# Patient Record
Sex: MALE | Race: WHITE | NOT HISPANIC OR LATINO | Employment: OTHER | URBAN - METROPOLITAN AREA
[De-identification: names, ages, dates, MRNs, and addresses within clinical notes are randomized per-mention and may not be internally consistent; named-entity substitution may affect disease eponyms.]

---

## 2020-01-21 ENCOUNTER — HOSPITAL ENCOUNTER (OUTPATIENT)
Dept: OTHER | Facility: HOSPITAL | Age: 65
Discharge: HOME | End: 2020-01-21
Attending: FAMILY MEDICINE

## 2020-01-21 LAB
CREAT BLD-MCNC: 0.7 MG/DL (ref 0.6–1.4)
GFR SERPLBLD BASED ON 1.73 SQ M-ARVRAT: >60 ML/MIN/{1.73_M2}

## 2020-02-03 ENCOUNTER — HOSPITAL ENCOUNTER (OUTPATIENT)
Dept: OTHER | Facility: HOSPITAL | Age: 65
Discharge: HOME | End: 2020-02-03
Attending: INTERNAL MEDICINE

## 2020-02-03 LAB
BASE EXCESS BLD CALC-SCNC: 0.2 MMOL/L
COHGB MFR BLD: 1.2 % (ref 0–1.5)
CONV ALLEN'S TEST: ABNORMAL
CONV FHHB: 14 % (ref 0–5)
CONV FIO2: 21 % (ref 21–100)
CONV SITE: ABNORMAL
HBA1C MFR BLD: 13.9 % (ref 13.8–16.4)
HCO3 BLDA-SCNC: 26 MMOL/L (ref 22–26)
LABORATORY COMMENT REPORT: ABNORMAL
LITERS PER MINUTE: 0 L/MIN
Lab: ABNORMAL
METHGB MFR BLD: 0.2 % (ref 0–1.5)
OXYHGB MFR BLD: 84.6 % (ref 94–98)
PCO2 BLD: 46.5 MM[HG] (ref 35–45)
PH UR: 7.36 [PH] (ref 7.35–7.45)
PO2 BLD: 237 MM[HG] (ref 0–500)
PO2 BLD: 49.8 MM[HG] (ref 80–100)
SAO2 % BLDCOA: 85.8 % (ref 95–99)
SPECIMEN SOURCE: ABNORMAL
SPO2: 87 MM[HG] (ref 21–100)

## 2020-05-11 ENCOUNTER — HOSPITAL ENCOUNTER (OUTPATIENT)
Dept: CARDIOLOGY | Facility: HOSPITAL | Age: 65
Discharge: HOME | End: 2020-05-11

## 2020-06-24 ENCOUNTER — HOSPITAL ENCOUNTER (OUTPATIENT)
Dept: GENERAL RADIOLOGY | Facility: HOSPITAL | Age: 65
Discharge: HOME | End: 2020-06-24
Attending: INTERNAL MEDICINE

## 2021-06-03 ENCOUNTER — HOSPITAL ENCOUNTER (INPATIENT)
Dept: TELEMETRY | Facility: HOSPITAL | Age: 66
LOS: 1 days | Discharge: HOME | End: 2021-06-05
Attending: GENERAL PRACTICE | Admitting: FAMILY MEDICINE

## 2021-06-03 LAB
ABO GROUP BLD: NORMAL
ALBUMIN SERPL-MCNC: 4.4 G/DL (ref 3.5–5)
ALBUMIN/GLOB SERPL: 1.4 {RATIO} (ref 1.4–2.6)
ALP SERPL-CCNC: 70 U/L (ref 56–155)
ALT SERPL-CCNC: 17 U/L (ref 10–40)
ANION GAP SERPL CALC-SCNC: 11 MMOL/L (ref 8–19)
AST SERPL-CCNC: 24 U/L (ref 15–50)
BASOPHILS # BLD AUTO: 0.06 10*3/UL (ref 0–0.2)
BASOPHILS NFR BLD AUTO: 0.6 % (ref 0–3)
BILIRUB SERPL-MCNC: 0.29 MG/DL (ref 0.2–1.3)
BLD GP AB SCN SERPL QL: NORMAL
BUN SERPL-MCNC: 21 MG/DL (ref 5–25)
BUN/CREAT SERPL: 30 {RATIO} (ref 6–20)
CALCIUM SERPL-MCNC: 9.4 MG/DL (ref 8.7–10.4)
CHLORIDE SERPL-SCNC: 97 MMOL/L (ref 99–111)
CONV ABD CONTROL: NORMAL
CONV ABS IMM GRAN: 0.04 10*3/UL (ref 0–0.2)
CONV CO2: 34 MMOL/L (ref 22–32)
CONV IMMATURE GRAN: 0.4 % (ref 0–1.8)
CONV TOTAL PROTEIN: 7.6 G/DL (ref 6.3–8.2)
CREAT UR-MCNC: 0.7 MG/DL (ref 0.7–1.2)
DEPRECATED RDW RBC AUTO: 41.7 FL (ref 35.1–43.9)
EOSINOPHIL # BLD AUTO: 0.18 10*3/UL (ref 0–0.7)
EOSINOPHIL # BLD AUTO: 1.7 % (ref 0–7)
ERYTHROCYTE [DISTWIDTH] IN BLOOD BY AUTOMATED COUNT: 12.1 % (ref 11.6–14.4)
GFR SERPLBLD BASED ON 1.73 SQ M-ARVRAT: >60 ML/MIN/{1.73_M2}
GLOBULIN UR ELPH-MCNC: 3.2 G/DL (ref 2–3.5)
GLUCOSE SERPL-MCNC: 123 MG/DL (ref 70–99)
HCT VFR BLD AUTO: 41.2 % (ref 42–52)
HGB BLD-MCNC: 12.9 G/DL (ref 14–18)
INR PPP: 0.99 (ref 2–3)
LYMPHOCYTES # BLD AUTO: 1.05 10*3/UL (ref 1–5)
LYMPHOCYTES NFR BLD AUTO: 9.8 % (ref 20–45)
MCH RBC QN AUTO: 29.2 PG (ref 27–31)
MCHC RBC AUTO-ENTMCNC: 31.3 G/DL (ref 33–37)
MCV RBC AUTO: 93.2 FL (ref 80–96)
MONOCYTES # BLD AUTO: 0.49 10*3/UL (ref 0.2–1.2)
MONOCYTES NFR BLD AUTO: 4.6 % (ref 3–10)
NEUTROPHILS # BLD AUTO: 8.87 10*3/UL (ref 2–8)
NEUTROPHILS NFR BLD AUTO: 82.9 % (ref 30–85)
NRBC CBCN: 0 % (ref 0–0.7)
OSMOLALITY SERPL CALC.SUM OF ELEC: 290 MOSM/KG (ref 273–304)
PLATELET # BLD AUTO: 304 10*3/UL (ref 130–400)
PMV BLD AUTO: 9.8 FL (ref 9.4–12.4)
POTASSIUM SERPL-SCNC: 4.1 MMOL/L (ref 3.5–5.3)
PROTHROMBIN TIME: 10.8 S (ref 9.4–12)
RBC # BLD AUTO: 4.42 10*6/UL (ref 4.7–6.1)
RH BLD: NORMAL
SODIUM SERPL-SCNC: 138 MMOL/L (ref 135–147)
WBC # BLD AUTO: 10.69 10*3/UL (ref 4.8–10.8)

## 2021-06-03 PROCEDURE — 9990

## 2021-06-03 PROCEDURE — C9113 INJ PANTOPRAZOLE SODIUM, VIA: HCPCS

## 2021-06-04 PROBLEM — I10 ESSENTIAL HYPERTENSION: Status: ACTIVE | Noted: 2021-06-04

## 2021-06-04 PROBLEM — I25.10 CAD (CORONARY ARTERY DISEASE): Status: ACTIVE | Noted: 2021-06-04

## 2021-06-04 PROBLEM — K62.5 RECTAL BLEEDING: Status: ACTIVE | Noted: 2021-06-04

## 2021-06-04 PROBLEM — D62 ACUTE BLOOD LOSS ANEMIA: Status: ACTIVE | Noted: 2021-06-04

## 2021-06-04 PROBLEM — J44.9 COPD (CHRONIC OBSTRUCTIVE PULMONARY DISEASE): Status: ACTIVE | Noted: 2021-06-04

## 2021-06-04 PROBLEM — Z86.73 HISTORY OF ISCHEMIC STROKE: Status: ACTIVE | Noted: 2021-06-04

## 2021-06-04 LAB
ANION GAP SERPL CALC-SCNC: 14 MMOL/L (ref 8–19)
BUN SERPL-MCNC: 18 MG/DL (ref 5–25)
BUN/CREAT SERPL: 30 {RATIO} (ref 6–20)
CALCIUM SERPL-MCNC: 8 MG/DL (ref 8.7–10.4)
CHLORIDE SERPL-SCNC: 104 MMOL/L (ref 99–111)
CONV CO2: 27 MMOL/L (ref 22–32)
CREAT UR-MCNC: 0.61 MG/DL (ref 0.7–1.2)
GFR SERPLBLD BASED ON 1.73 SQ M-ARVRAT: >60 ML/MIN/{1.73_M2}
GLUCOSE SERPL-MCNC: 94 MG/DL (ref 70–99)
HCT VFR BLD AUTO: 33.4 % (ref 42–52)
HCT VFR BLD AUTO: 35.6 % (ref 42–52)
HCT VFR BLD AUTO: 35.7 % (ref 42–52)
HGB BLD-MCNC: 10.3 G/DL (ref 14–18)
HGB BLD-MCNC: 10.9 G/DL (ref 14–18)
HGB BLD-MCNC: 11.1 G/DL (ref 14–18)
OSMOLALITY SERPL CALC.SUM OF ELEC: 294 MOSM/KG (ref 273–304)
POTASSIUM SERPL-SCNC: 4 MMOL/L (ref 3.5–5.3)
SODIUM SERPL-SCNC: 141 MMOL/L (ref 135–147)

## 2021-06-04 PROCEDURE — 9990

## 2021-06-04 PROCEDURE — 0DBP8ZX EXCISION OF RECTUM, VIA NATURAL OR ARTIFICIAL OPENING ENDOSCOPIC, DIAGNOSTIC: ICD-10-PCS | Performed by: SURGERY

## 2021-06-04 PROCEDURE — 88305 TISSUE EXAM BY PATHOLOGIST: CPT | Performed by: SURGERY

## 2021-06-04 PROCEDURE — 0DBL8ZX EXCISION OF TRANSVERSE COLON, VIA NATURAL OR ARTIFICIAL OPENING ENDOSCOPIC, DIAGNOSTIC: ICD-10-PCS | Performed by: SURGERY

## 2021-06-04 PROCEDURE — 0DBM8ZX EXCISION OF DESCENDING COLON, VIA NATURAL OR ARTIFICIAL OPENING ENDOSCOPIC, DIAGNOSTIC: ICD-10-PCS | Performed by: SURGERY

## 2021-06-04 RX ORDER — ARFORMOTEROL TARTRATE 15 UG/2ML
15 SOLUTION RESPIRATORY (INHALATION)
Status: DISCONTINUED | OUTPATIENT
Start: 2021-06-05 | End: 2021-06-05 | Stop reason: HOSPADM

## 2021-06-04 RX ORDER — LORATADINE 10 MG/1
10 TABLET ORAL DAILY
COMMUNITY

## 2021-06-04 RX ORDER — GUAIFENESIN 600 MG/1
600 TABLET, EXTENDED RELEASE ORAL 2 TIMES DAILY PRN
COMMUNITY

## 2021-06-04 RX ORDER — METOPROLOL SUCCINATE 50 MG/1
50 TABLET, EXTENDED RELEASE ORAL 2 TIMES DAILY
Status: ON HOLD | COMMUNITY
End: 2022-08-08

## 2021-06-04 RX ORDER — ALPRAZOLAM 0.25 MG/1
0.12 TABLET ORAL 2 TIMES DAILY
COMMUNITY
End: 2021-11-03

## 2021-06-04 RX ORDER — IPRATROPIUM BROMIDE AND ALBUTEROL SULFATE 2.5; .5 MG/3ML; MG/3ML
3 SOLUTION RESPIRATORY (INHALATION) EVERY 4 HOURS PRN
Status: DISCONTINUED | OUTPATIENT
Start: 2021-06-05 | End: 2021-06-05 | Stop reason: HOSPADM

## 2021-06-04 RX ORDER — IPRATROPIUM BROMIDE AND ALBUTEROL SULFATE 2.5; .5 MG/3ML; MG/3ML
3 SOLUTION RESPIRATORY (INHALATION)
COMMUNITY
End: 2021-11-03

## 2021-06-04 RX ORDER — ATORVASTATIN CALCIUM 10 MG/1
10 TABLET, FILM COATED ORAL NIGHTLY
COMMUNITY

## 2021-06-04 RX ORDER — OMEPRAZOLE 20 MG/1
20 CAPSULE, DELAYED RELEASE ORAL DAILY
COMMUNITY

## 2021-06-04 RX ORDER — ONDANSETRON 2 MG/ML
4 INJECTION INTRAMUSCULAR; INTRAVENOUS EVERY 4 HOURS PRN
Status: DISCONTINUED | OUTPATIENT
Start: 2021-06-05 | End: 2021-06-05 | Stop reason: HOSPADM

## 2021-06-04 RX ORDER — SODIUM CHLORIDE 9 MG/ML
30 INJECTION, SOLUTION INTRAVENOUS EVERY MORNING
Status: DISCONTINUED | OUTPATIENT
Start: 2021-06-05 | End: 2021-06-05 | Stop reason: HOSPADM

## 2021-06-04 RX ORDER — SODIUM CHLORIDE 0.9 % (FLUSH) 0.9 %
3 SYRINGE (ML) INJECTION EVERY 12 HOURS SCHEDULED
Status: DISCONTINUED | OUTPATIENT
Start: 2021-06-05 | End: 2021-06-05 | Stop reason: HOSPADM

## 2021-06-04 RX ORDER — SODIUM CHLORIDE 0.9 % (FLUSH) 0.9 %
3 SYRINGE (ML) INJECTION AS NEEDED
Status: DISCONTINUED | OUTPATIENT
Start: 2021-06-05 | End: 2021-06-05 | Stop reason: HOSPADM

## 2021-06-04 RX ORDER — MULTIPLE VITAMINS W/ MINERALS TAB 9MG-400MCG
1 TAB ORAL DAILY
COMMUNITY

## 2021-06-04 RX ORDER — ASPIRIN 81 MG/1
81 TABLET, CHEWABLE ORAL DAILY
COMMUNITY

## 2021-06-04 RX ORDER — BUDESONIDE AND FORMOTEROL FUMARATE DIHYDRATE 160; 4.5 UG/1; UG/1
2 AEROSOL RESPIRATORY (INHALATION)
COMMUNITY
End: 2022-05-26

## 2021-06-04 RX ORDER — LEVOFLOXACIN 750 MG/1
750 TABLET ORAL DAILY
COMMUNITY
End: 2021-06-05 | Stop reason: HOSPADM

## 2021-06-04 RX ORDER — ROFLUMILAST 500 UG/1
500 TABLET ORAL DAILY
COMMUNITY

## 2021-06-04 RX ORDER — METOPROLOL TARTRATE 50 MG/1
50 TABLET, FILM COATED ORAL EVERY 12 HOURS SCHEDULED
Status: DISCONTINUED | OUTPATIENT
Start: 2021-06-05 | End: 2021-06-05 | Stop reason: HOSPADM

## 2021-06-04 RX ORDER — BUDESONIDE 0.5 MG/2ML
0.5 INHALANT ORAL
Status: DISCONTINUED | OUTPATIENT
Start: 2021-06-05 | End: 2021-06-05 | Stop reason: HOSPADM

## 2021-06-04 RX ORDER — FAMOTIDINE 20 MG/1
20 TABLET, FILM COATED ORAL 2 TIMES DAILY
COMMUNITY
End: 2021-11-03

## 2021-06-05 VITALS
HEIGHT: 74 IN | OXYGEN SATURATION: 98 % | DIASTOLIC BLOOD PRESSURE: 58 MMHG | WEIGHT: 127.65 LBS | HEART RATE: 79 BPM | BODY MASS INDEX: 16.38 KG/M2 | RESPIRATION RATE: 18 BRPM | SYSTOLIC BLOOD PRESSURE: 100 MMHG | TEMPERATURE: 98.6 F

## 2021-06-05 LAB
ANION GAP SERPL CALCULATED.3IONS-SCNC: 7.1 MMOL/L (ref 5–15)
BASOPHILS # BLD AUTO: 0.08 10*3/MM3 (ref 0–0.2)
BASOPHILS NFR BLD AUTO: 0.9 % (ref 0–1.5)
BUN SERPL-MCNC: 21 MG/DL (ref 8–23)
BUN/CREAT SERPL: 36.8 (ref 7–25)
CALCIUM SPEC-SCNC: 8.4 MG/DL (ref 8.6–10.5)
CHLORIDE SERPL-SCNC: 109 MMOL/L (ref 98–107)
CO2 SERPL-SCNC: 28.9 MMOL/L (ref 22–29)
CREAT SERPL-MCNC: 0.57 MG/DL (ref 0.76–1.27)
DEPRECATED RDW RBC AUTO: 42.8 FL (ref 37–54)
EOSINOPHIL # BLD AUTO: 0.3 10*3/MM3 (ref 0–0.4)
EOSINOPHIL NFR BLD AUTO: 3.5 % (ref 0.3–6.2)
ERYTHROCYTE [DISTWIDTH] IN BLOOD BY AUTOMATED COUNT: 12.4 % (ref 12.3–15.4)
GFR SERPL CREATININE-BSD FRML MDRD: 144 ML/MIN/1.73
GLUCOSE SERPL-MCNC: 81 MG/DL (ref 65–99)
HCT VFR BLD AUTO: 30.6 % (ref 37.5–51)
HGB BLD-MCNC: 9.3 G/DL (ref 13–17.7)
IMM GRANULOCYTES # BLD AUTO: 0.02 10*3/MM3 (ref 0–0.05)
IMM GRANULOCYTES NFR BLD AUTO: 0.2 % (ref 0–0.5)
LYMPHOCYTES # BLD AUTO: 1.19 10*3/MM3 (ref 0.7–3.1)
LYMPHOCYTES NFR BLD AUTO: 13.9 % (ref 19.6–45.3)
MCH RBC QN AUTO: 29.2 PG (ref 26.6–33)
MCHC RBC AUTO-ENTMCNC: 30.4 G/DL (ref 31.5–35.7)
MCV RBC AUTO: 95.9 FL (ref 79–97)
MONOCYTES # BLD AUTO: 0.59 10*3/MM3 (ref 0.1–0.9)
MONOCYTES NFR BLD AUTO: 6.9 % (ref 5–12)
NEUTROPHILS NFR BLD AUTO: 6.38 10*3/MM3 (ref 1.7–7)
NEUTROPHILS NFR BLD AUTO: 74.6 % (ref 42.7–76)
NRBC BLD AUTO-RTO: 0 /100 WBC (ref 0–0.2)
PLATELET # BLD AUTO: 254 10*3/MM3 (ref 140–450)
PMV BLD AUTO: 10.5 FL (ref 6–12)
POTASSIUM SERPL-SCNC: 3.9 MMOL/L (ref 3.5–5.2)
RBC # BLD AUTO: 3.19 10*6/MM3 (ref 4.14–5.8)
SODIUM SERPL-SCNC: 145 MMOL/L (ref 136–145)
WBC # BLD AUTO: 8.56 10*3/MM3 (ref 3.4–10.8)

## 2021-06-05 PROCEDURE — 94799 UNLISTED PULMONARY SVC/PX: CPT

## 2021-06-05 PROCEDURE — 85025 COMPLETE CBC W/AUTO DIFF WBC: CPT | Performed by: FAMILY MEDICINE

## 2021-06-05 PROCEDURE — 99239 HOSP IP/OBS DSCHRG MGMT >30: CPT | Performed by: FAMILY MEDICINE

## 2021-06-05 PROCEDURE — 80048 BASIC METABOLIC PNL TOTAL CA: CPT | Performed by: FAMILY MEDICINE

## 2021-06-05 RX ADMIN — METOPROLOL TARTRATE 50 MG: 50 TABLET, FILM COATED ORAL at 09:10

## 2021-06-05 RX ADMIN — BUDESONIDE 0.5 MG: 0.5 INHALANT ORAL at 08:35

## 2021-06-05 RX ADMIN — SODIUM CHLORIDE, PRESERVATIVE FREE 3 ML: 5 INJECTION INTRAVENOUS at 09:11

## 2021-06-05 RX ADMIN — ARFORMOTEROL TARTRATE 15 MCG: 15 SOLUTION RESPIRATORY (INHALATION) at 08:35

## 2021-06-06 ENCOUNTER — READMISSION MANAGEMENT (OUTPATIENT)
Dept: CALL CENTER | Facility: HOSPITAL | Age: 66
End: 2021-06-06

## 2021-06-07 ENCOUNTER — LAB REQUISITION (OUTPATIENT)
Dept: LAB | Facility: HOSPITAL | Age: 66
End: 2021-06-07

## 2021-06-07 ENCOUNTER — READMISSION MANAGEMENT (OUTPATIENT)
Dept: CALL CENTER | Facility: HOSPITAL | Age: 66
End: 2021-06-07

## 2021-06-07 DIAGNOSIS — K51.411: ICD-10-CM

## 2021-06-07 NOTE — OUTREACH NOTE
Medical Week 1 Survey      Responses   [facility] patient discharged from?  [facility]   Does the patient have one of the following disease processes/diagnoses(primary or secondary)?  Other   Week 1 attempt successful?  Yes   Call start time  1408   Call end time  1418   Discharge diagnosis  rectal bleed,  rectal mass 6+cm w/ pending biopsies   Is patient permission given to speak with other caregiver?  Yes [Sons]   Meds reviewed with patient/caregiver?  Yes   Is the patient having any side effects they believe may be caused by any medication additions or changes?  No   Does the patient have all medications ordered at discharge?  N/A   Is the patient taking all medications as directed (includes completed medication regime)?  Yes   Medication comments  No new meds   Does the patient have a primary care provider?   Yes   Does the patient have an appointment with their PCP within 7 days of discharge?  No   [facility] regarding [facility]   What is preventing the patient from scheduling follow up appointments within 7 days of discharge?  Haven't had time   Nursing Interventions  Advised patient to make appointment, Educated patient on importance of making appointment   Has the patient kept scheduled appointments due by today?  N/A   Has home health visited the patient within 72 hours of discharge?  N/A   Psychosocial issues?  No   Did the patient receive a copy of their discharge instructions?  Yes   Nursing interventions  Reviewed instructions with patient   What is the patient's perception of their health status since discharge?  Improving   Is the patient/caregiver able to teach back signs and symptoms related to disease process for when to call PCP?  Yes   Is the patient/caregiver able to teach back signs and symptoms related to disease process for when to call 911?  Yes   Is the patient/caregiver able to teach back the hierarchy of who to call/visit for symptoms/problems? PCP, Specialist, Home health nurse,  Urgent Care, ED, 911  Yes   If the patient is a current smoker, are they able to teach back resources for cessation?  Not a smoker   Additional teach back comments  Pt instructed to watch for melena. He is eating and drinking well and advised to f/u with PCP.    Week 1 call completed?  Yes          Peri Zavala RN

## 2021-06-07 NOTE — OUTREACH NOTE
Prep Survey      Responses   [facility] facility patient discharged from?  [facility]   Is LACE score < 7 ?  No   Emergency Room discharge w/ pulse ox?  No   Eligibility  Readm Mgmt   Discharge diagnosis  rectal bleed,  rectal mass 6+cm w/ pending biopsies   Does the patient have one of the following disease processes/diagnoses(primary or secondary)?  Other   Does the patient have Home health ordered?  No   Is there a DME ordered?  No   Prep survey completed?  Yes          Gabrielle Woo RN

## 2021-06-08 LAB
CYTO UR: NORMAL
LAB AP CASE REPORT: NORMAL
LAB AP CLINICAL INFORMATION: NORMAL
PATH REPORT.FINAL DX SPEC: NORMAL
PATH REPORT.GROSS SPEC: NORMAL

## 2021-06-15 ENCOUNTER — READMISSION MANAGEMENT (OUTPATIENT)
Dept: CALL CENTER | Facility: HOSPITAL | Age: 66
End: 2021-06-15

## 2021-06-15 NOTE — OUTREACH NOTE
Medical Week 2 Survey      Responses   [facility] patient discharged from?  [facility]   Does the patient have one of the following disease processes/diagnoses(primary or secondary)?  Other   Week 2 attempt successful?  No   Unsuccessful attempts  Attempt 1          Leslie Goodrich RN

## 2021-06-22 ENCOUNTER — READMISSION MANAGEMENT (OUTPATIENT)
Dept: CALL CENTER | Facility: HOSPITAL | Age: 66
End: 2021-06-22

## 2021-06-22 NOTE — OUTREACH NOTE
Medical Week 2 Survey      Responses   [facility] patient discharged from?  [facility]   Does the patient have one of the following disease processes/diagnoses(primary or secondary)?  Other   Week 2 attempt successful?  Yes   Call start time  1101   Discharge diagnosis  rectal bleed,  rectal mass 6+cm w/ pending biopsies   Call end time  1106   Meds reviewed with patient/caregiver?  Yes   Is the patient having any side effects they believe may be caused by any medication additions or changes?  No   Does the patient have all medications ordered at discharge?  N/A   Is the patient taking all medications as directed (includes completed medication regime)?  Yes   Medication comments  NO new meds   Comments regarding appointments  [facility]   Does the patient have a primary care provider?   Yes   Has the patient kept scheduled appointments due by today?  N/A   Has home health visited the patient within 72 hours of discharge?  N/A   Psychosocial issues?  No   Did the patient receive a copy of their discharge instructions?  Yes   Nursing interventions  Reviewed instructions with patient   What is the patient's perception of their health status since discharge?  Improving   Is the patient/caregiver able to teach back signs and symptoms related to disease process for when to call PCP?  Yes   Is the patient/caregiver able to teach back signs and symptoms related to disease process for when to call 911?  Yes   Is the patient/caregiver able to teach back the hierarchy of who to call/visit for symptoms/problems? PCP, Specialist, Home health nurse, Urgent Care, ED, 911  Yes   If the patient is a current smoker, are they able to teach back resources for cessation?  Not a smoker   Additional teach back comments  Patient denies any further issues with rectal bleeding, tolerating po, no n/v and denies SOA.    Week 2 Call Completed?  Yes   Graduated  Yes   Graduated/Revoked comments  Patient is doing well, f/u scheduled  on 6/24/21. No immediate needs identified          Janette Head RN

## 2021-11-02 PROBLEM — I11.9 HYPERTENSIVE HEART DISEASE WITHOUT CONGESTIVE HEART FAILURE: Status: ACTIVE | Noted: 2021-11-02

## 2021-11-02 PROBLEM — R09.82 POSTNASAL DRIP: Status: ACTIVE | Noted: 2021-11-02

## 2021-11-02 PROBLEM — Z99.81 OXYGEN DEPENDENT: Status: ACTIVE | Noted: 2021-11-02

## 2021-11-02 PROBLEM — Z23 ENCOUNTER FOR IMMUNIZATION: Status: ACTIVE | Noted: 2021-11-02

## 2021-11-02 PROBLEM — R09.02 HYPOXEMIA: Status: ACTIVE | Noted: 2021-11-02

## 2021-11-02 PROBLEM — R06.02 SHORTNESS OF BREATH: Status: ACTIVE | Noted: 2021-11-02

## 2021-11-02 PROBLEM — Z87.891 EX-SMOKER: Status: ACTIVE | Noted: 2021-11-02

## 2021-11-02 PROBLEM — E78.5 HYPERLIPIDEMIA: Status: ACTIVE | Noted: 2021-11-02

## 2021-11-02 RX ORDER — ALBUTEROL SULFATE 2.5 MG/3ML
2.5 SOLUTION RESPIRATORY (INHALATION) EVERY 4 HOURS PRN
Status: ON HOLD | COMMUNITY
End: 2022-08-08

## 2021-11-03 ENCOUNTER — CONSULT (OUTPATIENT)
Dept: RADIATION ONCOLOGY | Facility: HOSPITAL | Age: 66
End: 2021-11-03

## 2021-11-03 ENCOUNTER — CONSULT (OUTPATIENT)
Dept: ONCOLOGY | Facility: HOSPITAL | Age: 66
End: 2021-11-03

## 2021-11-03 VITALS
WEIGHT: 124.56 LBS | DIASTOLIC BLOOD PRESSURE: 70 MMHG | OXYGEN SATURATION: 92 % | SYSTOLIC BLOOD PRESSURE: 130 MMHG | HEART RATE: 85 BPM | BODY MASS INDEX: 16.51 KG/M2 | RESPIRATION RATE: 18 BRPM | TEMPERATURE: 97.1 F | HEIGHT: 73 IN

## 2021-11-03 VITALS
OXYGEN SATURATION: 93 % | BODY MASS INDEX: 16.68 KG/M2 | TEMPERATURE: 99.6 F | DIASTOLIC BLOOD PRESSURE: 76 MMHG | SYSTOLIC BLOOD PRESSURE: 143 MMHG | HEART RATE: 78 BPM | RESPIRATION RATE: 16 BRPM | WEIGHT: 125.88 LBS | HEIGHT: 73 IN

## 2021-11-03 DIAGNOSIS — C20 RECTAL CANCER (HCC): Primary | ICD-10-CM

## 2021-11-03 LAB
ALBUMIN SERPL-MCNC: 4.1 G/DL (ref 3.5–5.2)
ALBUMIN/GLOB SERPL: 1.5 G/DL
ALP SERPL-CCNC: 54 U/L (ref 39–117)
ALT SERPL W P-5'-P-CCNC: 20 U/L (ref 1–41)
ANION GAP SERPL CALCULATED.3IONS-SCNC: 8.5 MMOL/L (ref 5–15)
AST SERPL-CCNC: 20 U/L (ref 1–40)
BILIRUB SERPL-MCNC: 0.2 MG/DL (ref 0–1.2)
BUN SERPL-MCNC: 23 MG/DL (ref 8–23)
BUN/CREAT SERPL: 34.8 (ref 7–25)
CALCIUM SPEC-SCNC: 9 MG/DL (ref 8.6–10.5)
CHLORIDE SERPL-SCNC: 99 MMOL/L (ref 98–107)
CO2 SERPL-SCNC: 32.5 MMOL/L (ref 22–29)
CREAT SERPL-MCNC: 0.66 MG/DL (ref 0.76–1.27)
GFR SERPL CREATININE-BSD FRML MDRD: 122 ML/MIN/1.73
GLOBULIN UR ELPH-MCNC: 2.8 GM/DL
GLUCOSE SERPL-MCNC: 99 MG/DL (ref 65–99)
POTASSIUM SERPL-SCNC: 4.8 MMOL/L (ref 3.5–5.2)
PROT SERPL-MCNC: 6.9 G/DL (ref 6–8.5)
SODIUM SERPL-SCNC: 140 MMOL/L (ref 136–145)

## 2021-11-03 PROCEDURE — 99204 OFFICE O/P NEW MOD 45 MIN: CPT | Performed by: INTERNAL MEDICINE

## 2021-11-03 PROCEDURE — 80053 COMPREHEN METABOLIC PANEL: CPT | Performed by: RADIOLOGY

## 2021-11-03 PROCEDURE — G0463 HOSPITAL OUTPT CLINIC VISIT: HCPCS | Performed by: INTERNAL MEDICINE

## 2021-11-03 PROCEDURE — 99205 OFFICE O/P NEW HI 60 MIN: CPT | Performed by: RADIOLOGY

## 2021-11-03 PROCEDURE — G0463 HOSPITAL OUTPT CLINIC VISIT: HCPCS

## 2021-11-03 RX ORDER — NEOMYCIN SULFATE 500 MG/1
TABLET ORAL
COMMUNITY
Start: 2021-09-01 | End: 2021-11-03

## 2021-11-03 RX ORDER — CAPECITABINE 500 MG/1
TABLET, FILM COATED ORAL
Qty: 180 TABLET | Refills: 0 | Status: SHIPPED | OUTPATIENT
Start: 2021-11-03 | End: 2022-01-26

## 2021-11-03 RX ORDER — MELATONIN
1000 DAILY
COMMUNITY
End: 2021-11-03

## 2021-11-03 RX ORDER — ONDANSETRON HYDROCHLORIDE 8 MG/1
8 TABLET, FILM COATED ORAL 3 TIMES DAILY PRN
Qty: 30 TABLET | Refills: 5 | Status: SHIPPED | OUTPATIENT
Start: 2021-11-03 | End: 2022-01-26

## 2021-11-03 NOTE — PROGRESS NOTES
New Patient Office Visit      Encounter Date: 11/03/2021   Patient Name: Pavel Dai  YOB: 1955   Medical Record Number: [id number]   Primary Diagnosis: Rectal cancer (HCC) [C20]       Chief Complaint:    Chief Complaint   Patient presents with   • Consult     Rectal Cancer       History of Present Illness: Pavel Dai is a 66 y.o. male who is here today to be seen in Radiation Oncology for   Evaluation of a recently diagnosed adenocarcinoma of the rectum.  Presenting symptoms included a GI bleed which prompted a colonoscopy. This revealed a mass consistent in appearance with a tubulovillous adenoma located 6 to 7 cm from the anal verge.  He had a transanal resection of the mass on 9/22 at the [facility].  Pathology revealed a moderately differentiated adenocarcinoma with superficial invasion of the muscularis propria.  Positive margins were noted peripherally.  There was LVI present on the specimen.  His case was discussed in a multidisciplinary setting.  Recommendation for adjuvant chemoradiation was made.. He wanted to receive radiation treatment closer to home and is here to establish care.  He recently had a PET/CT.  He was unable to tolerate an MRI of the pelvis.     Subjective      Prior Radiation History: no  Pacemaker or ICD: no    Review of Systems: Review of Systems   Constitutional: Negative for appetite change and fatigue.   HENT: Negative for trouble swallowing.    Eyes: Negative for visual disturbance.   Respiratory: Positive for cough (productive of clear/yellow sputum) and shortness of breath (on oxygen @ 2L).    Cardiovascular: Positive for leg swelling (intermittently). Negative for palpitations.   Gastrointestinal: Positive for abdominal pain (occasional sharp pain in lower abdomen) and blood in stool (at diagnosis, has since resolved). Negative for constipation, diarrhea, nausea and rectal pain.   Genitourinary: Negative  for difficulty urinating, dysuria and frequency.   Musculoskeletal: Positive for gait problem (uses w/c as needed r/t soa). Negative for back pain.   Neurological: Positive for weakness. Negative for dizziness and headaches.   Psychiatric/Behavioral: Positive for sleep disturbance.       Past Medical History:   Past Medical History:   Diagnosis Date   • CHF (congestive heart failure) (HCC)    • COPD (chronic obstructive pulmonary disease) (HCC)    • Coronary artery disease    • Frequent urination    • Hyperlipidemia    • Hypertension    • Rectal cancer (HCC)        Past Surgical History:   Past Surgical History:   Procedure Laterality Date   • COLONOSCOPY     • HERNIA REPAIR      approximately 4 years ago    • RECTAL SURGERY     • SHOULDER SURGERY      joint loose, calcium particles removed from shoulder joint       Family History:   Family History   Problem Relation Age of Onset   • No Known Problems Mother        Social History:   Social History     Socioeconomic History   • Marital status:    Tobacco Use   • Smoking status: Former Smoker     Start date: 11/3/1973     Quit date: 11/3/2014     Years since quittin.0   • Smokeless tobacco: Never Used   Vaping Use   • Vaping Use: Never used   Substance and Sexual Activity   • Alcohol use: Not Currently   • Drug use: Never   • Sexual activity: Defer       Medications:     Current Outpatient Medications:   •  albuterol (PROVENTIL) (2.5 MG/3ML) 0.083% nebulizer solution, Every 4 (Four) Hours., Disp: , Rfl:   •  aspirin 81 MG chewable tablet, Chew 81 mg Daily., Disp: , Rfl:   •  atorvastatin (LIPITOR) 10 MG tablet, Take 10 mg by mouth Every Night., Disp: , Rfl:   •  budesonide-formoterol (SYMBICORT) 160-4.5 MCG/ACT inhaler, Inhale 2 puffs 2 (Two) Times a Day., Disp: , Rfl:   •  guaiFENesin (MUCINEX) 600 MG 12 hr tablet, Take 600 mg by mouth 2 (Two) Times a Day., Disp: , Rfl:   •  ipratropium-albuterol (COMBIVENT RESPIMAT)  MCG/ACT inhaler, Inhale 1 puff 4  "(Four) Times a Day. Do not exceed inhalations per day, Disp: , Rfl:   •  loratadine (CLARITIN) 10 MG tablet, Take 10 mg by mouth Daily., Disp: , Rfl:   •  metoprolol succinate XL (TOPROL-XL) 50 MG 24 hr tablet, Take 50 mg by mouth 2 (two) times a day., Disp: , Rfl:   •  multivitamin with minerals tablet tablet, Take 1 tablet by mouth Daily., Disp: , Rfl:   •  omeprazole (priLOSEC) 20 MG capsule, Take 20 mg by mouth Daily., Disp: , Rfl:   •  roflumilast (DALIRESP) 500 MCG tablet tablet, Take 500 mcg by mouth Daily., Disp: , Rfl:     Allergies:   No Known Allergies    Measures:   Pain: (on a scale of 0-10)   Pain Score    21 1029   PainSc: 0-No pain       Advanced Care Plan: N Advanced Care Planning was discussed. The patient does not have a living will documented in the medical record and declined to perform one today.  KPS/Quality of Life: 80 - Restricted Physical Activity  ECOG: (2) Ambulatory & Capable of Self Care, Unable to Carry Out Work Activity, Up & About Greater Than 50% of Waking Hours  Depression Screenin    Objective     Physical Exam:   Vital Signs:   Vitals:    21 1029   BP: 143/76   Pulse: 78   Resp: 16   Temp: 99.6 °F (37.6 °C)   TempSrc: Temporal   SpO2: 93%   Weight: 57.1 kg (125 lb 14.1 oz)   Height: 185.4 cm (73\")   PainSc: 0-No pain     Body mass index is 16.61 kg/m².     Constitutional: The patient is a well-developed, well-nourished male  in no acute distress.  Alert and oriented ×3.  General: NAD, sitting comfortably  Eye: EOMI, anicteric sclerae  HENT: NC/AT, MMM  Respiratory: Symmetric expansion, nonlabored respiration  Cardiovascular: Regular rate and rhythm.  No murmurs, rubs, or gallops are appreciated.  Abdomen: nontender, nondistended.   Musculoskeletal: No obvious joint deformities, range of motion intact in all 4 extremities  Neuro: Alert oriented x3, cranial nerves III through XII are grossly intact, with no focal neurological deficits noted on exam.  Psych: Mood and " affect appropriate  Rectal: No masses palpable on rectal exam    PET/CT report available-focal areas of uptake in mid rectum with a mass correlate on comparison CT. mildly increased uptake in the distal rectosigmoid junction with an SUV of 7.1 without a CT correlate.  Nonhypermetabolic PA lymph nodes.  No pelvic or perirectal lymphadenopathy noted      Assessment / Plan      Assessment/Plan:   Diagnoses and all orders for this visit:    1. Rectal cancer (HCC) (Primary)  -     Comprehensive Metabolic Panel    Pavel Dai is a pleasant 66 y.o. male here to discuss adjuvant chemoradiation for his rectal adenocarcinoma.   He had a transanal resection of the mass on 9/22 at the [facility].  Pathology revealed a moderately differentiated adenocarcinoma with superficial invasion of the muscularis propria.  Positive margins were noted peripherally.  There was LVI present on the specimen.  His case was discussed in a multidisciplinary setting.  Recommendation for adjuvant chemoradiation was made. He will be receiving Xeloda through the [facility] in [place].   He was unable to tolerate an MRI.  However he had a PET/CT and does not have evidence of distant metastatic disease.   We have scheduled him for a CT simulation to facilitate treatment planning. Indications, risks, and benefits of treatment were discussed. We discussed the possibility of acute and late toxicities involving the skin, bowel, and bladder. We discussed the risk of radiation induced secondary malignancy. He signed a consent today.     Follow Up:  Return in about 1 week (around 11/10/2021) for CT SIM next visit.      Time:   I spent 65 minutes on this encounter today, 11/03/21. Activities that took place during this time include: preparing to see the patient, obtaining history, reviewing separately obtained history, performing a medically appropriate examination and evaluation, counseling and educating the patient, ordering  medications/tests/procedures, communicating with other healthcare providers, documenting clinical information in the health record, and coordinating care for this patient.     Sincerely,        Kandace Lucio MD  Radiation Oncology  [facility]    This document has been signed by Kandace Lucio MD on November 3, 2021 13:42 EDT             Patient Information:   NOTICE TO PATIENTS-HEALTH RESULTS RELEASE    We believe in information transparency, and we believe you deserve to see your information as soon as it is available.    Lab Results    We release ALL notes and results to you promptly.    Therefore, you may see some results even before we do. Please give us 2 business days to review and let you know our thoughts.  We look at every result. We will contact you with any results that concern us.  Some results may show abnormal, but are not clinically important. An example is “MCHC” and “MCV”.   Other results (like “RAINA”) are really challenging to interpret, and require reviewing other results and other information from your chart. Sometimes these are best discussed in person.    Imaging    CT scan, MRI, and PET reports can show new or re-occurring cancer  These reports can contain words that are hard to understand  These reports can also show unexpected results.  We ALWAYS plan to review these results with you and decide on a plan together. We prefer to do this in person, by video visit, or phone.  When possible, we will discuss the possible results with you BEFORE getting the test, and the next steps we would take with each result.  Some patients prefer to see their results online as soon as possible. Because of possible “bad news,” other patients may feel more comfortable waiting to discuss results when their provider is available at their next video visit or in-person visit or by phone. As the patient, you can choose when to view your result

## 2021-11-03 NOTE — ASSESSMENT & PLAN NOTE
Adenocarcinoma involving a dysplastic polyp with invasion into the muscularis propria-T2 on pathology report, positive lymph vascular invasion, positive margin.  No obvious amber involvement or metastatic disease on imaging including PET scan.  His performance status is ECOG PS 2.  Based on NCCN guidelines, I would recommend concurrent chemo RT for a T2 lesion with positive LVI and positive margins after transanal resection.  I discussed the case with Dr. Lucio, radiation oncology.  I will plan oral Xeloda 825 mg/m² twice daily Monday-Friday throughout the course of radiation.  We discussed possible adjuvant Xeloda to complete 6 months of perioperative therapy depending upon his tolerance and response to treatment with concurrent therapy.  He will need lab work-up for initiating therapy.  Prescription for Zofran also sent as needed for nausea.

## 2021-11-03 NOTE — PROGRESS NOTES
Chief Complaint  Rectal Cancer and Appointment    Jose Luis Abdalla MD Tran, Khue N, MD Subjective Allen Osorio Dai presents to [facility] for evaluation of rectal cancer.  Patient initially presented to the hospital for bright red blood per rectum.  He was evaluated at [facility].  Colonoscopy showed a lesion in the rectal area biopsy demonstrating only tubulovillous adenoma with high-grade dysplasia.  He was then referred to [facility] and underwent transanal excision of the lesion on 9/22/2021.  This demonstrated the high-grade dysplasia but also moderately differentiated adenocarcinoma with superficial invasion of the muscularis propria (T2), peripheral margin was positive.  Lymph vascular invasion identified.  He was presented at [facility] multidisciplinary tumor board.  They recommended adjuvant chemo RT.  He had a PET scan which did not demonstrate any evidence of metastatic disease.  MRI of the pelvis attempted but he was unable to complete the study.  He was initially evaluated through the [facility] but because of travel distance, he wanted to have his treatment more locally and thus presents today.  He was evaluated earlier today by Dr. Lucio with radiation oncology.  I discussed the case with her and we are in agreement for concurrent therapy with an anticipated start date later this month.  He states that the bowels are working well.  He denies any further blood per rectum or melena.  No other masses or adenopathy.  He states that he is not eating very well and has lost a few pounds since early in the summer.  He reports that he did speak with dietitian at the [facility].    Oncology/Hematology History Overview Note   9/22/21 (A)  Rectal mass; stitch on cephalad.  (B)  Right margin.         CLINICAL HISTORY:    Pre-op diagnosis: Anal mass.        DIAGNOSIS:    (A) Rectal mass,  transanal disk excision:     - Moderately differentiated invasive adenocarcinoma with focal superficial   invasion of  muscularis propria (see Synoptic Report)     - Tumor present at 3 o'clock mucosal/peripheral margin    (B) Rectum, right margin, excision:     - Fragment of colon, negative for carcinoma    11/3/21 Consult at [facility] Xeloda initiated with XRT     Rectal cancer (HCC)   11/3/2021 Initial Diagnosis    Rectal cancer (HCC)     11/3/2021 Cancer Staged    Staging form: Colon And Rectum, AJCC 8th Edition  - Clinical: Stage I (cT2, cN0, cM0) - Signed by Issac Walker MD on 11/3/2021     11/22/2021 -  Chemotherapy    OP rectal Capecitabine 825 mg/m2 M-F With Concurrent Radiation          Review of Systems   Constitutional: Negative for appetite change, diaphoresis, fatigue, fever, unexpected weight gain and unexpected weight loss.   HENT: Negative for hearing loss, mouth sores, sore throat, swollen glands, trouble swallowing and voice change.    Eyes: Negative for blurred vision.   Respiratory: Negative for cough, shortness of breath and wheezing.    Cardiovascular: Negative for chest pain and palpitations.   Gastrointestinal: Negative for abdominal pain, blood in stool, constipation, diarrhea, nausea and vomiting.   Endocrine: Negative for cold intolerance and heat intolerance.   Genitourinary: Negative for difficulty urinating, dysuria, frequency, hematuria and urinary incontinence.   Musculoskeletal: Negative for arthralgias, back pain and myalgias.   Skin: Negative for rash, skin lesions and bruise.   Neurological: Negative for dizziness, seizures, weakness, numbness and headache.   Hematological: Does not bruise/bleed easily.   Psychiatric/Behavioral: Negative for depressed mood. The patient is not nervous/anxious.      Current Outpatient Medications on File Prior to Visit   Medication Sig Dispense Refill   • albuterol (PROVENTIL) (2.5 MG/3ML) 0.083% nebulizer solution Every 4 (Four) Hours.     • aspirin  81 MG chewable tablet Chew 81 mg Daily.     • atorvastatin (LIPITOR) 10 MG tablet Take 10 mg by mouth Every Night.     • budesonide-formoterol (SYMBICORT) 160-4.5 MCG/ACT inhaler Inhale 2 puffs 2 (Two) Times a Day.     • guaiFENesin (MUCINEX) 600 MG 12 hr tablet Take 600 mg by mouth 2 (Two) Times a Day.     • ipratropium-albuterol (COMBIVENT RESPIMAT)  MCG/ACT inhaler Inhale 1 puff 4 (Four) Times a Day. Do not exceed inhalations per day     • loratadine (CLARITIN) 10 MG tablet Take 10 mg by mouth Daily.     • metoprolol succinate XL (TOPROL-XL) 50 MG 24 hr tablet Take 50 mg by mouth 2 (two) times a day.     • multivitamin with minerals tablet tablet Take 1 tablet by mouth Daily.     • omeprazole (priLOSEC) 20 MG capsule Take 20 mg by mouth Daily.     • roflumilast (DALIRESP) 500 MCG tablet tablet Take 500 mcg by mouth Daily.     • [DISCONTINUED] ALPRAZolam (XANAX) 0.25 MG tablet Take 0.125 mg by mouth 2 (two) times a day.     • [DISCONTINUED] cholecalciferol (Cholecalciferol) 25 MCG (1000 UT) tablet Take 1,000 Units by mouth Daily.     • [DISCONTINUED] famotidine (PEPCID) 20 MG tablet Take 20 mg by mouth 2 (Two) Times a Day.     • [DISCONTINUED] ipratropium-albuterol (DUO-NEB) 0.5-2.5 mg/3 ml nebulizer Take 3 mL by nebulization Every 4 (Four) Hours While Awake.     • [DISCONTINUED] neomycin (MYCIFRADIN) 500 MG tablet Take 2 tablets at 4 pm, then 2 tablets at bedtime the evening before surgery       No current facility-administered medications on file prior to visit.       No Known Allergies  Past Medical History:   Diagnosis Date   • CHF (congestive heart failure) (HCC)    • COPD (chronic obstructive pulmonary disease) (HCC)    • Coronary artery disease    • Frequent urination    • Hyperlipidemia    • Hypertension    • Rectal cancer (HCC)      Past Surgical History:   Procedure Laterality Date   • COLONOSCOPY     • HERNIA REPAIR      approximately 4 years ago    • RECTAL SURGERY     • SHOULDER SURGERY       "joint loose, calcium particles removed from shoulder joint     Social History     Socioeconomic History   • Marital status:    Tobacco Use   • Smoking status: Former Smoker     Start date: 11/3/1973     Quit date: 11/3/2014     Years since quittin.0   • Smokeless tobacco: Never Used   Vaping Use   • Vaping Use: Never used   Substance and Sexual Activity   • Alcohol use: Not Currently   • Drug use: Never   • Sexual activity: Defer     Family History   Problem Relation Age of Onset   • Heart attack Mother    • Heart attack Sister    • Heart attack Sister        Objective   Physical Exam  Vitals reviewed. Exam conducted with a chaperone present.   Constitutional:       General: He is not in acute distress.     Appearance: Normal appearance.   HENT:      Head: Normocephalic and atraumatic.      Nose:      Comments: Nasal cannula in place  Eyes:      Conjunctiva/sclera: Conjunctivae normal.      Pupils: Pupils are equal, round, and reactive to light.   Cardiovascular:      Rate and Rhythm: Normal rate and regular rhythm.      Heart sounds: Normal heart sounds. No murmur heard.  No gallop.    Pulmonary:      Effort: Pulmonary effort is normal.      Breath sounds: Normal breath sounds.   Abdominal:      General: Abdomen is flat. Bowel sounds are normal. There is no distension.      Palpations: Abdomen is soft.      Tenderness: There is no abdominal tenderness.   Musculoskeletal:      Cervical back: Neck supple.      Right lower leg: No edema.      Left lower leg: No edema.   Lymphadenopathy:      Cervical: No cervical adenopathy.   Neurological:      Mental Status: He is alert and oriented to person, place, and time.   Psychiatric:         Mood and Affect: Mood normal.         Behavior: Behavior normal.         Vitals:    21 1440   BP: 130/70   Pulse: 85   Resp: 18   Temp: 97.1 °F (36.2 °C)   SpO2: 92%  Comment: 2 l   Weight: 56.5 kg (124 lb 9 oz)   Height: 185.4 cm (73\")   PainSc: 0-No pain     ECOG " score: 2         PHQ-9 Total Score: 5                  Result Review :   The following data was reviewed by: Issac Walker MD on 11/03/2021:  Lab Results   Component Value Date    HGB 9.3 (L) 06/05/2021    HCT 30.6 (L) 06/05/2021    MCV 95.9 06/05/2021     06/05/2021    WBC 8.56 06/05/2021    NEUTROABS 6.38 06/05/2021    LYMPHSABS 1.19 06/05/2021    MONOSABS 0.59 06/05/2021    EOSABS 0.30 06/05/2021    BASOSABS 0.08 06/05/2021     Lab Results   Component Value Date    GLUCOSE 99 11/03/2021    BUN 23 11/03/2021    CREATININE 0.66 (L) 11/03/2021     11/03/2021    K 4.8 11/03/2021    CL 99 11/03/2021    CO2 32.5 (H) 11/03/2021    CALCIUM 9.0 11/03/2021    PROTEINTOT 6.9 11/03/2021    ALBUMIN 4.10 11/03/2021    BILITOT 0.2 11/03/2021    ALKPHOS 54 11/03/2021    AST 20 11/03/2021    ALT 20 11/03/2021     Data reviewed: [facility] from [facility] reviewed including oncology notes from Dr. Abdalla, PET scan, pathology report, operative report.      Assessment and Plan    Diagnoses and all orders for this visit:    1. Rectal cancer (HCC) (Primary)  Assessment & Plan:  Adenocarcinoma involving a dysplastic polyp with invasion into the muscularis propria-T2 on pathology report, positive lymph vascular invasion, positive margin.  No obvious amber involvement or metastatic disease on imaging including PET scan.  His performance status is ECOG PS 2.  Based on NCCN guidelines, I would recommend concurrent chemo RT for a T2 lesion with positive LVI and positive margins after transanal resection.  I discussed the case with Dr. Lucio, radiation oncology.  I will plan oral Xeloda 825 mg/m² twice daily Monday-Friday throughout the course of radiation.  We discussed possible adjuvant Xeloda to complete 6 months of perioperative therapy depending upon his tolerance and response to treatment with concurrent therapy.  He will need lab work-up for initiating therapy.  Prescription for  Zofran also sent as needed for nausea.     Orders:  -     CBC & Differential; Future  -     Comprehensive Metabolic Panel; Future  -     CEA; Future  -     capecitabine (XELODA) 500 MG chemo tablet; Take 3 tab(s) by mouth in the AM and 3 tab(s) by mouth in the PM on Monday-Friday with radiation.  Dispense: 180 tablet; Refill: 0  -     ondansetron (ZOFRAN) 8 MG tablet; Take 1 tablet by mouth 3 (Three) Times a Day As Needed for Nausea or Vomiting.  Dispense: 30 tablet; Refill: 5          Patient Follow Up: 2 weeks    Patient was given instructions and counseling regarding his condition or for health maintenance advice. Please see specific information pulled into the AVS if appropriate.     Issac Walker MD    11/3/2021

## 2021-11-04 ENCOUNTER — HOSPITAL ENCOUNTER (OUTPATIENT)
Dept: RADIATION ONCOLOGY | Facility: HOSPITAL | Age: 66
Setting detail: RADIATION/ONCOLOGY SERIES
End: 2021-11-04

## 2021-11-09 ENCOUNTER — HOSPITAL ENCOUNTER (OUTPATIENT)
Dept: RADIATION ONCOLOGY | Facility: HOSPITAL | Age: 66
Setting detail: RADIATION/ONCOLOGY SERIES
Discharge: HOME | End: 2021-11-09

## 2021-11-09 ENCOUNTER — TELEPHONE (OUTPATIENT)
Dept: RADIATION ONCOLOGY | Facility: HOSPITAL | Age: 66
End: 2021-11-09

## 2021-11-09 DIAGNOSIS — C20 MALIGNANT NEOPLASM OF RECTUM (HCC): ICD-10-CM

## 2021-11-09 PROCEDURE — 77334 RADIATION TREATMENT AID(S): CPT | Performed by: RADIOLOGY

## 2021-11-09 PROCEDURE — 77470 SPECIAL RADIATION TREATMENT: CPT | Performed by: RADIOLOGY

## 2021-11-09 PROCEDURE — 77263 THER RADIOLOGY TX PLNG CPLX: CPT | Performed by: RADIOLOGY

## 2021-11-09 PROCEDURE — 0 IOPAMIDOL PER 1 ML: Performed by: RADIOLOGY

## 2021-11-09 RX ADMIN — IOPAMIDOL 100 ML: 755 INJECTION, SOLUTION INTRAVENOUS at 10:10

## 2021-11-09 NOTE — TELEPHONE ENCOUNTER
Diagnosis: Rectal cancer    Reason for referral: Transportation    Comments: OSW received VM from pt requesting gas assistance. Appears pt had a consult with med onc and rad onc last Wednesday and is scheduled for a CT Sim in rad onc this morning. Called rad onc therapist and requested they provide pt with a $15 gas card today. OSW will plan to f/u with pt once tx begins to provide additional resources and assistance. OSW support remains available.    Services/Referrals Provided: Transportation - gas card

## 2021-11-19 ENCOUNTER — HOSPITAL ENCOUNTER (OUTPATIENT)
Dept: RADIATION ONCOLOGY | Facility: HOSPITAL | Age: 66
Discharge: HOME | End: 2021-11-19

## 2021-11-19 PROCEDURE — 77301 RADIOTHERAPY DOSE PLAN IMRT: CPT | Performed by: RADIOLOGY

## 2021-11-19 PROCEDURE — 77338 DESIGN MLC DEVICE FOR IMRT: CPT | Performed by: RADIOLOGY

## 2021-11-19 PROCEDURE — 77300 RADIATION THERAPY DOSE PLAN: CPT | Performed by: RADIOLOGY

## 2021-11-22 ENCOUNTER — LAB (OUTPATIENT)
Dept: LAB | Facility: HOSPITAL | Age: 66
End: 2021-11-22

## 2021-11-22 ENCOUNTER — TELEPHONE (OUTPATIENT)
Dept: ONCOLOGY | Facility: HOSPITAL | Age: 66
End: 2021-11-22

## 2021-11-22 DIAGNOSIS — C20 RECTAL CANCER (HCC): ICD-10-CM

## 2021-11-22 LAB
ALBUMIN SERPL-MCNC: 4.3 G/DL (ref 3.5–5.2)
ALBUMIN/GLOB SERPL: 1.5 G/DL
ALP SERPL-CCNC: 63 U/L (ref 39–117)
ALT SERPL W P-5'-P-CCNC: 21 U/L (ref 1–41)
ANION GAP SERPL CALCULATED.3IONS-SCNC: 5.6 MMOL/L (ref 5–15)
AST SERPL-CCNC: 25 U/L (ref 1–40)
BASOPHILS # BLD AUTO: 0.07 10*3/MM3 (ref 0–0.2)
BASOPHILS NFR BLD AUTO: 1.2 % (ref 0–1.5)
BILIRUB SERPL-MCNC: 0.2 MG/DL (ref 0–1.2)
BUN SERPL-MCNC: 20 MG/DL (ref 8–23)
BUN/CREAT SERPL: 31.3 (ref 7–25)
CALCIUM SPEC-SCNC: 9.3 MG/DL (ref 8.6–10.5)
CEA SERPL-MCNC: 2.82 NG/ML
CHLORIDE SERPL-SCNC: 96 MMOL/L (ref 98–107)
CO2 SERPL-SCNC: 35.4 MMOL/L (ref 22–29)
CREAT SERPL-MCNC: 0.64 MG/DL (ref 0.76–1.27)
DEPRECATED RDW RBC AUTO: 38.1 FL (ref 37–54)
EOSINOPHIL # BLD AUTO: 0.25 10*3/MM3 (ref 0–0.4)
EOSINOPHIL NFR BLD AUTO: 4.3 % (ref 0.3–6.2)
ERYTHROCYTE [DISTWIDTH] IN BLOOD BY AUTOMATED COUNT: 12.7 % (ref 12.3–15.4)
GFR SERPL CREATININE-BSD FRML MDRD: 126 ML/MIN/1.73
GLOBULIN UR ELPH-MCNC: 2.8 GM/DL
GLUCOSE SERPL-MCNC: 106 MG/DL (ref 65–99)
HCT VFR BLD AUTO: 33.2 % (ref 37.5–51)
HGB BLD-MCNC: 10.1 G/DL (ref 13–17.7)
IMM GRANULOCYTES # BLD AUTO: 0.01 10*3/MM3 (ref 0–0.05)
IMM GRANULOCYTES NFR BLD AUTO: 0.2 % (ref 0–0.5)
LYMPHOCYTES # BLD AUTO: 0.99 10*3/MM3 (ref 0.7–3.1)
LYMPHOCYTES NFR BLD AUTO: 17.1 % (ref 19.6–45.3)
MCH RBC QN AUTO: 25 PG (ref 26.6–33)
MCHC RBC AUTO-ENTMCNC: 30.4 G/DL (ref 31.5–35.7)
MCV RBC AUTO: 82.2 FL (ref 79–97)
MONOCYTES # BLD AUTO: 0.51 10*3/MM3 (ref 0.1–0.9)
MONOCYTES NFR BLD AUTO: 8.8 % (ref 5–12)
NEUTROPHILS NFR BLD AUTO: 3.95 10*3/MM3 (ref 1.7–7)
NEUTROPHILS NFR BLD AUTO: 68.4 % (ref 42.7–76)
NRBC BLD AUTO-RTO: 0 /100 WBC (ref 0–0.2)
PLATELET # BLD AUTO: 333 10*3/MM3 (ref 140–450)
PMV BLD AUTO: 11 FL (ref 6–12)
POTASSIUM SERPL-SCNC: 4.7 MMOL/L (ref 3.5–5.2)
PROT SERPL-MCNC: 7.1 G/DL (ref 6–8.5)
RBC # BLD AUTO: 4.04 10*6/MM3 (ref 4.14–5.8)
SODIUM SERPL-SCNC: 137 MMOL/L (ref 136–145)
WBC NRBC COR # BLD: 5.78 10*3/MM3 (ref 3.4–10.8)

## 2021-11-22 PROCEDURE — 85025 COMPLETE CBC W/AUTO DIFF WBC: CPT

## 2021-11-22 PROCEDURE — 82378 CARCINOEMBRYONIC ANTIGEN: CPT

## 2021-11-22 PROCEDURE — 80053 COMPREHEN METABOLIC PANEL: CPT

## 2021-11-22 PROCEDURE — 36415 COLL VENOUS BLD VENIPUNCTURE: CPT

## 2021-11-22 NOTE — TELEPHONE ENCOUNTER
Caller: Pavel Dai    Relationship to patient: Self    Best call back number: [phone] - PT HAS RADIATION TOMORROW AT 1030 SO TRY TO CALL TODAY OR BEFORE OR AFTER THAT APPT TOMORROW. PT DOES NOT HAVE VM SET UP ON HIS PHONE.     Chief complaint: PT WOULD LIKE TO R/S F/U APPT FOR 11/29 TO 11:30 INSTEAD OF 2:45. HE HAS RADIATION AT 10:30 AND HAS A LONG DRIVE HOME SO HE DOESN'T WANT TO HAVE TO WAIT SEVERAL HOURS FOR HIS APPT.     Type of visit: F/U    Requested date: SAME DAY BUT DIFFERENT TIME    Additional notes: PLS CALL PT TO ADVISE.

## 2021-11-23 ENCOUNTER — HOSPITAL ENCOUNTER (OUTPATIENT)
Dept: RADIATION ONCOLOGY | Facility: HOSPITAL | Age: 66
Discharge: HOME | End: 2021-11-23

## 2021-11-23 PROCEDURE — 77014 CHG CT GUIDANCE RADIATION THERAPY FLDS PLACEMENT: CPT | Performed by: RADIOLOGY

## 2021-11-23 PROCEDURE — 77386: CPT | Performed by: RADIOLOGY

## 2021-11-24 ENCOUNTER — HOSPITAL ENCOUNTER (OUTPATIENT)
Dept: RADIATION ONCOLOGY | Facility: HOSPITAL | Age: 66
Discharge: HOME | End: 2021-11-24

## 2021-11-24 ENCOUNTER — TELEPHONE (OUTPATIENT)
Dept: ONCOLOGY | Facility: HOSPITAL | Age: 66
End: 2021-11-24

## 2021-11-24 VITALS — BODY MASS INDEX: 16.67 KG/M2 | WEIGHT: 126.32 LBS

## 2021-11-24 PROCEDURE — 77014 CHG CT GUIDANCE RADIATION THERAPY FLDS PLACEMENT: CPT | Performed by: RADIOLOGY

## 2021-11-24 PROCEDURE — 77386: CPT | Performed by: RADIOLOGY

## 2021-11-24 NOTE — PROGRESS NOTES
Outpatient Nutrition Oncology Assessment    Patient Name: Pavel Dai  YOB: 1955  MRN: [id number]  Assessment Date: 11/24/2021    CLINICAL NUTRITION ASSESSMENT    Dx:  Rectal Ca      Type of Cancer Treatment  XRT to pelvis  Xeloda     CLINICAL NUTRITION ASSESSMENT      Reason for Assessment  Assessment     H&P:    Past Medical History:   Diagnosis Date   • CHF (congestive heart failure) (HCC)    • COPD (chronic obstructive pulmonary disease) (HCC)    • Coronary artery disease    • Frequent urination    • Hyperlipidemia    • Hypertension    • Rectal cancer (HCC)         Current Problems:   Patient Active Problem List   Diagnosis Code   • Rectal bleeding K62.5   • Acute blood loss anemia D62   • COPD (chronic obstructive pulmonary disease) (HCC) J44.9   • CAD (coronary artery disease) I25.10   • Essential hypertension I10   • History of ischemic stroke Z86.73   • Body mass index (BMI) 19.9 or less, adult Z68.1   • Encounter for immunization Z23   • Ex-smoker Z87.891   • Hyperlipidemia E78.5   • Hypoxemia R09.02   • Hypertensive heart disease without congestive heart failure I11.9   • Oxygen dependent Z99.81   • Postnasal drip R09.82   • Shortness of breath R06.02   • Rectal cancer (HCC) C20   • Malignant neoplasm of rectum (HCC) C20         Anthropometrics     Row Name 11/24/21 1058          Anthropometrics    Weight 57.3 kg (126 lb 5.2 oz)                 BMI kg/m2   Body mass index is 16.67 kg/m².    Weight Hx  Wt Readings from Last 30 Encounters:   11/24/21 1058 57.3 kg (126 lb 5.2 oz)   11/03/21 1440 56.5 kg (124 lb 9 oz)   11/03/21 1029 57.1 kg (125 lb 14.1 oz)   06/04/21 1142 57.9 kg (127 lb 10.3 oz)         Estimated/Assessed Needs     Row Name 11/24/21 1059          Calculation Measurements    Weight Used For Calculations 57.6 kg (126 lb 15.8 oz)            Estimated/Assessed Needs    Additional Documentation Protein Requirements (Group); Fluid Requirements (Group); KCAL/KG (Group)             KCAL/KG    KCAL/KG 35 Kcal/Kg (kcal)     35 Kcal/Kg (kcal) 2016            Protein Requirements    Weight Used For Protein Calculations 57.6 kg (126 lb 15.8 oz)     Est Protein Requirement Amount (gms/kg) 1.5 gm protein     Estimated Protein Requirements (gms/day) 86.4            Fluid Requirements    Fluid Requirements (mL/day) 1728  30 ml/kg ABW     RDA Method (mL) 1728                  Labs/Medications        Pertinent Labs Reviewed.   Results from last 7 days   Lab Units 11/22/21  1040   SODIUM mmol/L 137   POTASSIUM mmol/L 4.7   CHLORIDE mmol/L 96*   CO2 mmol/L 35.4*   BUN mg/dL 20   CREATININE mg/dL 0.64*   CALCIUM mg/dL 9.3   BILIRUBIN mg/dL 0.2   ALK PHOS U/L 63   ALT (SGPT) U/L 21   AST (SGOT) U/L 25   GLUCOSE mg/dL 106*     Results from last 7 days   Lab Units 11/22/21  1040   HEMOGLOBIN g/dL 10.1*   HEMATOCRIT % 33.2*     No results found for: COVID19  No results found for: HGBA1C      Pertinent Medications albuterol, aspirin, atorvastatin, budesonide-formoterol, capecitabine, guaiFENesin, ipratropium-albuterol, loratadine, metoprolol succinate XL, multivitamin with minerals, omeprazole, ondansetron, and roflumilast     Physical Findings        Malnutrition Severity Assessment     Row Name 11/24/21 1100          Malnutrition Severity Assessment    Malnutrition Type Chronic Disease - Related Malnutrition            Muscle Loss    Loss of Muscle Mass Findings Moderate     [place] Region Moderate - slight depression     Clavicle Bone Region Moderate - some protrusion in females, visible in males     Patellar Region Moderate - patella more prominent, less muscle definition around patella     Anterior Thigh Region Moderate - mild depression on inner thigh            Fat Loss    Subcutaneous Fat Loss Findings Moderate     Orbital Region  Moderate -  somewhat hollowness, slightly dark circles     Upper Arm Region Moderate - some fat tissue, not ample            Criteria Met (Must meet criteria for severity  "in at least 2 of these categories: M Wasting, Fat Loss, Fluid, Secondary Signs, Wt. Status, Intake)    Patient meets criteria for  Moderate (non-severe) Malnutrition                  Current Nutrition Orders & Evaluation of Intake       Oral Nutrition     Current PO Diet Soft foods (small/frequent meals), increased fluids   Supplement Ensure plus BID     Nutrition Diagnosis        Nutrition Dx Problem 1 Moderate malnutrition related to increased nutrient needs due to catabolic disease as evidenced by physiological causes increasing nutrient needs., hypermetabolic state., muscle wasting. and fat loss.       Nutrition Intervention       RD Action Nutrition counseling provided  Written materials given:  Diarrhea, High Calorie & High Protein Foods, Oral Care     Monitor/Evaluation       Monitor Per oncology nutrition protocol.     Comments:    Pt with 4% wt decline in the past 3 months.  Moderate muscle wasting & moderate subcutaneous fat loss noted while observing pt lying on treatment table.  He reports he is making a large effort to gain weight and has gained ~1-2 kg in the last week.  [facility] reports [facility] oral nutrition supplements (Ensure Plus) and he drinks 1 can BID.  Pt lives alone and reports it is hard to \"cook for one person\".  Reviewed high kcal/high protein convenient foods pt can consume.  He reports he is \"very picky\" and does not like a lot of the creamy-type suggestions that were given.  Discussed ideas in length.  Provided written materials.    Electronically signed by:  Kimberlee Gomez RD  11/24/21 11:01 EST  "

## 2021-11-24 NOTE — TELEPHONE ENCOUNTER
error   [General Appearance - Alert] : alert [PERRL With Normal Accommodation] : pupils were equal in size, round, and reactive to light [General Appearance - In No Acute Distress] : in no acute distress [Sclera] : the sclera and conjunctiva were normal [Extraocular Movements] : extraocular movements were intact [] : no respiratory distress [Auscultation Breath Sounds / Voice Sounds] : lungs were clear to auscultation bilaterally [Heart Sounds] : normal S1 and S2 [Murmurs] : no murmurs [Heart Rate And Rhythm] : heart rate was normal and rhythm regular [Heart Sounds Gallop] : no gallops [Heart Sounds Pericardial Friction Rub] : no pericardial rub [Full Pulse] : the pedal pulses are present [Edema] : there was no peripheral edema [Skin Color & Pigmentation] : normal skin color and pigmentation [Skin Turgor] : normal skin turgor [Cervical Lymph Nodes Enlarged Posterior Bilaterally] : posterior cervical [Cervical Lymph Nodes Enlarged Anterior Bilaterally] : anterior cervical [Supraclavicular Lymph Nodes Enlarged Bilaterally] : supraclavicular [No CVA Tenderness] : no ~M costovertebral angle tenderness [No Spinal Tenderness] : no spinal tenderness [Oriented To Time, Place, And Person] : oriented to person, place, and time [Impaired Insight] : insight and judgment were intact [Affect] : the affect was normal [FreeTextEntry1] : fast speech, appropriate, anxiety tolerable

## 2021-11-29 ENCOUNTER — HOSPITAL ENCOUNTER (OUTPATIENT)
Dept: RADIATION ONCOLOGY | Facility: HOSPITAL | Age: 66
Discharge: HOME | End: 2021-11-29

## 2021-11-29 ENCOUNTER — OFFICE VISIT (OUTPATIENT)
Dept: ONCOLOGY | Facility: HOSPITAL | Age: 66
End: 2021-11-29

## 2021-11-29 ENCOUNTER — DOCUMENTATION (OUTPATIENT)
Dept: RADIATION ONCOLOGY | Facility: HOSPITAL | Age: 66
End: 2021-11-29

## 2021-11-29 VITALS
BODY MASS INDEX: 16.58 KG/M2 | RESPIRATION RATE: 18 BRPM | DIASTOLIC BLOOD PRESSURE: 80 MMHG | OXYGEN SATURATION: 94 % | WEIGHT: 125.66 LBS | SYSTOLIC BLOOD PRESSURE: 143 MMHG | TEMPERATURE: 97.6 F | HEART RATE: 86 BPM

## 2021-11-29 DIAGNOSIS — C20 RECTAL CANCER (HCC): Primary | ICD-10-CM

## 2021-11-29 DIAGNOSIS — D62 ACUTE BLOOD LOSS ANEMIA: ICD-10-CM

## 2021-11-29 PROCEDURE — 77014 CHG CT GUIDANCE RADIATION THERAPY FLDS PLACEMENT: CPT | Performed by: RADIOLOGY

## 2021-11-29 PROCEDURE — 77386: CPT | Performed by: RADIOLOGY

## 2021-11-29 PROCEDURE — 99214 OFFICE O/P EST MOD 30 MIN: CPT | Performed by: INTERNAL MEDICINE

## 2021-11-29 PROCEDURE — G0463 HOSPITAL OUTPT CLINIC VISIT: HCPCS | Performed by: INTERNAL MEDICINE

## 2021-11-29 NOTE — PROGRESS NOTES
Chief Complaint  Malignant Neoplasm of Rectum (-fu)    Yoel Geller MD Tran, Khue N, MD Subjective          Pavel Dai presents to [facility] for ongoing treatment of his rectal cancer.  He is receiving concurrent chemo RT with radiation and single agent oral Xeloda.  He started last week.  He states that the treatments have gone well so far.  He denies nausea, vomiting, diarrhea, mouth sores, rash or hand-foot syndrome.  His appetite is normal.  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  He denies new masses or adenopathy.    Oncology/Hematology History Overview Note   9/22/21 (A)  Rectal mass; stitch on cephalad.  (B)  Right margin.         CLINICAL HISTORY:    Pre-op diagnosis: Anal mass.        DIAGNOSIS:    (A) Rectal mass, transanal disk excision:     - Moderately differentiated invasive adenocarcinoma with focal superficial   invasion of  muscularis propria (see Synoptic Report)     - Tumor present at 3 o'clock mucosal/peripheral margin    (B) Rectum, right margin, excision:     - Fragment of colon, negative for carcinoma    11/3/21 Consult at [facility] Xeloda initiated with XRT     Rectal cancer (HCC)   11/3/2021 Initial Diagnosis    Rectal cancer (HCC)     11/3/2021 Cancer Staged    Staging form: Colon And Rectum, AJCC 8th Edition  - Clinical: Stage I (cT2, cN0, cM0) - Signed by Issac Walker MD on 11/3/2021     11/22/2021 -  Chemotherapy    OP rectal Capecitabine 825 mg/m2 M-F With Concurrent Radiation      Malignant neoplasm of rectum (HCC) (Resolved)   11/9/2021 Initial Diagnosis    Malignant neoplasm of rectum (HCC)     11/9/2021 - 11/29/2021 Radiation    RADIATION THERAPY Treatment Details (11/9/2021 - 11/29/2021)  Site: Rectum  Technique: IMRT  Goal: No goal specified  Planned Treatment Start Date: No planned start date specified         Review of Systems   Constitutional: Positive for fatigue.   Respiratory: Positive for  shortness of breath (pt is O2 dependent).    All other systems reviewed and are negative.    Current Outpatient Medications on File Prior to Visit   Medication Sig Dispense Refill   • albuterol (PROVENTIL) (2.5 MG/3ML) 0.083% nebulizer solution Every 4 (Four) Hours.     • aspirin 81 MG chewable tablet Chew 81 mg Daily.     • atorvastatin (LIPITOR) 10 MG tablet Take 10 mg by mouth Every Night.     • budesonide-formoterol (SYMBICORT) 160-4.5 MCG/ACT inhaler Inhale 2 puffs 2 (Two) Times a Day.     • capecitabine (XELODA) 500 MG chemo tablet Take 3 tab(s) by mouth in the AM and 3 tab(s) by mouth in the PM on Monday-Friday with radiation. 180 tablet 0   • guaiFENesin (MUCINEX) 600 MG 12 hr tablet Take 600 mg by mouth 2 (Two) Times a Day.     • ipratropium-albuterol (COMBIVENT RESPIMAT)  MCG/ACT inhaler Inhale 1 puff 4 (Four) Times a Day. Do not exceed inhalations per day     • loratadine (CLARITIN) 10 MG tablet Take 10 mg by mouth Daily.     • metoprolol succinate XL (TOPROL-XL) 50 MG 24 hr tablet Take 50 mg by mouth 2 (two) times a day.     • multivitamin with minerals tablet tablet Take 1 tablet by mouth Daily.     • omeprazole (priLOSEC) 20 MG capsule Take 20 mg by mouth Daily.     • ondansetron (ZOFRAN) 8 MG tablet Take 1 tablet by mouth 3 (Three) Times a Day As Needed for Nausea or Vomiting. 30 tablet 5   • roflumilast (DALIRESP) 500 MCG tablet tablet Take 500 mcg by mouth Daily.       No current facility-administered medications on file prior to visit.       No Known Allergies  Past Medical History:   Diagnosis Date   • CHF (congestive heart failure) (HCC)    • COPD (chronic obstructive pulmonary disease) (HCC)    • Coronary artery disease    • Frequent urination    • Hyperlipidemia    • Hypertension    • Rectal cancer (HCC)      Past Surgical History:   Procedure Laterality Date   • COLONOSCOPY     • HERNIA REPAIR      approximately 4 years ago    • RECTAL SURGERY     • SHOULDER SURGERY      joint loose,  calcium particles removed from shoulder joint     Social History     Socioeconomic History   • Marital status:    Tobacco Use   • Smoking status: Former Smoker     Start date: 11/3/1973     Quit date: 11/3/2014     Years since quittin.0   • Smokeless tobacco: Never Used   Vaping Use   • Vaping Use: Never used   Substance and Sexual Activity   • Alcohol use: Not Currently   • Drug use: Never   • Sexual activity: Defer     Family History   Problem Relation Age of Onset   • Heart attack Mother    • Heart attack Sister    • Heart attack Sister        Objective   Physical Exam  Vitals reviewed. Exam conducted with a chaperone present.   Constitutional:       General: He is not in acute distress.     Appearance: Normal appearance.   HENT:      Head:      Comments: Nasal cannula in place    Cardiovascular:      Rate and Rhythm: Normal rate and regular rhythm.      Heart sounds: Normal heart sounds. No murmur heard.  No gallop.    Pulmonary:      Effort: Pulmonary effort is normal.      Breath sounds: Normal breath sounds.   Abdominal:      General: Abdomen is flat. Bowel sounds are normal. There is no distension.      Palpations: Abdomen is soft.      Tenderness: There is no abdominal tenderness.   Musculoskeletal:      Cervical back: Neck supple.      Right lower leg: No edema.      Left lower leg: No edema.   Lymphadenopathy:      Cervical: No cervical adenopathy.   Neurological:      Mental Status: He is alert and oriented to person, place, and time.   Psychiatric:         Mood and Affect: Mood normal.         Behavior: Behavior normal.         Vitals:    21 0918   BP: 143/80   Pulse: 86   Resp: 18   Temp: 97.6 °F (36.4 °C)   SpO2: 94%  Comment: Vladimir Can 2L   Weight: 57 kg (125 lb 10.6 oz)   PainSc: 0-No pain     ECOG score: 2         PHQ-9 Total Score: 0                  Result Review :   The following data was reviewed by: Issac Walker MD on 2021:  Lab Results   Component Value Date    HGB  10.1 (L) 11/22/2021    HCT 33.2 (L) 11/22/2021    MCV 82.2 11/22/2021     11/22/2021    WBC 5.78 11/22/2021    NEUTROABS 3.95 11/22/2021    LYMPHSABS 0.99 11/22/2021    MONOSABS 0.51 11/22/2021    EOSABS 0.25 11/22/2021    BASOSABS 0.07 11/22/2021     Lab Results   Component Value Date    GLUCOSE 106 (H) 11/22/2021    BUN 20 11/22/2021    CREATININE 0.64 (L) 11/22/2021     11/22/2021    K 4.7 11/22/2021    CL 96 (L) 11/22/2021    CO2 35.4 (H) 11/22/2021    CALCIUM 9.3 11/22/2021    PROTEINTOT 7.1 11/22/2021    ALBUMIN 4.30 11/22/2021    BILITOT 0.2 11/22/2021    ALKPHOS 63 11/22/2021    AST 25 11/22/2021    ALT 21 11/22/2021           Assessment and Plan    Diagnoses and all orders for this visit:    1. Rectal cancer (HCC) (Primary)  Assessment & Plan:  Patient is receiving concurrent chemo RT with radiation and single agent Xeloda.  Tolerating well thus far.  Lab work is notable for anemia which is multifactorial including prior blood loss and current treatment with radiation and chemotherapy.  His other blood counts are normal.  Adequate for treatment.  Continue capecitabine by mouth as prescribed.  Continue XRT as per radiation oncology.  I will see him back in 2 weeks for ongoing treatment with lab work prior to monitor for toxicities.    Orders:  -     CBC & Differential; Future  -     Comprehensive Metabolic Panel; Future    2. Acute blood loss anemia  Assessment & Plan:  Hemoglobin is decreased at 10.1 g/dL.  No intervention required at this point.  Repeat CBC at next visit            Patient Follow Up: 2 weeks  Patient was given instructions and counseling regarding his condition or for health maintenance advice. Please see specific information pulled into the AVS if appropriate.     Issac Walker MD    11/30/2021

## 2021-11-29 NOTE — PROGRESS NOTES
Diagnosis: Rectal Cancer    Reason for referral: Rounding    Comments: OSW and OSW student met with pt and pt's sister in [facility] during first week of tx/rounding. Pt presented in a pleasant mood. Introduced myself and discussed OSW role/psychosocial services available. Discussed opportunities for gas, transportation and financial assistance. Pt expressed interest in gas assistance and application for [employer] was completed and will be faxed when pt's source of income is confirmed. Application to the [employer] Cancer [employer]: [employer] was also completed and will be faxed pending Dr. Lucio's signature. OSW also spoke with pt about [employer] and financial assistance through the organization but pt reported that he is not interested as he does not want to take more than he needs. Pt not interested in transportation nor other financial assistance at this time as he provides his own transportation to and from appointments. Discussed opportunities for connection to support services (speaking with another pt for support, support groups, counseling etc.). Pt indicated that he is not interested in support services at this time as he receives great support from his sister who was with him at his tx today, his son who assists him with grocery shopping, his grandson who helps with mowing his lawn as well as other family and friends. Pt shared that his wife who passed away from lung cancer has been a pt of Dr. Partida. Pt expressed interest in getting assistance with cleaning his home every now and then. OSW student provided pt with information for the [facility] ([facility]) and pt indicated that he would be reaching out to them soon. Pt currently uses oxygen that he gets [facility]. Pt resides alone in [place] and has [facility] insurance coverage. OSW and OSW student support remains available. Pt and pt's sister expressed gratitude.      Services/Referrals Provided: Financial/gas  assistance: [employer], [place] [facility], [facility] ([facility])

## 2021-11-30 ENCOUNTER — HOSPITAL ENCOUNTER (OUTPATIENT)
Dept: RADIATION ONCOLOGY | Facility: HOSPITAL | Age: 66
Discharge: HOME | End: 2021-11-30

## 2021-11-30 VITALS
TEMPERATURE: 99 F | RESPIRATION RATE: 16 BRPM | DIASTOLIC BLOOD PRESSURE: 68 MMHG | HEART RATE: 85 BPM | OXYGEN SATURATION: 96 % | BODY MASS INDEX: 16.58 KG/M2 | SYSTOLIC BLOOD PRESSURE: 138 MMHG | WEIGHT: 125.66 LBS

## 2021-11-30 DIAGNOSIS — C20 RECTAL CANCER (HCC): Primary | ICD-10-CM

## 2021-11-30 PROCEDURE — 77386: CPT | Performed by: RADIOLOGY

## 2021-11-30 PROCEDURE — 77014 CHG CT GUIDANCE RADIATION THERAPY FLDS PLACEMENT: CPT | Performed by: RADIOLOGY

## 2021-11-30 PROCEDURE — 77427 RADIATION TX MANAGEMENT X5: CPT | Performed by: RADIOLOGY

## 2021-11-30 NOTE — PROGRESS NOTES
"                                     On Treatment Note      Encounter Date: 11/30/2021   Patient Name: Pavel Dai  YOB: 1955   Medical Record Number: [id number]       Rectal cancer (HCC)  Staging form: Colon And Rectum, AJCC 8th Edition  - Clinical: Stage I (cT2, cN0, cM0) - Signed by Issac Walker MD on 11/3/2021            Cancer Staging  Stage I (cT2, cN0, cM0)  Treatment Site: Pelvis  Prescribed dose: 50.4 Gy/28 fractions  Completed dose: 7.4 Gy/4 fractions  Date of First Treatment: 11/23/21    Tolerance: Tolerating well    Radiation related toxicities and management plan: none     He is receiving concurrent chemotherapy: xeloda (Dr. Walker)     Issues raised by patient or treatment team: none      Subjective      Review of Systems: Review of Systems   Constitutional: Negative for appetite change and fatigue.   HENT: Negative for sore throat and trouble swallowing.    Respiratory: Positive for cough (occasionally productive of clear to light green sputum) and shortness of breath (on oxygen cont. ).    Cardiovascular: Negative for leg swelling.   Gastrointestinal: Positive for abdominal pain (occasional, tolerable per pt.). Negative for constipation (taking stool softener nightly), diarrhea and nausea.   Genitourinary: Negative for difficulty urinating.   Musculoskeletal: Positive for arthralgias, back pain and gait problem.   Neurological: Positive for headaches (ongoing x \"30 yrs\" per pt, no recent change). Negative for dizziness and weakness.   Psychiatric/Behavioral: Positive for sleep disturbance (ongoing x 2-3 years, no recent change).       The following portions of the patient's history were reviewed and updated as appropriate: allergies, current medications, past family history, past medical history, past social history, past surgical history and problem list.    Measures:   Pain: (on a scale of 0-10)   Pain Score    11/30/21 1046   PainSc: 0-No pain       Advanced Care Plan: " N   KPS/Quality of Life: 80 - Restricted Physical Activity  ECOG: (2) Ambulatory and capable of self care, unable to carry out work activity, up and about > 50% or waking hours  Depression Screening:   Patient screened positive for depression based on a PHQ-9 score of 0 on 11/29/2021.         Objective     Physical Exam:   Vital Signs:   Vitals:    11/30/21 1046   BP: 138/68   Pulse: 85   Resp: 16   Temp: 99 °F (37.2 °C)   SpO2: 96%      Body mass index is 16.58 kg/m².   Wt Readings from Last 3 Encounters:   11/30/21 57 kg (125 lb 10.6 oz)   11/29/21 57 kg (125 lb 10.6 oz)   11/24/21 57.3 kg (126 lb 5.2 oz)       General: NAD, sitting comfortably  Eye: EOMI, anicteric sclerae  Respiratory: Symmetric expansion, nonlabored respiration  Neuro: Alert oriented x3, cranial nerves III through XII are grossly intact.  Psych: Mood and affect appropriate      Assessment / Plan      Plan:   I reviewed technical aspects of the radiation therapy treatment administration including dose delivery and daily treatment parameters to verify that these meet the specifications from my clinical treatment planning note. I reviewed the treatment setup notes. I reviewed and approved images obtained for “image guidance” with setup and treatment.     We will continue radiation therapy as prescribed.        Kandace Lucio MD  Radiation Oncology  [facility]    This document has been signed by Kandace Lucio MD on November 30, 2021 11:05 EST

## 2021-11-30 NOTE — ASSESSMENT & PLAN NOTE
Hemoglobin is decreased at 10.1 g/dL.  No intervention required at this point.  Repeat CBC at next visit

## 2021-11-30 NOTE — ASSESSMENT & PLAN NOTE
Patient is receiving concurrent chemo RT with radiation and single agent Xeloda.  Tolerating well thus far.  Lab work is notable for anemia which is multifactorial including prior blood loss and current treatment with radiation and chemotherapy.  His other blood counts are normal.  Adequate for treatment.  Continue capecitabine by mouth as prescribed.  Continue XRT as per radiation oncology.  I will see him back in 2 weeks for ongoing treatment with lab work prior to monitor for toxicities.

## 2021-11-30 NOTE — PROGRESS NOTES
11/30: OSW student faxed application for [employer]. Fax confirmed.     11/30: Application for [employer] was mailed.

## 2021-12-01 ENCOUNTER — HOSPITAL ENCOUNTER (OUTPATIENT)
Dept: RADIATION ONCOLOGY | Facility: HOSPITAL | Age: 66
Setting detail: RADIATION/ONCOLOGY SERIES
End: 2021-12-01

## 2021-12-01 ENCOUNTER — HOSPITAL ENCOUNTER (OUTPATIENT)
Dept: RADIATION ONCOLOGY | Facility: HOSPITAL | Age: 66
Discharge: HOME | End: 2021-12-01

## 2021-12-01 VITALS — WEIGHT: 126.76 LBS | BODY MASS INDEX: 16.72 KG/M2

## 2021-12-01 PROCEDURE — 77014 CHG CT GUIDANCE RADIATION THERAPY FLDS PLACEMENT: CPT | Performed by: RADIOLOGY

## 2021-12-01 PROCEDURE — 77386: CPT | Performed by: RADIOLOGY

## 2021-12-01 PROCEDURE — 77336 RADIATION PHYSICS CONSULT: CPT | Performed by: RADIOLOGY

## 2021-12-01 NOTE — PROGRESS NOTES
Outpatient Nutrition Oncology Follow Up    Patient Name: Pavel Dai  YOB: 1955  MRN: [id number]  Assessment Date: 12/1/2021    CLINICAL NUTRITION ASSESSMENT    Dx:  Rectal Ca      Type of Cancer Treatment  XRT to pelvis  Xeloda     CLINICAL NUTRITION ASSESSMENT      Reason for Assessment  Follow-up protocol     H&P:    Past Medical History:   Diagnosis Date   • CHF (congestive heart failure) (HCC)    • COPD (chronic obstructive pulmonary disease) (HCC)    • Coronary artery disease    • Frequent urination    • Hyperlipidemia    • Hypertension    • Rectal cancer (HCC)         Current Problems:   Patient Active Problem List   Diagnosis Code   • Rectal bleeding K62.5   • Acute blood loss anemia D62   • COPD (chronic obstructive pulmonary disease) (HCC) J44.9   • CAD (coronary artery disease) I25.10   • Essential hypertension I10   • History of ischemic stroke Z86.73   • Body mass index (BMI) 19.9 or less, adult Z68.1   • Encounter for immunization Z23   • Ex-smoker Z87.891   • Hyperlipidemia E78.5   • Hypoxemia R09.02   • Hypertensive heart disease without congestive heart failure I11.9   • Oxygen dependent Z99.81   • Postnasal drip R09.82   • Shortness of breath R06.02   • Rectal cancer (HCC) C20         Anthropometrics     Row Name 12/01/21 1031          Anthropometrics    Weight 57.5 kg (126 lb 12.2 oz)                 BMI kg/m2   Body mass index is 16.72 kg/m².    Weight Hx  Wt Readings from Last 30 Encounters:   12/01/21 1031 57.5 kg (126 lb 12.2 oz)   11/30/21 1046 57 kg (125 lb 10.6 oz)   11/29/21 0918 57 kg (125 lb 10.6 oz)   11/24/21 1058 57.3 kg (126 lb 5.2 oz)   11/03/21 1440 56.5 kg (124 lb 9 oz)   11/03/21 1029 57.1 kg (125 lb 14.1 oz)   06/04/21 1142 57.9 kg (127 lb 10.3 oz)        Labs/Medications        Pertinent Labs Reviewed.         Invalid input(s): LABALBU, PROT      No results found for: COVID19  No results found for: HGBA1C      Pertinent Medications albuterol, aspirin,  "atorvastatin, budesonide-formoterol, capecitabine, guaiFENesin, ipratropium-albuterol, loratadine, metoprolol succinate XL, multivitamin with minerals, omeprazole, ondansetron, and roflumilast     Current Nutrition Orders & Evaluation of Intake       Oral Nutrition     Current PO Diet Soft foods (small/frequent meals), increased fluids recommended   Supplement Ensure plus BID     Nutrition Diagnosis        Nutrition Dx Problem 1 Moderate malnutrition related to increased nutrient needs due to catabolic disease as evidenced by physiological causes increasing nutrient needs., hypermetabolic state., muscle wasting., fat loss. and patient report.       Nutrition Intervention       RD Action Nutrition follow up     Monitor/Evaluation       Monitor Per oncology nutrition protocol.     Comments:    .2 kg wt gain this week.  Pt denies nutrition-related concerns and reports to be eating better.  He is drinking the Ensure plus BID- he says the oral nutrition supplements \"must be helping me gain weight\".    Electronically signed by:  Kimberlee Gomez RD  12/01/21 10:32 EST  "

## 2021-12-02 ENCOUNTER — HOSPITAL ENCOUNTER (OUTPATIENT)
Dept: RADIATION ONCOLOGY | Facility: HOSPITAL | Age: 66
Discharge: HOME | End: 2021-12-02

## 2021-12-02 PROCEDURE — 77386: CPT | Performed by: RADIOLOGY

## 2021-12-02 PROCEDURE — 77014 CHG CT GUIDANCE RADIATION THERAPY FLDS PLACEMENT: CPT | Performed by: RADIOLOGY

## 2021-12-03 ENCOUNTER — HOSPITAL ENCOUNTER (OUTPATIENT)
Dept: RADIATION ONCOLOGY | Facility: HOSPITAL | Age: 66
Discharge: HOME | End: 2021-12-03

## 2021-12-03 PROCEDURE — 77014 CHG CT GUIDANCE RADIATION THERAPY FLDS PLACEMENT: CPT | Performed by: RADIOLOGY

## 2021-12-03 PROCEDURE — 77386: CPT | Performed by: RADIOLOGY

## 2021-12-06 ENCOUNTER — HOSPITAL ENCOUNTER (OUTPATIENT)
Dept: RADIATION ONCOLOGY | Facility: HOSPITAL | Age: 66
Discharge: HOME | End: 2021-12-06

## 2021-12-06 PROCEDURE — 77014 CHG CT GUIDANCE RADIATION THERAPY FLDS PLACEMENT: CPT | Performed by: RADIOLOGY

## 2021-12-06 PROCEDURE — 77386: CPT | Performed by: RADIOLOGY

## 2021-12-07 ENCOUNTER — HOSPITAL ENCOUNTER (OUTPATIENT)
Dept: RADIATION ONCOLOGY | Facility: HOSPITAL | Age: 66
Discharge: HOME | End: 2021-12-07

## 2021-12-07 VITALS
TEMPERATURE: 98.7 F | WEIGHT: 125.44 LBS | DIASTOLIC BLOOD PRESSURE: 73 MMHG | RESPIRATION RATE: 16 BRPM | HEART RATE: 86 BPM | OXYGEN SATURATION: 94 % | SYSTOLIC BLOOD PRESSURE: 140 MMHG | BODY MASS INDEX: 16.55 KG/M2

## 2021-12-07 DIAGNOSIS — C20 RECTAL CANCER (HCC): Primary | ICD-10-CM

## 2021-12-07 PROCEDURE — 77386: CPT | Performed by: RADIOLOGY

## 2021-12-07 PROCEDURE — 77014 CHG CT GUIDANCE RADIATION THERAPY FLDS PLACEMENT: CPT | Performed by: RADIOLOGY

## 2021-12-07 PROCEDURE — 77427 RADIATION TX MANAGEMENT X5: CPT | Performed by: RADIOLOGY

## 2021-12-07 NOTE — PROGRESS NOTES
"                                     On Treatment Note      Encounter Date: 12/07/2021   Patient Name: Pavel Dai  YOB: 1955   Medical Record Number: [id number]     Primary Diagnosis:@   Cancer Staging: Cancer Staging  Rectal cancer (HCC)  Staging form: Colon And Rectum, AJCC 8th Edition  - Clinical: Stage I (cT2, cN0, cM0) - Signed by Issac Walker MD on 11/3/2021            Cancer Staging  Stage I (cT2, cN0, cM0)      Treatment Site: Pelvis  Prescribed dose: 50.4 Gy/28 fractions  Completed dose: 16.2 Gy/9 fractions  Date of First Treatment: 11/23/21     Tolerance: Tolerating well     Radiation related toxicities and management plan: none      He is receiving concurrent chemotherapy: xeloda (Dr. Walker)      Issues raised by patient or treatment team: none - reinforced full bladder/empty rectum setup - discussed use of miralax      Subjective      Review of Systems: Review of Systems   Constitutional: Positive for fatigue. Negative for appetite change.   Respiratory: Positive for cough and shortness of breath (on oxygen @ 2L).    Cardiovascular: Negative for leg swelling.   Gastrointestinal: Positive for abdominal pain (r/t gas) and constipation.   Genitourinary: Positive for frequency and urgency. Negative for difficulty urinating and dysuria.   Musculoskeletal: Positive for gait problem. Negative for arthralgias and back pain.   Neurological: Positive for weakness (bilateral knees) and headaches (ongoing for \"years\", no recent change). Negative for dizziness.   Psychiatric/Behavioral: Positive for sleep disturbance (ongoing).       The following portions of the patient's history were reviewed and updated as appropriate: allergies, current medications, past family history, past medical history, past social history, past surgical history and problem list.    Measures:   Pain: (on a scale of 0-10)   Pain Score    12/07/21 1044   PainSc: 0-No pain       Advanced Care Plan: N Advance Care " Planning   ACP discussion was declined by the patient. Patient does not have an advance directive, declines further assistance.    KPS/Quality of Life: 80 - Restricted Physical Activity  ECOG: (2) Ambulatory and capable of self care, unable to carry out work activity, up and about > 50% or waking hours  Depression Screening:    PHQ-9 score of 0 on 11/29/2021.         Objective     Physical Exam:   Vital Signs:   Vitals:    12/07/21 1044   BP: 140/73   Pulse: 86   Resp: 16   Temp: 98.7 °F (37.1 °C)   SpO2: 94%      Body mass index is 16.55 kg/m².   Wt Readings from Last 3 Encounters:   12/07/21 56.9 kg (125 lb 7.1 oz)   12/01/21 57.5 kg (126 lb 12.2 oz)   11/30/21 57 kg (125 lb 10.6 oz)       General: NAD, sitting comfortably  Eye: EOMI, anicteric sclerae  Respiratory: Symmetric expansion, nonlabored respiration  Neuro: Alert oriented x3, cranial nerves III through XII are grossly intact.  Psych: Mood and affect appropriate      Assessment / Plan      Plan:   I reviewed technical aspects of the radiation therapy treatment administration including dose delivery and daily treatment parameters to verify that these meet the specifications from my clinical treatment planning note. I reviewed the treatment setup notes. I reviewed and approved images obtained for “image guidance” with setup and treatment.     We will continue radiation therapy as prescribed.        Kandace Lucio MD  Radiation Oncology  [facility]    This document has been signed by Kandace Lucio MD on December 7, 2021 12:32 EST

## 2021-12-08 ENCOUNTER — HOSPITAL ENCOUNTER (OUTPATIENT)
Dept: RADIATION ONCOLOGY | Facility: HOSPITAL | Age: 66
Discharge: HOME | End: 2021-12-08

## 2021-12-08 PROCEDURE — 77014 CHG CT GUIDANCE RADIATION THERAPY FLDS PLACEMENT: CPT | Performed by: RADIOLOGY

## 2021-12-08 PROCEDURE — 77336 RADIATION PHYSICS CONSULT: CPT | Performed by: RADIOLOGY

## 2021-12-08 PROCEDURE — 77386: CPT | Performed by: RADIOLOGY

## 2021-12-09 ENCOUNTER — HOSPITAL ENCOUNTER (OUTPATIENT)
Dept: RADIATION ONCOLOGY | Facility: HOSPITAL | Age: 66
Discharge: HOME | End: 2021-12-09

## 2021-12-09 VITALS — WEIGHT: 125.44 LBS | BODY MASS INDEX: 16.55 KG/M2

## 2021-12-09 PROCEDURE — 77386: CPT | Performed by: RADIOLOGY

## 2021-12-09 PROCEDURE — 77014 CHG CT GUIDANCE RADIATION THERAPY FLDS PLACEMENT: CPT | Performed by: RADIOLOGY

## 2021-12-09 NOTE — PROGRESS NOTES
Outpatient Nutrition Oncology Follow Up    Patient Name: Pavel Dai  YOB: 1955  MRN: [id number]  Assessment Date: 12/9/2021    CLINICAL NUTRITION ASSESSMENT    Dx:  Rectal Ca      Type of Cancer Treatment XRT to pelvis   Xeloda     CLINICAL NUTRITION ASSESSMENT      Reason for Assessment  Follow-up protocol     H&P:    Past Medical History:   Diagnosis Date   • CHF (congestive heart failure) (HCC)    • COPD (chronic obstructive pulmonary disease) (HCC)    • Coronary artery disease    • Frequent urination    • Hyperlipidemia    • Hypertension    • Rectal cancer (HCC)         Current Problems:   Patient Active Problem List   Diagnosis Code   • Rectal bleeding K62.5   • Acute blood loss anemia D62   • COPD (chronic obstructive pulmonary disease) (HCC) J44.9   • CAD (coronary artery disease) I25.10   • Essential hypertension I10   • History of ischemic stroke Z86.73   • Body mass index (BMI) 19.9 or less, adult Z68.1   • Encounter for immunization Z23   • Ex-smoker Z87.891   • Hyperlipidemia E78.5   • Hypoxemia R09.02   • Hypertensive heart disease without congestive heart failure I11.9   • Oxygen dependent Z99.81   • Postnasal drip R09.82   • Shortness of breath R06.02   • Rectal cancer (HCC) C20         Anthropometrics     Row Name 12/09/21 1046          Anthropometrics    Weight 56.9 kg (125 lb 7.1 oz)            Usual Body Weight (UBW)    Weight Loss unintentional     Weight Loss Time Frame 1% wt decline in the past week                 BMI kg/m2   Body mass index is 16.55 kg/m².    Weight Hx  Wt Readings from Last 30 Encounters:   12/09/21 1046 56.9 kg (125 lb 7.1 oz)   12/07/21 1044 56.9 kg (125 lb 7.1 oz)   12/01/21 1031 57.5 kg (126 lb 12.2 oz)   11/30/21 1046 57 kg (125 lb 10.6 oz)   11/29/21 0918 57 kg (125 lb 10.6 oz)   11/24/21 1058 57.3 kg (126 lb 5.2 oz)   11/03/21 1440 56.5 kg (124 lb 9 oz)   11/03/21 1029 57.1 kg (125 lb 14.1 oz)   06/04/21 1142 57.9 kg (127 lb 10.3 oz)     "    Labs/Medications        Pertinent Labs Reviewed.       Pertinent Medications albuterol, aspirin, atorvastatin, budesonide-formoterol, capecitabine, guaiFENesin, ipratropium-albuterol, loratadine, metoprolol succinate XL, multivitamin with minerals, omeprazole, ondansetron, and roflumilast     Current Nutrition Orders & Evaluation of Intake       Oral Nutrition     Current PO Diet Soft foods (small/frequent meals), increased fluids recommended   Supplement Ensure Plus BID (see below)     Nutrition Diagnosis        Nutrition Dx Problem 1 Moderate malnutrition related to increased nutrient needs due to catabolic disease as evidenced by physiological causes increasing nutrient needs., hypermetabolic state., muscle wasting. and fat loss.       Nutrition Intervention       RD Action Nutrition follow up     Monitor/Evaluation       Monitor Per oncology nutrition protocol.     Comments:    1% wt decline in the past week.  Pt reports he was previously drinking 1 can Ensure Plus BID ([facility] through [facility]), but has only been drinking 1 per day for the past week.  He notes that because he has to drink a lot of water prior to his tx, he was afraid the Ensure would make him feel \"too full\".  He spoke to radiation therapist & they encouraged pt to drink the Ensure supplement prior to coming & water- they would continue to monitor his fluid intake and provide advice as needed.  He reports improvement in gas due to takng Gas-ex.  He reports \"pain\" upon elimination of BMs, however report his stool is soft.  He is taking Miralax to help with elimination.      Electronically signed by:  Kimberlee Gomez RD  12/09/21 10:47 EST  "

## 2021-12-10 ENCOUNTER — HOSPITAL ENCOUNTER (OUTPATIENT)
Dept: RADIATION ONCOLOGY | Facility: HOSPITAL | Age: 66
Discharge: HOME | End: 2021-12-10

## 2021-12-10 PROCEDURE — 77386: CPT | Performed by: RADIOLOGY

## 2021-12-10 PROCEDURE — 77014 CHG CT GUIDANCE RADIATION THERAPY FLDS PLACEMENT: CPT | Performed by: RADIOLOGY

## 2021-12-13 ENCOUNTER — LAB (OUTPATIENT)
Dept: ONCOLOGY | Facility: HOSPITAL | Age: 66
End: 2021-12-13

## 2021-12-13 ENCOUNTER — HOSPITAL ENCOUNTER (OUTPATIENT)
Dept: RADIATION ONCOLOGY | Facility: HOSPITAL | Age: 66
Discharge: HOME | End: 2021-12-13

## 2021-12-13 ENCOUNTER — OFFICE VISIT (OUTPATIENT)
Dept: ONCOLOGY | Facility: HOSPITAL | Age: 66
End: 2021-12-13

## 2021-12-13 VITALS
SYSTOLIC BLOOD PRESSURE: 135 MMHG | OXYGEN SATURATION: 94 % | DIASTOLIC BLOOD PRESSURE: 80 MMHG | BODY MASS INDEX: 16 KG/M2 | TEMPERATURE: 96.4 F | WEIGHT: 121.25 LBS | RESPIRATION RATE: 18 BRPM | HEART RATE: 96 BPM

## 2021-12-13 DIAGNOSIS — C20 RECTAL CANCER (HCC): ICD-10-CM

## 2021-12-13 DIAGNOSIS — C20 RECTAL CANCER (HCC): Primary | ICD-10-CM

## 2021-12-13 PROBLEM — T45.1X5A ANEMIA DUE TO CHEMOTHERAPY: Status: ACTIVE | Noted: 2021-12-13

## 2021-12-13 PROBLEM — D64.81 ANEMIA DUE TO CHEMOTHERAPY: Status: ACTIVE | Noted: 2021-12-13

## 2021-12-13 LAB
ALBUMIN SERPL-MCNC: 4.2 G/DL (ref 3.5–5.2)
ALBUMIN/GLOB SERPL: 1.6 G/DL
ALP SERPL-CCNC: 59 U/L (ref 39–117)
ALT SERPL W P-5'-P-CCNC: 16 U/L (ref 1–41)
ANION GAP SERPL CALCULATED.3IONS-SCNC: 10.6 MMOL/L (ref 5–15)
AST SERPL-CCNC: 19 U/L (ref 1–40)
BASOPHILS # BLD AUTO: 0.01 10*3/MM3 (ref 0–0.2)
BASOPHILS NFR BLD AUTO: 0.1 % (ref 0–1.5)
BILIRUB SERPL-MCNC: 0.3 MG/DL (ref 0–1.2)
BUN SERPL-MCNC: 20 MG/DL (ref 8–23)
BUN/CREAT SERPL: 29.4 (ref 7–25)
CALCIUM SPEC-SCNC: 9.7 MG/DL (ref 8.6–10.5)
CHLORIDE SERPL-SCNC: 95 MMOL/L (ref 98–107)
CO2 SERPL-SCNC: 32.4 MMOL/L (ref 22–29)
CREAT SERPL-MCNC: 0.68 MG/DL (ref 0.76–1.27)
DEPRECATED RDW RBC AUTO: 43.7 FL (ref 37–54)
EOSINOPHIL # BLD AUTO: 1.88 10*3/MM3 (ref 0–0.4)
EOSINOPHIL NFR BLD AUTO: 19.1 % (ref 0.3–6.2)
ERYTHROCYTE [DISTWIDTH] IN BLOOD BY AUTOMATED COUNT: 15 % (ref 12.3–15.4)
GFR SERPL CREATININE-BSD FRML MDRD: 117 ML/MIN/1.73
GLOBULIN UR ELPH-MCNC: 2.7 GM/DL
GLUCOSE SERPL-MCNC: 115 MG/DL (ref 65–99)
HCT VFR BLD AUTO: 35.2 % (ref 37.5–51)
HGB BLD-MCNC: 10.4 G/DL (ref 13–17.7)
IMM GRANULOCYTES # BLD AUTO: 0.02 10*3/MM3 (ref 0–0.05)
IMM GRANULOCYTES NFR BLD AUTO: 0.2 % (ref 0–0.5)
LYMPHOCYTES # BLD AUTO: 0.39 10*3/MM3 (ref 0.7–3.1)
LYMPHOCYTES NFR BLD AUTO: 4 % (ref 19.6–45.3)
MCH RBC QN AUTO: 26.4 PG (ref 26.6–33)
MCHC RBC AUTO-ENTMCNC: 29.5 G/DL (ref 31.5–35.7)
MCV RBC AUTO: 89.3 FL (ref 79–97)
MONOCYTES # BLD AUTO: 0.74 10*3/MM3 (ref 0.1–0.9)
MONOCYTES NFR BLD AUTO: 7.5 % (ref 5–12)
NEUTROPHILS NFR BLD AUTO: 6.8 10*3/MM3 (ref 1.7–7)
NEUTROPHILS NFR BLD AUTO: 69.1 % (ref 42.7–76)
PLATELET # BLD AUTO: 270 10*3/MM3 (ref 140–450)
PMV BLD AUTO: 9 FL (ref 6–12)
POTASSIUM SERPL-SCNC: 4.8 MMOL/L (ref 3.5–5.2)
PROT SERPL-MCNC: 6.9 G/DL (ref 6–8.5)
RBC # BLD AUTO: 3.94 10*6/MM3 (ref 4.14–5.8)
SODIUM SERPL-SCNC: 138 MMOL/L (ref 136–145)
WBC NRBC COR # BLD: 9.84 10*3/MM3 (ref 3.4–10.8)

## 2021-12-13 PROCEDURE — 80053 COMPREHEN METABOLIC PANEL: CPT

## 2021-12-13 PROCEDURE — G0463 HOSPITAL OUTPT CLINIC VISIT: HCPCS

## 2021-12-13 PROCEDURE — 85025 COMPLETE CBC W/AUTO DIFF WBC: CPT

## 2021-12-13 PROCEDURE — 99214 OFFICE O/P EST MOD 30 MIN: CPT | Performed by: INTERNAL MEDICINE

## 2021-12-13 PROCEDURE — 77386: CPT | Performed by: RADIOLOGY

## 2021-12-13 PROCEDURE — 36415 COLL VENOUS BLD VENIPUNCTURE: CPT

## 2021-12-13 PROCEDURE — 77014 CHG CT GUIDANCE RADIATION THERAPY FLDS PLACEMENT: CPT | Performed by: RADIOLOGY

## 2021-12-13 NOTE — PROGRESS NOTES
Chief Complaint  neoplasm of rectum and Follow-up    Yoel Geller MD Tran, Khue N, MD Subjective          Pavel Dai presents to [facility] for ongoing treatment of his rectal cancer.  He is receiving adjuvant radiation and capecitabine.  He states he is tolerating the treatments reasonably well.  He is having increased diarrhea and also reports a lot of gassiness and tenesmus symptoms with his radiation.  He denies blood per rectum or melena.  He states that he is eating and drinking fairly well.  His activities are limited by oxygen dependent COPD.  He denies masses or adenopathy.  No unusual aches or pains.    Oncology/Hematology History Overview Note   9/22/21 (A)  Rectal mass; stitch on cephalad.  (B)  Right margin.         CLINICAL HISTORY:    Pre-op diagnosis: Anal mass.        DIAGNOSIS:    (A) Rectal mass, transanal disk excision:     - Moderately differentiated invasive adenocarcinoma with focal superficial   invasion of  muscularis propria (see Synoptic Report)     - Tumor present at 3 o'clock mucosal/peripheral margin    (B) Rectum, right margin, excision:     - Fragment of colon, negative for carcinoma    11/3/21 Consult at [facility] Xeloda initiated with XRT     Rectal cancer (HCC)   11/3/2021 Initial Diagnosis    Rectal cancer (HCC)     11/3/2021 Cancer Staged    Staging form: Colon And Rectum, AJCC 8th Edition  - Clinical: Stage I (cT2, cN0, cM0) - Signed by Issac Walker MD on 11/3/2021     12/13/2021 -  Chemotherapy    OP rectal Capecitabine 825 mg/m2 M-F With Concurrent Radiation      Malignant neoplasm of rectum (HCC) (Resolved)   11/9/2021 Initial Diagnosis    Malignant neoplasm of rectum (HCC)     11/9/2021 - 11/29/2021 Radiation    RADIATION THERAPY Treatment Details (11/9/2021 - 11/29/2021)  Site: Rectum  Technique: IMRT  Goal: No goal specified  Planned Treatment Start Date: No planned start date specified         Review of Systems    Constitutional: Positive for fatigue. Negative for appetite change, diaphoresis, fever, unexpected weight gain and unexpected weight loss.   HENT: Negative for hearing loss, mouth sores, sore throat, swollen glands, trouble swallowing and voice change.    Eyes: Negative for blurred vision.   Respiratory: Positive for shortness of breath. Negative for cough and wheezing.    Cardiovascular: Negative for chest pain and palpitations.   Gastrointestinal: Negative for abdominal pain, blood in stool, constipation, diarrhea, nausea and vomiting.   Endocrine: Negative for cold intolerance and heat intolerance.   Genitourinary: Negative for difficulty urinating, dysuria, frequency, hematuria and urinary incontinence.   Musculoskeletal: Negative for arthralgias, back pain and myalgias.   Skin: Negative for rash, skin lesions and bruise.   Neurological: Positive for weakness. Negative for dizziness, seizures, numbness and headache.   Hematological: Does not bruise/bleed easily.   Psychiatric/Behavioral: Negative for depressed mood. The patient is not nervous/anxious.      Current Outpatient Medications on File Prior to Visit   Medication Sig Dispense Refill   • albuterol (PROVENTIL) (2.5 MG/3ML) 0.083% nebulizer solution Every 4 (Four) Hours.     • aspirin 81 MG chewable tablet Chew 81 mg Daily.     • atorvastatin (LIPITOR) 10 MG tablet Take 10 mg by mouth Every Night.     • budesonide-formoterol (SYMBICORT) 160-4.5 MCG/ACT inhaler Inhale 2 puffs 2 (Two) Times a Day.     • capecitabine (XELODA) 500 MG chemo tablet Take 3 tab(s) by mouth in the AM and 3 tab(s) by mouth in the PM on Monday-Friday with radiation. 180 tablet 0   • guaiFENesin (MUCINEX) 600 MG 12 hr tablet Take 600 mg by mouth 2 (Two) Times a Day.     • ipratropium-albuterol (COMBIVENT RESPIMAT)  MCG/ACT inhaler Inhale 1 puff 4 (Four) Times a Day. Do not exceed inhalations per day     • loratadine (CLARITIN) 10 MG tablet Take 10 mg by mouth Daily.     •  metoprolol succinate XL (TOPROL-XL) 50 MG 24 hr tablet Take 50 mg by mouth 2 (two) times a day.     • multivitamin with minerals tablet tablet Take 1 tablet by mouth Daily.     • omeprazole (priLOSEC) 20 MG capsule Take 20 mg by mouth Daily.     • ondansetron (ZOFRAN) 8 MG tablet Take 1 tablet by mouth 3 (Three) Times a Day As Needed for Nausea or Vomiting. 30 tablet 5   • roflumilast (DALIRESP) 500 MCG tablet tablet Take 500 mcg by mouth Daily.       No current facility-administered medications on file prior to visit.       No Known Allergies  Past Medical History:   Diagnosis Date   • Anemia due to chemotherapy 2021   • CHF (congestive heart failure) (HCC)    • COPD (chronic obstructive pulmonary disease) (HCC)    • Coronary artery disease    • Frequent urination    • Hyperlipidemia    • Hypertension    • Rectal cancer (HCC)      Past Surgical History:   Procedure Laterality Date   • COLONOSCOPY     • HERNIA REPAIR      approximately 4 years ago    • RECTAL SURGERY     • SHOULDER SURGERY      joint loose, calcium particles removed from shoulder joint     Social History     Socioeconomic History   • Marital status:    Tobacco Use   • Smoking status: Former Smoker     Start date: 11/3/1973     Quit date: 11/3/2014     Years since quittin.1   • Smokeless tobacco: Never Used   Vaping Use   • Vaping Use: Never used   Substance and Sexual Activity   • Alcohol use: Not Currently   • Drug use: Never   • Sexual activity: Defer     Family History   Problem Relation Age of Onset   • Heart attack Mother    • Heart attack Sister    • Heart attack Sister        Objective   Physical Exam  Vitals reviewed. Exam conducted with a chaperone present.   Constitutional:       Appearance: Normal appearance.   HENT:      Head: Normocephalic and atraumatic.      Comments: Nasal cannula in place  Cardiovascular:      Rate and Rhythm: Normal rate and regular rhythm.      Heart sounds: Normal heart sounds. No murmur  heard.  No gallop.    Pulmonary:      Effort: Pulmonary effort is normal.      Breath sounds: Normal breath sounds.   Abdominal:      General: Abdomen is flat. Bowel sounds are normal.      Palpations: Abdomen is soft.   Musculoskeletal:      Cervical back: Neck supple.      Right lower leg: No edema.      Left lower leg: No edema.   Lymphadenopathy:      Cervical: No cervical adenopathy.   Neurological:      Mental Status: He is alert and oriented to person, place, and time.   Psychiatric:         Mood and Affect: Mood normal.         Behavior: Behavior normal.         Vitals:    12/13/21 0821   BP: 135/80   Pulse: 96   Resp: 18   Temp: 96.4 °F (35.8 °C)   SpO2: 94%  Comment: 2 liters   Weight: 55 kg (121 lb 4.1 oz)   PainSc: 0-No pain     ECOG score: 2         PHQ-9 Total Score:                    Result Review :   The following data was reviewed by: Issac Walker MD on 12/13/2021:  Lab Results   Component Value Date    HGB 10.4 (L) 12/13/2021    HCT 35.2 (L) 12/13/2021    MCV 89.3 12/13/2021     12/13/2021    WBC 9.84 12/13/2021    NEUTROABS 6.80 12/13/2021    LYMPHSABS 0.39 (L) 12/13/2021    MONOSABS 0.74 12/13/2021    EOSABS 1.88 (H) 12/13/2021    BASOSABS 0.01 12/13/2021     Lab Results   Component Value Date    GLUCOSE 115 (H) 12/13/2021    BUN 20 12/13/2021    CREATININE 0.68 (L) 12/13/2021     12/13/2021    K 4.8 12/13/2021    CL 95 (L) 12/13/2021    CO2 32.4 (H) 12/13/2021    CALCIUM 9.7 12/13/2021    PROTEINTOT 6.9 12/13/2021    ALBUMIN 4.20 12/13/2021    BILITOT 0.3 12/13/2021    ALKPHOS 59 12/13/2021    AST 19 12/13/2021    ALT 16 12/13/2021           Assessment and Plan    Diagnoses and all orders for this visit:    1. Rectal cancer (HCC) (Primary)  Assessment & Plan:  Patient is status post resection.  He is receiving adjuvant chemo RT with radiation and capecitabine.  Tolerating his treatments reasonably well.  He does have ongoing anemia with a hemoglobin 10.4 g/dL.  He reports some  increased tenesmus which is likely secondary to radiation proctitis-side effect of his treatment.  He also reports increased bloating and gassiness.  I recommended Gas-X 4 times daily with meals and at bedtime.  His lab work today is otherwise adequate for treatment, continue capecitabine as prescribed.  He will follow-up with radiation oncology as per their plan.  I will see him back in 1 week for continued treatment with lab work prior to monitor for toxicities.    Orders:  -     CBC and Differential; Future  -     Comprehensive metabolic panel; Future  -     Lab Appointment Request; Future  -     Clinic Appointment Request; Future  -     CBC and Differential; Future  -     Lab Appointment Request; Future  -     CBC and Differential; Future  -     Lab Appointment Request; Future  -     CBC and Differential; Future  -     Lab Appointment Request; Future  -     CBC and Differential; Future  -     Lab Appointment Request; Future  -     CBC and Differential; Future  -     Lab Appointment Request; Future  -     Oral Chemo - Track Receipt of Meds  -     Treatment Considerations          Patient Follow Up: 1 week    Patient was given instructions and counseling regarding his condition or for health maintenance advice. Please see specific information pulled into the AVS if appropriate.     Issac Walker MD    12/13/2021

## 2021-12-13 NOTE — ASSESSMENT & PLAN NOTE
Patient is status post resection.  He is receiving adjuvant chemo RT with radiation and capecitabine.  Tolerating his treatments reasonably well.  He does have ongoing anemia with a hemoglobin 10.4 g/dL.  He reports some increased tenesmus which is likely secondary to radiation proctitis-side effect of his treatment.  He also reports increased bloating and gassiness.  I recommended Gas-X 4 times daily with meals and at bedtime.  His lab work today is otherwise adequate for treatment, continue capecitabine as prescribed.  He will follow-up with radiation oncology as per their plan.  I will see him back in 1 week for continued treatment with lab work prior to monitor for toxicities.

## 2021-12-13 NOTE — ASSESSMENT & PLAN NOTE
Hemoglobin is decreased but reasonable.  No specific intervention required at this point.  Repeat CBC at next visit.

## 2021-12-14 ENCOUNTER — HOSPITAL ENCOUNTER (OUTPATIENT)
Dept: RADIATION ONCOLOGY | Facility: HOSPITAL | Age: 66
Discharge: HOME | End: 2021-12-14

## 2021-12-14 PROCEDURE — 77386: CPT | Performed by: RADIOLOGY

## 2021-12-14 PROCEDURE — 77014 CHG CT GUIDANCE RADIATION THERAPY FLDS PLACEMENT: CPT | Performed by: RADIOLOGY

## 2021-12-15 ENCOUNTER — HOSPITAL ENCOUNTER (OUTPATIENT)
Dept: RADIATION ONCOLOGY | Facility: HOSPITAL | Age: 66
Discharge: HOME | End: 2021-12-15

## 2021-12-15 VITALS
OXYGEN SATURATION: 95 % | SYSTOLIC BLOOD PRESSURE: 122 MMHG | RESPIRATION RATE: 18 BRPM | HEART RATE: 95 BPM | WEIGHT: 122.58 LBS | TEMPERATURE: 98.5 F | DIASTOLIC BLOOD PRESSURE: 78 MMHG | BODY MASS INDEX: 16.17 KG/M2

## 2021-12-15 DIAGNOSIS — C20 RECTAL CANCER (HCC): Primary | ICD-10-CM

## 2021-12-15 PROCEDURE — 77427 RADIATION TX MANAGEMENT X5: CPT | Performed by: RADIOLOGY

## 2021-12-15 PROCEDURE — 77014 CHG CT GUIDANCE RADIATION THERAPY FLDS PLACEMENT: CPT | Performed by: RADIOLOGY

## 2021-12-15 PROCEDURE — 77386: CPT | Performed by: RADIOLOGY

## 2021-12-15 PROCEDURE — 77336 RADIATION PHYSICS CONSULT: CPT | Performed by: RADIOLOGY

## 2021-12-15 NOTE — PROGRESS NOTES
On Treatment Visit       Patient: Pavel Dai   YOB: 1955   Medical Record Number: [id number]     Date of Visit  December 15, 2021   Primary Diagnosis:Rectal cancer (HCC) [C20]  Cancer Staging: Cancer Staging  Stage I (cT2, cN0, cM0)       was seen today for an on treatment visit.  He is receiving radiation therapy to the pelvis.  He  has received 2700 cGy in 15 fractions out of a planned dose of 5040 cGy in any 8 fractions. He is currently receiving concurrent capecitabine chemotherapy per Dr. Walker.     Today on exam the patient is tolerating radiation therapy well and has no new disease or treatment-related complaints.  Denies rectal bleeding.  Had had problems with abdominal sharp pain, relieved with Gas-X.  Also had constipation, relieved with laxative.                                            Review of Systems:   Review of Systems   Constitutional: Positive for appetite change and fatigue.   HENT: Negative for trouble swallowing.    Respiratory: Positive for cough (productive of brownish colored sputum) and shortness of breath (on oxygen).    Cardiovascular: Negative for leg swelling.   Gastrointestinal: Positive for abdominal pain (r/t gas, need to have BM) and constipation. Negative for nausea.   Genitourinary: Positive for frequency. Negative for difficulty urinating, dysuria and urgency.   Musculoskeletal: Positive for gait problem.   Neurological: Positive for weakness (bilateral legs) and headaches (intermittent, ongoing for 40 years per pt.). Negative for dizziness.   Psychiatric/Behavioral: Positive for sleep disturbance (frequent waking).       Vitals:     Vitals:    12/15/21 1048   BP: 122/78   Pulse: 95   Resp: 18   Temp: 98.5 °F (36.9 °C)   SpO2: 95%       Weight:   Wt Readings from Last 3 Encounters:   12/15/21 55.6 kg (122 lb 9.2 oz)   12/13/21 55 kg (121 lb 4.1 oz)   12/09/21 56.9 kg (125 lb 7.1 oz)      Pain:    Pain Score    12/15/21 1048   PainSc: 0-No pain          Physical Exam:  Physical Exam  Constitutional:       General: He is not in acute distress.     Comments: Cachectic   Eyes:      General: No scleral icterus.  Pulmonary:      Effort: Pulmonary effort is normal. No respiratory distress.      Comments: Receiving supplemental O2 via NC  Abdominal:      General: There is no distension.   Neurological:      General: No focal deficit present.           Plan: I have reviewed treatment setup notes, checked and approved the daily guidance images.  I reviewed dose delivery, treatment parameters and deemed them appropriate. We plan to continue radiation therapy as prescribed.  We will check skin on treatment table tomorrow.      Angeline Ulloa MD  Radiation Oncology   Electronically signed 12/15/2021  11:00 EST

## 2021-12-16 ENCOUNTER — HOSPITAL ENCOUNTER (OUTPATIENT)
Dept: RADIATION ONCOLOGY | Facility: HOSPITAL | Age: 66
Discharge: HOME | End: 2021-12-16

## 2021-12-16 VITALS — BODY MASS INDEX: 16.17 KG/M2 | WEIGHT: 122.58 LBS

## 2021-12-16 PROCEDURE — 77014 CHG CT GUIDANCE RADIATION THERAPY FLDS PLACEMENT: CPT | Performed by: RADIOLOGY

## 2021-12-16 PROCEDURE — 77386: CPT | Performed by: RADIOLOGY

## 2021-12-16 NOTE — PROGRESS NOTES
"12/16/21:    Pt seen in radiation therapy.  Today's wt:  55.6 kg, a 2.3% loss in the past week.  3% total wt decline in 3 weeks.  Pt reports increasing abdominal pn related to gas and urgency to have a BM with alternating constipation.  He took a laxative for the past few days to help have a BM, but states the urgency & watery BMs have worsened last night & this morning.  Pt spoke to Radiation Oncologist about issues this week.  Pt now taking Gas-Ex more to help with the cramping & abdominal pn.  He plans to stop taking the laxative now, until BMs are more formed.  Pt reports his BMs have never been \"hard\" and always soft, therefore never takes stool softeners.  Discussed taking Metamucil if urgency or loose BMs continue after he stops taking the laxative for a few days- to help produce a more formed stool.  Pt was a little confused at the information he has received this week, therefore typed up a note to explain better:  Gas-ex for excess gas, Miralax for laxative effect (only if constipated), Metamucil (to help regular BMs and promote less urgency & more formed stool), and Imodium AD (only if diarrhea occurs/continues).  "

## 2021-12-20 ENCOUNTER — LAB (OUTPATIENT)
Dept: ONCOLOGY | Facility: HOSPITAL | Age: 66
End: 2021-12-20

## 2021-12-20 ENCOUNTER — HOSPITAL ENCOUNTER (OUTPATIENT)
Dept: RADIATION ONCOLOGY | Facility: HOSPITAL | Age: 66
Discharge: HOME | End: 2021-12-20

## 2021-12-20 ENCOUNTER — OFFICE VISIT (OUTPATIENT)
Dept: ONCOLOGY | Facility: HOSPITAL | Age: 66
End: 2021-12-20

## 2021-12-20 VITALS
TEMPERATURE: 97 F | HEART RATE: 110 BPM | WEIGHT: 121.91 LBS | RESPIRATION RATE: 20 BRPM | OXYGEN SATURATION: 90 % | SYSTOLIC BLOOD PRESSURE: 136 MMHG | DIASTOLIC BLOOD PRESSURE: 77 MMHG | BODY MASS INDEX: 16.08 KG/M2

## 2021-12-20 DIAGNOSIS — T45.1X5A ANEMIA DUE TO CHEMOTHERAPY: ICD-10-CM

## 2021-12-20 DIAGNOSIS — D64.81 ANEMIA DUE TO CHEMOTHERAPY: ICD-10-CM

## 2021-12-20 DIAGNOSIS — C20 RECTAL CANCER (HCC): ICD-10-CM

## 2021-12-20 DIAGNOSIS — C20 RECTAL CANCER (HCC): Primary | ICD-10-CM

## 2021-12-20 LAB
BASOPHILS # BLD AUTO: 0.01 10*3/MM3 (ref 0–0.2)
BASOPHILS NFR BLD AUTO: 0.1 % (ref 0–1.5)
DEPRECATED RDW RBC AUTO: 46.2 FL (ref 37–54)
EOSINOPHIL # BLD AUTO: 2.38 10*3/MM3 (ref 0–0.4)
EOSINOPHIL NFR BLD AUTO: 27.5 % (ref 0.3–6.2)
ERYTHROCYTE [DISTWIDTH] IN BLOOD BY AUTOMATED COUNT: 16.9 % (ref 12.3–15.4)
HCT VFR BLD AUTO: 35.6 % (ref 37.5–51)
HGB BLD-MCNC: 10.5 G/DL (ref 13–17.7)
IMM GRANULOCYTES # BLD AUTO: 0.02 10*3/MM3 (ref 0–0.05)
IMM GRANULOCYTES NFR BLD AUTO: 0.2 % (ref 0–0.5)
LYMPHOCYTES # BLD AUTO: 0.32 10*3/MM3 (ref 0.7–3.1)
LYMPHOCYTES NFR BLD AUTO: 3.7 % (ref 19.6–45.3)
MCH RBC QN AUTO: 27.3 PG (ref 26.6–33)
MCHC RBC AUTO-ENTMCNC: 29.5 G/DL (ref 31.5–35.7)
MCV RBC AUTO: 92.5 FL (ref 79–97)
MONOCYTES # BLD AUTO: 0.57 10*3/MM3 (ref 0.1–0.9)
MONOCYTES NFR BLD AUTO: 6.6 % (ref 5–12)
NEUTROPHILS NFR BLD AUTO: 5.34 10*3/MM3 (ref 1.7–7)
NEUTROPHILS NFR BLD AUTO: 61.9 % (ref 42.7–76)
PLATELET # BLD AUTO: 332 10*3/MM3 (ref 140–450)
PMV BLD AUTO: 8.9 FL (ref 6–12)
RBC # BLD AUTO: 3.85 10*6/MM3 (ref 4.14–5.8)
WBC NRBC COR # BLD: 8.64 10*3/MM3 (ref 3.4–10.8)

## 2021-12-20 PROCEDURE — 99214 OFFICE O/P EST MOD 30 MIN: CPT | Performed by: INTERNAL MEDICINE

## 2021-12-20 PROCEDURE — G0463 HOSPITAL OUTPT CLINIC VISIT: HCPCS | Performed by: INTERNAL MEDICINE

## 2021-12-20 PROCEDURE — 85025 COMPLETE CBC W/AUTO DIFF WBC: CPT

## 2021-12-20 PROCEDURE — 77014 CHG CT GUIDANCE RADIATION THERAPY FLDS PLACEMENT: CPT | Performed by: RADIOLOGY

## 2021-12-20 PROCEDURE — 77386: CPT | Performed by: RADIOLOGY

## 2021-12-20 PROCEDURE — 36415 COLL VENOUS BLD VENIPUNCTURE: CPT

## 2021-12-20 NOTE — PROGRESS NOTES
Chief Complaint  Malignant Neoplasm of Rectum (-fu)    Jose Luis Abdalla MD Tran, Khue N, MD Subjective Allen Osorio Dai presents to [facility]AT[facility] for ongoing treatment of his rectal cancer.  He is receiving radiation plus capecitabine.  He continues to complain of tenesmus especially as he is got more radiation.  He denies blood per rectum or melena.  He feels like he is eating and drinking adequately, his weight is stable.  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  He denies any masses or adenopathy.  He denies mouth sores or rash.    Oncology/Hematology History Overview Note      CLINICAL HISTORY:    Pre-op diagnosis: Anal mass.        DIAGNOSIS:    (A) Rectal mass, transanal disk excision:     - Moderately differentiated invasive adenocarcinoma with focal superficial   invasion of  muscularis propria (see Synoptic Report)     - Tumor present at 3 o'clock mucosal/peripheral margin    (B) Rectum, right margin, excision:     - Fragment of colon, negative for carcinoma    Surgery:  9/22/21 Transanal excision of the Rectal mass performed at [facility]. This demonstrated the high-grade dysplasia but also moderately differentiated adenocarcinoma with superficial invasion of the muscularis propria (T2), peripheral margin was positive.  Lymph vascular invasion identified.    Chemotherapy & Radiation:  11/22/21 adjuvant chemo RT with radiation and capecitabine     Rectal cancer (HCC)   11/3/2021 Initial Diagnosis    Rectal cancer (HCC)     11/3/2021 Cancer Staged    Staging form: Colon And Rectum, AJCC 8th Edition  - Clinical: Stage I (cT2, cN0, cM0) - Signed by Issac Walker MD on 11/3/2021     12/13/2021 -  Chemotherapy    OP rectal Capecitabine 825 mg/m2 M-F With Concurrent Radiation      Malignant neoplasm of rectum (HCC) (Resolved)   11/9/2021 Initial Diagnosis    Malignant neoplasm of rectum (HCC)     11/9/2021 - 11/29/2021 Radiation     RADIATION THERAPY Treatment Details (11/9/2021 - 11/29/2021)  Site: Rectum  Technique: IMRT  Goal: No goal specified  Planned Treatment Start Date: No planned start date specified         Review of Systems   Constitutional: Positive for fatigue.   Respiratory: Positive for shortness of breath.    Gastrointestinal: Positive for abdominal pain and rectal pain (Difficulty releasing stool). Negative for blood in stool (puss/blood).   Musculoskeletal: Positive for gait problem (wheelchair).     Current Outpatient Medications on File Prior to Visit   Medication Sig Dispense Refill   • albuterol (PROVENTIL) (2.5 MG/3ML) 0.083% nebulizer solution Every 4 (Four) Hours.     • aspirin 81 MG chewable tablet Chew 81 mg Daily.     • atorvastatin (LIPITOR) 10 MG tablet Take 10 mg by mouth Every Night.     • budesonide-formoterol (SYMBICORT) 160-4.5 MCG/ACT inhaler Inhale 2 puffs 2 (Two) Times a Day.     • capecitabine (XELODA) 500 MG chemo tablet Take 3 tab(s) by mouth in the AM and 3 tab(s) by mouth in the PM on Monday-Friday with radiation. 180 tablet 0   • guaiFENesin (MUCINEX) 600 MG 12 hr tablet Take 600 mg by mouth 2 (Two) Times a Day.     • ipratropium-albuterol (COMBIVENT RESPIMAT)  MCG/ACT inhaler Inhale 1 puff 4 (Four) Times a Day. Do not exceed inhalations per day     • loratadine (CLARITIN) 10 MG tablet Take 10 mg by mouth Daily.     • metoprolol succinate XL (TOPROL-XL) 50 MG 24 hr tablet Take 50 mg by mouth 2 (two) times a day.     • multivitamin with minerals tablet tablet Take 1 tablet by mouth Daily.     • omeprazole (priLOSEC) 20 MG capsule Take 20 mg by mouth Daily.     • ondansetron (ZOFRAN) 8 MG tablet Take 1 tablet by mouth 3 (Three) Times a Day As Needed for Nausea or Vomiting. 30 tablet 5   • roflumilast (DALIRESP) 500 MCG tablet tablet Take 500 mcg by mouth Daily.       No current facility-administered medications on file prior to visit.       No Known Allergies  Past Medical History:   Diagnosis  Date   • Anemia due to chemotherapy 2021   • CHF (congestive heart failure) (HCC)    • COPD (chronic obstructive pulmonary disease) (HCC)    • Coronary artery disease    • Frequent urination    • Hyperlipidemia    • Hypertension    • Rectal cancer (HCC)      Past Surgical History:   Procedure Laterality Date   • COLONOSCOPY     • HERNIA REPAIR      approximately 4 years ago    • RECTAL SURGERY     • SHOULDER SURGERY      joint loose, calcium particles removed from shoulder joint     Social History     Socioeconomic History   • Marital status:    Tobacco Use   • Smoking status: Former Smoker     Start date: 11/3/1973     Quit date: 11/3/2014     Years since quittin.1   • Smokeless tobacco: Never Used   Vaping Use   • Vaping Use: Never used   Substance and Sexual Activity   • Alcohol use: Not Currently   • Drug use: Never   • Sexual activity: Defer     Family History   Problem Relation Age of Onset   • Heart attack Mother    • Heart attack Sister    • Heart attack Sister        Objective   Physical Exam  Vitals reviewed. Exam conducted with a chaperone present.   Constitutional:       General: He is not in acute distress.     Appearance: Normal appearance.   Cardiovascular:      Rate and Rhythm: Normal rate and regular rhythm.      Heart sounds: Normal heart sounds. No murmur heard.  No gallop.    Pulmonary:      Effort: Pulmonary effort is normal.      Breath sounds: Normal breath sounds.   Abdominal:      General: Abdomen is flat. Bowel sounds are normal.      Palpations: Abdomen is soft.   Musculoskeletal:      Cervical back: Neck supple.      Right lower leg: No edema.      Left lower leg: No edema.   Lymphadenopathy:      Cervical: No cervical adenopathy.   Neurological:      Mental Status: He is alert and oriented to person, place, and time.   Psychiatric:         Mood and Affect: Mood normal.         Behavior: Behavior normal.         Vitals:    21 0928   BP: 136/77   Pulse: 110   Resp:  20   Temp: 97 °F (36.1 °C)   SpO2: 90%   Weight: 55.3 kg (121 lb 14.6 oz)   PainSc:   6   PainLoc: Rectum     ECOG score: 2         PHQ-9 Total Score:                    Result Review :   The following data was reviewed by: Issac Walker MD on 12/20/2021:  Lab Results   Component Value Date    HGB 10.5 (L) 12/20/2021    HCT 35.6 (L) 12/20/2021    MCV 92.5 12/20/2021     12/20/2021    WBC 8.64 12/20/2021    NEUTROABS 5.34 12/20/2021    LYMPHSABS 0.32 (L) 12/20/2021    MONOSABS 0.57 12/20/2021    EOSABS 2.38 (H) 12/20/2021    BASOSABS 0.01 12/20/2021     Lab Results   Component Value Date    GLUCOSE 115 (H) 12/13/2021    BUN 20 12/13/2021    CREATININE 0.68 (L) 12/13/2021     12/13/2021    K 4.8 12/13/2021    CL 95 (L) 12/13/2021    CO2 32.4 (H) 12/13/2021    CALCIUM 9.7 12/13/2021    PROTEINTOT 6.9 12/13/2021    ALBUMIN 4.20 12/13/2021    BILITOT 0.3 12/13/2021    ALKPHOS 59 12/13/2021    AST 19 12/13/2021    ALT 16 12/13/2021           Assessment and Plan    Diagnoses and all orders for this visit:    1. Rectal cancer (HCC) (Primary)  Assessment & Plan:  Patient is receiving neoadjuvant chemo RT with radiation plus capecitabine.  He continues to have symptoms of tenesmus related to his underlying disease and radiation.  He is tolerating chemotherapy reasonably well.  He does demonstrate anemia due to treatment but this is stable.  Continue oral capecitabine as planned.  Follow-up with radiation oncology as scheduled.  RTC 2 weeks for OV, lab work to monitor for toxicities.    Orders:  -     CBC & Differential; Future  -     Comprehensive Metabolic Panel; Future    2. Anemia due to chemotherapy  Assessment & Plan:  Hemoglobin remains decreased but stable.  No requirement for dose adjustment or transfusion.  Repeat CBC next visit.    Orders:  -     CBC & Differential; Future  -     Comprehensive Metabolic Panel; Future          Patient Follow Up: 2 weeks    Patient was given instructions and  counseling regarding his condition or for health maintenance advice. Please see specific information pulled into the AVS if appropriate.     Issac Walker MD    12/20/2021

## 2021-12-20 NOTE — ASSESSMENT & PLAN NOTE
Patient is receiving neoadjuvant chemo RT with radiation plus capecitabine.  He continues to have symptoms of tenesmus related to his underlying disease and radiation.  He is tolerating chemotherapy reasonably well.  He does demonstrate anemia due to treatment but this is stable.  Continue oral capecitabine as planned.  Follow-up with radiation oncology as scheduled.  RTC 2 weeks for OV, lab work to monitor for toxicities.

## 2021-12-20 NOTE — ASSESSMENT & PLAN NOTE
Hemoglobin remains decreased but stable.  No requirement for dose adjustment or transfusion.  Repeat CBC next visit.

## 2021-12-21 ENCOUNTER — HOSPITAL ENCOUNTER (OUTPATIENT)
Dept: RADIATION ONCOLOGY | Facility: HOSPITAL | Age: 66
Discharge: HOME | End: 2021-12-21

## 2021-12-21 VITALS
HEART RATE: 99 BPM | RESPIRATION RATE: 16 BRPM | OXYGEN SATURATION: 96 % | BODY MASS INDEX: 16.26 KG/M2 | SYSTOLIC BLOOD PRESSURE: 116 MMHG | WEIGHT: 123.24 LBS | DIASTOLIC BLOOD PRESSURE: 66 MMHG | TEMPERATURE: 99.1 F

## 2021-12-21 DIAGNOSIS — C20 RECTAL CANCER (HCC): Primary | ICD-10-CM

## 2021-12-21 PROCEDURE — 77014 CHG CT GUIDANCE RADIATION THERAPY FLDS PLACEMENT: CPT | Performed by: RADIOLOGY

## 2021-12-21 PROCEDURE — 77386: CPT | Performed by: RADIOLOGY

## 2021-12-21 PROCEDURE — 77427 RADIATION TX MANAGEMENT X5: CPT | Performed by: RADIOLOGY

## 2021-12-21 NOTE — PROGRESS NOTES
"                                     On Treatment Note      Encounter Date: 12/21/2021   Patient Name: Pavel Dai  YOB: 1955   Medical Record Number: [id number]       Rectal cancer (HCC)  Staging form: Colon And Rectum, AJCC 8th Edition  - Clinical: Stage I (cT2, cN0, cM0) - Signed by Issac Walker MD on 11/3/2021            Cancer Staging  Stage I (cT2, cN0, cM0)      Treatment Site: Pelvis  Prescribed dose: 50.4 Gy/28 fractions  Completed dose: 32.4 Gy/18 fractions  Date of First Treatment: 11/23/21     Tolerance: Tolerating well     Radiation related toxicities and management plan: gas pains - simethicone     He is receiving concurrent chemotherapy: xeloda (Dr. Walker)      Issues raised by patient or treatment team: working with marco antonio loera (dietary)      Subjective      Review of Systems: Review of Systems   Constitutional: Positive for fatigue. Negative for appetite change.   Respiratory: Positive for cough (productive) and shortness of breath (on oxygen @ 2L).    Cardiovascular: Negative for leg swelling.   Gastrointestinal: Positive for constipation. Negative for abdominal pain (r/t gas), diarrhea and nausea.   Genitourinary: Negative for difficulty urinating, dysuria, frequency and urgency.   Musculoskeletal: Positive for gait problem. Negative for arthralgias and back pain.   Neurological: Positive for weakness (bilateral knees) and headaches (ongoing for \"years\", no recent change). Negative for dizziness.   Psychiatric/Behavioral: Positive for sleep disturbance (ongoing).       The following portions of the patient's history were reviewed and updated as appropriate: allergies, current medications, past family history, past medical history, past social history, past surgical history and problem list.    Measures:   Pain: (on a scale of 0-10)   Pain Score    12/21/21 1042   PainSc: 0-No pain       Advanced Care Plan: N Advance Care Planning   ACP discussion was declined by " the patient. Patient does not have an advance directive, declines further assistance.    KPS/Quality of Life: 80 - Restricted Physical Activity  ECOG: (2) Ambulatory and capable of self care, unable to carry out work activity, up and about > 50% or waking hours  Depression Screening:    PHQ-9 score of 0 on 11/29/2021.         Objective     Physical Exam:   Vital Signs:   Vitals:    12/21/21 1042   BP: 116/66   Pulse: 99   Resp: 16   Temp: 99.1 °F (37.3 °C)   SpO2: 96%      Body mass index is 16.26 kg/m².   Wt Readings from Last 3 Encounters:   12/21/21 55.9 kg (123 lb 3.8 oz)   12/20/21 55.3 kg (121 lb 14.6 oz)   12/16/21 55.6 kg (122 lb 9.2 oz)       General: NAD, sitting comfortably  Eye: EOMI, anicteric sclerae  Respiratory: Symmetric expansion, nonlabored respiration  Neuro: Alert oriented x3, cranial nerves III through XII are grossly intact.  Psych: Mood and affect appropriate      Assessment / Plan      Plan:   I reviewed technical aspects of the radiation therapy treatment administration including dose delivery and daily treatment parameters to verify that these meet the specifications from my clinical treatment planning note. I reviewed the treatment setup notes. I reviewed and approved images obtained for “image guidance” with setup and treatment.     We will continue radiation therapy as prescribed.        Kandace Lucio MD  Radiation Oncology  [facility]    This document has been signed by Kandace Lucio MD on December 21, 2021 11:13 EST

## 2021-12-22 ENCOUNTER — HOSPITAL ENCOUNTER (OUTPATIENT)
Dept: RADIATION ONCOLOGY | Facility: HOSPITAL | Age: 66
Discharge: HOME | End: 2021-12-22

## 2021-12-22 VITALS — BODY MASS INDEX: 16.61 KG/M2 | WEIGHT: 125.88 LBS

## 2021-12-22 PROCEDURE — 77386: CPT | Performed by: RADIOLOGY

## 2021-12-22 PROCEDURE — 77014 CHG CT GUIDANCE RADIATION THERAPY FLDS PLACEMENT: CPT | Performed by: RADIOLOGY

## 2021-12-22 NOTE — PROGRESS NOTES
12/22/21:    Wt gain of ~2 kg in the past week.  Pt denies nutrition-related concerns at this time.  He continues to make an effort to eat well & drinks Ensure Plus BID ([facility]).  Per MD note, p's abd pn (related to gas) is not a concern this week.

## 2021-12-23 ENCOUNTER — HOSPITAL ENCOUNTER (OUTPATIENT)
Dept: RADIATION ONCOLOGY | Facility: HOSPITAL | Age: 66
Discharge: HOME | End: 2021-12-23

## 2021-12-23 PROCEDURE — 77386: CPT | Performed by: RADIOLOGY

## 2021-12-23 PROCEDURE — 77014 CHG CT GUIDANCE RADIATION THERAPY FLDS PLACEMENT: CPT | Performed by: RADIOLOGY

## 2021-12-23 PROCEDURE — 77336 RADIATION PHYSICS CONSULT: CPT | Performed by: RADIOLOGY

## 2021-12-27 ENCOUNTER — LAB (OUTPATIENT)
Dept: ONCOLOGY | Facility: HOSPITAL | Age: 66
End: 2021-12-27

## 2021-12-27 ENCOUNTER — HOSPITAL ENCOUNTER (OUTPATIENT)
Dept: RADIATION ONCOLOGY | Facility: HOSPITAL | Age: 66
Discharge: HOME | End: 2021-12-27

## 2021-12-27 DIAGNOSIS — C20 RECTAL CANCER (HCC): ICD-10-CM

## 2021-12-27 DIAGNOSIS — T45.1X5A ANEMIA DUE TO CHEMOTHERAPY: ICD-10-CM

## 2021-12-27 DIAGNOSIS — D64.81 ANEMIA DUE TO CHEMOTHERAPY: ICD-10-CM

## 2021-12-27 LAB
ALBUMIN SERPL-MCNC: 3.51 G/DL (ref 3.5–5.2)
ALBUMIN/GLOB SERPL: 1.5 G/DL
ALP SERPL-CCNC: 60 U/L (ref 39–117)
ALT SERPL W P-5'-P-CCNC: 24 U/L (ref 1–41)
ANION GAP SERPL CALCULATED.3IONS-SCNC: 7.1 MMOL/L (ref 5–15)
AST SERPL-CCNC: 24 U/L (ref 1–40)
BASOPHILS # BLD AUTO: 0.02 10*3/MM3 (ref 0–0.2)
BASOPHILS NFR BLD AUTO: 0.3 % (ref 0–1.5)
BILIRUB SERPL-MCNC: 0.2 MG/DL (ref 0–1.2)
BUN SERPL-MCNC: 29 MG/DL (ref 8–23)
BUN/CREAT SERPL: 42 (ref 7–25)
CALCIUM SPEC-SCNC: 8.8 MG/DL (ref 8.6–10.5)
CHLORIDE SERPL-SCNC: 95 MMOL/L (ref 98–107)
CO2 SERPL-SCNC: 33.9 MMOL/L (ref 22–29)
CREAT SERPL-MCNC: 0.69 MG/DL (ref 0.76–1.27)
DEPRECATED RDW RBC AUTO: 51.1 FL (ref 37–54)
EOSINOPHIL # BLD AUTO: 0.41 10*3/MM3 (ref 0–0.4)
EOSINOPHIL NFR BLD AUTO: 5.8 % (ref 0.3–6.2)
ERYTHROCYTE [DISTWIDTH] IN BLOOD BY AUTOMATED COUNT: 17.8 % (ref 12.3–15.4)
GFR SERPL CREATININE-BSD FRML MDRD: 115 ML/MIN/1.73
GLOBULIN UR ELPH-MCNC: 2.3 GM/DL
GLUCOSE SERPL-MCNC: 117 MG/DL (ref 65–99)
HCT VFR BLD AUTO: 36.2 % (ref 37.5–51)
HGB BLD-MCNC: 10.7 G/DL (ref 13–17.7)
IMM GRANULOCYTES # BLD AUTO: 0.03 10*3/MM3 (ref 0–0.05)
IMM GRANULOCYTES NFR BLD AUTO: 0.4 % (ref 0–0.5)
LYMPHOCYTES # BLD AUTO: 0.29 10*3/MM3 (ref 0.7–3.1)
LYMPHOCYTES NFR BLD AUTO: 4.1 % (ref 19.6–45.3)
MCH RBC QN AUTO: 27.3 PG (ref 26.6–33)
MCHC RBC AUTO-ENTMCNC: 29.6 G/DL (ref 31.5–35.7)
MCV RBC AUTO: 92.3 FL (ref 79–97)
MONOCYTES # BLD AUTO: 0.84 10*3/MM3 (ref 0.1–0.9)
MONOCYTES NFR BLD AUTO: 12 % (ref 5–12)
NEUTROPHILS NFR BLD AUTO: 5.42 10*3/MM3 (ref 1.7–7)
NEUTROPHILS NFR BLD AUTO: 77.4 % (ref 42.7–76)
PLATELET # BLD AUTO: 458 10*3/MM3 (ref 140–450)
PMV BLD AUTO: 8.7 FL (ref 6–12)
POTASSIUM SERPL-SCNC: 4 MMOL/L (ref 3.5–5.2)
PROT SERPL-MCNC: 5.8 G/DL (ref 6–8.5)
RBC # BLD AUTO: 3.92 10*6/MM3 (ref 4.14–5.8)
SODIUM SERPL-SCNC: 136 MMOL/L (ref 136–145)
WBC NRBC COR # BLD: 7.01 10*3/MM3 (ref 3.4–10.8)

## 2021-12-27 PROCEDURE — 36415 COLL VENOUS BLD VENIPUNCTURE: CPT

## 2021-12-27 PROCEDURE — 85025 COMPLETE CBC W/AUTO DIFF WBC: CPT

## 2021-12-27 PROCEDURE — 80053 COMPREHEN METABOLIC PANEL: CPT

## 2021-12-27 PROCEDURE — 77014 CHG CT GUIDANCE RADIATION THERAPY FLDS PLACEMENT: CPT | Performed by: RADIOLOGY

## 2021-12-27 PROCEDURE — 77386: CPT | Performed by: RADIOLOGY

## 2021-12-28 ENCOUNTER — HOSPITAL ENCOUNTER (OUTPATIENT)
Dept: RADIATION ONCOLOGY | Facility: HOSPITAL | Age: 66
Discharge: HOME | End: 2021-12-28

## 2021-12-28 VITALS
BODY MASS INDEX: 15.97 KG/M2 | HEART RATE: 93 BPM | TEMPERATURE: 98.4 F | SYSTOLIC BLOOD PRESSURE: 107 MMHG | OXYGEN SATURATION: 94 % | RESPIRATION RATE: 16 BRPM | WEIGHT: 121.03 LBS | DIASTOLIC BLOOD PRESSURE: 61 MMHG

## 2021-12-28 DIAGNOSIS — C20 RECTAL CANCER (HCC): Primary | ICD-10-CM

## 2021-12-28 PROCEDURE — 77386: CPT | Performed by: RADIOLOGY

## 2021-12-28 PROCEDURE — 77014 CHG CT GUIDANCE RADIATION THERAPY FLDS PLACEMENT: CPT | Performed by: RADIOLOGY

## 2021-12-28 PROCEDURE — 77427 RADIATION TX MANAGEMENT X5: CPT | Performed by: RADIOLOGY

## 2021-12-28 NOTE — PROGRESS NOTES
On Treatment Note      Encounter Date: 12/28/2021   Patient Name: Pavel Dai  YOB: 1955   Medical Record Number: [id number]       Cancer Staging: Cancer Staging  Rectal cancer (HCC)  Staging form: Colon And Rectum, AJCC 8th Edition  - Clinical: Stage I (cT2, cN0, cM0) - Signed by Issac Walker MD on 11/3/2021            Cancer Staging  Stage I (cT2, cN0, cM0)      Treatment Site: Pelvis  Prescribed dose: 50.4 Gy/28 fractions  Completed dose: 39.6 Gy/22 fractions  Date of First Treatment: 11/23/21     Tolerance: Tolerating well     Radiation related toxicities and management plan: gas pains - simethicone only helps moderately. Suggested probiotics, he is not interested. diahhrea managed with medication.      He is receiving concurrent chemotherapy: xeloda (Dr. Walker)      Issues raised by patient or treatment team: working with marco antonio loera (dietary)       Subjective      Review of Systems: Review of Systems   Constitutional: Positive for appetite change (varies, some days are good but when experiencing abdominal pain it is lessened). Negative for fatigue.   Respiratory: Positive for cough (occasionally productive) and shortness of breath.    Cardiovascular: Negative for leg swelling.   Gastrointestinal: Positive for abdominal distention (occurs with increased gas, takes gas X as directed), blood in stool (occasionally) and rectal pain.   Genitourinary: Positive for frequency. Negative for difficulty urinating.   Musculoskeletal: Positive for gait problem (r/t weakness, soa).   Neurological: Positive for weakness and headaches (occasional, no change from previous h/a's). Negative for dizziness.   Psychiatric/Behavioral: Positive for sleep disturbance (ongoing for 5+ years).       The following portions of the patient's history were reviewed and updated as appropriate: allergies, current medications, past family history, past medical history, past  social history, past surgical history and problem list.    Measures:   Pain: (on a scale of 0-10)   Pain Score    12/28/21 1043   PainSc:   5     Pavel Dai reports a pain score of 5.  Given his pain assessment as noted, treatment options were discussed and the following options were decided upon as a follow-up plan to address the patient's pain: continuation of current treatment plan for pain.    Advanced Care Plan: N   KPS/Quality of Life: 80 - Restricted Physical Activity  ECOG: (1) Restricted in physically strenuous activity, ambulatory and able to do work of light nature  Depression Screening:    PHQ-9 score of 0 on 11/29/2021.       Objective     Physical Exam:   Vital Signs:   Vitals:    12/28/21 1043   BP: 107/61   Pulse: 93   Resp: 16   Temp: 98.4 °F (36.9 °C)   SpO2: 94%      Body mass index is 15.97 kg/m².   Wt Readings from Last 3 Encounters:   12/28/21 54.9 kg (121 lb 0.5 oz)   12/22/21 57.1 kg (125 lb 14.1 oz)   12/21/21 55.9 kg (123 lb 3.8 oz)       General: NAD, sitting comfortably  Eye: EOMI, anicteric sclerae  Respiratory: Symmetric expansion, nonlabored respiration  Neuro: Alert oriented x3, cranial nerves III through XII are grossly intact.  Psych: Mood and affect appropriate      Assessment / Plan      Plan:   I reviewed technical aspects of the radiation therapy treatment administration including dose delivery and daily treatment parameters to verify that these meet the specifications from my clinical treatment planning note. I reviewed the treatment setup notes. I reviewed and approved images obtained for “image guidance” with setup and treatment.     We will continue radiation therapy as prescribed.        Kandace Lucio MD  Radiation Oncology  [facility]    This document has been signed by Kandace Lucio MD on December 28, 2021 11:55 EST

## 2021-12-29 ENCOUNTER — HOSPITAL ENCOUNTER (OUTPATIENT)
Dept: RADIATION ONCOLOGY | Facility: HOSPITAL | Age: 66
Discharge: HOME | End: 2021-12-29

## 2021-12-29 VITALS — WEIGHT: 121.47 LBS | BODY MASS INDEX: 16.03 KG/M2

## 2021-12-29 PROCEDURE — 77014 CHG CT GUIDANCE RADIATION THERAPY FLDS PLACEMENT: CPT | Performed by: RADIOLOGY

## 2021-12-29 PROCEDURE — 77386: CPT | Performed by: RADIOLOGY

## 2021-12-29 NOTE — PROGRESS NOTES
Outpatient Nutrition Oncology Follow Up    Patient Name: Pavel Dai  YOB: 1955  MRN: [id number]  Assessment Date: 12/29/2021    CLINICAL NUTRITION ASSESSMENT    Dx:  Rectal Ca      Type of Cancer Treatment  XRT to pelvis  Xeloda     CLINICAL NUTRITION ASSESSMENT      Reason for Assessment  Follow-up protocol     H&P:    Past Medical History:   Diagnosis Date   • Anemia due to chemotherapy 12/13/2021   • CHF (congestive heart failure) (HCC)    • COPD (chronic obstructive pulmonary disease) (HCC)    • Coronary artery disease    • Frequent urination    • Hyperlipidemia    • Hypertension    • Rectal cancer (HCC)         Current Problems:   Patient Active Problem List   Diagnosis Code   • Rectal bleeding K62.5   • Acute blood loss anemia D62   • COPD (chronic obstructive pulmonary disease) (HCC) J44.9   • CAD (coronary artery disease) I25.10   • Essential hypertension I10   • History of ischemic stroke Z86.73   • Body mass index (BMI) 19.9 or less, adult Z68.1   • Encounter for immunization Z23   • Ex-smoker Z87.891   • Hyperlipidemia E78.5   • Hypoxemia R09.02   • Hypertensive heart disease without congestive heart failure I11.9   • Oxygen dependent Z99.81   • Postnasal drip R09.82   • Shortness of breath R06.02   • Rectal cancer (HCC) C20   • Anemia due to chemotherapy D64.81, T45.1X5A         Anthropometrics     Row Name 12/29/21 1051          Anthropometrics    Weight 55.1 kg (121 lb 7.6 oz)            Usual Body Weight (UBW)    Weight Loss unintentional     Weight Loss Time Frame 3.5% wt decline in 1 week                 BMI kg/m2   Body mass index is 16.03 kg/m².    Weight Hx  Wt Readings from Last 30 Encounters:   12/29/21 1051 55.1 kg (121 lb 7.6 oz)   12/28/21 1043 54.9 kg (121 lb 0.5 oz)   12/22/21 1033 57.1 kg (125 lb 14.1 oz)   12/21/21 1042 55.9 kg (123 lb 3.8 oz)   12/20/21 0928 55.3 kg (121 lb 14.6 oz)   12/16/21 1045 55.6 kg (122 lb 9.2 oz)   12/15/21 1048 55.6 kg (122 lb 9.2 oz)    12/13/21 0821 55 kg (121 lb 4.1 oz)   12/09/21 1046 56.9 kg (125 lb 7.1 oz)   12/07/21 1044 56.9 kg (125 lb 7.1 oz)   12/01/21 1031 57.5 kg (126 lb 12.2 oz)   11/30/21 1046 57 kg (125 lb 10.6 oz)   11/29/21 0918 57 kg (125 lb 10.6 oz)   11/24/21 1058 57.3 kg (126 lb 5.2 oz)   11/03/21 1440 56.5 kg (124 lb 9 oz)   11/03/21 1029 57.1 kg (125 lb 14.1 oz)   06/04/21 1142 57.9 kg (127 lb 10.3 oz)        Labs/Medications        Pertinent Labs Reviewed.   Results from last 7 days   Lab Units 12/27/21  0933   SODIUM mmol/L 136   POTASSIUM mmol/L 4.0   CHLORIDE mmol/L 95*   CO2 mmol/L 33.9*   BUN mg/dL 29*   CREATININE mg/dL 0.69*   CALCIUM mg/dL 8.8   BILIRUBIN mg/dL 0.2   ALK PHOS U/L 60   ALT (SGPT) U/L 24   AST (SGOT) U/L 24   GLUCOSE mg/dL 117*     Results from last 7 days   Lab Units 12/27/21  0933   HEMOGLOBIN g/dL 10.7*   HEMATOCRIT % 36.2*       Pertinent Medications albuterol, aspirin, atorvastatin, budesonide-formoterol, capecitabine, guaiFENesin, ipratropium-albuterol, loratadine, metoprolol succinate XL, multivitamin with minerals, omeprazole, ondansetron, and roflumilast     Physical Findings       Severe muscle wasting:  Temporal region  Severe subcutaneous fat loss:  Buccal & orbital regions     Current Nutrition Orders & Evaluation of Intake       Oral Nutrition     Current PO Diet Soft foods (small/frequent meals), increased fluids recommended   Supplement Ensure Plus BID     Nutrition Diagnosis        Nutrition Dx Problem 1 Severe malnutrition related to Inability to consume sufficient energy as evidenced by physiological causes increasing nutrient needs., hypermetabolic state., muscle wasting., fat loss., inadequate energy intake., decreased appetite. and patient report.       Nutrition Intervention       RD Action Nutrition follow up     Monitor/Evaluation       Monitor Per oncology nutrition protocol.     Comments:    Significant wt decline in the past week:  3.5%.  Pt's wt has fluctuated between  "55-57 kg since tx began.  Pt gives a consistent report each week:  Appetite fluctuates greatly day to day and usually related to abd pn, gas, diarrhea.  Pt manages gas & diarrhea with medication.  He tries to eat small/frequent meals \"even if I'm not hungry, I make myself\" and continues to drink the oral nutrition shakes. Tomorrow, Oncology RD will provide pt with information on [employer] Boost VHC oral nutrition shake (530 kcals/22 gm protein per 8 oz.)- if pt likes these, perhaps the [facility] can change his oral nutrition supplement to this to provide higher kcal & protein content.  [facility] managing his oral nutrition supplements.      If appetite declines further, may consider a trial of Marinol.    Electronically signed by:  Kimberlee Gomez RD  12/29/21 10:53 EST  "

## 2021-12-30 ENCOUNTER — HOSPITAL ENCOUNTER (OUTPATIENT)
Dept: RADIATION ONCOLOGY | Facility: HOSPITAL | Age: 66
Discharge: HOME | End: 2021-12-30

## 2021-12-30 PROCEDURE — 77386: CPT | Performed by: RADIOLOGY

## 2021-12-30 PROCEDURE — 77014 CHG CT GUIDANCE RADIATION THERAPY FLDS PLACEMENT: CPT | Performed by: RADIOLOGY

## 2021-12-30 NOTE — PROGRESS NOTES
12/30/21:    Brief follow up in radiation therapy.  Provided pt with samples of [facility] oral nutrition shake (530 kcals, 22 gm protein per 8 oz as compared to Ensure Plus 350 kcals, 13 gm protein per 8 oz).  Pt agreed to try.  Boost VHC BID = 1060 kcals, 44 gm protein per day as compared to Ensure Plus = 700 kcals, 26 gm protein per day.  If pt likes the samples of [facility], encouraged him to call the [facility] and request they change his oral nutrition supplement deliveries to the [employer] VHC to provide more kcals/pro per volume.  Pt says he has the person to contact on a business card at home.  Also included how to purchase out of pocket & prices (through both the [employer] website & [employer] website), however explained that if insurance will cover the Ensure Plus, it should also cover the Boost C & that [facility] has to manage/coordinate that.    Oncology RD will continue to f/u per protocol.

## 2022-01-01 ENCOUNTER — TELEPHONE (OUTPATIENT)
Dept: ONCOLOGY | Facility: HOSPITAL | Age: 67
End: 2022-01-01

## 2022-01-03 ENCOUNTER — OFFICE VISIT (OUTPATIENT)
Dept: ONCOLOGY | Facility: HOSPITAL | Age: 67
End: 2022-01-03

## 2022-01-03 ENCOUNTER — HOSPITAL ENCOUNTER (OUTPATIENT)
Dept: RADIATION ONCOLOGY | Facility: HOSPITAL | Age: 67
Setting detail: RADIATION/ONCOLOGY SERIES
End: 2022-01-03

## 2022-01-03 ENCOUNTER — LAB (OUTPATIENT)
Dept: LAB | Facility: HOSPITAL | Age: 67
End: 2022-01-03

## 2022-01-03 ENCOUNTER — HOSPITAL ENCOUNTER (OUTPATIENT)
Dept: RADIATION ONCOLOGY | Facility: HOSPITAL | Age: 67
Discharge: HOME | End: 2022-01-03

## 2022-01-03 VITALS
WEIGHT: 119.05 LBS | RESPIRATION RATE: 20 BRPM | DIASTOLIC BLOOD PRESSURE: 51 MMHG | HEART RATE: 108 BPM | SYSTOLIC BLOOD PRESSURE: 117 MMHG | OXYGEN SATURATION: 90 % | TEMPERATURE: 96.6 F | BODY MASS INDEX: 15.71 KG/M2

## 2022-01-03 DIAGNOSIS — G89.3 CANCER RELATED PAIN: ICD-10-CM

## 2022-01-03 DIAGNOSIS — C20 RECTAL CANCER: Primary | ICD-10-CM

## 2022-01-03 DIAGNOSIS — C20 RECTAL CANCER: ICD-10-CM

## 2022-01-03 DIAGNOSIS — D62 ACUTE BLOOD LOSS ANEMIA: ICD-10-CM

## 2022-01-03 LAB
BASOPHILS # BLD AUTO: 0.06 10*3/MM3 (ref 0–0.2)
BASOPHILS NFR BLD AUTO: 0.6 % (ref 0–1.5)
DEPRECATED RDW RBC AUTO: 60.2 FL (ref 37–54)
EOSINOPHIL # BLD AUTO: 0.53 10*3/MM3 (ref 0–0.4)
EOSINOPHIL NFR BLD AUTO: 4.9 % (ref 0.3–6.2)
ERYTHROCYTE [DISTWIDTH] IN BLOOD BY AUTOMATED COUNT: 18.6 % (ref 12.3–15.4)
HCT VFR BLD AUTO: 35.5 % (ref 37.5–51)
HGB BLD-MCNC: 10.5 G/DL (ref 13–17.7)
IMM GRANULOCYTES # BLD AUTO: 0.06 10*3/MM3 (ref 0–0.05)
IMM GRANULOCYTES NFR BLD AUTO: 0.6 % (ref 0–0.5)
LYMPHOCYTES # BLD AUTO: 0.27 10*3/MM3 (ref 0.7–3.1)
LYMPHOCYTES NFR BLD AUTO: 2.5 % (ref 19.6–45.3)
MCH RBC QN AUTO: 28.1 PG (ref 26.6–33)
MCHC RBC AUTO-ENTMCNC: 29.6 G/DL (ref 31.5–35.7)
MCV RBC AUTO: 94.9 FL (ref 79–97)
MONOCYTES # BLD AUTO: 1.07 10*3/MM3 (ref 0.1–0.9)
MONOCYTES NFR BLD AUTO: 9.9 % (ref 5–12)
NEUTROPHILS NFR BLD AUTO: 8.82 10*3/MM3 (ref 1.7–7)
NEUTROPHILS NFR BLD AUTO: 81.5 % (ref 42.7–76)
NRBC BLD AUTO-RTO: 0 /100 WBC (ref 0–0.2)
PLATELET # BLD AUTO: 558 10*3/MM3 (ref 140–450)
PMV BLD AUTO: 9.2 FL (ref 6–12)
RBC # BLD AUTO: 3.74 10*6/MM3 (ref 4.14–5.8)
WBC NRBC COR # BLD: 10.81 10*3/MM3 (ref 3.4–10.8)

## 2022-01-03 PROCEDURE — 77336 RADIATION PHYSICS CONSULT: CPT | Performed by: RADIOLOGY

## 2022-01-03 PROCEDURE — 99214 OFFICE O/P EST MOD 30 MIN: CPT | Performed by: INTERNAL MEDICINE

## 2022-01-03 PROCEDURE — 77386: CPT | Performed by: RADIOLOGY

## 2022-01-03 PROCEDURE — G0463 HOSPITAL OUTPT CLINIC VISIT: HCPCS

## 2022-01-03 PROCEDURE — 85025 COMPLETE CBC W/AUTO DIFF WBC: CPT

## 2022-01-03 PROCEDURE — 77014 CHG CT GUIDANCE RADIATION THERAPY FLDS PLACEMENT: CPT | Performed by: RADIOLOGY

## 2022-01-03 PROCEDURE — 36415 COLL VENOUS BLD VENIPUNCTURE: CPT

## 2022-01-03 RX ORDER — HYDROCODONE BITARTRATE AND ACETAMINOPHEN 5; 325 MG/1; MG/1
1 TABLET ORAL EVERY 6 HOURS PRN
Qty: 60 TABLET | Refills: 0 | Status: SHIPPED | OUTPATIENT
Start: 2022-01-03 | End: 2022-01-26

## 2022-01-03 NOTE — PROGRESS NOTES
Chief Complaint  Rectal Cancer and Follow-up    Jose Luis Abdalla MD Tran, MD Daisy Lei Osorio Dai presents to [facility] HEMATOLOGY & ONCOLOGY for ongoing treatment of his rectal cancer.  He is receiving neoadjuvant chemotherapy with Xeloda along with radiation.  He continues to have tenesmus, rectal pain.  Unrelieved with Tylenol or Motrin.  He denies blood per rectum or melena, occasional scant blood on the toilet tissue when he wipes.    Oncology/Hematology History Overview Note      CLINICAL HISTORY:    Pre-op diagnosis: Anal mass.        DIAGNOSIS:    (A) Rectal mass, transanal disk excision:     - Moderately differentiated invasive adenocarcinoma with focal superficial   invasion of  muscularis propria (see Synoptic Report)     - Tumor present at 3 o'clock mucosal/peripheral margin    (B) Rectum, right margin, excision:     - Fragment of colon, negative for carcinoma    Surgery:  9/22/21 Transanal excision of the Rectal mass performed at [facility]. This demonstrated the high-grade dysplasia but also moderately differentiated adenocarcinoma with superficial invasion of the muscularis propria (T2), peripheral margin was positive.  Lymph vascular invasion identified.    Chemotherapy & Radiation:  11/22/21 adjuvant chemo RT with radiation and capecitabine     Rectal cancer (HCC)   11/3/2021 Initial Diagnosis    Rectal cancer (HCC)     11/3/2021 Cancer Staged    Staging form: Colon And Rectum, AJCC 8th Edition  - Clinical: Stage I (cT2, cN0, cM0) - Signed by Issac Walker MD on 11/3/2021     12/13/2021 -  Chemotherapy    OP rectal Capecitabine 825 mg/m2 M-F With Concurrent Radiation      Malignant neoplasm of rectum (HCC) (Resolved)   11/9/2021 Initial Diagnosis    Malignant neoplasm of rectum (HCC)     11/9/2021 - 11/29/2021 Radiation    RADIATION THERAPY Treatment Details (11/9/2021 - 11/29/2021)  Site: Rectum  Technique: IMRT  Goal: No goal  specified  Planned Treatment Start Date: No planned start date specified         Review of Systems   Constitutional: Positive for fatigue. Negative for appetite change, diaphoresis, fever, unexpected weight gain and unexpected weight loss.   HENT: Negative for hearing loss, mouth sores, sore throat, swollen glands, trouble swallowing and voice change.    Eyes: Negative for blurred vision.   Respiratory: Positive for shortness of breath (Oxygen dependent COPD). Negative for cough and wheezing.    Cardiovascular: Negative for chest pain and palpitations.   Gastrointestinal: Negative for abdominal pain, blood in stool, constipation, diarrhea, nausea and vomiting.   Endocrine: Negative for cold intolerance and heat intolerance.   Genitourinary: Negative for difficulty urinating, dysuria, frequency, hematuria and urinary incontinence.   Musculoskeletal: Negative for arthralgias, back pain and myalgias.   Skin: Negative for rash, skin lesions and bruise.   Neurological: Positive for weakness. Negative for dizziness, seizures, numbness and headache.   Hematological: Does not bruise/bleed easily.   Psychiatric/Behavioral: Negative for depressed mood. The patient is not nervous/anxious.      Current Outpatient Medications on File Prior to Visit   Medication Sig Dispense Refill   • albuterol (PROVENTIL) (2.5 MG/3ML) 0.083% nebulizer solution Every 4 (Four) Hours.     • aspirin 81 MG chewable tablet Chew 81 mg Daily.     • atorvastatin (LIPITOR) 10 MG tablet Take 10 mg by mouth Every Night.     • budesonide-formoterol (SYMBICORT) 160-4.5 MCG/ACT inhaler Inhale 2 puffs 2 (Two) Times a Day.     • capecitabine (XELODA) 500 MG chemo tablet Take 3 tab(s) by mouth in the AM and 3 tab(s) by mouth in the PM on Monday-Friday with radiation. 180 tablet 0   • guaiFENesin (MUCINEX) 600 MG 12 hr tablet Take 600 mg by mouth 2 (Two) Times a Day.     • ipratropium-albuterol (COMBIVENT RESPIMAT)  MCG/ACT inhaler Inhale 1 puff 4 (Four)  Times a Day. Do not exceed inhalations per day     • loratadine (CLARITIN) 10 MG tablet Take 10 mg by mouth Daily.     • metoprolol succinate XL (TOPROL-XL) 50 MG 24 hr tablet Take 50 mg by mouth 2 (two) times a day.     • multivitamin with minerals tablet tablet Take 1 tablet by mouth Daily.     • omeprazole (priLOSEC) 20 MG capsule Take 20 mg by mouth Daily.     • ondansetron (ZOFRAN) 8 MG tablet Take 1 tablet by mouth 3 (Three) Times a Day As Needed for Nausea or Vomiting. 30 tablet 5   • roflumilast (DALIRESP) 500 MCG tablet tablet Take 500 mcg by mouth Daily.       No current facility-administered medications on file prior to visit.       No Known Allergies  Past Medical History:   Diagnosis Date   • Anemia due to chemotherapy 2021   • Cancer related pain 1/3/2022   • CHF (congestive heart failure) ([facility])    • COPD (chronic obstructive pulmonary disease) (HCC)    • Coronary artery disease    • Frequent urination    • Hyperlipidemia    • Hypertension    • Rectal cancer (HCC)      Past Surgical History:   Procedure Laterality Date   • COLONOSCOPY     • HERNIA REPAIR      approximately 4 years ago    • RECTAL SURGERY     • SHOULDER SURGERY      joint loose, calcium particles removed from shoulder joint     Social History     Socioeconomic History   • Marital status:    Tobacco Use   • Smoking status: Former Smoker     Start date: 11/3/1973     Quit date: 11/3/2014     Years since quittin.1   • Smokeless tobacco: Never Used   Vaping Use   • Vaping Use: Never used   Substance and Sexual Activity   • Alcohol use: Not Currently   • Drug use: Never   • Sexual activity: Defer     Family History   Problem Relation Age of Onset   • Heart attack Mother    • Heart attack Sister    • Heart attack Sister        Objective   Physical Exam  Vitals reviewed. Exam conducted with a chaperone present.   Constitutional:       General: He is not in acute distress.     Appearance: Normal appearance.   Cardiovascular:       Rate and Rhythm: Normal rate and regular rhythm.      Heart sounds: Normal heart sounds. No murmur heard.  No gallop.    Pulmonary:      Effort: Pulmonary effort is normal.      Breath sounds: Normal breath sounds.   Abdominal:      General: Abdomen is flat. Bowel sounds are normal.      Palpations: Abdomen is soft.   Musculoskeletal:      Cervical back: Neck supple.      Right lower leg: No edema.      Left lower leg: No edema.   Lymphadenopathy:      Cervical: No cervical adenopathy.   Neurological:      Mental Status: He is alert and oriented to person, place, and time.   Psychiatric:         Mood and Affect: Mood normal.         Behavior: Behavior normal.         Vitals:    01/03/22 1137   BP: 117/51   Pulse: 108   Resp: 20   Temp: 96.6 °F (35.9 °C)   SpO2: 90%  Comment: 3 liters   Weight: 54 kg (119 lb 0.8 oz)   PainSc: 10-Worst pain ever   PainLoc: Rectum     ECOG score: 2         PHQ-9 Total Score:                    Result Review :   The following data was reviewed by: Issac Walker MD on 01/03/2022:  Lab Results   Component Value Date    HGB 10.5 (L) 01/03/2022    HCT 35.5 (L) 01/03/2022    MCV 94.9 01/03/2022     (H) 01/03/2022    WBC 10.81 (H) 01/03/2022    NEUTROABS 8.82 (H) 01/03/2022    LYMPHSABS 0.27 (L) 01/03/2022    MONOSABS 1.07 (H) 01/03/2022    EOSABS 0.53 (H) 01/03/2022    BASOSABS 0.06 01/03/2022     Lab Results   Component Value Date    GLUCOSE 117 (H) 12/27/2021    BUN 29 (H) 12/27/2021    CREATININE 0.69 (L) 12/27/2021     12/27/2021    K 4.0 12/27/2021    CL 95 (L) 12/27/2021    CO2 33.9 (H) 12/27/2021    CALCIUM 8.8 12/27/2021    PROTEINTOT 5.8 (L) 12/27/2021    ALBUMIN 3.51 12/27/2021    BILITOT 0.2 12/27/2021    ALKPHOS 60 12/27/2021    AST 24 12/27/2021    ALT 24 12/27/2021           Assessment and Plan    Diagnoses and all orders for this visit:    1. Rectal cancer (HCC) (Primary)  Assessment & Plan:  Patient is receiving neoadjuvant chemo RT with radiation and  Xeloda.  Tolerating the chemotherapy portion well.  He is having expected side effects from the radiation including tenesmus and perineal irritation.  Lab work demonstrates ongoing anemia which is stable.  Continue oral Xeloda as planned.  Continue radiation as per radiation oncology-she is scheduled to finish next Monday.  I will plan to see him back 3 weeks from now to ensure that all acute toxicities are resolving with lab work prior.    Orders:  -     CBC & Differential; Future  -     Comprehensive Metabolic Panel; Future  -     HYDROcodone-acetaminophen (NORCO) 5-325 MG per tablet; Take 1 tablet by mouth Every 6 (Six) Hours As Needed (pain).  Dispense: 60 tablet; Refill: 0    2. Acute blood loss anemia  Assessment & Plan:  Hemoglobin is decreased in the RBC indices suggest iron deficiency.  Iron profile and ferritin will be checked with his next lab stick.  Repeat CBC next visit    Orders:  -     Ferritin; Future  -     Iron Profile; Future  -     CBC & Differential; Future    3. Cancer related pain  Assessment & Plan:  Patient reports increased symptoms from his treatment.  No relief with over-the-counter Tylenol or Motrin.  Angel reviewed and shows no discrepancies.  I will start hydrocodone as needed.  Reassess next visit.    Orders:  -     HYDROcodone-acetaminophen (NORCO) 5-325 MG per tablet; Take 1 tablet by mouth Every 6 (Six) Hours As Needed (pain).  Dispense: 60 tablet; Refill: 0          Patient Follow Up: 3 weeks    Patient was given instructions and counseling regarding his condition or for health maintenance advice. Please see specific information pulled into the AVS if appropriate.     Issac Walker MD    1/3/2022

## 2022-01-04 ENCOUNTER — HOSPITAL ENCOUNTER (OUTPATIENT)
Dept: RADIATION ONCOLOGY | Facility: HOSPITAL | Age: 67
Discharge: HOME | End: 2022-01-04

## 2022-01-04 VITALS
DIASTOLIC BLOOD PRESSURE: 72 MMHG | WEIGHT: 121.69 LBS | SYSTOLIC BLOOD PRESSURE: 98 MMHG | HEART RATE: 98 BPM | OXYGEN SATURATION: 92 % | RESPIRATION RATE: 16 BRPM | BODY MASS INDEX: 16.06 KG/M2 | TEMPERATURE: 98.2 F

## 2022-01-04 DIAGNOSIS — C20 RECTAL CANCER: Primary | ICD-10-CM

## 2022-01-04 PROCEDURE — 77014 CHG CT GUIDANCE RADIATION THERAPY FLDS PLACEMENT: CPT | Performed by: RADIOLOGY

## 2022-01-04 PROCEDURE — 77427 RADIATION TX MANAGEMENT X5: CPT | Performed by: RADIOLOGY

## 2022-01-04 PROCEDURE — 77386: CPT | Performed by: RADIOLOGY

## 2022-01-04 NOTE — PROGRESS NOTES
"                                     On Treatment Note      Encounter Date: 01/04/2022   Patient Name: Pavel Dai  YOB: 1955   Medical Record Number: [id number]     Treatment Site: Pelvis  Prescribed dose: 50.4 Gy/28 fractions  Completed dose: 46.8 Gy/28 fractions  Date of First Treatment: 11/23/21     Tolerance: Tolerating well     Radiation related toxicities and management plan: gas pains - simethicone only helps moderately. Suggested probiotics, he is not interested. diahhrea managed with medication. Started narcotic med this week and feels like it  helps     He is receiving concurrent chemotherapy: xeloda (Dr. Walker)      Issues raised by patient or treatment team: working with marco antonio loera (dietary)      Subjective      Review of Systems: Review of Systems   Constitutional: Negative for appetite change and fatigue.   Respiratory: Positive for cough (productive of clear sputum intermittently) and shortness of breath (on oxygen continuously).    Cardiovascular: Negative for leg swelling.   Gastrointestinal: Positive for abdominal pain (intermittent, occurs with BM. recently started on pain medication.), anal bleeding (small amount on toilet paper) and blood in stool (\"very little\" per patient). Negative for constipation, diarrhea and nausea.   Genitourinary: Negative for difficulty urinating.   Musculoskeletal: Positive for gait problem. Negative for back pain and neck pain.   Neurological: Positive for weakness (BLE, relates to decreased activity) and headaches (ongoing \"for 50 years\" per patient, no recent change). Negative for dizziness.   Psychiatric/Behavioral: Positive for sleep disturbance.       The following portions of the patient's history were reviewed and updated as appropriate: allergies, current medications, past family history, past medical history, past social history, past surgical history and problem list.    Measures:   Pain: (on a scale of 0-10)   Pain Score    " 01/04/22 1043   PainSc: 0-No pain       Advanced Care Plan: N Advance Care Planning   ACP discussion was declined by the patient. Patient does not have an advance directive, declines further assistance.    KPS/Quality of Life: 80 - Restricted Physical Activity  ECOG: (2) Ambulatory and capable of self care, unable to carry out work activity, up and about > 50% or waking hours  Depression Screening:    PHQ-9 score of 0 on 11/29/2021.         Objective     Physical Exam:   Vital Signs:   Vitals:    01/04/22 1043   BP: 98/72   Pulse: 98   Resp: 16   Temp: 98.2 °F (36.8 °C)   SpO2: 92%      Body mass index is 16.06 kg/m².   Wt Readings from Last 3 Encounters:   01/04/22 55.2 kg (121 lb 11.1 oz)   01/03/22 54 kg (119 lb 0.8 oz)   12/29/21 55.1 kg (121 lb 7.6 oz)       General: NAD, sitting comfortably  Eye: EOMI, anicteric sclerae  Respiratory: Symmetric expansion, nonlabored respiration  Neuro: Alert oriented x3, cranial nerves III through XII are grossly intact.  Psych: Mood and affect appropriate      Assessment / Plan      Plan:   I reviewed technical aspects of the radiation therapy treatment administration including dose delivery and daily treatment parameters to verify that these meet the specifications from my clinical treatment planning note. I reviewed the treatment setup notes. I reviewed and approved images obtained for “image guidance” with setup and treatment.     We will continue radiation therapy as prescribed.        Kandace Lucio MD  Radiation Oncology  [facility]    This document has been signed by Kandace Lucio MD on January 4, 2022 13:51 EST

## 2022-01-05 ENCOUNTER — HOSPITAL ENCOUNTER (OUTPATIENT)
Dept: RADIATION ONCOLOGY | Facility: HOSPITAL | Age: 67
Discharge: HOME | End: 2022-01-05

## 2022-01-05 VITALS — WEIGHT: 121.91 LBS | BODY MASS INDEX: 16.08 KG/M2

## 2022-01-05 PROCEDURE — 77014 CHG CT GUIDANCE RADIATION THERAPY FLDS PLACEMENT: CPT | Performed by: RADIOLOGY

## 2022-01-05 PROCEDURE — 77386: CPT | Performed by: RADIOLOGY

## 2022-01-05 NOTE — PROGRESS NOTES
1/5/22:    Follow up in radiation therapy.  Pt's wt is 55.3 kg today (.2 kg gain from last week).  Pt reports he is eating better this week & denies nutrition-related concerns.  Pt's pain meds were changed and this has helped his PO intake.  Reminded pt of taking meds to prevent or treat constipation if this occurs.      Oncology RD remains available per protocol.

## 2022-01-06 ENCOUNTER — HOSPITAL ENCOUNTER (OUTPATIENT)
Dept: RADIATION ONCOLOGY | Facility: HOSPITAL | Age: 67
Discharge: HOME | End: 2022-01-06

## 2022-01-06 PROCEDURE — 77014 CHG CT GUIDANCE RADIATION THERAPY FLDS PLACEMENT: CPT | Performed by: RADIOLOGY

## 2022-01-06 PROCEDURE — 77386: CPT | Performed by: RADIOLOGY

## 2022-01-11 ENCOUNTER — APPOINTMENT (OUTPATIENT)
Dept: LAB | Facility: HOSPITAL | Age: 67
End: 2022-01-11

## 2022-01-12 ENCOUNTER — TELEPHONE (OUTPATIENT)
Dept: RADIATION ONCOLOGY | Facility: HOSPITAL | Age: 67
End: 2022-01-12

## 2022-01-24 ENCOUNTER — APPOINTMENT (OUTPATIENT)
Dept: LAB | Facility: HOSPITAL | Age: 67
End: 2022-01-24

## 2022-01-24 ENCOUNTER — LAB (OUTPATIENT)
Dept: LAB | Facility: HOSPITAL | Age: 67
End: 2022-01-24

## 2022-01-24 DIAGNOSIS — D62 ACUTE BLOOD LOSS ANEMIA: ICD-10-CM

## 2022-01-24 DIAGNOSIS — C20 RECTAL CANCER: ICD-10-CM

## 2022-01-24 LAB
ALBUMIN SERPL-MCNC: 3.1 G/DL (ref 3.5–5.2)
ALBUMIN/GLOB SERPL: 0.9 G/DL
ALP SERPL-CCNC: 89 U/L (ref 39–117)
ALT SERPL W P-5'-P-CCNC: 17 U/L (ref 1–41)
ANION GAP SERPL CALCULATED.3IONS-SCNC: 6.8 MMOL/L (ref 5–15)
AST SERPL-CCNC: 18 U/L (ref 1–40)
BASOPHILS # BLD AUTO: 0.06 10*3/MM3 (ref 0–0.2)
BASOPHILS NFR BLD AUTO: 0.6 % (ref 0–1.5)
BILIRUB SERPL-MCNC: 0.2 MG/DL (ref 0–1.2)
BUN SERPL-MCNC: 24 MG/DL (ref 8–23)
BUN/CREAT SERPL: 40.7 (ref 7–25)
CALCIUM SPEC-SCNC: 9.3 MG/DL (ref 8.6–10.5)
CHLORIDE SERPL-SCNC: 96 MMOL/L (ref 98–107)
CO2 SERPL-SCNC: 38.2 MMOL/L (ref 22–29)
CREAT SERPL-MCNC: 0.59 MG/DL (ref 0.76–1.27)
DEPRECATED RDW RBC AUTO: 62.7 FL (ref 37–54)
EOSINOPHIL # BLD AUTO: 0.17 10*3/MM3 (ref 0–0.4)
EOSINOPHIL NFR BLD AUTO: 1.7 % (ref 0.3–6.2)
ERYTHROCYTE [DISTWIDTH] IN BLOOD BY AUTOMATED COUNT: 17.9 % (ref 12.3–15.4)
FERRITIN SERPL-MCNC: 32.02 NG/ML (ref 30–400)
GFR SERPL CREATININE-BSD FRML MDRD: 138 ML/MIN/1.73
GLOBULIN UR ELPH-MCNC: 3.5 GM/DL
GLUCOSE SERPL-MCNC: 110 MG/DL (ref 65–99)
HCT VFR BLD AUTO: 33.6 % (ref 37.5–51)
HGB BLD-MCNC: 9.9 G/DL (ref 13–17.7)
IMM GRANULOCYTES # BLD AUTO: 0.05 10*3/MM3 (ref 0–0.05)
IMM GRANULOCYTES NFR BLD AUTO: 0.5 % (ref 0–0.5)
IRON 24H UR-MRATE: 44 MCG/DL (ref 59–158)
IRON SATN MFR SERPL: 13 % (ref 20–50)
LYMPHOCYTES # BLD AUTO: 0.4 10*3/MM3 (ref 0.7–3.1)
LYMPHOCYTES NFR BLD AUTO: 4 % (ref 19.6–45.3)
MCH RBC QN AUTO: 28.8 PG (ref 26.6–33)
MCHC RBC AUTO-ENTMCNC: 29.5 G/DL (ref 31.5–35.7)
MCV RBC AUTO: 97.7 FL (ref 79–97)
MONOCYTES # BLD AUTO: 0.6 10*3/MM3 (ref 0.1–0.9)
MONOCYTES NFR BLD AUTO: 6 % (ref 5–12)
NEUTROPHILS NFR BLD AUTO: 8.69 10*3/MM3 (ref 1.7–7)
NEUTROPHILS NFR BLD AUTO: 87.2 % (ref 42.7–76)
NRBC BLD AUTO-RTO: 0 /100 WBC (ref 0–0.2)
PLATELET # BLD AUTO: 433 10*3/MM3 (ref 140–450)
PMV BLD AUTO: 9.9 FL (ref 6–12)
POTASSIUM SERPL-SCNC: 4.7 MMOL/L (ref 3.5–5.2)
PROT SERPL-MCNC: 6.6 G/DL (ref 6–8.5)
RBC # BLD AUTO: 3.44 10*6/MM3 (ref 4.14–5.8)
SODIUM SERPL-SCNC: 141 MMOL/L (ref 136–145)
TIBC SERPL-MCNC: 337 MCG/DL (ref 298–536)
TRANSFERRIN SERPL-MCNC: 226 MG/DL (ref 200–360)
WBC NRBC COR # BLD: 9.97 10*3/MM3 (ref 3.4–10.8)

## 2022-01-24 PROCEDURE — 83540 ASSAY OF IRON: CPT

## 2022-01-24 PROCEDURE — 36415 COLL VENOUS BLD VENIPUNCTURE: CPT

## 2022-01-24 PROCEDURE — 85025 COMPLETE CBC W/AUTO DIFF WBC: CPT

## 2022-01-24 PROCEDURE — 84466 ASSAY OF TRANSFERRIN: CPT

## 2022-01-24 PROCEDURE — 80053 COMPREHEN METABOLIC PANEL: CPT

## 2022-01-24 PROCEDURE — 82728 ASSAY OF FERRITIN: CPT

## 2022-01-26 ENCOUNTER — OFFICE VISIT (OUTPATIENT)
Dept: ONCOLOGY | Facility: HOSPITAL | Age: 67
End: 2022-01-26

## 2022-01-26 VITALS
HEART RATE: 85 BPM | OXYGEN SATURATION: 95 % | RESPIRATION RATE: 16 BRPM | SYSTOLIC BLOOD PRESSURE: 127 MMHG | WEIGHT: 122.14 LBS | TEMPERATURE: 96.5 F | DIASTOLIC BLOOD PRESSURE: 65 MMHG | BODY MASS INDEX: 16.11 KG/M2

## 2022-01-26 DIAGNOSIS — G89.3 CANCER RELATED PAIN: ICD-10-CM

## 2022-01-26 DIAGNOSIS — C20 RECTAL CANCER: Primary | ICD-10-CM

## 2022-01-26 DIAGNOSIS — D64.9 ANEMIA, UNSPECIFIED TYPE: ICD-10-CM

## 2022-01-26 PROCEDURE — 99214 OFFICE O/P EST MOD 30 MIN: CPT | Performed by: INTERNAL MEDICINE

## 2022-01-26 PROCEDURE — G0463 HOSPITAL OUTPT CLINIC VISIT: HCPCS

## 2022-01-26 PROCEDURE — G0463 HOSPITAL OUTPT CLINIC VISIT: HCPCS | Performed by: INTERNAL MEDICINE

## 2022-01-26 RX ORDER — IRON POLYSACCHARIDE COMPLEX 150 MG
150 CAPSULE ORAL DAILY
Qty: 30 CAPSULE | Refills: 5 | Status: ON HOLD | OUTPATIENT
Start: 2022-01-26 | End: 2022-08-08

## 2022-01-26 RX ORDER — HYDROCODONE BITARTRATE AND ACETAMINOPHEN 7.5; 325 MG/1; MG/1
1 TABLET ORAL EVERY 6 HOURS PRN
Qty: 60 TABLET | Refills: 0 | Status: ON HOLD | OUTPATIENT
Start: 2022-01-26 | End: 2022-08-08

## 2022-01-26 NOTE — PROGRESS NOTES
Chief Complaint  Rectal Cancer and Follow-up    Yoel Geller MD Tran, Khue N, MD Subjective          Pavel Dai presents to [facility] HEMATOLOGY & ONCOLOGY for for follow-up of rectal cancer. He recently completed concurrent chemo RT with radiation and Xeloda. He continues to have a lot of pain in the groin area from his radiation. He states that slowly improving. He is taking Norco with inadequate relief. He states that the bowels are back to normal without blood productive, pain or melena. He is trying to eat better. His activity is limited by his oxygen dependent COPD.    Oncology/Hematology History Overview Note      CLINICAL HISTORY:    Pre-op diagnosis: Anal mass.        DIAGNOSIS:    (A) Rectal mass, transanal disk excision:     - Moderately differentiated invasive adenocarcinoma with focal superficial   invasion of  muscularis propria (see Synoptic Report)     - Tumor present at 3 o'clock mucosal/peripheral margin    (B) Rectum, right margin, excision:     - Fragment of colon, negative for carcinoma    Surgery:  9/22/21 Transanal excision of the Rectal mass performed at [facility]. This demonstrated the high-grade dysplasia but also moderately differentiated adenocarcinoma with superficial invasion of the muscularis propria (T2), peripheral margin was positive.  Lymph vascular invasion identified.    Chemotherapy & Radiation:  11/22/21 neoadjuvant chemo RT with radiation x 28 fractions and capecitabine     Rectal cancer (HCC)   11/3/2021 Initial Diagnosis    Rectal cancer (HCC)     11/3/2021 Cancer Staged    Staging form: Colon And Rectum, AJCC 8th Edition  - Clinical: Stage I (cT2, cN0, cM0) - Signed by Issac Walker MD on 11/3/2021     12/13/2021 - 12/13/2021 Chemotherapy    OP rectal Capecitabine 825 mg/m2 M-F With Concurrent Radiation      Malignant neoplasm of rectum (HCC) (Resolved)   11/9/2021 Initial Diagnosis    Malignant neoplasm of rectum (HCC)      11/9/2021 - 11/29/2021 Radiation    RADIATION THERAPY Treatment Details (11/9/2021 - 11/29/2021)  Site: Rectum  Technique: IMRT  Goal: No goal specified  Planned Treatment Start Date: No planned start date specified         Review of Systems   Constitutional: Positive for fatigue. Negative for appetite change, diaphoresis, fever, unexpected weight gain and unexpected weight loss.   HENT: Negative for hearing loss, mouth sores, sore throat, swollen glands, trouble swallowing and voice change.    Eyes: Negative for blurred vision.   Respiratory: Positive for cough. Negative for shortness of breath and wheezing.    Cardiovascular: Negative for chest pain and palpitations.   Gastrointestinal: Positive for abdominal pain and constipation. Negative for blood in stool, diarrhea, nausea and vomiting.   Endocrine: Negative for cold intolerance and heat intolerance.   Genitourinary: Negative for difficulty urinating, dysuria, frequency, hematuria and urinary incontinence.   Musculoskeletal: Negative for arthralgias, back pain and myalgias.   Skin: Negative for rash, skin lesions and wound.   Neurological: Positive for weakness. Negative for dizziness, seizures, numbness and headache.   Hematological: Does not bruise/bleed easily.   Psychiatric/Behavioral: Negative for depressed mood. The patient is not nervous/anxious.      Current Outpatient Medications on File Prior to Visit   Medication Sig Dispense Refill   • albuterol (PROVENTIL) (2.5 MG/3ML) 0.083% nebulizer solution Every 4 (Four) Hours.     • aspirin 81 MG chewable tablet Chew 81 mg Daily.     • atorvastatin (LIPITOR) 10 MG tablet Take 10 mg by mouth Every Night.     • budesonide-formoterol (SYMBICORT) 160-4.5 MCG/ACT inhaler Inhale 2 puffs 2 (Two) Times a Day.     • guaiFENesin (MUCINEX) 600 MG 12 hr tablet Take 600 mg by mouth 2 (Two) Times a Day.     • ipratropium-albuterol (COMBIVENT RESPIMAT)  MCG/ACT inhaler Inhale 1 puff 4 (Four) Times a Day. Do not  exceed inhalations per day     • loratadine (CLARITIN) 10 MG tablet Take 10 mg by mouth Daily.     • metoprolol succinate XL (TOPROL-XL) 50 MG 24 hr tablet Take 50 mg by mouth 2 (two) times a day.     • multivitamin with minerals tablet tablet Take 1 tablet by mouth Daily.     • omeprazole (priLOSEC) 20 MG capsule Take 20 mg by mouth Daily.     • roflumilast (DALIRESP) 500 MCG tablet tablet Take 500 mcg by mouth Daily.       No current facility-administered medications on file prior to visit.       No Known Allergies  Past Medical History:   Diagnosis Date   • Anemia due to chemotherapy 2021   • Cancer related pain 1/3/2022   • CHF (congestive heart failure) (HCC)    • COPD (chronic obstructive pulmonary disease) (HCC)    • Coronary artery disease    • Frequent urination    • Hyperlipidemia    • Hypertension    • Rectal cancer (HCC)      Past Surgical History:   Procedure Laterality Date   • COLONOSCOPY     • HERNIA REPAIR      approximately 4 years ago    • RECTAL SURGERY     • SHOULDER SURGERY      joint loose, calcium particles removed from shoulder joint     Social History     Socioeconomic History   • Marital status:    Tobacco Use   • Smoking status: Former Smoker     Start date: 11/3/1973     Quit date: 11/3/2014     Years since quittin.2   • Smokeless tobacco: Never Used   Vaping Use   • Vaping Use: Never used   Substance and Sexual Activity   • Alcohol use: Not Currently   • Drug use: Never   • Sexual activity: Defer     Family History   Problem Relation Age of Onset   • Heart attack Mother    • Heart attack Sister    • Heart attack Sister        Objective   Physical Exam  Vitals reviewed. Exam conducted with a chaperone present.   Constitutional:       General: He is not in acute distress.     Appearance: Normal appearance.   HENT:      Nose:      Comments: Nasal cannula  Cardiovascular:      Rate and Rhythm: Normal rate and regular rhythm.      Heart sounds: Normal heart sounds. No murmur  heard.  No gallop.    Pulmonary:      Effort: Pulmonary effort is normal.      Breath sounds: Normal breath sounds.   Abdominal:      General: Abdomen is flat. Bowel sounds are normal. There is no distension.      Palpations: Abdomen is soft.      Tenderness: There is no abdominal tenderness.   Musculoskeletal:      Cervical back: Neck supple.      Right lower leg: No edema.      Left lower leg: No edema.   Lymphadenopathy:      Cervical: No cervical adenopathy.   Neurological:      Mental Status: He is alert and oriented to person, place, and time.   Psychiatric:         Mood and Affect: Mood normal.         Behavior: Behavior normal.         Vitals:    01/26/22 0842   BP: 127/65   Pulse: 85   Resp: 16   Temp: 96.5 °F (35.8 °C)   SpO2: 95%  Comment: 3 l   Weight: 55.4 kg (122 lb 2.2 oz)   PainSc:   6   PainLoc: Abdomen     ECOG score: 2         PHQ-9 Total Score:                    Result Review :   The following data was reviewed by: Issac Walker MD on 01/26/2022:  Lab Results   Component Value Date    HGB 9.9 (L) 01/24/2022    HCT 33.6 (L) 01/24/2022    MCV 97.7 (H) 01/24/2022     01/24/2022    WBC 9.97 01/24/2022    NEUTROABS 8.69 (H) 01/24/2022    LYMPHSABS 0.40 (L) 01/24/2022    MONOSABS 0.60 01/24/2022    EOSABS 0.17 01/24/2022    BASOSABS 0.06 01/24/2022     Lab Results   Component Value Date    GLUCOSE 110 (H) 01/24/2022    BUN 24 (H) 01/24/2022    CREATININE 0.59 (L) 01/24/2022     01/24/2022    K 4.7 01/24/2022    CL 96 (L) 01/24/2022    CO2 38.2 (H) 01/24/2022    CALCIUM 9.3 01/24/2022    PROTEINTOT 6.6 01/24/2022    ALBUMIN 3.10 (L) 01/24/2022    BILITOT 0.2 01/24/2022    ALKPHOS 89 01/24/2022    AST 18 01/24/2022    ALT 17 01/24/2022           Assessment and Plan    Diagnoses and all orders for this visit:    1. Rectal cancer (HCC) (Primary)  Assessment & Plan:  Patient is status post resection followed by adjuvant chemo RT completed a few weeks ago. Overall he tolerated the treatment  well although he does have a lot of pain in the pelvic area which is slowly improving. He notes that his bowels are working normally.  I will see him back in 3 months for continued surveillance with labs  prior.    Orders:  -     iron polysaccharides (NIFEREX) 150 MG capsule; Take 1 capsule by mouth Daily.  Dispense: 30 capsule; Refill: 5  -     CBC & Differential; Future  -     Comprehensive Metabolic Panel; Future  -     Ferritin; Future  -     Iron Profile; Future    2. Anemia, unspecified type  Assessment & Plan:  Hemoglobin remains low at 9.9 g/dL. Prescription iron therapy with Niferex daily. Repeat iron profile and ferritin next visit.    Orders:  -     iron polysaccharides (NIFEREX) 150 MG capsule; Take 1 capsule by mouth Daily.  Dispense: 30 capsule; Refill: 5    3. Cancer related pain  Assessment & Plan:  Patient continues to have fairly significant pain in the pelvis/rectum area from his recent radiation.  Current Norco not helping very much.  I will increase his Norco to 7.5/325 1 every 6 hours.  New prescription sent to pharmacy.  No discrepancies noted on Angel.  Reassess next visit.    Orders:  -     HYDROcodone-acetaminophen (NORCO) 7.5-325 MG per tablet; Take 1 tablet by mouth Every 6 (Six) Hours As Needed (pain).  Dispense: 60 tablet; Refill: 0          Patient Follow Up: 3 months     patient was given instructions and counseling regarding his condition or for health maintenance advice. Please see specific information pulled into the AVS if appropriate.     Issac Walker MD    1/27/2022

## 2022-01-27 NOTE — ASSESSMENT & PLAN NOTE
Patient continues to have fairly significant pain in the pelvis/rectum area from his recent radiation.  Current Norco not helping very much.  I will increase his Norco to 7.5/325 1 every 6 hours.  New prescription sent to pharmacy.  No discrepancies noted on Angel.  Reassess next visit.

## 2022-01-27 NOTE — ASSESSMENT & PLAN NOTE
Hemoglobin remains low at 9.9 g/dL. Prescription iron therapy with Niferex daily. Repeat iron profile and ferritin next visit.

## 2022-01-27 NOTE — ASSESSMENT & PLAN NOTE
Patient is status post resection followed by adjuvant chemo RT completed a few weeks ago. Overall he tolerated the treatment well although he does have a lot of pain in the pelvic area which is slowly improving. He notes that his bowels are working normally.  I will see him back in 3 months for continued surveillance with labs  prior.

## 2022-01-31 ENCOUNTER — HOSPITAL ENCOUNTER (EMERGENCY)
Facility: HOSPITAL | Age: 67
Discharge: HOME | End: 2022-01-31
Attending: EMERGENCY MEDICINE | Admitting: EMERGENCY MEDICINE

## 2022-01-31 ENCOUNTER — APPOINTMENT (OUTPATIENT)
Dept: GENERAL RADIOLOGY | Facility: HOSPITAL | Age: 67
End: 2022-01-31

## 2022-01-31 ENCOUNTER — APPOINTMENT (OUTPATIENT)
Dept: CT IMAGING | Facility: HOSPITAL | Age: 67
End: 2022-01-31

## 2022-01-31 ENCOUNTER — TELEPHONE (OUTPATIENT)
Dept: ONCOLOGY | Facility: HOSPITAL | Age: 67
End: 2022-01-31

## 2022-01-31 VITALS
DIASTOLIC BLOOD PRESSURE: 74 MMHG | HEART RATE: 99 BPM | HEIGHT: 73 IN | BODY MASS INDEX: 15.87 KG/M2 | SYSTOLIC BLOOD PRESSURE: 124 MMHG | TEMPERATURE: 98.7 F | RESPIRATION RATE: 20 BRPM | WEIGHT: 119.71 LBS | OXYGEN SATURATION: 97 %

## 2022-01-31 DIAGNOSIS — J20.9 ACUTE BRONCHITIS, UNSPECIFIED ORGANISM: Primary | ICD-10-CM

## 2022-01-31 DIAGNOSIS — R60.0 PEDAL EDEMA: ICD-10-CM

## 2022-01-31 DIAGNOSIS — I87.2 VENOUS INSUFFICIENCY OF BOTH LOWER EXTREMITIES: ICD-10-CM

## 2022-01-31 LAB
ALBUMIN SERPL-MCNC: 3.6 G/DL (ref 3.5–5.2)
ALBUMIN/GLOB SERPL: 0.9 G/DL
ALP SERPL-CCNC: 88 U/L (ref 39–117)
ALT SERPL W P-5'-P-CCNC: 18 U/L (ref 1–41)
ANION GAP SERPL CALCULATED.3IONS-SCNC: 8.1 MMOL/L (ref 5–15)
AST SERPL-CCNC: 18 U/L (ref 1–40)
BASOPHILS # BLD AUTO: 0.05 10*3/MM3 (ref 0–0.2)
BASOPHILS NFR BLD AUTO: 0.6 % (ref 0–1.5)
BILIRUB SERPL-MCNC: 0.2 MG/DL (ref 0–1.2)
BUN SERPL-MCNC: 22 MG/DL (ref 8–23)
BUN/CREAT SERPL: 38.6 (ref 7–25)
CALCIUM SPEC-SCNC: 9.1 MG/DL (ref 8.6–10.5)
CHLORIDE SERPL-SCNC: 95 MMOL/L (ref 98–107)
CO2 SERPL-SCNC: 37.9 MMOL/L (ref 22–29)
CREAT SERPL-MCNC: 0.57 MG/DL (ref 0.76–1.27)
D-LACTATE SERPL-SCNC: 0.9 MMOL/L (ref 0.5–2)
DEPRECATED RDW RBC AUTO: 62.4 FL (ref 37–54)
EOSINOPHIL # BLD AUTO: 0.19 10*3/MM3 (ref 0–0.4)
EOSINOPHIL NFR BLD AUTO: 2.2 % (ref 0.3–6.2)
ERYTHROCYTE [DISTWIDTH] IN BLOOD BY AUTOMATED COUNT: 17.2 % (ref 12.3–15.4)
GFR SERPL CREATININE-BSD FRML MDRD: 144 ML/MIN/1.73
GLOBULIN UR ELPH-MCNC: 3.8 GM/DL
GLUCOSE SERPL-MCNC: 99 MG/DL (ref 65–99)
HCT VFR BLD AUTO: 33.2 % (ref 37.5–51)
HGB BLD-MCNC: 9.5 G/DL (ref 13–17.7)
HOLD SPECIMEN: NORMAL
HOLD SPECIMEN: NORMAL
IMM GRANULOCYTES # BLD AUTO: 0.05 10*3/MM3 (ref 0–0.05)
IMM GRANULOCYTES NFR BLD AUTO: 0.6 % (ref 0–0.5)
LYMPHOCYTES # BLD AUTO: 0.41 10*3/MM3 (ref 0.7–3.1)
LYMPHOCYTES NFR BLD AUTO: 4.7 % (ref 19.6–45.3)
MCH RBC QN AUTO: 28.5 PG (ref 26.6–33)
MCHC RBC AUTO-ENTMCNC: 28.6 G/DL (ref 31.5–35.7)
MCV RBC AUTO: 99.7 FL (ref 79–97)
MONOCYTES # BLD AUTO: 0.44 10*3/MM3 (ref 0.1–0.9)
MONOCYTES NFR BLD AUTO: 5 % (ref 5–12)
NEUTROPHILS NFR BLD AUTO: 7.65 10*3/MM3 (ref 1.7–7)
NEUTROPHILS NFR BLD AUTO: 86.9 % (ref 42.7–76)
NRBC BLD AUTO-RTO: 0 /100 WBC (ref 0–0.2)
NT-PROBNP SERPL-MCNC: 579.5 PG/ML (ref 0–900)
PLATELET # BLD AUTO: 534 10*3/MM3 (ref 140–450)
PMV BLD AUTO: 9.4 FL (ref 6–12)
POTASSIUM SERPL-SCNC: 4.1 MMOL/L (ref 3.5–5.2)
PROT SERPL-MCNC: 7.4 G/DL (ref 6–8.5)
RBC # BLD AUTO: 3.33 10*6/MM3 (ref 4.14–5.8)
SODIUM SERPL-SCNC: 141 MMOL/L (ref 136–145)
TROPONIN T SERPL-MCNC: 0.03 NG/ML (ref 0–0.03)
TROPONIN T SERPL-MCNC: 0.05 NG/ML (ref 0–0.03)
WBC NRBC COR # BLD: 8.79 10*3/MM3 (ref 3.4–10.8)
WHOLE BLOOD HOLD SPECIMEN: NORMAL
WHOLE BLOOD HOLD SPECIMEN: NORMAL

## 2022-01-31 PROCEDURE — 93010 ELECTROCARDIOGRAM REPORT: CPT | Performed by: INTERNAL MEDICINE

## 2022-01-31 PROCEDURE — 99284 EMERGENCY DEPT VISIT MOD MDM: CPT

## 2022-01-31 PROCEDURE — 93005 ELECTROCARDIOGRAM TRACING: CPT | Performed by: EMERGENCY MEDICINE

## 2022-01-31 PROCEDURE — 0 IOPAMIDOL PER 1 ML: Performed by: EMERGENCY MEDICINE

## 2022-01-31 PROCEDURE — 87040 BLOOD CULTURE FOR BACTERIA: CPT | Performed by: EMERGENCY MEDICINE

## 2022-01-31 PROCEDURE — 83605 ASSAY OF LACTIC ACID: CPT | Performed by: EMERGENCY MEDICINE

## 2022-01-31 PROCEDURE — 71045 X-RAY EXAM CHEST 1 VIEW: CPT

## 2022-01-31 PROCEDURE — 85025 COMPLETE CBC W/AUTO DIFF WBC: CPT | Performed by: EMERGENCY MEDICINE

## 2022-01-31 PROCEDURE — 84484 ASSAY OF TROPONIN QUANT: CPT | Performed by: EMERGENCY MEDICINE

## 2022-01-31 PROCEDURE — 36415 COLL VENOUS BLD VENIPUNCTURE: CPT

## 2022-01-31 PROCEDURE — 80053 COMPREHEN METABOLIC PANEL: CPT | Performed by: EMERGENCY MEDICINE

## 2022-01-31 PROCEDURE — 71275 CT ANGIOGRAPHY CHEST: CPT

## 2022-01-31 PROCEDURE — 83880 ASSAY OF NATRIURETIC PEPTIDE: CPT | Performed by: EMERGENCY MEDICINE

## 2022-01-31 RX ORDER — AZITHROMYCIN 250 MG/1
TABLET, FILM COATED ORAL
Qty: 6 TABLET | Refills: 0 | Status: SHIPPED | OUTPATIENT
Start: 2022-01-31 | End: 2022-02-09

## 2022-01-31 RX ORDER — PREDNISONE 20 MG/1
TABLET ORAL
Qty: 15 TABLET | Refills: 0 | Status: SHIPPED | OUTPATIENT
Start: 2022-01-31 | End: 2022-02-09

## 2022-01-31 RX ORDER — SODIUM CHLORIDE 0.9 % (FLUSH) 0.9 %
10 SYRINGE (ML) INJECTION AS NEEDED
Status: DISCONTINUED | OUTPATIENT
Start: 2022-01-31 | End: 2022-01-31 | Stop reason: HOSPADM

## 2022-01-31 RX ORDER — FUROSEMIDE 20 MG/1
20 TABLET ORAL DAILY
Qty: 7 TABLET | Refills: 0 | Status: SHIPPED | OUTPATIENT
Start: 2022-01-31 | End: 2022-02-09

## 2022-01-31 RX ORDER — FUROSEMIDE 20 MG/1
20 TABLET ORAL DAILY
Qty: 7 TABLET | Refills: 0 | Status: SHIPPED | OUTPATIENT
Start: 2022-01-31 | End: 2022-01-31 | Stop reason: SDUPTHER

## 2022-01-31 RX ORDER — AZITHROMYCIN 250 MG/1
TABLET, FILM COATED ORAL
Qty: 6 TABLET | Refills: 0 | Status: SHIPPED | OUTPATIENT
Start: 2022-01-31 | End: 2022-01-31 | Stop reason: SDUPTHER

## 2022-01-31 RX ORDER — PREDNISONE 20 MG/1
TABLET ORAL
Qty: 15 TABLET | Refills: 0 | Status: SHIPPED | OUTPATIENT
Start: 2022-01-31 | End: 2022-01-31 | Stop reason: SDUPTHER

## 2022-01-31 RX ADMIN — IOPAMIDOL 100 ML: 755 INJECTION, SOLUTION INTRAVENOUS at 18:55

## 2022-01-31 NOTE — TELEPHONE ENCOUNTER
Caller: Pavel Dai    Relationship: Self    Best call back number: [phone]    What is the best time to reach you: ANYTIME    Who are you requesting to speak with (clinical staff, provider,  specific staff member): DR SUH OR NURSE    What was the call regarding: PT SAID THAT HIS FEET HAVE BEEN SWELLING FOR OVER A WEEK NOW AND WONT GO DOWN. HE HAS TRIED PROPPING THEM UP AND IT IS NOT HELPING. HE ALSO HAD A DIP IN HIS OXYGEN SATS YESTERDAY TO 68-80S. HIS OXYGEN LEVEL RIGHT NOW IS 92 AND HAS BEEN BACK AROUND 90 SINCE YESTERDAY. HE ISNT SURE IF THAT IS ASSOCIATED WITH THE SWELLING OR NOT.     Do you require a callback: YES

## 2022-01-31 NOTE — TELEPHONE ENCOUNTER
pt c/o a short bout of decreased O2 sat level yesterday and lower extremity swelling x1 week. pt states his O2 sat is 90-92% and he is not sob. This nurse instructed the pt see his PCP for his s/s. This nurse also instructed the pt on which option to select when calling to reach this nurse on the triage line for any future needs, s/s, or questions. pt voices understanding.

## 2022-02-01 LAB — QT INTERVAL: 312 MS

## 2022-02-01 NOTE — DISCHARGE INSTRUCTIONS
Keep your legs elevated.  Wear knee-high support stockings.  Take medications as directed.  Return for worsening symptoms.  Follow-up with your doctor in 2 days.

## 2022-02-05 LAB
BACTERIA SPEC AEROBE CULT: NORMAL
BACTERIA SPEC AEROBE CULT: NORMAL

## 2022-02-09 ENCOUNTER — OFFICE VISIT (OUTPATIENT)
Dept: RADIATION ONCOLOGY | Facility: HOSPITAL | Age: 67
End: 2022-02-09

## 2022-02-09 VITALS
RESPIRATION RATE: 16 BRPM | HEART RATE: 87 BPM | TEMPERATURE: 97.2 F | WEIGHT: 128.09 LBS | SYSTOLIC BLOOD PRESSURE: 121 MMHG | OXYGEN SATURATION: 95 % | DIASTOLIC BLOOD PRESSURE: 82 MMHG | BODY MASS INDEX: 16.9 KG/M2

## 2022-02-09 DIAGNOSIS — C20 MALIGNANT NEOPLASM OF RECTUM: ICD-10-CM

## 2022-02-09 DIAGNOSIS — C20 RECTAL CANCER: Primary | ICD-10-CM

## 2022-02-09 LAB — CEA SERPL-MCNC: 3.21 NG/ML

## 2022-02-09 PROCEDURE — 82378 CARCINOEMBRYONIC ANTIGEN: CPT | Performed by: RADIOLOGY

## 2022-02-09 PROCEDURE — 99212-NC PR NO CHARGE CBC OFFICE OUTPATIENT VISIT 10 MINUTES: Performed by: RADIOLOGY

## 2022-02-09 PROCEDURE — G0463 HOSPITAL OUTPT CLINIC VISIT: HCPCS | Performed by: RADIOLOGY

## 2022-02-09 PROCEDURE — 36415 COLL VENOUS BLD VENIPUNCTURE: CPT | Performed by: RADIOLOGY

## 2022-02-09 NOTE — PROGRESS NOTES
Follow Up Office Visit      Encounter Date: 02/09/2022   Patient Name: Pavel Dai  YOB: 1955   Medical Record Number: [id number]   Primary Diagnosis: Rectal cancer (HCC) [C20]   Cancer Staging: Cancer Staging  Rectal cancer (HCC)  Staging form: Colon And Rectum, AJCC 8th Edition  - Clinical: Stage I (cT2, cN0, cM0) - Signed by Issac Walker MD on 11/3/2021                Cancer Staging  Stage I (cT2, cN0, cM0)        Chief Complaint:    Chief Complaint   Patient presents with   • Follow-up   • Rectal Cancer       Oncologic History: Pavel Dai is a 66 y.o. male here for follow up re: a recently diagnosed adenocarcinoma of the rectum.  Presenting symptoms included a GI bleed which prompted a colonoscopy. This revealed a mass consistent in appearance with a tubulovillous adenoma located 6 to 7 cm from the anal verge.  He had a transanal resection of the mass on 9/22 at the [facility].  Pathology revealed a moderately differentiated adenocarcinoma with superficial invasion of the muscularis propria.  Positive margins were noted peripherally.  There was LVI present on the specimen.  His case was discussed in a multidisciplinary setting.  Recommendation for adjuvant chemoradiation was made. He received a radiation dose of 50.4 Gy/28 fractions and completed on 1/6/22.     He is here for 1 mo f/u     Interval History: abdominal pain improving, no melena/hematochezia, no diahhrea. Stable SOA w exertion, stable fatigue, stable SOA, stable cough.    Prior Radiation: 50.4 Gy/28 fractions - 1/6/22 - pelvis         Subjective      Review of Systems: Review of Systems   Constitutional: Negative for appetite change and fatigue.   Respiratory: Positive for cough (occasionally productive of green sputum) and shortness of breath.    Cardiovascular: Positive for leg swelling.   Gastrointestinal: Positive for abdominal pain (occasionally, described  as sharp) and rectal pain (occasionally occurs with bowel movement). Negative for blood in stool, constipation and diarrhea.   Genitourinary: Negative for difficulty urinating and dysuria.   Musculoskeletal: Positive for gait problem. Negative for back pain and neck pain.   Neurological: Positive for weakness (BLE, occurs more so with walking up/down stairs). Negative for dizziness and headaches.   Psychiatric/Behavioral: Negative for sleep disturbance.       The following portions of the patient's history were reviewed and updated as appropriate: allergies, current medications, past family history, past medical history, past social history, past surgical history and problem list.    Measures:   Pain: (on a scale of 0-10)   Pain Score    02/09/22 1126   PainSc: 0-No pain       Advanced Care Plan: N Advance Care Planning   ACP discussion was declined by the patient. Patient does not have an advance directive, declines further assistance.    KPS/Quality of Life: 80 - Restricted Physical Activity  ECOG: (2) Ambulatory and capable of self care, unable to carry out work activity, up and about > 50% or waking hours  Depression Screening:   Patient screened positive for depression based on a PHQ-9 score of 0 on 11/29/2021.       Objective     Physical Exam:   Vital Signs:   Vitals:    02/09/22 1126   BP: 121/82   Pulse: 87   Resp: 16   Temp: 97.2 °F (36.2 °C)   TempSrc: Temporal   SpO2: 95%   Weight: 58.1 kg (128 lb 1.4 oz)   PainSc: 0-No pain     Body mass index is 16.9 kg/m².     Constitutional: No acute distress, sitting comfortably  Eye: EOMI, anicteric sclerae  HENT: NC/AT, MMM   Respiratory: Symmetric expansion, nonlabored respiration  MSK: ROM intact in all four extremities, no obvious deformities  Neuro: Alert, oriented x3, CN3-12 grossly intact.   Psych: Appropriate mood and affect.  Rectal exam deferred today          Assessment / Plan        Assessment/Plan:     Diagnoses and all orders for this visit:    1.  Rectal cancer (HCC) (Primary)  -     CEA    2. Malignant neoplasm of rectum (HCC)  -     CEA        Pavel Dai is a pleasant 66 y.o. male who  had a transanal resection of the mass on 9/22 at the [facility].  Pathology revealed a moderately differentiated adenocarcinoma with superficial invasion of the muscularis propria.  Positive margins were noted peripherally.  There was LVI present on the specimen.      He received adjuvant Xeloda RT. Radiation was to a dose of 50.4 Gy/28 fractions and completed on 1/6/22. He is only 1 month out from treatment so a digital rectal exam was deferred today. We will check a CEA today.  He is recovering well from acute radiation related toxicity. He has an appointment in [place] for a sigmoidoscopy this month.   He continues to follow up with Dr. Walker.  I will see him back in 6 months for a clinical examination.     Follow Up:   Return in about 6 months (around 8/9/2022).        Time:   I spent 23 minutes on this encounter today, 02/09/22. Activities that took place during this time include:   - preparing to see the patient  - obtaining and reviewing separately obtained history  - counseling and educating the patient  - ordering medications/tests/procedures  - documenting clinical information in the health record  - coordinating care for this patient.     Sincerely,        Kandace Lucio MD  Radiation Oncology  [facility]    This document has been signed by Kandace Lucio MD on February 9, 2022 16:17 EST           NOTICE TO PATIENTS-HEALTH RESULTS RELEASE    We believe in information transparency, and we believe you deserve to see your information as soon as it is available.    Lab Results    • We release ALL notes and results to you promptly.    • Therefore, you may see some results even before we do. Please give us 2 business days to review and let you know our thoughts.  • We look at every result. We will contact you with any results that  concern us.  • Some results may show abnormal, but are not clinically important. An example is “MCHC” and “MCV”.   Other results (like “RAINA”) are really challenging to interpret, and require reviewing other results and other information from your chart. Sometimes these are best discussed in person.  Imaging    CT scan, MRI, and PET reports can show new or re-occurring cancer  • These reports can contain words that are hard to understand  • These reports can also show unexpected results.  • We ALWAYS plan to review these results with you and decide on a plan together. We prefer to do this in person, by video visit, or phone.  • When possible, we will discuss the possible results with you BEFORE getting the test, and the next steps we would take with each result.  • Some patients prefer to see their results online as soon as possible. Because of possible “bad news,” other patients may feel more comfortable waiting to discuss results when their provider is available at their next video visit or in-person visit or by phone. As the patient, you can choose when to view your result

## 2022-04-22 ENCOUNTER — LAB (OUTPATIENT)
Dept: LAB | Facility: HOSPITAL | Age: 67
End: 2022-04-22

## 2022-04-22 DIAGNOSIS — C20 RECTAL CANCER: ICD-10-CM

## 2022-04-22 LAB
ALBUMIN SERPL-MCNC: 4.2 G/DL (ref 3.5–5.2)
ALBUMIN/GLOB SERPL: 1.1 G/DL
ALP SERPL-CCNC: 67 U/L (ref 39–117)
ALT SERPL W P-5'-P-CCNC: 16 U/L (ref 1–41)
ANION GAP SERPL CALCULATED.3IONS-SCNC: 7.8 MMOL/L (ref 5–15)
AST SERPL-CCNC: 22 U/L (ref 1–40)
BASOPHILS # BLD AUTO: 0.04 10*3/MM3 (ref 0–0.2)
BASOPHILS NFR BLD AUTO: 0.6 % (ref 0–1.5)
BILIRUB SERPL-MCNC: 0.2 MG/DL (ref 0–1.2)
BUN SERPL-MCNC: 21 MG/DL (ref 8–23)
BUN/CREAT SERPL: 35.6 (ref 7–25)
CALCIUM SPEC-SCNC: 9.8 MG/DL (ref 8.6–10.5)
CHLORIDE SERPL-SCNC: 97 MMOL/L (ref 98–107)
CO2 SERPL-SCNC: 37.2 MMOL/L (ref 22–29)
CREAT SERPL-MCNC: 0.59 MG/DL (ref 0.76–1.27)
DEPRECATED RDW RBC AUTO: 48.5 FL (ref 37–54)
EGFRCR SERPLBLD CKD-EPI 2021: 107.7 ML/MIN/1.73
EOSINOPHIL # BLD AUTO: 0.19 10*3/MM3 (ref 0–0.4)
EOSINOPHIL NFR BLD AUTO: 2.6 % (ref 0.3–6.2)
ERYTHROCYTE [DISTWIDTH] IN BLOOD BY AUTOMATED COUNT: 14.5 % (ref 12.3–15.4)
FERRITIN SERPL-MCNC: 15.75 NG/ML (ref 30–400)
GLOBULIN UR ELPH-MCNC: 3.7 GM/DL
GLUCOSE SERPL-MCNC: 102 MG/DL (ref 65–99)
HCT VFR BLD AUTO: 36 % (ref 37.5–51)
HGB BLD-MCNC: 10.1 G/DL (ref 13–17.7)
IMM GRANULOCYTES # BLD AUTO: 0.01 10*3/MM3 (ref 0–0.05)
IMM GRANULOCYTES NFR BLD AUTO: 0.1 % (ref 0–0.5)
IRON 24H UR-MRATE: 45 MCG/DL (ref 59–158)
IRON SATN MFR SERPL: 10 % (ref 20–50)
LYMPHOCYTES # BLD AUTO: 0.43 10*3/MM3 (ref 0.7–3.1)
LYMPHOCYTES NFR BLD AUTO: 6 % (ref 19.6–45.3)
MCH RBC QN AUTO: 25.6 PG (ref 26.6–33)
MCHC RBC AUTO-ENTMCNC: 28.1 G/DL (ref 31.5–35.7)
MCV RBC AUTO: 91.4 FL (ref 79–97)
MONOCYTES # BLD AUTO: 0.77 10*3/MM3 (ref 0.1–0.9)
MONOCYTES NFR BLD AUTO: 10.7 % (ref 5–12)
NEUTROPHILS NFR BLD AUTO: 5.73 10*3/MM3 (ref 1.7–7)
NEUTROPHILS NFR BLD AUTO: 80 % (ref 42.7–76)
NRBC BLD AUTO-RTO: 0 /100 WBC (ref 0–0.2)
PLATELET # BLD AUTO: 331 10*3/MM3 (ref 140–450)
PMV BLD AUTO: 10.6 FL (ref 6–12)
POTASSIUM SERPL-SCNC: 4.8 MMOL/L (ref 3.5–5.2)
PROT SERPL-MCNC: 7.9 G/DL (ref 6–8.5)
RBC # BLD AUTO: 3.94 10*6/MM3 (ref 4.14–5.8)
SODIUM SERPL-SCNC: 142 MMOL/L (ref 136–145)
TIBC SERPL-MCNC: 451 MCG/DL (ref 298–536)
TRANSFERRIN SERPL-MCNC: 303 MG/DL (ref 200–360)
WBC NRBC COR # BLD: 7.17 10*3/MM3 (ref 3.4–10.8)

## 2022-04-22 PROCEDURE — 85025 COMPLETE CBC W/AUTO DIFF WBC: CPT

## 2022-04-22 PROCEDURE — 83540 ASSAY OF IRON: CPT

## 2022-04-22 PROCEDURE — 82728 ASSAY OF FERRITIN: CPT

## 2022-04-22 PROCEDURE — 36415 COLL VENOUS BLD VENIPUNCTURE: CPT

## 2022-04-22 PROCEDURE — 80053 COMPREHEN METABOLIC PANEL: CPT

## 2022-04-22 PROCEDURE — 84466 ASSAY OF TRANSFERRIN: CPT

## 2022-04-27 ENCOUNTER — OFFICE VISIT (OUTPATIENT)
Dept: ONCOLOGY | Facility: HOSPITAL | Age: 67
End: 2022-04-27

## 2022-04-27 VITALS
WEIGHT: 123.46 LBS | DIASTOLIC BLOOD PRESSURE: 64 MMHG | HEART RATE: 91 BPM | BODY MASS INDEX: 16.29 KG/M2 | RESPIRATION RATE: 18 BRPM | TEMPERATURE: 98.8 F | SYSTOLIC BLOOD PRESSURE: 120 MMHG | OXYGEN SATURATION: 90 %

## 2022-04-27 DIAGNOSIS — C20 RECTAL CANCER: Primary | ICD-10-CM

## 2022-04-27 DIAGNOSIS — D50.9 IRON DEFICIENCY ANEMIA, UNSPECIFIED IRON DEFICIENCY ANEMIA TYPE: ICD-10-CM

## 2022-04-27 PROBLEM — K21.9 GASTROESOPHAGEAL REFLUX DISEASE: Status: ACTIVE | Noted: 2022-04-27

## 2022-04-27 PROBLEM — D50.0 IRON DEFICIENCY ANEMIA DUE TO CHRONIC BLOOD LOSS: Status: ACTIVE | Noted: 2021-12-13

## 2022-04-27 PROCEDURE — G0463 HOSPITAL OUTPT CLINIC VISIT: HCPCS

## 2022-04-27 PROCEDURE — 99214 OFFICE O/P EST MOD 30 MIN: CPT | Performed by: INTERNAL MEDICINE

## 2022-04-27 RX ORDER — SODIUM CHLORIDE 9 MG/ML
250 INJECTION, SOLUTION INTRAVENOUS ONCE
Status: CANCELLED | OUTPATIENT
Start: 2022-05-09

## 2022-04-27 RX ORDER — SODIUM CHLORIDE 9 MG/ML
250 INJECTION, SOLUTION INTRAVENOUS ONCE
Status: CANCELLED | OUTPATIENT
Start: 2022-05-16

## 2022-04-27 NOTE — PROGRESS NOTES
Chief Complaint  Rectal Cancer    Yoel Geller MD Tran, Khue N, MD Subjective          Pavel Dai presents to [facility] for follow-up of his rectal cancer.  He status post resection followed by adjuvant chemo RT.  On return today, he states he is feeling better.  He reports that the bowels are working normally without blood productive, pain or melena.  He denies any masses or adenopathy, no unusual aches or pains.  He reports good appetite and his weight is maintained.  He has adequate energy level although his activities are curbed by his oxygen dependence.  He tried to take oral iron but was intolerant of that regimen.    Oncology/Hematology History Overview Note      CLINICAL HISTORY:    Pre-op diagnosis: Anal mass.        DIAGNOSIS:    (A) Rectal mass, transanal disk excision:     - Moderately differentiated invasive adenocarcinoma with focal superficial   invasion of  muscularis propria (see Synoptic Report)     - Tumor present at 3 o'clock mucosal/peripheral margin    (B) Rectum, right margin, excision:     - Fragment of colon, negative for carcinoma    Surgery:  9/22/21 Transanal excision of the Rectal mass performed at [facility]. This demonstrated the high-grade dysplasia but also moderately differentiated adenocarcinoma with superficial invasion of the muscularis propria (T2), peripheral margin was positive.  Lymph vascular invasion identified.    Chemotherapy & Radiation:  11/22/21 neoadjuvant chemo RT with radiation x 28 fractions and capecitabine     Rectal cancer (HCC)   11/3/2021 Initial Diagnosis    Rectal cancer (HCC)     11/3/2021 Cancer Staged    Staging form: Colon And Rectum, AJCC 8th Edition  - Clinical: Stage I (cT2, cN0, cM0) - Signed by Issac Walker MD on 11/3/2021     12/13/2021 - 12/13/2021 Chemotherapy    OP rectal Capecitabine 825 mg/m2 M-F With Concurrent Radiation      Malignant neoplasm of rectum (HCC)  (Resolved)   11/9/2021 Initial Diagnosis    Malignant neoplasm of rectum (HCC)     11/9/2021 - 11/29/2021 Radiation    RADIATION THERAPY Treatment Details (11/9/2021 - 11/29/2021)  Site: Rectum  Technique: IMRT  Goal: No goal specified  Planned Treatment Start Date: No planned start date specified         Review of Systems   Constitutional: Negative for appetite change, diaphoresis, fatigue, fever, unexpected weight gain and unexpected weight loss.   HENT: Negative for hearing loss, mouth sores, sore throat, swollen glands, trouble swallowing and voice change.    Eyes: Negative for blurred vision.   Respiratory: Negative for cough, shortness of breath and wheezing.    Cardiovascular: Negative for chest pain and palpitations.   Gastrointestinal: Negative for abdominal pain, blood in stool, constipation, diarrhea, nausea and vomiting.   Endocrine: Negative for cold intolerance and heat intolerance.   Genitourinary: Negative for difficulty urinating, dysuria, frequency, hematuria and urinary incontinence.   Musculoskeletal: Negative for arthralgias, back pain and myalgias.   Skin: Negative for rash, skin lesions and wound.   Neurological: Negative for dizziness, seizures, weakness, numbness and headache.   Hematological: Does not bruise/bleed easily.   Psychiatric/Behavioral: Negative for depressed mood. The patient is not nervous/anxious.      Current Outpatient Medications on File Prior to Visit   Medication Sig Dispense Refill   • albuterol (PROVENTIL) (2.5 MG/3ML) 0.083% nebulizer solution Every 4 (Four) Hours.     • aspirin 81 MG chewable tablet Chew 81 mg Daily.     • atorvastatin (LIPITOR) 10 MG tablet Take 10 mg by mouth Every Night.     • budesonide-formoterol (SYMBICORT) 160-4.5 MCG/ACT inhaler Inhale 2 puffs 2 (Two) Times a Day.     • guaiFENesin (MUCINEX) 600 MG 12 hr tablet Take 600 mg by mouth 2 (Two) Times a Day.     • HYDROcodone-acetaminophen (NORCO) 7.5-325 MG per tablet Take 1 tablet by mouth Every 6  (Six) Hours As Needed (pain). 60 tablet 0   • ipratropium-albuterol (COMBIVENT RESPIMAT)  MCG/ACT inhaler Inhale 1 puff 4 (Four) Times a Day. Do not exceed inhalations per day     • iron polysaccharides (NIFEREX) 150 MG capsule Take 1 capsule by mouth Daily. 30 capsule 5   • loratadine (CLARITIN) 10 MG tablet Take 10 mg by mouth Daily.     • metoprolol succinate XL (TOPROL-XL) 50 MG 24 hr tablet Take 50 mg by mouth 2 (two) times a day.     • multivitamin with minerals tablet tablet Take 1 tablet by mouth Daily.     • omeprazole (priLOSEC) 20 MG capsule Take 20 mg by mouth Daily.     • roflumilast (DALIRESP) 500 MCG tablet tablet Take 500 mcg by mouth Daily.       No current facility-administered medications on file prior to visit.       No Known Allergies  Past Medical History:   Diagnosis Date   • Anemia due to chemotherapy 2021   • Cancer related pain 1/3/2022   • CHF (congestive heart failure) (HCC)    • COPD (chronic obstructive pulmonary disease) (HCC)    • Coronary artery disease    • Frequent urination    • Hyperlipidemia    • Hypertension    • Iron deficiency anemia due to chronic blood loss 2021   • Rectal cancer (HCC)      Past Surgical History:   Procedure Laterality Date   • COLONOSCOPY     • HERNIA REPAIR      approximately 4 years ago    • RECTAL SURGERY     • SHOULDER SURGERY      joint loose, calcium particles removed from shoulder joint     Social History     Socioeconomic History   • Marital status:    Tobacco Use   • Smoking status: Former Smoker     Start date: 11/3/1973     Quit date: 11/3/2014     Years since quittin.4   • Smokeless tobacco: Never Used   Vaping Use   • Vaping Use: Never used   Substance and Sexual Activity   • Alcohol use: Not Currently   • Drug use: Never   • Sexual activity: Defer     Family History   Problem Relation Age of Onset   • Heart attack Mother    • Heart attack Sister    • Heart attack Sister        Objective   Physical Exam  Vitals  reviewed. Exam conducted with a chaperone present.   Constitutional:       General: He is not in acute distress.     Appearance: Normal appearance.   HENT:      Nose:      Comments: Nasal cannula in place  Cardiovascular:      Rate and Rhythm: Normal rate and regular rhythm.      Heart sounds: Normal heart sounds. No murmur heard.    No gallop.   Pulmonary:      Effort: Pulmonary effort is normal.      Breath sounds: Normal breath sounds.   Abdominal:      General: Abdomen is flat. Bowel sounds are normal. There is no distension.      Palpations: Abdomen is soft.      Tenderness: There is no abdominal tenderness.   Musculoskeletal:      Cervical back: Neck supple.      Right lower leg: No edema.      Left lower leg: No edema.   Lymphadenopathy:      Cervical: No cervical adenopathy.   Neurological:      Mental Status: He is alert and oriented to person, place, and time.   Psychiatric:         Mood and Affect: Mood normal.         Behavior: Behavior normal.         Vitals:    04/27/22 1040   BP: 120/64   Pulse: 91   Resp: 18   Temp: 98.8 °F (37.1 °C)   SpO2: 90%  Comment: 3 l   Weight: 56 kg (123 lb 7.3 oz)   PainSc: 0-No pain     ECOG score: 2         PHQ-9 Total Score:                    Result Review :   The following data was reviewed by: Issac Walker MD on 04/27/2022:  Lab Results   Component Value Date    HGB 10.1 (L) 04/22/2022    HCT 36.0 (L) 04/22/2022    MCV 91.4 04/22/2022     04/22/2022    WBC 7.17 04/22/2022    NEUTROABS 5.73 04/22/2022    LYMPHSABS 0.43 (L) 04/22/2022    MONOSABS 0.77 04/22/2022    EOSABS 0.19 04/22/2022    BASOSABS 0.04 04/22/2022     Lab Results   Component Value Date    GLUCOSE 102 (H) 04/22/2022    BUN 21 04/22/2022    CREATININE 0.59 (L) 04/22/2022     04/22/2022    K 4.8 04/22/2022    CL 97 (L) 04/22/2022    CO2 37.2 (H) 04/22/2022    CALCIUM 9.8 04/22/2022    PROTEINTOT 7.9 04/22/2022    ALBUMIN 4.20 04/22/2022    BILITOT 0.2 04/22/2022    ALKPHOS 67 04/22/2022     AST 22 04/22/2022    ALT 16 04/22/2022     No results found for: MG, PHOS, FREET4, TSH    Data reviewed: Endoscopy report reviewed from February demonstrating 1 small polyp which was biopsied.  Pathology report demonstrates a hyperplastic polyp.   Radiation oncology note from Dr. Lucio reviewed.       Assessment and Plan    Diagnoses and all orders for this visit:    1. Rectal cancer (HCC) (Primary)  Assessment & Plan:  Status post resection followed by adjuvant chemo RT for positive margin.  Patient is doing well.  I see no evidence of disease recurrence by his history or physical examination.  Lab work today looks good with exception of anemia as discussed.  I reviewed his recent endoscopy which showed a hyperplastic polyp.  I will see him back in 6 months for ongoing surveillance.    Orders:  -     CBC & Differential; Future  -     Comprehensive Metabolic Panel; Future  -     Ferritin; Future  -     Iron Profile; Future  -     CEA; Future    2. Iron deficiency anemia, unspecified iron deficiency anemia type  Assessment & Plan:  Patient has been intolerant of oral iron therapy.  His ferritin, iron saturation and hemoglobin remain low.  I will treat him with Injectafer x2.  Repeat lab work including CBC, iron profile, ferritin at next visit.    Orders:  -     CBC & Differential; Future  -     Ferritin; Future  -     Iron Profile; Future  -     ONCBCN INFUSION APPOINTMENT REQUEST 01; Future  -     ONCBCN INFUSION APPOINTMENT REQUEST 01; Future    Other orders  -     ENDOSCOPY, INT  -     SCANNED PATHOLOGY          Patient Follow Up: 6 months    Patient was given instructions and counseling regarding his condition or for health maintenance advice. Please see specific information pulled into the AVS if appropriate.     Issac Walker MD    4/27/2022

## 2022-04-27 NOTE — ASSESSMENT & PLAN NOTE
Patient has been intolerant of oral iron therapy.  His ferritin, iron saturation and hemoglobin remain low.  I will treat him with Injectafer x2.  Repeat lab work including CBC, iron profile, ferritin at next visit.

## 2022-04-27 NOTE — ASSESSMENT & PLAN NOTE
Status post resection followed by adjuvant chemo RT for positive margin.  Patient is doing well.  I see no evidence of disease recurrence by his history or physical examination.  Lab work today looks good with exception of anemia as discussed.  I reviewed his recent endoscopy which showed a hyperplastic polyp.  I will see him back in 6 months for ongoing surveillance.

## 2022-05-09 ENCOUNTER — HOSPITAL ENCOUNTER (OUTPATIENT)
Dept: ONCOLOGY | Facility: HOSPITAL | Age: 67
Setting detail: INFUSION SERIES
Discharge: HOME | End: 2022-05-09

## 2022-05-09 VITALS
TEMPERATURE: 98 F | HEART RATE: 92 BPM | SYSTOLIC BLOOD PRESSURE: 116 MMHG | OXYGEN SATURATION: 92 % | DIASTOLIC BLOOD PRESSURE: 57 MMHG | RESPIRATION RATE: 20 BRPM

## 2022-05-09 DIAGNOSIS — D50.0 IRON DEFICIENCY ANEMIA DUE TO CHRONIC BLOOD LOSS: Primary | ICD-10-CM

## 2022-05-09 PROCEDURE — 25010000002 FERRIC CARBOXYMALTOSE 750 MG/15ML SOLUTION: Performed by: INTERNAL MEDICINE

## 2022-05-09 PROCEDURE — 96365 THER/PROPH/DIAG IV INF INIT: CPT

## 2022-05-09 RX ORDER — SODIUM CHLORIDE 9 MG/ML
250 INJECTION, SOLUTION INTRAVENOUS ONCE
Status: COMPLETED | OUTPATIENT
Start: 2022-05-09 | End: 2022-05-09

## 2022-05-09 RX ADMIN — SODIUM CHLORIDE 250 ML: 9 INJECTION, SOLUTION INTRAVENOUS at 14:32

## 2022-05-09 RX ADMIN — FERRIC CARBOXYMALTOSE INJECTION 750 MG: 50 INJECTION, SOLUTION INTRAVENOUS at 14:32

## 2022-05-10 NOTE — ADDENDUM NOTE
Encounter addended by: Sadaf Love [facility] on: 5/10/2022 3:43 PM   Actions taken: Clinical Note Signed, [employer]ent created or edited

## 2022-05-10 NOTE — PROGRESS NOTES
[place]   [facility] [address]  [place] [address]  [phone]      FERRIC CARBOXYMALTOSE EDUCATION    NAME:  Pavel Dai      : 1955           DATE: 22    Education Sheets: (list all that apply)  Injectafer                                                                                                                                                                Regimen:  Injectafer 750mg x 2 doses      Notes:   Discussed possible Side effects with patient including headache, dizziness, flushing, N/V, and high blood pressure. Informed patient that the infusion time would be approximately 15 minutes with a 30 minute observation period afterwards to ensure tolerability prior to leaving. Encouraged patient to let staff know if they experiences any allergic reaction or unusual feelings. Patient also aware that this is the 1st of 2 doses and will return next week for second dose. Patient did not have any additional questions at this time.

## 2022-05-12 NOTE — ASSESSMENT & PLAN NOTE
Hemoglobin is decreased in the RBC indices suggest iron deficiency.  Iron profile and ferritin will be checked with his next lab stick.  Repeat CBC next visit  
Patient is receiving neoadjuvant chemo RT with radiation and Xeloda.  Tolerating the chemotherapy portion well.  He is having expected side effects from the radiation including tenesmus and perineal irritation.  Lab work demonstrates ongoing anemia which is stable.  Continue oral Xeloda as planned.  Continue radiation as per radiation oncology-she is scheduled to finish next Monday.  I will plan to see him back 3 weeks from now to ensure that all acute toxicities are resolving with lab work prior.  
Patient reports increased symptoms from his treatment.  No relief with over-the-counter Tylenol or Motrin.  Angel reviewed and shows no discrepancies.  I will start hydrocodone as needed.  Reassess next visit.  
Statement Selected

## 2022-05-16 ENCOUNTER — HOSPITAL ENCOUNTER (OUTPATIENT)
Dept: ONCOLOGY | Facility: HOSPITAL | Age: 67
Setting detail: INFUSION SERIES
Discharge: HOME | End: 2022-05-16

## 2022-05-16 VITALS
OXYGEN SATURATION: 90 % | HEART RATE: 87 BPM | DIASTOLIC BLOOD PRESSURE: 71 MMHG | RESPIRATION RATE: 18 BRPM | TEMPERATURE: 97.1 F | SYSTOLIC BLOOD PRESSURE: 113 MMHG

## 2022-05-16 DIAGNOSIS — D50.0 IRON DEFICIENCY ANEMIA DUE TO CHRONIC BLOOD LOSS: Primary | ICD-10-CM

## 2022-05-16 PROCEDURE — 96375 TX/PRO/DX INJ NEW DRUG ADDON: CPT

## 2022-05-16 PROCEDURE — 25010000002 FERRIC CARBOXYMALTOSE 750 MG/15ML SOLUTION: Performed by: INTERNAL MEDICINE

## 2022-05-16 PROCEDURE — 96374 THER/PROPH/DIAG INJ IV PUSH: CPT

## 2022-05-16 RX ORDER — SODIUM CHLORIDE 9 MG/ML
250 INJECTION, SOLUTION INTRAVENOUS ONCE
Status: COMPLETED | OUTPATIENT
Start: 2022-05-16 | End: 2022-05-16

## 2022-05-16 RX ADMIN — SODIUM CHLORIDE 250 ML: 9 INJECTION, SOLUTION INTRAVENOUS at 13:29

## 2022-05-16 RX ADMIN — FERRIC CARBOXYMALTOSE INJECTION 750 MG: 50 INJECTION, SOLUTION INTRAVENOUS at 13:30

## 2022-05-18 ENCOUNTER — OFFICE VISIT (OUTPATIENT)
Dept: ONCOLOGY | Facility: HOSPITAL | Age: 67
End: 2022-05-18

## 2022-05-18 ENCOUNTER — NURSE NAVIGATOR (OUTPATIENT)
Dept: ONCOLOGY | Facility: HOSPITAL | Age: 67
End: 2022-05-18

## 2022-05-18 VITALS
OXYGEN SATURATION: 90 % | SYSTOLIC BLOOD PRESSURE: 103 MMHG | WEIGHT: 123.46 LBS | DIASTOLIC BLOOD PRESSURE: 60 MMHG | TEMPERATURE: 99.4 F | RESPIRATION RATE: 16 BRPM | HEART RATE: 94 BPM | BODY MASS INDEX: 16.29 KG/M2

## 2022-05-18 DIAGNOSIS — J44.9 CHRONIC OBSTRUCTIVE PULMONARY DISEASE, UNSPECIFIED COPD TYPE: ICD-10-CM

## 2022-05-18 DIAGNOSIS — D50.0 IRON DEFICIENCY ANEMIA DUE TO CHRONIC BLOOD LOSS: Primary | ICD-10-CM

## 2022-05-18 DIAGNOSIS — C20 RECTAL CANCER: ICD-10-CM

## 2022-05-18 DIAGNOSIS — Z87.891 EX-SMOKER: ICD-10-CM

## 2022-05-18 DIAGNOSIS — Z12.5 PROSTATE CANCER SCREENING: ICD-10-CM

## 2022-05-18 PROCEDURE — G0463 HOSPITAL OUTPT CLINIC VISIT: HCPCS | Performed by: NURSE PRACTITIONER

## 2022-05-18 PROCEDURE — 99215 OFFICE O/P EST HI 40 MIN: CPT | Performed by: NURSE PRACTITIONER

## 2022-05-18 NOTE — PROGRESS NOTES
MEDICAL ONCOLOGY CANCER SURVIVORSHIP VISIT    Pavel Dai  [id number]  1955    Chief Complaint: Survivorship        History of present illness:  Pavel Dai is a 67 y.o. year old male who is here today for the Cancer Survivorship visit, see oncology history below. Rectal cancer.     Mr. Pavel Dai presents with his sister for survivorship visit today for rectal cancer. He was diagnosed with rectal cancer and completed surgery on 9/22/2021. It is early stage rectal cancer, stage I. He also completed neoadjuvant chemotherapy and XRT with capecitabine. Denies any post chemotherapy with neuropathy symptoms or other known adverse side effects.     He is WC bound today. He does have constant oxygen therapy at 3 liters / NC. He does have emphysema / COPD. He quit smoking several years ago. He denies any alcohol use. He denies every having a low dose CT screening for lung cancer. He follows with pulmonary. He reports is up to date on COVID, flu and pneumonia vaccines.     We discussed his surveilance visits with Dr. Walker and will likely be 2-4 times a year. He will need a repeat colonoscopy per the NCCN guidelines for his stage I rectal cancer.     He recently was treated with Injectafer for continue anemia and iron deficiency anemia. He completed his 2nd Injectafer infusion last week. We will recheck his lab work in 2 months along with a PSA screening to check for prostrate cancer since he has never had one of these screenings as well.     He does have fatigue. He does have lower extremity weakness and difficulty climbing stairs.  We discussed pulmonary rehab with him and this would certainly benefit him to get stronger since he is a former tobacco user and has physical deconditioning.      We discussed distress. He has none and rates a 3 on distress scale.     We discussed food to increase his weight since he lost weight his dignosis of rectal cancer. He uses Ensure supplements 1-2 cans a day.      He also has strong family history of familial CAD in mother and 2 sisters. We discussed signs and symptoms to MI versus stroke. He gets his cholesterol screening and other lab work done at Dr. Geller office. He also goes to [facility] in [place] for follow up and scans as well.      Cancer History:   Oncology/Hematology History Overview Note      CLINICAL HISTORY:    Pre-op diagnosis: Anal mass.        DIAGNOSIS:    (A) Rectal mass, transanal disk excision:     - Moderately differentiated invasive adenocarcinoma with focal superficial   invasion of  muscularis propria (see Synoptic Report)     - Tumor present at 3 o'clock mucosal/peripheral margin    (B) Rectum, right margin, excision:     - Fragment of colon, negative for carcinoma    Surgery:  9/22/21 Transanal excision of the Rectal mass performed at [facility]. This demonstrated the high-grade dysplasia but also moderately differentiated adenocarcinoma with superficial invasion of the muscularis propria (T2), peripheral margin was positive.  Lymph vascular invasion identified.    Chemotherapy & Radiation:  11/22/21 neoadjuvant chemo RT with radiation x 28 fractions and capecitabine     Rectal cancer (HCC)   11/3/2021 Initial Diagnosis    Rectal cancer (HCC)     11/3/2021 Cancer Staged    Staging form: Colon And Rectum, AJCC 8th Edition  - Clinical: Stage I (cT2, cN0, cM0) - Signed by Issac Walker MD on 11/3/2021     12/13/2021 - 12/13/2021 Chemotherapy    OP rectal Capecitabine 825 mg/m2 M-F With Concurrent Radiation      Malignant neoplasm of rectum (HCC) (Resolved)   11/9/2021 Initial Diagnosis    Malignant neoplasm of rectum (HCC)     11/9/2021 - 11/29/2021 Radiation    RADIATION THERAPY Treatment Details (11/9/2021 - 11/29/2021)  Site: Rectum  Technique: IMRT  Goal: No goal specified  Planned Treatment Start Date: No planned start date specified         Past Medical History:   Diagnosis Date   • Anemia due to chemotherapy  12/13/2021   • Cancer related pain 1/3/2022   • CHF (congestive heart failure) (HCC)    • COPD (chronic obstructive pulmonary disease) (HCC)    • Coronary artery disease    • Frequent urination    • Hyperlipidemia    • Hypertension    • Iron deficiency anemia due to chronic blood loss 12/13/2021   • Rectal cancer (HCC)        Past Surgical History:   Procedure Laterality Date   • COLONOSCOPY     • HERNIA REPAIR      approximately 4 years ago    • RECTAL SURGERY     • SHOULDER SURGERY      joint loose, calcium particles removed from shoulder joint       MEDICATIONS: The current medication list was reviewed and reconciled.     Allergies:  has No Known Allergies.    Family History   Problem Relation Age of Onset   • Heart attack Mother    • Heart attack Sister    • Heart attack Sister          Review of Systems   Constitutional: Positive for fatigue.   HENT: Negative.    Eyes: Negative.    Respiratory: Positive for cough and shortness of breath.    Cardiovascular: Negative.    Gastrointestinal: Negative.    Endocrine: Negative.    Genitourinary: Negative.    Musculoskeletal: Negative.    Skin: Negative.    Allergic/Immunologic: Negative.    Neurological: Positive for weakness.   Hematological: Bruises/bleeds easily.   Psychiatric/Behavioral: Negative.        Physical Exam  Vitals and nursing note reviewed.   Constitutional:       Appearance: Normal appearance. He is normal weight.   HENT:      Head: Normocephalic.      Nose: Nose normal.      Mouth/Throat:      Mouth: Mucous membranes are moist.   Eyes:      Pupils: Pupils are equal, round, and reactive to light.   Cardiovascular:      Rate and Rhythm: Normal rate and regular rhythm.      Pulses: Normal pulses.      Heart sounds: Normal heart sounds. No murmur heard.  Pulmonary:      Effort: Pulmonary effort is normal. No respiratory distress.      Breath sounds: Normal breath sounds.   Abdominal:      General: Bowel sounds are normal.      Palpations: Abdomen is soft.    Musculoskeletal:         General: Normal range of motion.      Cervical back: Normal range of motion and neck supple.   Skin:     General: Skin is warm and dry.      Capillary Refill: Capillary refill takes less than 2 seconds.   Neurological:      General: No focal deficit present.      Mental Status: He is alert and oriented to person, place, and time.   Psychiatric:         Mood and Affect: Mood normal.         Thought Content: Thought content normal.         Judgment: Judgment normal.       Vital Signs: /60   Pulse 94   Temp 99.4 °F (37.4 °C)   Resp 16   Wt 56 kg (123 lb 7.3 oz)   SpO2 90%   BMI 16.29 kg/m²    General Appearance:  alert, cooperative, no apparent distress and appears stated age   Neurologic/Psychiatric: A&O x 3, gait steady, appropriate affect   HEENT:  Normocephalic, without obvious abnormality, mucous membranes moist   Neck: Supple, symmetrical, trachea midline, no adenopathy;  No thyromegaly, masses, or tenderness   Back:   Symmetric, no curvature, ROM normal, no CVA tenderness   Lungs:   Clear to auscultation bilaterally; respirations regular, even, and unlabored bilaterally   Heart:  Regular rate and rhythm, no murmurs appreciated   Abdomen:   Soft, non-tender, non-distended and no organomegaly   Lymph nodes: No cervical or supraclavicular adenopathy noted   Extremities: Normal, atraumatic; no clubbing, cyanosis, or edema      ECOG Performance Status: (2) Ambulatory & Capable of Self Care, Unable to Carry Out Work Activity, Up & About Greater Than 50% of Waking Hours        Assessment and Plan:  Diagnoses and all orders for this visit:    1. Iron deficiency anemia due to chronic blood loss (Primary)  -     Iron Profile; Future  -     Ferritin; Future  -     CBC & Differential; Future  -     PSA Diagnostic; Future    2. Prostate cancer screening    3. Rectal cancer (HCC)    4. Ex-smoker    5. Chronic obstructive pulmonary disease, unspecified COPD type (HCC)    Patient with  stage I rectal cancer. Completed neoadjuvant chemotherapy, XRT and resection. He will need a colonoscopy in 1 year following surgical resection.     Will check a PSA with his iron panel, ferritin, CBC in 8 weeks after his Injectafer infusions.     Suggest low dose CT screening for lung cancer YEARLY since he is a former smoker. He has been quit for 4 year now. He is still at risk for developing lung cancer and needs to be monitored for any enlarging pulmonary nodules or masses which would require biopsy. The USPSTF recommends annual screening for lung cancer with low-dose computed tomography (LDCT) in adults aged 50 to 80 years who have a 20 pack-year smoking history and currently smoke or have quit within the past 15 years. Screening should be discontinued once a person has not smoked for 15 years or develops a health problem that substantially limits life expectancy or the ability or willingness to have curative lung surgery. Mr. Dai reports he would like to have this performed and would like it to be completed at [facility].     Suggests outpatient pulmonary rehabilitation for COPD, and is on chronic oxygen therapy. He has generalized physical deconditioning due to recent surgery, chemotherapy, COPD.     Maintain up to date immunizations with COVID boosters, flu and pneumonia vaccines.     Follow up with  as scheduled.     Follow up with Dr. Walker as scheduled.     Follow up with PCP as scheduled.     Follow up with pulmonary as scheduled.           The patient and I have reviewed their personal Survivorship Care Plan in detail. We discussed diagnosis, pathology, histology, all treatments, and ongoing surveillance recommendations. All questions were answered to his satisfaction. The patient is in agreement with our plan for ongoing surveillance as outlined in the plan. A copy of this document was provided at the completion of our visit.  A copy has also been sent to the patient's primary  care provider.    This was a 40 minute visit with 40 minutes spent in direct face to face review of the Survivorship Care Plan.    Return to clinic in 6 months for ongoing cancer surveillance.      JOSE Gardner

## 2022-05-18 NOTE — PROGRESS NOTES
Nurse Navigator Evaluation     05/18/2022  Pavel Dai    Care Plan Delivery Method: Patient Visit    Care Plan Delivered to PCP Via: Fax    Referrals: Psychosocial Coordinator    Education Material Provided on: Care Plan, Diagnosis, Exercise, Nutrition, Support Groups, Survivorship and Advanced Care Planning Materials, Updated Provider Directory for area, Surveillance and Future Screening Information.    Vidant Acuity Tool:  2    Diagnosis:   Rectal Cancer    Stage:  Cancer Staging  Stage I (cT2, cN0, cM0)    New Cancer Screening:   [facility]  [facility] and that they take care of his future screenings.    Nurse Navigator Intervention:   Survivorship discussion held today.  Met with patient in the clinic and a written copy of his Survivorship Care Plan was given, reviewed and discussed.  Diagnosis, staging and treatment summary, along with signs and symptoms of cancer recurrence were reviewed.  Educational materials pertaining to his cancer diagnosis provided and placed within his Survivorship folder.  Follow up surveillance and importance of preventive wellness exams discussed.  Information on cancer surveillance and screenings provided within patients care plan.  Time allowed for patient to ask questions and for answers to be given.  Patient referral to Outpatient Oncology Social Worker completed for help in establishing cleaning services for his home. Financial Navigator and Dietician referral declined at this time.    Distress Screening Score: 3  Distress Screening documented in patient chart.     Time Spent Evaluating: I spent 40 minutes caring for Pavel on this date of service. This time includes time spent by me in the following activities: preparing for the visit by way of obtaining and reviewing patient history, communicating with members of the healthcare team, creating a care plan, discussion of care plan with the  patient and allowing time to answer patient questions.    Mag Bronson RN  5/18/2022

## 2022-05-25 ENCOUNTER — NURSE NAVIGATOR (OUTPATIENT)
Dept: ONCOLOGY | Facility: HOSPITAL | Age: 67
End: 2022-05-25

## 2022-05-25 NOTE — PROGRESS NOTES
[facility] request [facility] to [facility] during Survivorship Clinic, appointment dated 5/18/22.  Supporting clinic visit documentation along with [facility] referral request filled out today and faxed to Dr Geller's office at 12:38pm.

## 2022-05-26 ENCOUNTER — OFFICE VISIT (OUTPATIENT)
Dept: RADIATION ONCOLOGY | Facility: HOSPITAL | Age: 67
End: 2022-05-26

## 2022-05-26 VITALS
RESPIRATION RATE: 18 BRPM | HEART RATE: 91 BPM | WEIGHT: 124.12 LBS | OXYGEN SATURATION: 91 % | BODY MASS INDEX: 16.38 KG/M2 | DIASTOLIC BLOOD PRESSURE: 74 MMHG | SYSTOLIC BLOOD PRESSURE: 133 MMHG | TEMPERATURE: 98.7 F

## 2022-05-26 DIAGNOSIS — C20 RECTAL CANCER: Primary | ICD-10-CM

## 2022-05-26 LAB — CEA SERPL-MCNC: 3 NG/ML

## 2022-05-26 PROCEDURE — G0463 HOSPITAL OUTPT CLINIC VISIT: HCPCS | Performed by: RADIOLOGY

## 2022-05-26 PROCEDURE — 99214 OFFICE O/P EST MOD 30 MIN: CPT | Performed by: RADIOLOGY

## 2022-05-26 PROCEDURE — 82378 CARCINOEMBRYONIC ANTIGEN: CPT | Performed by: RADIOLOGY

## 2022-05-26 PROCEDURE — 36415 COLL VENOUS BLD VENIPUNCTURE: CPT | Performed by: RADIOLOGY

## 2022-05-26 RX ORDER — FLUTICASONE PROPIONATE AND SALMETEROL 250; 50 UG/1; UG/1
1 POWDER RESPIRATORY (INHALATION)
COMMUNITY
End: 2023-01-11

## 2022-05-26 NOTE — PROGRESS NOTES
Follow Up Office Visit      Encounter Date: 05/26/2022   Patient Name: Pavel Dai  YOB: 1955   Medical Record Number: [id number]   Primary Diagnosis: Rectal cancer (HCC) [C20]   Cancer Staging: Cancer Staging  Rectal cancer (HCC)  Staging form: Colon And Rectum, AJCC 8th Edition  - Clinical: Stage I (cT2, cN0, cM0) - Signed by Issac Walker MD on 11/3/2021                Cancer Staging  Stage I (cT2, cN0, cM0)        Chief Complaint:    Chief Complaint   Patient presents with   • Follow-up   • Rectal Cancer       Oncologic History: Pavel Dai is a 67 y.o. male here for follow up re: a recently diagnosed adenocarcinoma of the rectum.  Presenting symptoms included a GI bleed which prompted a colonoscopy. This revealed a mass consistent in appearance with a tubulovillous adenoma located 6 to 7 cm from the anal verge.  He had a transanal resection of the mass on 9/22 at the [facility].  Pathology revealed a moderately differentiated adenocarcinoma with superficial invasion of the muscularis propria.  Positive margins were noted peripherally.  There was LVI present on the specimen.  His case was discussed in a multidisciplinary setting.  Recommendation for adjuvant chemoradiation was made. He received a radiation dose of 50.4 Gy/28 fractions and completed on 1/6/22    Interval History: anemia, managed through Dr. Walker's office - reports fatigue and weakness, remains O2 dependent and physical activity at home is limited. Denies melena, hematochezia, hematuria, or dysuria. Good control of bowels and bladder. Had a recent endoscopy w a hyperplastic polyp identified ([facility])    Prior Radiation: 50.4 Gy/28 fractions - 1/6/22 - pelvis         Subjective      Review of Systems: Review of Systems   Constitutional: Positive for fatigue. Negative for appetite change.   Eyes: Negative for visual disturbance.   Respiratory: Positive for  shortness of breath (on 3L O2 continuous). Negative for cough.    Gastrointestinal: Positive for rectal pain. Negative for anal bleeding, blood in stool, constipation, diarrhea and nausea.   Genitourinary: Negative for dysuria, frequency and urgency.   Musculoskeletal: Positive for gait problem (in wc).   Skin: Negative for rash.   Neurological: Negative for dizziness and headaches.   Psychiatric/Behavioral: Positive for sleep disturbance (waking throughout the night).       The following portions of the patient's history were reviewed and updated as appropriate: allergies, current medications, past family history, past medical history, past social history, past surgical history and problem list.    Measures:   Pain: (on a scale of 0-10)   Pain Score    22 1126   PainSc:   3   PainLoc: Rectum       Advanced Care Plan: N Advance Care Planning   ACP discussion was declined by the patient. Patient does not have an advance directive, declines further assistance.       KPS/Quality of Life: 70 - Difficulty Walking, Needs Assistance   ECO  Depression Screening:     Depression: Not at risk   • PHQ-2 Score: 0           Objective     Physical Exam:   Vital Signs:   Vitals:    22 1126   BP: 133/74   Pulse: 91   Resp: 18   Temp: 98.7 °F (37.1 °C)   TempSrc: Temporal   SpO2: 91%   Weight: 56.3 kg (124 lb 1.9 oz)   PainSc:   3   PainLoc: Rectum     Body mass index is 16.38 kg/m².     Constitutional: No acute distress, sitting comfortably  Eye: EOMI, anicteric sclerae  HENT: NC/AT, MMM   Respiratory: Symmetric expansion, on o2 via nasal cannula  MSK: ROM intact in all four extremities, no obvious deformities  Neuro: Alert, oriented x3, CN3-12 grossly intact. Moves all extremities against gravity  Psych: Appropriate mood and affect.  JANES deferred             Assessment / Plan        Assessment/Plan:     Diagnoses and all orders for this visit:    1. Rectal cancer (HCC) (Primary)  -     CEA; Future  -      CEA        Pavel Dai is a pleasant 67 y.o. male with who had a mass consistent in appearance with a tubulovillous adenoma located 6 to 7 cm from the anal verge. He had a transanal resection of the mass on 9/22 at the [facility].  Pathology revealed a moderately differentiated adenocarcinoma with superficial invasion of the muscularis propria.  Positive margins were noted peripherally.  There was LVI present on the specimen.       He received adjuvant Xeloda RT. Radiation was to a dose of 50.4 Gy/28 fractions and completed on 1/6/22.  He had a recent endoscopic evaluation at [facility] with a hyperplastic polyp identified. I have encouraged him to continue endoscopic surveillance with his team at [facility] as clinically indicated.     He has recovered well from acute radiation related toxicity and is currently disease free.   He sees Dr. Walker back in October. We will check a CEA today. I will see him back in January with CT imaging of the chest, abdomen, and pelvis which may be obtained yearly per NCCN guidelines.       Follow Up:   Return in about 33 weeks (around 1/12/2023) for Office Visit, Imaging before next visit.        Time:   I spent 35 minutes on this encounter today, 05/26/22. Activities that took place during this time include:   - preparing to see the patient  - obtaining and reviewing separately obtained history  - performing a medically appropriate examination and evaluation  - counseling and educating the patient  - ordering medications/tests/procedures  - documenting clinical information in the health record  - coordinating care for this patient.     Sincerely,        Kandace Lucio MD  Radiation Oncology  [facility]    This document has been signed by Kandace Lucio MD on May 26, 2022 12:16 EDT           NOTICE TO PATIENTS-HEALTH RESULTS RELEASE    We believe in information transparency, and we believe you deserve to see your information as soon as it is available.    Lab  Results    We release ALL notes and results to you promptly.    Therefore, you may see some results even before we do. Please give us 2 business days to review and let you know our thoughts.  We look at every result. We will contact you with any results that concern us.  Some results may show abnormal, but are not clinically important. An example is “MCHC” and “MCV”.   Other results (like “RAINA”) are really challenging to interpret, and require reviewing other results and other information from your chart. Sometimes these are best discussed in person.  Imaging    CT scan, MRI, and PET reports can show new or re-occurring cancer  These reports can contain words that are hard to understand  These reports can also show unexpected results.  We ALWAYS plan to review these results with you and decide on a plan together. We prefer to do this in person, by video visit, or phone.  When possible, we will discuss the possible results with you BEFORE getting the test, and the next steps we would take with each result.  Some patients prefer to see their results online as soon as possible. Because of possible “bad news,” other patients may feel more comfortable waiting to discuss results when their provider is available at their next video visit or in-person visit or by phone. As the patient, you can choose when to view your result

## 2022-06-14 ENCOUNTER — TELEPHONE (OUTPATIENT)
Dept: ONCOLOGY | Facility: HOSPITAL | Age: 67
End: 2022-06-14

## 2022-06-14 NOTE — TELEPHONE ENCOUNTER
"Diagnosis: Rectal cancer    Reason for referral: In home care    Comments: OSW received referral from survivorship RN, Sharri FIGUEROA, reporting pt is interested in resources to assist with household chores and cleaning. OSW attempted to reach pt via telephone. Pt's mobile phone number rang continuously and then reported, \"This call cannot be completed at this time. Please hang up and try again later\". OSW attempted to reach pt's home phone number listed and this continued to ring as well and then reported, \"Enter remote access code\". Pt does not have a f/u appointment scheduled until October 2022. Will mail pt resources, including the [facility] agency on aging's In Home Services program, [employer], and private pay agencies, along with my business card. OSW support remains available.    Services/Referrals Provided: Household chores/cleaning resources  "

## 2022-08-07 ENCOUNTER — APPOINTMENT (OUTPATIENT)
Dept: GENERAL RADIOLOGY | Facility: HOSPITAL | Age: 67
End: 2022-08-07

## 2022-08-07 ENCOUNTER — HOSPITAL ENCOUNTER (INPATIENT)
Facility: HOSPITAL | Age: 67
LOS: 8 days | Discharge: HOME HEALTH | End: 2022-08-16
Attending: EMERGENCY MEDICINE | Admitting: INTERNAL MEDICINE

## 2022-08-07 DIAGNOSIS — J44.1 COPD EXACERBATION: Primary | ICD-10-CM

## 2022-08-07 DIAGNOSIS — R13.12 DYSPHAGIA, OROPHARYNGEAL: ICD-10-CM

## 2022-08-07 DIAGNOSIS — R26.2 DIFFICULTY WALKING: ICD-10-CM

## 2022-08-07 DIAGNOSIS — Z78.9 DECREASED ACTIVITIES OF DAILY LIVING (ADL): ICD-10-CM

## 2022-08-07 LAB
BASOPHILS # BLD AUTO: 0.02 10*3/MM3 (ref 0–0.2)
BASOPHILS NFR BLD AUTO: 0.3 % (ref 0–1.5)
DEPRECATED RDW RBC AUTO: 46.6 FL (ref 37–54)
EOSINOPHIL # BLD AUTO: 0.01 10*3/MM3 (ref 0–0.4)
EOSINOPHIL NFR BLD AUTO: 0.1 % (ref 0.3–6.2)
ERYTHROCYTE [DISTWIDTH] IN BLOOD BY AUTOMATED COUNT: 12.6 % (ref 12.3–15.4)
GLUCOSE BLDC GLUCOMTR-MCNC: 155 MG/DL (ref 70–99)
HCT VFR BLD AUTO: 42.3 % (ref 37.5–51)
HGB BLD-MCNC: 12.4 G/DL (ref 13–17.7)
IMM GRANULOCYTES # BLD AUTO: 0.04 10*3/MM3 (ref 0–0.05)
IMM GRANULOCYTES NFR BLD AUTO: 0.5 % (ref 0–0.5)
LYMPHOCYTES # BLD AUTO: 0.29 10*3/MM3 (ref 0.7–3.1)
LYMPHOCYTES NFR BLD AUTO: 3.7 % (ref 19.6–45.3)
MCH RBC QN AUTO: 29.8 PG (ref 26.6–33)
MCHC RBC AUTO-ENTMCNC: 29.3 G/DL (ref 31.5–35.7)
MCV RBC AUTO: 101.7 FL (ref 79–97)
MONOCYTES # BLD AUTO: 0.6 10*3/MM3 (ref 0.1–0.9)
MONOCYTES NFR BLD AUTO: 7.6 % (ref 5–12)
NEUTROPHILS NFR BLD AUTO: 6.96 10*3/MM3 (ref 1.7–7)
NEUTROPHILS NFR BLD AUTO: 87.8 % (ref 42.7–76)
NRBC BLD AUTO-RTO: 0 /100 WBC (ref 0–0.2)
PLATELET # BLD AUTO: 230 10*3/MM3 (ref 140–450)
PMV BLD AUTO: 10.3 FL (ref 6–12)
RBC # BLD AUTO: 4.16 10*6/MM3 (ref 4.14–5.8)
WBC NRBC COR # BLD: 7.92 10*3/MM3 (ref 3.4–10.8)

## 2022-08-07 PROCEDURE — 84145 PROCALCITONIN (PCT): CPT | Performed by: INTERNAL MEDICINE

## 2022-08-07 PROCEDURE — 81001 URINALYSIS AUTO W/SCOPE: CPT

## 2022-08-07 PROCEDURE — 99285 EMERGENCY DEPT VISIT HI MDM: CPT

## 2022-08-07 PROCEDURE — 80053 COMPREHEN METABOLIC PANEL: CPT | Performed by: EMERGENCY MEDICINE

## 2022-08-07 PROCEDURE — 93010 ELECTROCARDIOGRAM REPORT: CPT | Performed by: INTERNAL MEDICINE

## 2022-08-07 PROCEDURE — 87899 AGENT NOS ASSAY W/OPTIC: CPT | Performed by: INTERNAL MEDICINE

## 2022-08-07 PROCEDURE — 82962 GLUCOSE BLOOD TEST: CPT

## 2022-08-07 PROCEDURE — 83036 HEMOGLOBIN GLYCOSYLATED A1C: CPT | Performed by: INTERNAL MEDICINE

## 2022-08-07 PROCEDURE — 84484 ASSAY OF TROPONIN QUANT: CPT | Performed by: EMERGENCY MEDICINE

## 2022-08-07 PROCEDURE — 93005 ELECTROCARDIOGRAM TRACING: CPT | Performed by: EMERGENCY MEDICINE

## 2022-08-07 PROCEDURE — 71045 X-RAY EXAM CHEST 1 VIEW: CPT

## 2022-08-07 PROCEDURE — 83880 ASSAY OF NATRIURETIC PEPTIDE: CPT | Performed by: EMERGENCY MEDICINE

## 2022-08-07 PROCEDURE — 83735 ASSAY OF MAGNESIUM: CPT | Performed by: EMERGENCY MEDICINE

## 2022-08-07 PROCEDURE — 93005 ELECTROCARDIOGRAM TRACING: CPT

## 2022-08-07 PROCEDURE — 99223 1ST HOSP IP/OBS HIGH 75: CPT | Performed by: INTERNAL MEDICINE

## 2022-08-07 PROCEDURE — 85025 COMPLETE CBC W/AUTO DIFF WBC: CPT

## 2022-08-07 PROCEDURE — 87449 NOS EACH ORGANISM AG IA: CPT | Performed by: INTERNAL MEDICINE

## 2022-08-07 RX ORDER — SODIUM CHLORIDE 0.9 % (FLUSH) 0.9 %
10 SYRINGE (ML) INJECTION AS NEEDED
Status: DISCONTINUED | OUTPATIENT
Start: 2022-08-07 | End: 2022-08-16 | Stop reason: HOSPADM

## 2022-08-08 ENCOUNTER — APPOINTMENT (OUTPATIENT)
Dept: MRI IMAGING | Facility: HOSPITAL | Age: 67
End: 2022-08-08

## 2022-08-08 ENCOUNTER — APPOINTMENT (OUTPATIENT)
Dept: CT IMAGING | Facility: HOSPITAL | Age: 67
End: 2022-08-08

## 2022-08-08 ENCOUNTER — APPOINTMENT (OUTPATIENT)
Dept: CARDIOLOGY | Facility: HOSPITAL | Age: 67
End: 2022-08-08

## 2022-08-08 ENCOUNTER — APPOINTMENT (OUTPATIENT)
Dept: NEUROLOGY | Facility: HOSPITAL | Age: 67
End: 2022-08-08

## 2022-08-08 PROBLEM — J96.21 ACUTE ON CHRONIC RESPIRATORY FAILURE WITH HYPOXIA AND HYPERCAPNIA: Status: ACTIVE | Noted: 2022-08-08

## 2022-08-08 PROBLEM — E43 SEVERE MALNUTRITION: Status: ACTIVE | Noted: 2022-08-08

## 2022-08-08 PROBLEM — J96.22 ACUTE ON CHRONIC RESPIRATORY FAILURE WITH HYPOXIA AND HYPERCAPNIA: Status: ACTIVE | Noted: 2022-08-08

## 2022-08-08 PROBLEM — J44.1 COPD EXACERBATION: Status: ACTIVE | Noted: 2022-08-08

## 2022-08-08 LAB
ALBUMIN SERPL-MCNC: 4.2 G/DL (ref 3.5–5.2)
ALBUMIN/GLOB SERPL: 1.2 G/DL
ALP SERPL-CCNC: 74 U/L (ref 39–117)
ALT SERPL W P-5'-P-CCNC: 17 U/L (ref 1–41)
ANION GAP SERPL CALCULATED.3IONS-SCNC: 3.1 MMOL/L (ref 5–15)
ANION GAP SERPL CALCULATED.3IONS-SCNC: 4.5 MMOL/L (ref 5–15)
ARTERIAL PATENCY WRIST A: POSITIVE
AST SERPL-CCNC: 17 U/L (ref 1–40)
BACTERIA UR QL AUTO: ABNORMAL /HPF
BASE EXCESS BLDA CALC-SCNC: 11.6 MMOL/L (ref -2–2)
BASE EXCESS BLDA CALC-SCNC: 4.3 MMOL/L (ref -2–2)
BDY SITE: ABNORMAL
BDY SITE: ABNORMAL
BH CV ECHO MEAS - AO ROOT DIAM: 2.1 CM
BH CV ECHO MEAS - EF(MOD-BP): 65 %
BH CV ECHO MEAS - IVSD: 1 CM
BH CV ECHO MEAS - LA DIMENSION: 3.7 CM
BH CV ECHO MEAS - LAT PEAK E' VEL: 8 CM/SEC
BH CV ECHO MEAS - LVIDD: 3.3 CM
BH CV ECHO MEAS - LVIDS: 2.8 CM
BH CV ECHO MEAS - LVPWD: 1 CM
BH CV ECHO MEAS - MED PEAK E' VEL: 10 CM/SEC
BH CV ECHO MEAS - MV A MAX VEL: 37 CM/SEC
BH CV ECHO MEAS - MV DEC TIME: 117 MSEC
BH CV ECHO MEAS - MV E MAX VEL: 82 CM/SEC
BH CV ECHO MEAS - MV E/A: 2.2
BH CV ECHO MEAS - RVDD: 3.9 CM
BH CV ECHO MEASUREMENTS AVERAGE E/E' RATIO: 9.11
BILIRUB SERPL-MCNC: 0.2 MG/DL (ref 0–1.2)
BILIRUB UR QL STRIP: NEGATIVE
BUN SERPL-MCNC: 28 MG/DL (ref 8–23)
BUN SERPL-MCNC: 29 MG/DL (ref 8–23)
BUN/CREAT SERPL: 53.8 (ref 7–25)
BUN/CREAT SERPL: 54.7 (ref 7–25)
CALCIUM SPEC-SCNC: 9.7 MG/DL (ref 8.6–10.5)
CALCIUM SPEC-SCNC: 9.8 MG/DL (ref 8.6–10.5)
CHLORIDE SERPL-SCNC: 98 MMOL/L (ref 98–107)
CHLORIDE SERPL-SCNC: 98 MMOL/L (ref 98–107)
CLARITY UR: CLEAR
CO2 SERPL-SCNC: 41.9 MMOL/L (ref 22–29)
CO2 SERPL-SCNC: 42.5 MMOL/L (ref 22–29)
COHGB MFR BLD: 0.9 % (ref 0–1.5)
COHGB MFR BLD: 1.5 % (ref 0–1.5)
COLOR UR: YELLOW
CREAT SERPL-MCNC: 0.52 MG/DL (ref 0.76–1.27)
CREAT SERPL-MCNC: 0.53 MG/DL (ref 0.76–1.27)
EGFRCR SERPLBLD CKD-EPI 2021: 111.2 ML/MIN/1.73
EGFRCR SERPLBLD CKD-EPI 2021: 111.9 ML/MIN/1.73
FHHB: 10.6 % (ref 0–5)
FHHB: 7.2 % (ref 0–5)
GAS FLOW AIRWAY: 3 LPM
GLOBULIN UR ELPH-MCNC: 3.4 GM/DL
GLUCOSE BLDC GLUCOMTR-MCNC: 122 MG/DL (ref 70–99)
GLUCOSE BLDC GLUCOMTR-MCNC: 129 MG/DL (ref 70–99)
GLUCOSE BLDC GLUCOMTR-MCNC: 132 MG/DL (ref 70–99)
GLUCOSE BLDC GLUCOMTR-MCNC: 176 MG/DL (ref 70–99)
GLUCOSE SERPL-MCNC: 147 MG/DL (ref 65–99)
GLUCOSE SERPL-MCNC: 157 MG/DL (ref 65–99)
GLUCOSE UR STRIP-MCNC: NEGATIVE MG/DL
HBA1C MFR BLD: 5.3 % (ref 4.8–5.6)
HCO3 BLDA-SCNC: 30.6 MMOL/L (ref 22–26)
HCO3 BLDA-SCNC: 41.5 MMOL/L (ref 22–26)
HGB BLDA-MCNC: 13.4 G/DL (ref 13.8–16.4)
HGB BLDA-MCNC: 13.6 G/DL (ref 13.8–16.4)
HGB UR QL STRIP.AUTO: ABNORMAL
HOLD SPECIMEN: NORMAL
HOLD SPECIMEN: NORMAL
HYALINE CASTS UR QL AUTO: ABNORMAL /LPF
INHALED O2 CONCENTRATION: 32 %
INHALED O2 CONCENTRATION: 32 %
IVRT: 79 MSEC
KETONES UR QL STRIP: NEGATIVE
L PNEUMO1 AG UR QL IA: NEGATIVE
LACTATE BLDA-SCNC: ABNORMAL MMOL/L
LACTATE BLDA-SCNC: ABNORMAL MMOL/L
LEFT ATRIUM VOLUME INDEX: 24 ML/M2
LEUKOCYTE ESTERASE UR QL STRIP.AUTO: NEGATIVE
MAGNESIUM SERPL-MCNC: 1.8 MG/DL (ref 1.6–2.4)
MAGNESIUM SERPL-MCNC: 1.8 MG/DL (ref 1.6–2.4)
MAXIMAL PREDICTED HEART RATE: 155 BPM
METHGB BLD QL: 0.1 % (ref 0–1.5)
METHGB BLD QL: 0.2 % (ref 0–1.5)
MODALITY: ABNORMAL
MODALITY: ABNORMAL
NITRITE UR QL STRIP: NEGATIVE
NT-PROBNP SERPL-MCNC: 1625 PG/ML (ref 0–900)
OXYHGB MFR BLDV: 88.4 % (ref 94–99)
OXYHGB MFR BLDV: 91.1 % (ref 94–99)
PCO2 BLDA: 53.1 MM HG (ref 35–45)
PCO2 BLDA: 84.3 MM HG (ref 35–45)
PH BLDA: 7.31 PH UNITS (ref 7.35–7.45)
PH BLDA: 7.38 PH UNITS (ref 7.35–7.45)
PH UR STRIP.AUTO: 5.5 [PH] (ref 5–8)
PO2 BLD: 166 MM[HG] (ref 0–500)
PO2 BLD: 205 MM[HG] (ref 0–500)
PO2 BLDA: 53.1 MM HG (ref 80–100)
PO2 BLDA: 65.5 MM HG (ref 80–100)
POTASSIUM SERPL-SCNC: 4.1 MMOL/L (ref 3.5–5.2)
POTASSIUM SERPL-SCNC: 4.1 MMOL/L (ref 3.5–5.2)
PROCALCITONIN SERPL-MCNC: 0.07 NG/ML (ref 0–0.25)
PROT SERPL-MCNC: 7.6 G/DL (ref 6–8.5)
PROT UR QL STRIP: ABNORMAL
RBC # UR STRIP: ABNORMAL /HPF
REF LAB TEST METHOD: ABNORMAL
S PNEUM AG SPEC QL LA: NEGATIVE
SAO2 % BLDCOA: 89.3 % (ref 95–99)
SAO2 % BLDCOA: 92.7 % (ref 95–99)
SODIUM SERPL-SCNC: 143 MMOL/L (ref 136–145)
SODIUM SERPL-SCNC: 145 MMOL/L (ref 136–145)
SP GR UR STRIP: 1.02 (ref 1–1.03)
SQUAMOUS #/AREA URNS HPF: ABNORMAL /HPF
STRESS TARGET HR: 132 BPM
TROPONIN T SERPL-MCNC: 0.03 NG/ML (ref 0–0.03)
TROPONIN T SERPL-MCNC: 0.05 NG/ML (ref 0–0.03)
UROBILINOGEN UR QL STRIP: ABNORMAL
WBC # UR STRIP: ABNORMAL /HPF
WHOLE BLOOD HOLD COAG: NORMAL
WHOLE BLOOD HOLD SPECIMEN: NORMAL

## 2022-08-08 PROCEDURE — 94640 AIRWAY INHALATION TREATMENT: CPT

## 2022-08-08 PROCEDURE — 83050 HGB METHEMOGLOBIN QUAN: CPT | Performed by: EMERGENCY MEDICINE

## 2022-08-08 PROCEDURE — 95816 EEG AWAKE AND DROWSY: CPT

## 2022-08-08 PROCEDURE — 94799 UNLISTED PULMONARY SVC/PX: CPT

## 2022-08-08 PROCEDURE — 70551 MRI BRAIN STEM W/O DYE: CPT

## 2022-08-08 PROCEDURE — 36415 COLL VENOUS BLD VENIPUNCTURE: CPT | Performed by: INTERNAL MEDICINE

## 2022-08-08 PROCEDURE — 94664 DEMO&/EVAL PT USE INHALER: CPT

## 2022-08-08 PROCEDURE — 93325 DOPPLER ECHO COLOR FLOW MAPG: CPT

## 2022-08-08 PROCEDURE — 25010000002 ENOXAPARIN PER 10 MG: Performed by: INTERNAL MEDICINE

## 2022-08-08 PROCEDURE — 94660 CPAP INITIATION&MGMT: CPT

## 2022-08-08 PROCEDURE — 80048 BASIC METABOLIC PNL TOTAL CA: CPT | Performed by: INTERNAL MEDICINE

## 2022-08-08 PROCEDURE — 87205 SMEAR GRAM STAIN: CPT | Performed by: INTERNAL MEDICINE

## 2022-08-08 PROCEDURE — 82805 BLOOD GASES W/O2 SATURATION: CPT | Performed by: EMERGENCY MEDICINE

## 2022-08-08 PROCEDURE — 25010000002 AZITHROMYCIN PER 500 MG: Performed by: INTERNAL MEDICINE

## 2022-08-08 PROCEDURE — 63710000001 INSULIN LISPRO (HUMAN) PER 5 UNITS: Performed by: INTERNAL MEDICINE

## 2022-08-08 PROCEDURE — 93308 TTE F-UP OR LMTD: CPT

## 2022-08-08 PROCEDURE — 82962 GLUCOSE BLOOD TEST: CPT

## 2022-08-08 PROCEDURE — 93321 DOPPLER ECHO F-UP/LMTD STD: CPT

## 2022-08-08 PROCEDURE — 82375 ASSAY CARBOXYHB QUANT: CPT | Performed by: INTERNAL MEDICINE

## 2022-08-08 PROCEDURE — 71250 CT THORAX DX C-: CPT

## 2022-08-08 PROCEDURE — 83735 ASSAY OF MAGNESIUM: CPT | Performed by: INTERNAL MEDICINE

## 2022-08-08 PROCEDURE — 82375 ASSAY CARBOXYHB QUANT: CPT | Performed by: EMERGENCY MEDICINE

## 2022-08-08 PROCEDURE — 84484 ASSAY OF TROPONIN QUANT: CPT | Performed by: INTERNAL MEDICINE

## 2022-08-08 PROCEDURE — 63710000001 PREDNISONE PER 1 MG: Performed by: INTERNAL MEDICINE

## 2022-08-08 PROCEDURE — 25010000002 CEFTRIAXONE PER 250 MG: Performed by: INTERNAL MEDICINE

## 2022-08-08 PROCEDURE — 36600 WITHDRAWAL OF ARTERIAL BLOOD: CPT | Performed by: INTERNAL MEDICINE

## 2022-08-08 PROCEDURE — 63710000001 PREDNISONE PER 5 MG: Performed by: INTERNAL MEDICINE

## 2022-08-08 PROCEDURE — 25010000002 METHYLPREDNISOLONE PER 125 MG: Performed by: EMERGENCY MEDICINE

## 2022-08-08 PROCEDURE — 87070 CULTURE OTHR SPECIMN AEROBIC: CPT | Performed by: INTERNAL MEDICINE

## 2022-08-08 PROCEDURE — 92610 EVALUATE SWALLOWING FUNCTION: CPT

## 2022-08-08 PROCEDURE — 82805 BLOOD GASES W/O2 SATURATION: CPT | Performed by: INTERNAL MEDICINE

## 2022-08-08 PROCEDURE — 99223 1ST HOSP IP/OBS HIGH 75: CPT | Performed by: INTERNAL MEDICINE

## 2022-08-08 PROCEDURE — 83050 HGB METHEMOGLOBIN QUAN: CPT | Performed by: INTERNAL MEDICINE

## 2022-08-08 PROCEDURE — 36600 WITHDRAWAL OF ARTERIAL BLOOD: CPT | Performed by: EMERGENCY MEDICINE

## 2022-08-08 PROCEDURE — 94761 N-INVAS EAR/PLS OXIMETRY MLT: CPT

## 2022-08-08 RX ORDER — SODIUM CHLORIDE 0.9 % (FLUSH) 0.9 %
10 SYRINGE (ML) INJECTION EVERY 12 HOURS SCHEDULED
Status: DISCONTINUED | OUTPATIENT
Start: 2022-08-08 | End: 2022-08-16 | Stop reason: HOSPADM

## 2022-08-08 RX ORDER — SODIUM CHLORIDE 9 MG/ML
40 INJECTION, SOLUTION INTRAVENOUS AS NEEDED
Status: DISCONTINUED | OUTPATIENT
Start: 2022-08-08 | End: 2022-08-16 | Stop reason: HOSPADM

## 2022-08-08 RX ORDER — DEXTROSE MONOHYDRATE 25 G/50ML
25 INJECTION, SOLUTION INTRAVENOUS
Status: DISCONTINUED | OUTPATIENT
Start: 2022-08-08 | End: 2022-08-14

## 2022-08-08 RX ORDER — IPRATROPIUM BROMIDE AND ALBUTEROL SULFATE 2.5; .5 MG/3ML; MG/3ML
3 SOLUTION RESPIRATORY (INHALATION)
Status: DISCONTINUED | OUTPATIENT
Start: 2022-08-08 | End: 2022-08-08

## 2022-08-08 RX ORDER — ARFORMOTEROL TARTRATE 15 UG/2ML
15 SOLUTION RESPIRATORY (INHALATION)
Status: DISCONTINUED | OUTPATIENT
Start: 2022-08-08 | End: 2022-08-16 | Stop reason: HOSPADM

## 2022-08-08 RX ORDER — ASPIRIN 81 MG/1
81 TABLET, CHEWABLE ORAL DAILY
Status: DISCONTINUED | OUTPATIENT
Start: 2022-08-08 | End: 2022-08-16 | Stop reason: HOSPADM

## 2022-08-08 RX ORDER — INSULIN LISPRO 100 [IU]/ML
0-7 INJECTION, SOLUTION INTRAVENOUS; SUBCUTANEOUS
Status: DISCONTINUED | OUTPATIENT
Start: 2022-08-08 | End: 2022-08-14

## 2022-08-08 RX ORDER — SODIUM CHLORIDE 0.9 % (FLUSH) 0.9 %
10 SYRINGE (ML) INJECTION AS NEEDED
Status: DISCONTINUED | OUTPATIENT
Start: 2022-08-08 | End: 2022-08-08

## 2022-08-08 RX ORDER — HYDROCODONE BITARTRATE AND ACETAMINOPHEN 7.5; 325 MG/1; MG/1
1 TABLET ORAL EVERY 6 HOURS PRN
Status: DISCONTINUED | OUTPATIENT
Start: 2022-08-08 | End: 2022-08-08

## 2022-08-08 RX ORDER — ACETAMINOPHEN 325 MG/1
650 TABLET ORAL EVERY 4 HOURS PRN
Status: DISCONTINUED | OUTPATIENT
Start: 2022-08-08 | End: 2022-08-16 | Stop reason: HOSPADM

## 2022-08-08 RX ORDER — PREDNISONE 20 MG/1
40 TABLET ORAL
Status: DISCONTINUED | OUTPATIENT
Start: 2022-08-09 | End: 2022-08-16

## 2022-08-08 RX ORDER — NICOTINE POLACRILEX 4 MG
15 LOZENGE BUCCAL
Status: DISCONTINUED | OUTPATIENT
Start: 2022-08-08 | End: 2022-08-14

## 2022-08-08 RX ORDER — ENOXAPARIN SODIUM 100 MG/ML
40 INJECTION SUBCUTANEOUS DAILY
Status: DISCONTINUED | OUTPATIENT
Start: 2022-08-08 | End: 2022-08-16 | Stop reason: HOSPADM

## 2022-08-08 RX ORDER — METHYLPREDNISOLONE SODIUM SUCCINATE 125 MG/2ML
125 INJECTION, POWDER, LYOPHILIZED, FOR SOLUTION INTRAMUSCULAR; INTRAVENOUS ONCE
Status: COMPLETED | OUTPATIENT
Start: 2022-08-08 | End: 2022-08-08

## 2022-08-08 RX ORDER — ALBUTEROL SULFATE 2.5 MG/3ML
2.5 SOLUTION RESPIRATORY (INHALATION) EVERY 8 HOURS PRN
COMMUNITY

## 2022-08-08 RX ORDER — CEFTRIAXONE SODIUM 1 G/50ML
1 INJECTION, SOLUTION INTRAVENOUS EVERY 24 HOURS
Status: COMPLETED | OUTPATIENT
Start: 2022-08-08 | End: 2022-08-14

## 2022-08-08 RX ORDER — PANTOPRAZOLE SODIUM 40 MG/1
40 TABLET, DELAYED RELEASE ORAL EVERY MORNING
Status: DISCONTINUED | OUTPATIENT
Start: 2022-08-08 | End: 2022-08-16 | Stop reason: HOSPADM

## 2022-08-08 RX ORDER — BUDESONIDE 0.5 MG/2ML
0.5 INHALANT ORAL
Status: DISCONTINUED | OUTPATIENT
Start: 2022-08-08 | End: 2022-08-16 | Stop reason: HOSPADM

## 2022-08-08 RX ORDER — IPRATROPIUM BROMIDE AND ALBUTEROL SULFATE 2.5; .5 MG/3ML; MG/3ML
3 SOLUTION RESPIRATORY (INHALATION)
Status: DISCONTINUED | OUTPATIENT
Start: 2022-08-08 | End: 2022-08-16 | Stop reason: HOSPADM

## 2022-08-08 RX ORDER — GUAIFENESIN 600 MG/1
600 TABLET, EXTENDED RELEASE ORAL 2 TIMES DAILY
Status: DISCONTINUED | OUTPATIENT
Start: 2022-08-08 | End: 2022-08-16 | Stop reason: HOSPADM

## 2022-08-08 RX ORDER — ROFLUMILAST 500 UG/1
500 TABLET ORAL DAILY
Status: DISCONTINUED | OUTPATIENT
Start: 2022-08-08 | End: 2022-08-16 | Stop reason: HOSPADM

## 2022-08-08 RX ORDER — METOPROLOL TARTRATE 50 MG/1
50 TABLET, FILM COATED ORAL 2 TIMES DAILY
COMMUNITY

## 2022-08-08 RX ORDER — IPRATROPIUM BROMIDE AND ALBUTEROL SULFATE 2.5; .5 MG/3ML; MG/3ML
3 SOLUTION RESPIRATORY (INHALATION) ONCE
Status: COMPLETED | OUTPATIENT
Start: 2022-08-08 | End: 2022-08-08

## 2022-08-08 RX ORDER — METOPROLOL SUCCINATE 50 MG/1
50 TABLET, EXTENDED RELEASE ORAL EVERY 12 HOURS SCHEDULED
Status: DISCONTINUED | OUTPATIENT
Start: 2022-08-08 | End: 2022-08-09

## 2022-08-08 RX ORDER — ATORVASTATIN CALCIUM 10 MG/1
10 TABLET, FILM COATED ORAL NIGHTLY
Status: DISCONTINUED | OUTPATIENT
Start: 2022-08-08 | End: 2022-08-16 | Stop reason: HOSPADM

## 2022-08-08 RX ADMIN — GUAIFENESIN 600 MG: 600 TABLET ORAL at 12:28

## 2022-08-08 RX ADMIN — Medication 10 ML: at 12:28

## 2022-08-08 RX ADMIN — SODIUM CHLORIDE 500 ML: 9 INJECTION, SOLUTION INTRAVENOUS at 00:47

## 2022-08-08 RX ADMIN — IPRATROPIUM BROMIDE AND ALBUTEROL SULFATE 3 ML: .5; 3 SOLUTION RESPIRATORY (INHALATION) at 07:04

## 2022-08-08 RX ADMIN — BUDESONIDE 0.5 MG: 0.5 SUSPENSION RESPIRATORY (INHALATION) at 07:04

## 2022-08-08 RX ADMIN — ARFORMOTEROL TARTRATE 15 MCG: 15 SOLUTION RESPIRATORY (INHALATION) at 07:04

## 2022-08-08 RX ADMIN — CEFTRIAXONE SODIUM 1 G: 1 INJECTION, SOLUTION INTRAVENOUS at 12:28

## 2022-08-08 RX ADMIN — METHYLPREDNISOLONE SODIUM SUCCINATE 125 MG: 125 INJECTION, POWDER, FOR SOLUTION INTRAMUSCULAR; INTRAVENOUS at 00:47

## 2022-08-08 RX ADMIN — METOPROLOL SUCCINATE 50 MG: 50 TABLET, EXTENDED RELEASE ORAL at 10:55

## 2022-08-08 RX ADMIN — Medication 10 ML: at 20:51

## 2022-08-08 RX ADMIN — BUDESONIDE 0.5 MG: 0.5 SUSPENSION RESPIRATORY (INHALATION) at 18:50

## 2022-08-08 RX ADMIN — IPRATROPIUM BROMIDE AND ALBUTEROL SULFATE 3 ML: 2.5; .5 SOLUTION RESPIRATORY (INHALATION) at 13:08

## 2022-08-08 RX ADMIN — ACETAMINOPHEN 325MG 650 MG: 325 TABLET ORAL at 21:36

## 2022-08-08 RX ADMIN — PREDNISONE 50 MG: 20 TABLET ORAL at 08:30

## 2022-08-08 RX ADMIN — GUAIFENESIN 600 MG: 600 TABLET ORAL at 20:51

## 2022-08-08 RX ADMIN — ENOXAPARIN SODIUM 40 MG: 100 INJECTION SUBCUTANEOUS at 08:31

## 2022-08-08 RX ADMIN — ARFORMOTEROL TARTRATE 15 MCG: 15 SOLUTION RESPIRATORY (INHALATION) at 18:50

## 2022-08-08 RX ADMIN — ROFLUMILAST 500 MCG: 500 TABLET ORAL at 10:55

## 2022-08-08 RX ADMIN — ATORVASTATIN CALCIUM 10 MG: 10 TABLET, FILM COATED ORAL at 20:51

## 2022-08-08 RX ADMIN — INSULIN LISPRO 2 UNITS: 100 INJECTION, SOLUTION INTRAVENOUS; SUBCUTANEOUS at 12:28

## 2022-08-08 RX ADMIN — PANTOPRAZOLE SODIUM 40 MG: 40 TABLET, DELAYED RELEASE ORAL at 10:54

## 2022-08-08 RX ADMIN — METOPROLOL SUCCINATE 50 MG: 50 TABLET, EXTENDED RELEASE ORAL at 20:51

## 2022-08-08 RX ADMIN — AZITHROMYCIN 500 MG: 500 INJECTION, POWDER, LYOPHILIZED, FOR SOLUTION INTRAVENOUS at 12:34

## 2022-08-08 RX ADMIN — IPRATROPIUM BROMIDE AND ALBUTEROL SULFATE 3 ML: 2.5; .5 SOLUTION RESPIRATORY (INHALATION) at 18:49

## 2022-08-08 RX ADMIN — IPRATROPIUM BROMIDE AND ALBUTEROL SULFATE 3 ML: .5; 3 SOLUTION RESPIRATORY (INHALATION) at 00:24

## 2022-08-08 RX ADMIN — Medication 10 ML: at 08:31

## 2022-08-08 RX ADMIN — ASPIRIN 81 MG CHEWABLE TABLET 81 MG: 81 TABLET CHEWABLE at 10:55

## 2022-08-08 NOTE — CONSULTS
"Nutrition Services    Patient Name: Pavel Dai  YOB: 1955  MRN: [id number]  Admission date: 8/7/2022      CLINICAL NUTRITION ASSESSMENT      Reason for Assessment  Identified at risk by screening criteria, BMI   H&P:    Past Medical History:   Diagnosis Date   • Anemia due to chemotherapy 12/13/2021   • Cancer related pain 1/3/2022   • CHF (congestive heart failure) (HCC)    • COPD (chronic obstructive pulmonary disease) (HCC)    • Coronary artery disease    • Frequent urination    • Hyperlipidemia    • Hypertension    • Iron deficiency anemia due to chronic blood loss 12/13/2021   • Rectal cancer (HCC)         Current Problems:   Active Hospital Problems    Diagnosis    • COPD exacerbation (HCC)         Nutrition/Diet History         Narrative     RD consult for BMI of only 16.46.  In talking with pt UBW was 167# before he started chemo & radiation treatments for rectal CA.  Pt notes his last chemo was in Jan.  Pt has lost a total of 43#, a 26% clinically significant change.  NFPE shows severe muscle & fat wasting, meeting ASPEN guidelines for malnutrition.  Pt very SOB while RD was visiting him 2' to hx of COPD.  Talked with pt about importance of eating small frequent meal & snacks to help control SOB but also make sure gets in needed nutrition for wt gain.  [facility] Ensure which he likes but they only send him vanilla.  Pt agreeable to snacks in between meals of Ensure Enlive TID, that way he can eat first then this is his back up nutrition.     Anthropometrics        Current Height, Weight Height: 185.4 cm (73\")  Weight: 56.6 kg (124 lb 12.5 oz)   Current BMI Body mass index is 16.46 kg/m².  Underweight       Weight Hx  Wt Readings from Last 30 Encounters:   08/08/22 0237 56.6 kg (124 lb 12.5 oz)   08/08/22 0109 56.6 kg (124 lb 12.5 oz)   08/07/22 2319 56.2 kg (124 lb)   05/26/22 1126 56.3 kg (124 lb 1.9 oz)   05/18/22 1305 56 kg (123 lb 7.3 oz)   04/27/22 1040 56 kg " (123 lb 7.3 oz)   02/09/22 1126 58.1 kg (128 lb 1.4 oz)   01/31/22 1445 54.3 kg (119 lb 11.4 oz)   01/26/22 0842 55.4 kg (122 lb 2.2 oz)   01/05/22 1046 55.3 kg (121 lb 14.6 oz)   01/04/22 1043 55.2 kg (121 lb 11.1 oz)   01/03/22 1137 54 kg (119 lb 0.8 oz)   12/29/21 1051 55.1 kg (121 lb 7.6 oz)   12/28/21 1043 54.9 kg (121 lb 0.5 oz)   12/22/21 1033 57.1 kg (125 lb 14.1 oz)   12/21/21 1042 55.9 kg (123 lb 3.8 oz)   12/20/21 0928 55.3 kg (121 lb 14.6 oz)   12/16/21 1045 55.6 kg (122 lb 9.2 oz)   12/15/21 1048 55.6 kg (122 lb 9.2 oz)   12/13/21 0821 55 kg (121 lb 4.1 oz)   12/09/21 1046 56.9 kg (125 lb 7.1 oz)   12/07/21 1044 56.9 kg (125 lb 7.1 oz)   12/01/21 1031 57.5 kg (126 lb 12.2 oz)   11/30/21 1046 57 kg (125 lb 10.6 oz)   11/29/21 0918 57 kg (125 lb 10.6 oz)   11/24/21 1058 57.3 kg (126 lb 5.2 oz)   11/03/21 1440 56.5 kg (124 lb 9 oz)   11/03/21 1029 57.1 kg (125 lb 14.1 oz)   06/04/21 1142 57.9 kg (127 lb 10.3 oz)            Wt Change Observation 43# wt loss form UBW of 167#     Estimated/Assessed Needs       Energy Requirements    EST Needs (kcal/day) 3278-4043 (IBW)       Protein Requirements    EST Daily Needs (g/day) 80-96       Fluid Requirements     Estimated Needs (mL/day) 2293     Labs/Medications         Pertinent Labs Reviewed.   Results from last 7 days   Lab Units 08/08/22  0707 08/07/22  2331   SODIUM mmol/L 143 145   POTASSIUM mmol/L 4.1 4.1   CHLORIDE mmol/L 98 98   CO2 mmol/L 41.9* 42.5*   BUN mg/dL 28* 29*   CREATININE mg/dL 0.52* 0.53*   CALCIUM mg/dL 9.8 9.7   BILIRUBIN mg/dL  --  0.2   ALK PHOS U/L  --  74   ALT (SGPT) U/L  --  17   AST (SGOT) U/L  --  17   GLUCOSE mg/dL 157* 147*     Results from last 7 days   Lab Units 08/08/22  0707 08/07/22  2331   MAGNESIUM mg/dL 1.8 1.8   HEMOGLOBIN g/dL  --  12.4*   HEMATOCRIT %  --  42.3       Pertinent Medications Reviewed.     Current Nutrition Orders & Evaluation of Intake       Oral Nutrition     Current PO Diet Diet Regular; Cardiac    Supplement No active supplement orders       Malnutrition Severity Assessment      Patient meets criteria for : Severe Malnutrition  Malnutrition Type (last 8 hours)     Malnutrition Severity Assessment     Row Name 08/08/22 0933       Malnutrition Severity Assessment    Malnutrition Type Chronic Disease - Related Malnutrition    Row Name 08/08/22 0933       Unintentional Weight Loss     Unintentional Weight Loss Findings Severe    Unintentional Weight Loss  Weight loss greater than 20% in one year    Row Name 08/08/22 0933       Muscle Loss    Loss of Muscle Mass Findings Severe    Anabaptist Region Severe - deep hollowing/scooping, lack of muscle to touch, facial bones well defined    Clavicle Bone Region Severe - protruding prominent bone    Acromion Bone Region Severe - squared shoulders, bones, and acromion process protrusion prominent    Scapular Bone Region Severe - prominent bones, depressions easily visible between ribs, scapula, spine, shoulders    Anterior Thigh Region Severe - line/depression along thigh, obviously thin    Posterior Calf Region Severe - thin with very little definition/firmness    Row Name 08/08/22 0933       Fat Loss    Orbital Region  Severe - pronounced hollowness/depression, dark circles, loose saggy skin    Upper Arm Region Moderate - some fat tissue, not ample    Thoracic & Lumbar Region Severe - ribs visible with prominent depressions, iliac crest very prominent    Row Name 08/08/22 0933       Criteria Met (Must meet criteria for severity in at least 2 of these categories: M Wasting, Fat Loss, Fluid, Secondary Signs, Wt. Status, Intake)    Patient meets criteria for  Severe Malnutrition                 Nutrition Diagnosis         Nutrition Dx Problem 1 Severe malnutrition related to rectal CA with previous chemo & radiation as evidenced by inadequate energy intake., decreased appetite., unintended wt change. and body composition changes.     Nutrition Intervention         Continue  cardiac diet  Encouraged small frequent meals/snacks for COPD  Add Ensure Enlive TID between meals     Medical Nutrition Therapy/Nutrition Education          Learner     Readiness Patient  Acceptance     Method     Response Explanation  Verbalizes understanding     Monitor/Evaluation        Monitor PO intake, Supplement intake, Pertinent labs, Weight     Nutrition Discharge Plan         Small frequent meals/snacks; continue Ensure Enlive     Electronically signed by:  Emma Harrison RD  08/08/22 09:34 EDT

## 2022-08-08 NOTE — ED PROVIDER NOTES
"Time: 12:08 AM EDT  Arrived by: ambulance  Chief Complaint: COPD flare-up; SOB  History provided by: patient  History is limited by: vague historian    History of Present Illness:  Patient is a 67 y.o. year old male who presents to the emergency department with COPD flare-up and SOB. Pt reports that he has been experiencing worsening SOB over the past 2-3 days. Pt denies worsening cough, wheezing, chest pain, numbness, weakness, or fever. Pt also reports confusion and memory loss since around 9:30-10 AM today, but he denies any confusion currently. Pt states his relative said he was \"in and out\" during a conversation they had earlier today. Pt also reports a headache that began one week ago; he denies recent head injury. Pt has a hx of COPD, and he denies steroid use or new medications.        History provided by:  Patient  History limited by: vague historian.   used: No        Similar Symptoms Previously: no  Recently seen: no      Patient Care Team  Primary Care Provider: Yoel Geller MD    Past Medical History:     No Known Allergies  Past Medical History:   Diagnosis Date   • Anemia due to chemotherapy 12/13/2021   • Cancer related pain 1/3/2022   • CHF (congestive heart failure) (HCC)    • COPD (chronic obstructive pulmonary disease) (HCC)    • Coronary artery disease    • Frequent urination    • Hyperlipidemia    • Hypertension    • Iron deficiency anemia due to chronic blood loss 12/13/2021   • Rectal cancer (HCC)      Past Surgical History:   Procedure Laterality Date   • COLONOSCOPY     • HERNIA REPAIR      approximately 4 years ago    • RECTAL SURGERY     • SHOULDER SURGERY      joint loose, calcium particles removed from shoulder joint     Family History   Problem Relation Age of Onset   • Heart attack Mother    • Heart attack Sister    • Heart attack Sister        Home Medications:  Prior to Admission medications    Medication Sig Start Date End Date Taking? Authorizing Provider "   albuterol (PROVENTIL) (2.5 MG/3ML) 0.083% nebulizer solution Every 4 (Four) Hours.    Estefania Quarles MD   aspirin 81 MG chewable tablet Chew 81 mg Daily.    Estefania Quarles MD   atorvastatin (LIPITOR) 10 MG tablet Take 10 mg by mouth Every Night.    Estefania Quarles MD   Fluticasone-Salmeterol (Wixela Inhub) 250-50 MCG/ACT DISKUS Inhale 2 (Two) Times a Day.    Estefania Quarles MD   guaiFENesin (MUCINEX) 600 MG 12 hr tablet Take 600 mg by mouth 2 (Two) Times a Day.    Estefania Quarles MD   HYDROcodone-acetaminophen (NORCO) 7.5-325 MG per tablet Take 1 tablet by mouth Every 6 (Six) Hours As Needed (pain). 22   Issac Walker MD   ipratropium-albuterol (COMBIVENT RESPIMAT)  MCG/ACT inhaler Inhale 1 puff 4 (Four) Times a Day. Do not exceed inhalations per day    Estefania Quarles MD   iron polysaccharides (NIFEREX) 150 MG capsule Take 1 capsule by mouth Daily. 22   Issac Walker MD   loratadine (CLARITIN) 10 MG tablet Take 10 mg by mouth Daily.    Estefania Quarles MD   metoprolol succinate XL (TOPROL-XL) 50 MG 24 hr tablet Take 50 mg by mouth 2 (two) times a day.    Estefania Quarles MD   multivitamin with minerals tablet tablet Take 1 tablet by mouth Daily.    Estefania Quarles MD   omeprazole (priLOSEC) 20 MG capsule Take 20 mg by mouth Daily.    Estefania Quarles MD   roflumilast (DALIRESP) 500 MCG tablet tablet Take 500 mcg by mouth Daily.    Estefania Quarles MD        Social History:   Social History     Tobacco Use   • Smoking status: Former Smoker     Start date: 11/3/1973     Quit date: 11/3/2014     Years since quittin.7   • Smokeless tobacco: Never Used   Vaping Use   • Vaping Use: Never used   Substance Use Topics   • Alcohol use: Not Currently   • Drug use: Never     Recent travel: not applicable    Review of Systems:  Review of Systems   Constitutional: Negative for chills and fever.   HENT: Negative for congestion,  "rhinorrhea and sore throat.    Eyes: Negative for photophobia.   Respiratory: Positive for shortness of breath. Negative for apnea, cough, chest tightness and wheezing.    Cardiovascular: Negative for chest pain and palpitations.   Gastrointestinal: Negative for abdominal pain, diarrhea, nausea and vomiting.   Endocrine: Negative.    Genitourinary: Negative for difficulty urinating and dysuria.   Musculoskeletal: Negative for back pain, joint swelling and myalgias.   Skin: Negative for color change and wound.   Allergic/Immunologic: Negative.    Neurological: Positive for headaches. Negative for seizures, weakness and numbness.        - head injury  + confusion  + memory loss   Psychiatric/Behavioral: Negative.    All other systems reviewed and are negative.       Physical Exam:  /75 (BP Location: Right arm, Patient Position: Sitting)   Pulse 106   Temp 98 °F (36.7 °C) (Oral)   Resp 16   Ht 185.4 cm (73\")   Wt 56.6 kg (124 lb 12.5 oz)   SpO2 92%   BMI 16.46 kg/m²     Physical Exam  Vitals and nursing note reviewed.   Constitutional:       General: He is awake.      Appearance: Normal appearance.   HENT:      Head: Normocephalic and atraumatic.      Nose: Nose normal.      Mouth/Throat:      Mouth: Mucous membranes are moist.   Eyes:      Extraocular Movements: Extraocular movements intact.      Pupils: Pupils are equal, round, and reactive to light.   Cardiovascular:      Rate and Rhythm: Normal rate and regular rhythm.      Heart sounds: Normal heart sounds.   Pulmonary:      Effort: Pulmonary effort is normal. Prolonged expiration present. No respiratory distress.      Breath sounds: Normal breath sounds. No wheezing, rhonchi or rales.      Comments: BL lung sounds diminished; Prolonged expiratory phase; Expiratory wheeze  Abdominal:      General: Bowel sounds are normal.      Palpations: Abdomen is soft.      Tenderness: There is no abdominal tenderness. There is no guarding or rebound.      " Comments: No rigidity   Musculoskeletal:         General: No tenderness. Normal range of motion.      Cervical back: Normal range of motion and neck supple.   Skin:     General: Skin is warm and dry.      Coloration: Skin is not jaundiced.   Neurological:      General: No focal deficit present.      Mental Status: He is alert and oriented to person, place, and time. Mental status is at baseline.      Sensory: Sensation is intact.      Motor: Motor function is intact.      Coordination: Coordination is intact.   Psychiatric:         Attention and Perception: Attention and perception normal.         Mood and Affect: Mood and affect normal.         Speech: Speech normal.         Behavior: Behavior normal.         Judgment: Judgment normal.                Medications in the Emergency Department:  Medications   sodium chloride 0.9 % flush 10 mL (has no administration in time range)   sodium chloride 0.9 % flush 10 mL (10 mL Intravenous Not Given 8/8/22 0219)   sodium chloride 0.9 % infusion 40 mL (has no administration in time range)   Enoxaparin Sodium (LOVENOX) syringe 40 mg (has no administration in time range)   acetaminophen (TYLENOL) tablet 650 mg (has no administration in time range)   predniSONE (DELTASONE) tablet 50 mg (has no administration in time range)   ipratropium-albuterol (DUO-NEB) nebulizer solution 3 mL (3 mL Nebulization Given 8/8/22 0704)   arformoterol (BROVANA) nebulizer solution 15 mcg (15 mcg Nebulization Given 8/8/22 0704)   budesonide (PULMICORT) nebulizer solution 0.5 mg (0.5 mg Nebulization Given 8/8/22 0704)   roflumilast (DALIRESP) tablet 500 mcg (has no administration in time range)   dextrose (GLUTOSE) oral gel 15 g (has no administration in time range)   dextrose (D50W) (25 g/50 mL) IV injection 25 g (has no administration in time range)   glucagon (human recombinant) (GLUCAGEN DIAGNOSTIC) injection 1 mg (has no administration in time range)   Insulin Lispro (humaLOG) injection 0-7 Units  (has no administration in time range)   ipratropium-albuterol (DUO-NEB) nebulizer solution 3 mL (3 mL Nebulization Given 8/8/22 0024)   methylPREDNISolone sodium succinate (SOLU-Medrol) injection 125 mg (125 mg Intravenous Given 8/8/22 0047)   sodium chloride 0.9 % bolus 500 mL (500 mL Intravenous New Bag 8/8/22 0047)        Labs  Lab Results (last 24 hours)     Procedure Component Value Units Date/Time    POC Glucose Once [[id number]]  (Abnormal) Collected: 08/07/22 2320    Specimen: Blood Updated: 08/07/22 2321     Glucose 155 mg/dL      Comment: Serial Number: [id number]:  [id number]       CBC & Differential [[id number]]  (Abnormal) Collected: 08/07/22 2331    Specimen: Blood Updated: 08/07/22 2357    Narrative:      The following orders were created for panel order CBC & Differential.  Procedure                               Abnormality         Status                     ---------                               -----------         ------                     CBC Auto Differential[[id number]]        Abnormal            Final result                 Please view results for these tests on the individual orders.    Comprehensive Metabolic Panel [[id number]]  (Abnormal) Collected: 08/07/22 2331    Specimen: Blood Updated: 08/08/22 0012     Glucose 147 mg/dL      BUN 29 mg/dL      Creatinine 0.53 mg/dL      Sodium 145 mmol/L      Potassium 4.1 mmol/L      Chloride 98 mmol/L      CO2 42.5 mmol/L      Calcium 9.7 mg/dL      Total Protein 7.6 g/dL      Albumin 4.20 g/dL      ALT (SGPT) 17 U/L      AST (SGOT) 17 U/L      Alkaline Phosphatase 74 U/L      Total Bilirubin 0.2 mg/dL      Globulin 3.4 gm/dL      A/G Ratio 1.2 g/dL      BUN/Creatinine Ratio 54.7     Anion Gap 4.5 mmol/L      eGFR 111.2 mL/min/1.73      Comment: National Kidney Foundation and American Society of Nephrology (ASN) Task Force recommended calculation based on the Chronic Kidney Disease Epidemiology Collaboration (CKD-EPI) equation refit without  adjustment for race.       Narrative:      GFR Normal >60  Chronic Kidney Disease <60  Kidney Failure <15      Troponin [[id number]]  (Abnormal) Collected: 08/07/22 2331    Specimen: Blood Updated: 08/08/22 0028     Troponin T 0.052 ng/mL     Narrative:      Troponin T Reference Range:  <= 0.03 ng/mL-   Negative for AMI  >0.03 ng/mL-     Abnormal for myocardial necrosis.  Clinicians would have to utilize clinical acumen, EKG, Troponin and serial changes to determine if it is an Acute Myocardial Infarction or myocardial injury due to an underlying chronic condition.       Results may be falsely decreased if patient taking Biotin.      Magnesium [[id number]]  (Normal) Collected: 08/07/22 2331    Specimen: Blood Updated: 08/08/22 0012     Magnesium 1.8 mg/dL     CBC Auto Differential [[id number]]  (Abnormal) Collected: 08/07/22 2331    Specimen: Blood Updated: 08/07/22 2357     WBC 7.92 10*3/mm3      RBC 4.16 10*6/mm3      Hemoglobin 12.4 g/dL      Hematocrit 42.3 %      .7 fL      MCH 29.8 pg      MCHC 29.3 g/dL      RDW 12.6 %      RDW-SD 46.6 fl      MPV 10.3 fL      Platelets 230 10*3/mm3      Neutrophil % 87.8 %      Lymphocyte % 3.7 %      Monocyte % 7.6 %      Eosinophil % 0.1 %      Basophil % 0.3 %      Immature Grans % 0.5 %      Neutrophils, Absolute 6.96 10*3/mm3      Lymphocytes, Absolute 0.29 10*3/mm3      Monocytes, Absolute 0.60 10*3/mm3      Eosinophils, Absolute 0.01 10*3/mm3      Basophils, Absolute 0.02 10*3/mm3      Immature Grans, Absolute 0.04 10*3/mm3      nRBC 0.0 /100 WBC     BNP [[id number]]  (Abnormal) Collected: 08/07/22 2331    Specimen: Blood Updated: 08/08/22 0040     proBNP 1,625.0 pg/mL     Narrative:      Among patients with dyspnea, NT-proBNP is highly sensitive for the detection of acute congestive heart failure. In addition NT-proBNP of <300 pg/ml effectively rules out acute congestive heart failure with 99% negative predictive value.    Results may be falsely decreased if  "patient taking Biotin.      Hemoglobin A1c [[id number]] Collected: 08/07/22 2331    Specimen: Blood Updated: 08/08/22 0113    Procalcitonin [[id number]]  (Normal) Collected: 08/07/22 2331    Specimen: Blood Updated: 08/08/22 0134     Procalcitonin 0.07 ng/mL     Narrative:      As a Marker for Sepsis (Non-Neonates):    1. <0.5 ng/mL represents a low risk of severe sepsis and/or septic shock.  2. >2 ng/mL represents a high risk of severe sepsis and/or septic shock.    As a Marker for Lower Respiratory Tract Infections that require antibiotic therapy:    PCT on Admission    Antibiotic Therapy       6-12 Hrs later    >0.5                Strongly Recommended  >0.25 - <0.5        Recommended  0.1 - 0.25          Discouraged              Remeasure/reassess PCT  <0.1                Strongly Discouraged     Remeasure/reassess PCT    As 28 day mortality risk marker: \"Change in Procalcitonin Result\" (>80% or <=80%) if Day 0 (or Day 1) and Day 4 values are available. Refer to http://[email or web address]-pct-calculator.com    Change in PCT <=80%  A decrease of PCT levels below or equal to 80% defines a positive change in PCT test result representing a higher risk for 28-day all-cause mortality of patients diagnosed with severe sepsis for septic shock.    Change in PCT >80%  A decrease of PCT levels of more than 80% defines a negative change in PCT result representing a lower risk for 28-day all-cause mortality of patients diagnosed with severe sepsis or septic shock.    This test is Prognostic not Diagnostic, if elevated correlate with clinical findings before administering antibiotic treatment.        Urinalysis With Culture If Indicated - Urine, Clean Catch [[id number]]  (Abnormal) Collected: 08/07/22 2336    Specimen: Urine, Clean Catch Updated: 08/08/22 0001     Color, UA Yellow     Appearance, UA Clear     pH, UA 5.5     Specific Gravity, UA 1.024     Glucose, UA Negative     Ketones, UA Negative     Bilirubin, UA Negative     Blood, " UA Small (1+)     Protein, UA 30 mg/dL (1+)     Leuk Esterase, UA Negative     Nitrite, UA Negative     Urobilinogen, UA 1.0 E.U./dL    Narrative:      In absence of clinical symptoms, the presence of pyuria, bacteria, and/or nitrites on the urinalysis result does not correlate with infection.    Urinalysis, Microscopic Only - Urine, Clean Catch [[id number]]  (Abnormal) Collected: 08/07/22 2336    Specimen: Urine, Clean Catch Updated: 08/08/22 0003     RBC, UA 3-5 /HPF      WBC, UA 0-2 /HPF      Comment: Urine culture not indicated.        Bacteria, UA None Seen /HPF      Squamous Epithelial Cells, UA 0-2 /HPF      Hyaline Casts, UA 7-12 /LPF      Methodology Automated Microscopy    Blood Gas, Arterial -With Co-Ox Panel: Yes [[id number]]  (Abnormal) Collected: 08/08/22 0028    Specimen: Arterial Blood from Arm, Right Updated: 08/08/22 0035     pH, Arterial 7.310 pH units      pCO2, Arterial 84.3 mm Hg      pO2, Arterial 65.5 mm Hg      HCO3, Arterial 41.5 mmol/L      Base Excess, Arterial 11.6 mmol/L      O2 Saturation, Arterial 92.7 %      Hemoglobin, Blood Gas 13.4 g/dL      Carboxyhemoglobin 1.5 %      Methemoglobin 0.20 %      Oxyhemoglobin 91.1 %      FHHB 7.2 %      Site Arterial: right brachial     Modality Nasal Cannula     FIO2 32 %      Flow Rate 3 lpm      PO2/FIO2 205     Lactate, Arterial --    Blood Gas, Arterial -With Co-Ox Panel: Yes [[id number]]  (Abnormal) Collected: 08/08/22 0631    Specimen: Arterial Blood from Arm, Right Updated: 08/08/22 0639     pH, Arterial 7.379 pH units      pCO2, Arterial 53.1 mm Hg      pO2, Arterial 53.1 mm Hg      HCO3, Arterial 30.6 mmol/L      Base Excess, Arterial 4.3 mmol/L      O2 Saturation, Arterial 89.3 %      Hemoglobin, Blood Gas 13.6 g/dL      Carboxyhemoglobin 0.9 %      Methemoglobin 0.10 %      Oxyhemoglobin 88.4 %      FHHB 10.6 %      Site Arterial: right radial     Modality BiPap     FIO2 32 %      PO2/FIO2 166     Pavel's Test Positive     Lactate,  Arterial --    Narrative:      12/6           Imaging:  XR Chest 1 View    Result Date: 8/8/2022  PROCEDURE: XR CHEST 1 VW  COMPARISON: 1/31/2022.  INDICATIONS: SHORTNESS OF BREATH W/ H/O COPD.  FINDINGS:  Two views of the chest reveal no cardiac enlargement and no acute infiltrate.  No pleural effusion or pneumothorax is seen.  There is emphysematous contour of the lungs.  Chronic calcified granulomatous disease involves the chest.  The thoracic aorta is atherosclerotic.  External artifacts obscure detail.  There is suspected dextroscoliosis of the upper to mid thoracic spine.  No significant interval change is seen since the prior study.        No acute infiltrate is appreciated.      COMMENT:  Part of this note is an electronic transcription of spoken language to printed text. The electronic translation/transcription may permit erroneous, or at times, nonsensical (or even sensical) words or phrases to be inadvertently transcribed or omitted; this  has reviewed the note for such errors (as well as additional errors); however, some may still exist.  MAYLIN TANNER JR, MD       Electronically Signed and Approved By: MAYLIN TANNER JR, MD on 8/08/2022 at 0:26                Procedures:  Procedures    Progress  ED Course as of 08/08/22 0732   Mon Aug 08, 2022   0011 EKG interpretation: Sinus tachycardia, heart rate 117, normal MS and QT intervals, normal QRS duration, nonspecific ST segment changes with no acute ischemia.  Unchanged from 1/31/2022 [RP]   0107 Discussed with Dr. Velazquez for admission. [RP]      ED Course User Index  [RP] Harley German MD                            Medical Decision Making:  MDM  Number of Diagnoses or Management Options  COPD exacerbation (HCC): established and worsening     Amount and/or Complexity of Data Reviewed  Clinical lab tests: reviewed  Tests in the medicine section of CPT®: reviewed  Decide to obtain previous medical records or to obtain history from someone  other than the patient: yes  Independent visualization of images, tracings, or specimens: yes    Risk of Complications, Morbidity, and/or Mortality  Presenting problems: moderate  Management options: low         Final diagnoses:   COPD exacerbation (HCC)        Disposition:  ED Disposition     ED Disposition   Decision to Admit    Condition   --    Comment   Level of Care: Med/Surg [1]   Diagnosis: COPD exacerbation (HCC) [213358]   Admitting Physician: LACI CHIN [[id number]]   Attending Physician: LACI CHIN [[id number]]   Certification: I Certify That Inpatient Hospital Services Are Medically Necessary For Greater Than 2 Midnights               This medical record created using voice recognition software.           Lorenzo Du  08/08/22 0026       Lorenzo Du  08/08/22 0106       Lorenzo Du  08/08/22 0106       Harley German MD  08/08/22 0732

## 2022-08-08 NOTE — H&P
"[facility] [place]    Patient Name: Pavel Dai  : 1955  MRN: [id number]  Primary Care Physician: Yoel Geller MD  Date of admission: 2022    Subjective   Subjective     Chief Complaint: SOB/poor memory/confusion    History of Present Illness  67 y.o.  male former smoker with COPD, CHF, CAD, HLD, HTN, and rectal cancer with chemo and radiation in  presents with with SOB and confusion/memory loss.  His report of the duration of this is somewhat inconsistent from the past 2-3 days to over a week.  Reports that he had a headache for the last week although he denies having it now; denies recent head trauma.  Pt also reports confusion and memory loss since around 9:30-10 AM today, but he denies any confusion currently. Pt states his relative said he was \"in and out\" during a conversation they had earlier today.  Denies fevers, chills, cough, chest pain, abdominal pain, dysuria, change in bowel habits, change in sense of taste or smell.    In the ED:  Vitals notable for tachycardia, tachypnea, and O2 decentralization on room air.    Notable labs and studies:  BNP 1625  Troponin 0.052    CO2 42.5  BUN 29  Creatinine 0.53  Glucose 147  Magnesium 1.8    Procalcitonin: Negative    CBC: Unremarkable    AB.31/84.3/65.5/41.5/92.7%    Urinalysis: Negative for infection    CXR:  -No acute infiltrate         Review of Systems   Constitutional: Negative for chills and fever.   HENT: Negative for congestion, rhinorrhea and sore throat.    Eyes: Negative for photophobia.   Respiratory: Positive for shortness of breath. Negative for apnea, cough, chest tightness and wheezing.    Cardiovascular: Negative for chest pain and palpitations.   Gastrointestinal: Negative for abdominal pain, diarrhea, nausea and vomiting.   Endocrine: Negative.    Genitourinary: Negative for difficulty urinating and dysuria.   Musculoskeletal: Negative for back pain, joint swelling and myalgias.   Skin: " Negative for color change and wound.   Allergic/Immunologic: Negative.    Neurological: Positive for headaches. Negative for seizures, weakness and numbness.   + confusion  + memory loss   Psychiatric/Behavioral: Negative.    All other systems reviewed and are negative.  Personal History     Past Medical History:   Diagnosis Date   • Anemia due to chemotherapy 12/13/2021   • Cancer related pain 1/3/2022   • CHF (congestive heart failure) (HCC)    • COPD (chronic obstructive pulmonary disease) (HCC)    • Coronary artery disease    • Frequent urination    • Hyperlipidemia    • Hypertension    • Iron deficiency anemia due to chronic blood loss 12/13/2021   • Rectal cancer (HCC)        Past Surgical History:   Procedure Laterality Date   • COLONOSCOPY     • HERNIA REPAIR      approximately 4 years ago    • RECTAL SURGERY     • SHOULDER SURGERY      joint loose, calcium particles removed from shoulder joint       Family History: family history includes Heart attack in his mother, sister, and sister. Otherwise pertinent FHx was reviewed and not pertinent to current issue.    Social History:  reports that he quit smoking about 7 years ago. He started smoking about 48 years ago. He has never used smokeless tobacco. He reports previous alcohol use. He reports that he does not use drugs.    Home Medications:  Fluticasone-Salmeterol, HYDROcodone-acetaminophen, albuterol, aspirin, atorvastatin, guaiFENesin, ipratropium-albuterol, iron polysaccharides, loratadine, metoprolol succinate XL, multivitamin with minerals, omeprazole, and roflumilast      Allergies:  No Known Allergies    Objective    Objective     Vitals:  Temp:  [98.4 °F (36.9 °C)] 98.4 °F (36.9 °C)  Heart Rate:  [113-122] 113  Resp:  [22] 22  BP: (131)/(70) 131/70  Flow (L/min):  [3-4] 4    Physical Exam  Constitutional:       General: He is awake.  Very thin.     Appearance: Normal appearance.   HENT: Apparent temporal wasting (covered by BiPAP straps)     Head:  Normocephalic and atraumatic.      Nose: Nose normal.      Mouth/Throat:      Mouth: Mucous membranes are moist.   Eyes:      Extraocular Movements: Extraocular movements intact.      Pupils: Pupils are equal, round, and reactive to light.   Cardiovascular:      Rate and Rhythm: Normal rate and regular rhythm.      Heart sounds: Normal heart sounds.   Pulmonary: BiPAP on     Effort: Pulmonary effort is normal. Prolonged expiration present. No respiratory distress.      Breath sounds: Normal breath sounds. No wheezing, rhonchi or rales.      Comments: BL lung sounds diminished; Prolonged expiratory phase; no wheezes at the time of my exam  Abdominal: Very thin     General: Bowel sounds are normal.      Palpations: Abdomen is soft.      Tenderness: There is no abdominal tenderness. There is no guarding or rebound.   Musculoskeletal:         General: No tenderness. Normal range of motion.      Cervical back: Normal range of motion and neck supple.   Skin:     General: Skin is warm and dry.      Coloration: Skin is not jaundiced.   Neurological:      General: No focal deficit present.      Mental Status: He is alert and oriented to person, place, and time. Mental status is at baseline.      Sensory: Sensation is intact.      Motor: Motor function is intact.      Coordination: Coordination is intact.   Psychiatric:         Attention and Perception: Attention and perception normal.         Mood and Affect: Mood and affect normal.         Speech: Speech normal.         Behavior: Behavior normal.         Judgment: Judgment normal.      Result Review    Result Review:  I have personally reviewed the results from the time of this admission to 8/8/2022 01:05 EDT and agree with these findings:  [x]  Laboratory list / accordion  []  Microbiology  [x]  Radiology  [x]  EKG/Telemetry   []  Cardiology/Vascular   []  Pathology  []  Old records  []  Other:  Most notable findings include: Partially compensated respiratory acidosis; BNP  1625; troponin mildly elevated      Assessment & Plan   Assessment / Plan     Brief Patient Summary:  67 y.o.  male former smoker with COPD, CHF, CAD, HLD, HTN, and rectal cancer with chemo and radiation in 2022 presents with with SOB and confusion/memory loss.  Found to be in hypercapnic respiratory failure.    Active Hospital Problems:  There are no active hospital problems to display for this patient.      Plan:   Acute hypercapnic hypoxemic respiratory failure secondary to COPD  COPD exacerbation  Confusion  -Hypercapnia partially compensated  -No cough or sputum of any kind  -BiPAP  -Prednisone 50 daily  -Nebulizers  [] Consider pulmonology consult for worsening chronic issue  [] Follow-up with PT/OT    Headache  Confusion  -Assumed to be secondary to above  [] Consider head imaging if warranted in the setting of cancer    Elevated troponin  Elevated BNP  Possible FABIAN  CHF  -Does not appear to be having cardiac process; has no chest pain  -Does not appear to be in fluid overload  -Caution with IVF at this time in the setting of CHF  [] Follow-up repeat labs    Mild hyperglycemia (glucose 147)  -ISS  [] Follow-up fingersticks    Near cachexia  -Patient says he is always been like this  -Apparent temporal wasting  -Has a recent history of cancer  [] Follow-up nutrition consult    Chronic issues:  [] Restart medications once reconciled, if appropriate  CAD/CHF/HLD/HTN:  -List shows aspirin, Lipitor, Toprol      DVT prophylaxis:  Lovenox    CODE STATUS:    There are no questions and answers to display.       Admission Status:  I believe this patient meets inpatient status.    Kayden Velazquez MD,

## 2022-08-08 NOTE — CONSULTS
Pulmonary / Critical Care Consult Note      Patient Name: Pavel Dai  : 1955  MRN: [id number]  Primary Care Physician:  Yoel Geller MD  Referring Physician: Huy Cobian MD  Date of admission: 2022    Subjective   Subjective     Reason for Consult/ Chief Complaint:   Shortness of breath, confusion, elevated CO2    HPI:  Pavel Dai is a 67 y.o. male former cigarette smoker with COPD, rectal cancer status post chemoradiation in  came in confused.  Reported a few days of shortness of breath and increasing cough with yellow sputum prior to admission.  Was brought in by family for 2 3 days of increasing confusion and headache.  Last night he was found to have hypercapnia with an elevated PCO2 in the 80s with a respiratory acidosis.  He was started on BiPAP overnight.  Chest x-ray shows chronic changes with possible lung infiltrate.  His mental status is better this morning.  He is receiving an EEG and brain MRI as work-up for altered mental status.  He does not remember the night before however he reports the respiratory complaints noted above over the last few days.  He does state that occasionally he wears 2 L of oxygen at home.    Review of Systems  Constitutional symptoms: Fatigue, otherwise Denied complaints   Ear, nose, throat: Denied complaints  Cardiovascular:  Denied complaints  Respiratory: Dyspnea, cough, wheeze, otherwise denied complaints  Gastrointestinal: Denied complaints  Musculoskeletal: Weakness, otherwise denied complaints  Genitourinary: Denied complaints  Allergy / Immunology: Denied complaints  Hematologic: Denied complaints  Neurologic: Confusion, otherwise denied complaints  Skin: Denied complaints  Endocrine: Denied complaints  Psychiatric: Denied complaints      Personal History     Past Medical History:   Diagnosis Date   • Anemia due to chemotherapy 2021   • Cancer related pain 1/3/2022   • CHF (congestive heart failure) (HCC)    • COPD  (chronic obstructive pulmonary disease) (HCC)    • Coronary artery disease    • Frequent urination    • Hyperlipidemia    • Hypertension    • Iron deficiency anemia due to chronic blood loss 12/13/2021   • Rectal cancer (HCC)        Past Surgical History:   Procedure Laterality Date   • COLONOSCOPY     • HERNIA REPAIR      approximately 4 years ago    • RECTAL SURGERY     • SHOULDER SURGERY      joint loose, calcium particles removed from shoulder joint       Family History: family history includes Heart attack in his mother, sister, and sister. Otherwise pertinent FHx was reviewed and not pertinent to current issue.    Social History:  reports that he quit smoking about 7 years ago. He started smoking about 48 years ago. He has never used smokeless tobacco. He reports previous alcohol use. He reports that he does not use drugs.    Home Medications:  Fluticasone-Salmeterol, albuterol, aspirin, atorvastatin, guaiFENesin, ipratropium-albuterol, loratadine, metoprolol tartrate, multivitamin with minerals, omeprazole, and roflumilast    Allergies:  No Known Allergies    Objective    Objective     Vitals:   Temp:  [98 °F (36.7 °C)-98.7 °F (37.1 °C)] 98.7 °F (37.1 °C)  Heart Rate:  [102-122] 113  Resp:  [16-29] 18  BP: (119-140)/(70-83) 140/75  Flow (L/min):  [3-4] 3    Physical Exam:  Vital Signs Reviewed   General: Thin chronically ill-appearing male, Alert, NAD.    HEENT:  PERRL, EOMI.  OP, nares clear, no sinus tenderness  Neck:  Supple, no JVD, no thyromegaly  Lymph: no axillary, cervical, supraclavicular lymphadenopathy noted bilaterally  Chest:   Scaphoid chest, barrel chested, poor aeration, coarse wheezing and rhonchi bilaterally, tympanic to percussion bilaterally, pursed lip breathing noted  CV: RRR, no MGR, pulses 2+, equal.  Abd:  Soft, NT, ND, + BS, no HSM  EXT:  no clubbing, no cyanosis, no edema, no joint tenderness, muscle wasting noted all 4 extremities  Neuro:  A&Ox3, CN grossly intact, no focal  deficits.  Skin: No rashes or lesions noted      Result Review    Result Review:  I have personally reviewed the results from the time of this admission to 8/8/2022 12:59 EDT and agree with these findings:  [x]  Laboratory  [x]  Microbiology  [x]  Radiology  []  EKG/Telemetry   [x]  Cardiology/Vascular   []  Pathology  []  Old records  []  Other:  Most notable findings include:   Chest x-ray with chronic changes with possible lung infiltrates  NT BNP elevated  ABG 7.3 1/84/65/41        Lab 08/08/22  0707 08/07/22  2331   WBC  --  7.92   HEMOGLOBIN  --  12.4*   HEMATOCRIT  --  42.3   PLATELETS  --  230   SODIUM 143 145   POTASSIUM 4.1 4.1   CHLORIDE 98 98   CO2 41.9* 42.5*   BUN 28* 29*   CREATININE 0.52* 0.53*   GLUCOSE 157* 147*   CALCIUM 9.8 9.7   TOTAL PROTEIN  --  7.6   ALBUMIN  --  4.20   GLOBULIN  --  3.4         Assessment & Plan   Assessment / Plan     Active Hospital Problems:  Active Hospital Problems    Diagnosis    • COPD exacerbation (HCC)    • Severe malnutrition (HCC)    • Acute on chronic respiratory failure with hypoxia and hypercapnia (HCC)      Impression:  Altered mental status  CO2 narcosis  Acute exacerbation of COPD  Acute on chronic hypoxemic and hypercapnic respiratory failure  Severe protein calorie malnutrition with muscle wasting  Possible community-acquired pneumonia from unspecified organism  Tobacco use of cigarettes in remission  Hyperglycemia    Plan:  Continue NIPPV and transition to NIPPV nightly with naps on current settings.  Would benefit from NIPPV at home.  Will consult RT  to arrange  Patient will require noninvasive positive pressure ventilation for COPD and chronic respiratory failure.  Alternative modes of ventilation are insufficient due to persistent hypercapnia despite BiPAP use.  This patient requires frequent durations of ventilatory support with battery back-up and continuous enhanced along monitoring.  Intermittent support will be insufficient.  This  patient quickly deteriorates without noninvasive ventilation.  Removal of ventilator may cause serious harm to the patient or frequent readmissions to the hospital.  Check noncontrast chest CT  Complete 5 to 7 days of prednisone  Add ceftriaxone and azithromycin.  Check Legionella and strep pneumo antigens as well as sputum culture  Continue nebulizers and bronchopulmonary hygiene  Continue home Daliresp 500 mcg daily  Wean O2 to keep SPO2 greater than 90%  Supplements per dietitian  Trend renal function and electrolytes  EEG, MRI and altered mental status work-up per primary    DVT prophylaxis:  Medical DVT prophylaxis orders are present.     Code Status and Medical Interventions:   Ordered at: 08/08/22 0111     Code Status (Patient has no pulse and is not breathing):    CPR (Attempt to Resuscitate)     Medical Interventions (Patient has pulse or is breathing):    Full Support      I did discuss the case over the phone with the patient's primary pulmonologist, Dr. TAMIA Myers.  He request that we see the patient while inpatient and then follow-up with Dr. Myers as outpatient.      Labs, imaging, microbiology, notes and medications personally reviewed  Discussed with primary    Thank you for involving me in the care of this patient.    Electronically signed by Nish Schaefer MD, 08/08/22, 1:01 PM EDT.

## 2022-08-08 NOTE — THERAPY EVALUATION
Acute Care - Speech Language Pathology   Swallow Initial Evaluation DANYA Matos     Patient Name: Pavel Dai  : 1955  MRN: [id number]  Today's Date: 2022               Admit Date: 2022    Visit Dx:     ICD-10-CM ICD-9-CM   1. COPD exacerbation (HCC)  J44.1 491.21   2. Dysphagia, oropharyngeal  R13.12 787.22     Patient Active Problem List   Diagnosis   • Rectal bleeding   • Acute blood loss anemia   • COPD (chronic obstructive pulmonary disease) (HCC)   • CAD (coronary artery disease)   • Essential hypertension   • History of ischemic stroke   • Body mass index (BMI) 19.9 or less, adult   • Encounter for immunization   • Ex-smoker   • Hyperlipidemia   • Hypoxemia   • Hypertensive heart disease without congestive heart failure   • Oxygen dependent   • Postnasal drip   • Shortness of breath   • Rectal cancer (HCC)   • Iron deficiency anemia due to chronic blood loss   • Cancer related pain   • Gastroesophageal reflux disease   • COPD exacerbation (HCC)     Past Medical History:   Diagnosis Date   • Anemia due to chemotherapy 2021   • Cancer related pain 1/3/2022   • CHF (congestive heart failure) (HCC)    • COPD (chronic obstructive pulmonary disease) (HCC)    • Coronary artery disease    • Frequent urination    • Hyperlipidemia    • Hypertension    • Iron deficiency anemia due to chronic blood loss 2021   • Rectal cancer (HCC)      Past Surgical History:   Procedure Laterality Date   • COLONOSCOPY     • HERNIA REPAIR      approximately 4 years ago    • RECTAL SURGERY     • SHOULDER SURGERY      joint loose, calcium particles removed from shoulder joint     PAIN SCALE: None noted    PRECAUTIONS/CONTRAINDICATIONS:  None noted    SUSPECTED ABUSE/NEGLECT/EXPLOITATION:  None noted    SOCIAL/PSYCHOLOGICAL NEEDS/BARRIERS:  None noted    PAST SOCIAL HISTORY:  Lives at home    PRIOR FUNCTION:  Independent    PATIENT GOALS/EXPECTATIONS:  Did not report    HISTORY:  This patient is a 67 year  old admitted with COPD exacerbation.  Referred for clinical bedside evaluation.     CURRENT DIET LEVEL:  Regular diet    OBJECTIVE:    TEST ADMINISTERED:  Clinical dysphagia evaluation    COGNITION/SAFETY AWARENESS: Alert and oriented    BEHAVIORAL OBSERVATIONS: Pleasant cooperative    ORAL MOTOR EXAM: Right labial droop noted.  Patient is edentulous.    VOICE QUALITY: Mildly hoarse    REFLEX EXAM: Deferred    POSTURE: 90 degrees upright    FEEDING/SWALLOWING FUNCTION: Assessed with full range of consistencies with thin liquids from spoon cup and straw, purée consistencies soft and hard solids.    CLINICAL OBSERVATIONS: Oral stage is characterized by good bolus preparation and control.  Timely oral transit.  Oral residue noted after the swallow.  Pharyngeal phase appears timely with good laryngeal elevation per palpation.  No clinical signs or symptoms of aspiration were noted.  Patient denies globus sensation after completion of swallow.    DYSPHAGIA CRITERIA: N/A    FUNCTIONAL ASSESSMENT INSTRUMENT: Patient currently scored a level 7 of 7 on Functional Communication Measures for swallowing indicating a 0% limitation in function.    ASSESSMENT/ PLAN OF CARE:  Pt presents with functional oropharyngeal swallow.  No clinical signs or symptoms of aspiration noted.  Vocal quality may clear to auscultation.    PROBLEMS:  1.  N/a   LTG 1: N/a   STG 1a: N/a    FREQUENCY/DURATION:  N/a    REHAB POTENTIAL:  Pt has good rehab potential.  The following limitations may influence improvement/ length of tx medical status.    RECOMMENDATIONS:   1.   DIET: Regular diet - thin liquids    2.  POSITION: 90 degrees upright    3.  COMPENSATORY STRATEGIES: General swallow precautions    No further dysphagia tx indicated at this time.  Available for re-consult should patient medical status allows    YES: Pt/responsible party agrees with plan of care and has been informed of all alternatives, risks and benefits.      EDUCATION  The patient  has been educated in the following areas:   Dysphagia (Swallowing Impairment).        Salena Hallman, SLP  8/8/2022

## 2022-08-08 NOTE — PLAN OF CARE
Goal Outcome Evaluation:    AAO X4. ANXIOUS, RESTLESS, & AGITATED DEPENDING ON THE INTERACTION.    TOLERATING 3L/NC (WEARS 3L/NC AAT AT HOME), DIET & ACTIVITY TO BSC.    HYPOXIC WITH ACTIVITY AT TIMES &/OR AGITATED EPISODES BUT ABOUT TO RECOVER WITH 1:1 CALMING MEASURES & DEEP BREATHING. PT ABLE TO TOLERATE TALKING ON THE PHONE FOR MOST OF THE DAY.

## 2022-08-08 NOTE — PROGRESS NOTES
" [facility]   Hospitalist Progress Note  Date: 2022  Patient Name: Pavel Dai  : 1955  MRN: [id number]  Date of admission: 2022      Subjective   Subjective     Chief Complaint: Shortness of air, confusion    Summary: 67 y.o.  male former smoker with COPD, CHF, CAD, HLD, HTN, and rectal cancer with chemo and radiation in  presents with with SOB and confusion/memory loss.  His report of the duration of this is somewhat inconsistent from the past 2-3 days to over a week.  Reports that he had a headache for the last week although he denies having it now; denies recent head trauma.  Pt also reports confusion and memory loss since around 9:30-10 AM today, but he denies any confusion currently. Pt states his relative said he was \"in and out\" during a conversation they had earlier today.  On arrival to the ED, infectious work-up negative.  Found to have hypercapnia with CO2 84, admitted for further care, started on BiPAP.  Pulmonology consulted.  MRI obtained.    Interval Followup: No acute events overnight.  Patient states he is feeling significantly better today.  However, he is still intermittently confused and forgetful.  He is alert and oriented x3, but has difficulty remembering his past medical history and the events of the last few days.  Now on nasal cannula with adequate saturations.  He states he does not have a CPAP or BiPAP at home that he can recall.    Review of Systems   All systems reviewed and negative unless otherwise stated under interval follow-up    Objective   Objective     Vitals:   Temp:  [98 °F (36.7 °C)-98.7 °F (37.1 °C)] 98.7 °F (37.1 °C)  Heart Rate:  [102-122] 113  Resp:  [16-29] 18  BP: (119-140)/(70-83) 140/75  Flow (L/min):  [3-4] 3  Physical Exam    Constitutional: Awake, alert, no acute distress, intermittently confused   Eyes: Pupils equal, sclerae anicteric, no conjunctival injection   HENT: NCAT, mucous membranes moist   Neck: Supple, no thyromegaly, no " lymphadenopathy, trachea midline   Respiratory: Diminished breath sounds in bilateral bases, otherwise clear to auscultation bilaterally, nasal cannula in place, nonlabored respirations    Cardiovascular: RRR, no murmurs, rubs, or gallops   Gastrointestinal: Positive bowel sounds, soft, nontender, nondistended   Musculoskeletal: No bilateral ankle edema, no clubbing or cyanosis to extremities   Psychiatric: Appropriate affect, cooperative   Neurologic: Oriented x 3, strength symmetric in all extremities, Cranial Nerves grossly intact to confrontation, speech clear   Skin: No rashes     Result Review    Result Review:  I have personally reviewed the results from the time of this admission to 8/8/2022 11:47 EDT and agree with these findings:  [x]  Laboratory all labs reviewed  []  Microbiology  []  Radiology  [x]  EKG/Telemetry telemetry reviewed showed normal sinus rhythm  []  Cardiology/Vascular   []  Pathology  []  Old records  []  Other:  CBC    CBC 1/31/22 4/22/22 8/7/22   WBC 8.79 7.17 7.92   RBC 3.33 (A) 3.94 (A) 4.16   Hemoglobin 9.5 (A) 10.1 (A) 12.4 (A)   Hematocrit 33.2 (A) 36.0 (A) 42.3   MCV 99.7 (A) 91.4 101.7 (A)   MCH 28.5 25.6 (A) 29.8   MCHC 28.6 (A) 28.1 (A) 29.3 (A)   RDW 17.2 (A) 14.5 12.6   Platelets 534 (A) 331 230   (A) Abnormal value            CMP    CMP 4/22/22 8/7/22 8/8/22   Glucose 102 (A) 147 (A) 157 (A)   BUN 21 29 (A) 28 (A)   Creatinine 0.59 (A) 0.53 (A) 0.52 (A)   Sodium 142 145 143   Potassium 4.8 4.1 4.1   Chloride 97 (A) 98 98   Calcium 9.8 9.7 9.8   Albumin 4.20 4.20    Total Bilirubin 0.2 0.2    Alkaline Phosphatase 67 74    AST (SGOT) 22 17    ALT (SGPT) 16 17    (A) Abnormal value              Assessment & Plan   Assessment / Plan     Assessment/Plan:  Acute hypercapnic hypoxemic respiratory failure  COPD with acute exacerbation  Metabolic encephalopathy secondary to above  Concern for community-acquired pneumonia due to unknown bacterial source  Headache  Chronic hypoxemia on  2-3 nasal cannula  Confusion  Elevated troponin, ACS ruled out  History of chronic CHF  Severe protein calorie malnutrition  Cachexia  CAD  HLD  Hypertension  Rectal cancer, recently completed chemo and radiation    Continue to monitor in the hospital for management of the above  Consult pulmonology, appreciate assistance.  If patient does not have a CPAP/BiPAP at home, he may benefit from one at discharge  Hypercapnia is resolved, patient still remains intermittently confused  Will obtain MRI brain and EEG.  If no improvement, consider neurology consultation  Continue scheduled duo nebs, Pulmicort and Brovana twice daily, albuterol as needed, bronchopulmonary hygiene  Prednisone 40 mg daily for minimum 5 to 7 days  Obtain CT chest without contrast  Start Rocephin azithromycin due to concern for community-acquired pneumonia, de-escalate based on cultures  Obtain sputum culture, strep and Legionella antigens  Obtain 2D echo  Restart appropriate home medications  Trend renal function and electrolytes with a.m. BMP  Trend Hgb and WBC with a.m. CBC     Discussed plan with RN, pulmonology    DVT prophylaxis:  Medical DVT prophylaxis orders are present.    CODE STATUS:   Code Status (Patient has no pulse and is not breathing): CPR (Attempt to Resuscitate)  Medical Interventions (Patient has pulse or is breathing): Full Support        Electronically signed by Huy Linares MD, 08/08/22, 11:47 AM EDT.

## 2022-08-09 LAB
ANION GAP SERPL CALCULATED.3IONS-SCNC: 6.9 MMOL/L (ref 5–15)
BASOPHILS # BLD AUTO: 0.01 10*3/MM3 (ref 0–0.2)
BASOPHILS NFR BLD AUTO: 0.2 % (ref 0–1.5)
BUN SERPL-MCNC: 28 MG/DL (ref 8–23)
BUN/CREAT SERPL: 50 (ref 7–25)
CALCIUM SPEC-SCNC: 9.6 MG/DL (ref 8.6–10.5)
CHLORIDE SERPL-SCNC: 94 MMOL/L (ref 98–107)
CO2 SERPL-SCNC: 40.1 MMOL/L (ref 22–29)
CREAT SERPL-MCNC: 0.56 MG/DL (ref 0.76–1.27)
DEPRECATED RDW RBC AUTO: 45.6 FL (ref 37–54)
EGFRCR SERPLBLD CKD-EPI 2021: 109.4 ML/MIN/1.73
EOSINOPHIL # BLD AUTO: 0.03 10*3/MM3 (ref 0–0.4)
EOSINOPHIL NFR BLD AUTO: 0.5 % (ref 0.3–6.2)
ERYTHROCYTE [DISTWIDTH] IN BLOOD BY AUTOMATED COUNT: 12.7 % (ref 12.3–15.4)
GLUCOSE BLDC GLUCOMTR-MCNC: 105 MG/DL (ref 70–99)
GLUCOSE BLDC GLUCOMTR-MCNC: 115 MG/DL (ref 70–99)
GLUCOSE BLDC GLUCOMTR-MCNC: 125 MG/DL (ref 70–99)
GLUCOSE BLDC GLUCOMTR-MCNC: 146 MG/DL (ref 70–99)
GLUCOSE SERPL-MCNC: 98 MG/DL (ref 65–99)
HCT VFR BLD AUTO: 36.9 % (ref 37.5–51)
HGB BLD-MCNC: 11.3 G/DL (ref 13–17.7)
IMM GRANULOCYTES # BLD AUTO: 0.02 10*3/MM3 (ref 0–0.05)
IMM GRANULOCYTES NFR BLD AUTO: 0.3 % (ref 0–0.5)
LYMPHOCYTES # BLD AUTO: 0.64 10*3/MM3 (ref 0.7–3.1)
LYMPHOCYTES NFR BLD AUTO: 10.7 % (ref 19.6–45.3)
MAGNESIUM SERPL-MCNC: 1.7 MG/DL (ref 1.6–2.4)
MCH RBC QN AUTO: 30.1 PG (ref 26.6–33)
MCHC RBC AUTO-ENTMCNC: 30.6 G/DL (ref 31.5–35.7)
MCV RBC AUTO: 98.1 FL (ref 79–97)
MONOCYTES # BLD AUTO: 0.75 10*3/MM3 (ref 0.1–0.9)
MONOCYTES NFR BLD AUTO: 12.6 % (ref 5–12)
NEUTROPHILS NFR BLD AUTO: 4.52 10*3/MM3 (ref 1.7–7)
NEUTROPHILS NFR BLD AUTO: 75.7 % (ref 42.7–76)
NRBC BLD AUTO-RTO: 0 /100 WBC (ref 0–0.2)
PHOSPHATE SERPL-MCNC: 2.8 MG/DL (ref 2.5–4.5)
PLATELET # BLD AUTO: 233 10*3/MM3 (ref 140–450)
PMV BLD AUTO: 10.1 FL (ref 6–12)
POTASSIUM SERPL-SCNC: 3.2 MMOL/L (ref 3.5–5.2)
RBC # BLD AUTO: 3.76 10*6/MM3 (ref 4.14–5.8)
SODIUM SERPL-SCNC: 141 MMOL/L (ref 136–145)
WBC NRBC COR # BLD: 5.97 10*3/MM3 (ref 3.4–10.8)

## 2022-08-09 PROCEDURE — 94664 DEMO&/EVAL PT USE INHALER: CPT

## 2022-08-09 PROCEDURE — 94660 CPAP INITIATION&MGMT: CPT

## 2022-08-09 PROCEDURE — 80048 BASIC METABOLIC PNL TOTAL CA: CPT | Performed by: INTERNAL MEDICINE

## 2022-08-09 PROCEDURE — 25010000002 CEFTRIAXONE PER 250 MG: Performed by: INTERNAL MEDICINE

## 2022-08-09 PROCEDURE — 84100 ASSAY OF PHOSPHORUS: CPT | Performed by: INTERNAL MEDICINE

## 2022-08-09 PROCEDURE — 63710000001 PREDNISONE PER 1 MG: Performed by: INTERNAL MEDICINE

## 2022-08-09 PROCEDURE — 99233 SBSQ HOSP IP/OBS HIGH 50: CPT | Performed by: INTERNAL MEDICINE

## 2022-08-09 PROCEDURE — 94761 N-INVAS EAR/PLS OXIMETRY MLT: CPT

## 2022-08-09 PROCEDURE — 94799 UNLISTED PULMONARY SVC/PX: CPT

## 2022-08-09 PROCEDURE — 97165 OT EVAL LOW COMPLEX 30 MIN: CPT

## 2022-08-09 PROCEDURE — 97161 PT EVAL LOW COMPLEX 20 MIN: CPT

## 2022-08-09 PROCEDURE — 82962 GLUCOSE BLOOD TEST: CPT

## 2022-08-09 PROCEDURE — 85025 COMPLETE CBC W/AUTO DIFF WBC: CPT | Performed by: INTERNAL MEDICINE

## 2022-08-09 PROCEDURE — 83735 ASSAY OF MAGNESIUM: CPT | Performed by: INTERNAL MEDICINE

## 2022-08-09 PROCEDURE — 25010000002 MAGNESIUM SULFATE IN D5W 1G/100ML (PREMIX) 1-5 GM/100ML-% SOLUTION: Performed by: INTERNAL MEDICINE

## 2022-08-09 PROCEDURE — 25010000002 ENOXAPARIN PER 10 MG: Performed by: INTERNAL MEDICINE

## 2022-08-09 RX ORDER — AZITHROMYCIN 250 MG/1
500 TABLET, FILM COATED ORAL
Status: COMPLETED | OUTPATIENT
Start: 2022-08-09 | End: 2022-08-12

## 2022-08-09 RX ORDER — POTASSIUM CHLORIDE 1.5 G/1.77G
40 POWDER, FOR SOLUTION ORAL ONCE
Status: COMPLETED | OUTPATIENT
Start: 2022-08-09 | End: 2022-08-09

## 2022-08-09 RX ORDER — POTASSIUM CHLORIDE 750 MG/1
40 CAPSULE, EXTENDED RELEASE ORAL DAILY
Status: DISCONTINUED | OUTPATIENT
Start: 2022-08-09 | End: 2022-08-09

## 2022-08-09 RX ORDER — MAGNESIUM SULFATE 1 G/100ML
1 INJECTION INTRAVENOUS ONCE
Status: COMPLETED | OUTPATIENT
Start: 2022-08-09 | End: 2022-08-09

## 2022-08-09 RX ADMIN — PREDNISONE 40 MG: 20 TABLET ORAL at 08:22

## 2022-08-09 RX ADMIN — BUDESONIDE 0.5 MG: 0.5 SUSPENSION RESPIRATORY (INHALATION) at 19:01

## 2022-08-09 RX ADMIN — ROFLUMILAST 500 MCG: 500 TABLET ORAL at 08:22

## 2022-08-09 RX ADMIN — BUDESONIDE 0.5 MG: 0.5 SUSPENSION RESPIRATORY (INHALATION) at 06:40

## 2022-08-09 RX ADMIN — IPRATROPIUM BROMIDE AND ALBUTEROL SULFATE 3 ML: 2.5; .5 SOLUTION RESPIRATORY (INHALATION) at 19:01

## 2022-08-09 RX ADMIN — MAGNESIUM SULFATE HEPTAHYDRATE 1 G: 10 INJECTION, SOLUTION INTRAVENOUS at 09:51

## 2022-08-09 RX ADMIN — PANTOPRAZOLE SODIUM 40 MG: 40 TABLET, DELAYED RELEASE ORAL at 06:06

## 2022-08-09 RX ADMIN — IPRATROPIUM BROMIDE AND ALBUTEROL SULFATE 3 ML: 2.5; .5 SOLUTION RESPIRATORY (INHALATION) at 12:50

## 2022-08-09 RX ADMIN — ENOXAPARIN SODIUM 40 MG: 100 INJECTION SUBCUTANEOUS at 08:22

## 2022-08-09 RX ADMIN — POTASSIUM CHLORIDE 40 MEQ: 1.5 POWDER, FOR SOLUTION ORAL at 09:51

## 2022-08-09 RX ADMIN — AZITHROMYCIN MONOHYDRATE 500 MG: 250 TABLET ORAL at 11:33

## 2022-08-09 RX ADMIN — GUAIFENESIN 600 MG: 600 TABLET ORAL at 20:37

## 2022-08-09 RX ADMIN — IPRATROPIUM BROMIDE AND ALBUTEROL SULFATE 3 ML: 2.5; .5 SOLUTION RESPIRATORY (INHALATION) at 00:20

## 2022-08-09 RX ADMIN — GUAIFENESIN 600 MG: 600 TABLET ORAL at 08:22

## 2022-08-09 RX ADMIN — IPRATROPIUM BROMIDE AND ALBUTEROL SULFATE 3 ML: 2.5; .5 SOLUTION RESPIRATORY (INHALATION) at 06:40

## 2022-08-09 RX ADMIN — Medication 10 ML: at 20:37

## 2022-08-09 RX ADMIN — ARFORMOTEROL TARTRATE 15 MCG: 15 SOLUTION RESPIRATORY (INHALATION) at 06:39

## 2022-08-09 RX ADMIN — METOPROLOL SUCCINATE 50 MG: 50 TABLET, EXTENDED RELEASE ORAL at 08:22

## 2022-08-09 RX ADMIN — Medication 10 ML: at 08:22

## 2022-08-09 RX ADMIN — ARFORMOTEROL TARTRATE 15 MCG: 15 SOLUTION RESPIRATORY (INHALATION) at 19:01

## 2022-08-09 RX ADMIN — ATORVASTATIN CALCIUM 10 MG: 10 TABLET, FILM COATED ORAL at 20:37

## 2022-08-09 RX ADMIN — CEFTRIAXONE SODIUM 1 G: 1 INJECTION, SOLUTION INTRAVENOUS at 11:33

## 2022-08-09 RX ADMIN — ASPIRIN 81 MG CHEWABLE TABLET 81 MG: 81 TABLET CHEWABLE at 08:22

## 2022-08-09 NOTE — CONSULTS
"RT CM consulted to arrange NIPPV/Astral for cachectic (BMI 16.46)  67 y.o. male former cigarette smoker with COPD, rectal cancer status post chemoradiation. Mr Dai was admitted from ER after having worsening SOB,confusion and memory loss over the past 2-3 days. Relative brought him to ER noticing he was \"in and out\" during a conversation.  ABG revealed acute on chronic hypercapnic respiratory failure, 7.31/84.3/65.5/41.5 .  CXR shows chronic changes with possible lung infiltrate. Mr Dai states he has seen Dr WILLI Myers in the past for his COPD and has had several PFT's (not available on [place]) at Dr Schwartz's and the [facility].  He has never had a PSG.  Reported home respiratory meds are oxygen, albuterol neb, Advair diskus, Combivent and Daliresp.  He states he lives alone and tends to his own ADL's but his son checks on him about 3x/week.  He states he does not leave the home unless he has to.  He drives to his appointments, but his sister always accompanies him. He uses a wheelchair for mobility of longer distances, CAT 29, mMRC score 4. Mr Dai will need NIV with targeted tidal volume at home. RT CM COPD disease process,  how it relates to hypercapnia and treatment of respiratory failure with NIV.  Mr Dai has [place] and Medicare benefits and agrees to use his Medicare benefits for the NIV so he can use a local company and NIV can be coordinated outside of [facility] without a sleepstudy.  RT CM will send referral to [facility] and request financial assistance application be completed as well. RT CM will follow up with pt to check pt's ability to perform advair disckus correctly.  Pt may not have proper inspiratory flow to perform adequately.  Pt may also benefit from LAMA, such as spiriva respimat.  "

## 2022-08-09 NOTE — PLAN OF CARE
Goal Outcome Evaluation:  Plan of Care Reviewed With: patient        Progress: no change  Outcome Evaluation: Patient presents with no physical limitations that impede his ability to return home independently or with assist from his son as needed.  Patient encouraged to continue ambulating in the room multiple times a day to improve endurance.  Patient will be discharged from physical therapy caseload

## 2022-08-09 NOTE — PLAN OF CARE
Goal Outcome Evaluation:  Plan of Care Reviewed With: patient        Progress: no change (First session for evaluation)  Outcome Evaluation: Patient presents with limitations of balance and endurance/activity tolerance which impede his ability to perform ADL and transfers as prior.  The skills of a therapist will be required to safely and effectively implement treatment plan to restore maximal level of function.

## 2022-08-09 NOTE — PROGRESS NOTES
Pulmonary / Critical Care Progress Note      Patient Name: Pavel Dai  : 1955  MRN: [id number]  Attending:  Robbie Mai MD  Date of admission: 2022    Subjective   Subjective   Follow-up for COPD exacerbation and respiratory failure    Over past 24 hours:  Does feel better today  Mental status back to baseline.  EEG with some mild slowing and brain MRI was grossly unremarkable  Wore NIPPV overnight.  Has previously been offered at home and declined.  States he is considering trying it now  Still short of breath but improving  Wheezing with little movement  Has a new thick cough productive of copious amounts of thick yellow sputum  Chest he was done x-ray showing new groundglass infiltrate with possible associated fibrosis in the left lung with severe emphysematous changes  Remains on empiric antibiotics  No chest pain or hemoptysis  Very weak and fatigued  No nausea, fevers or chills      Review of Systems  General: Fatigue and weakness, otherwise denied complaints  Cardiovascular:  Denied complaints  Respiratory: Dyspnea, cough, wheeze, otherwise denied complaints  Gastrointestinal: Denied complaints        Objective   Objective     Vitals:   Temp:  [97.7 °F (36.5 °C)-98.8 °F (37.1 °C)] 97.9 °F (36.6 °C)  Heart Rate:  [] 75  Resp:  [18-24] 20  BP: (112-149)/(65-86) 123/65  Flow (L/min):  [2.5-3] 3    Physical Exam   Vital Signs Reviewed   General: Thin chronically ill-appearing male, Alert, NAD.    HEENT:  PERRL, EOMI.  OP, nares clear, no sinus tenderness  Neck:  Supple, no JVD, no thyromegaly  Chest:   Scaphoid chest, barrel chested, poor aeration, coarse wheezing and rhonchi bilaterally somewhat better, tympanic to percussion bilaterally, pursed lip breathing noted  CV: RRR, no MGR, pulses 2+, equal.  Abd:  Soft, NT, ND, + BS, no HSM  EXT:  no clubbing, no cyanosis, no edema, no joint tenderness, muscle wasting noted all 4 extremities  Neuro:  A&Ox3, CN grossly intact, no focal  deficits.  Skin: No rashes or lesions noted       Result Review    Result Review:  I have personally reviewed the results from the time of this admission to 8/9/2022 08:53 EDT and agree with these findings:  [x]  Laboratory  [x]  Microbiology  [x]  Radiology  [x]  EKG/Telemetry   [x]  Cardiology/Vascular   []  Pathology  []  Old records  []  Other:  Most notable findings include:  Culture so far negative  EEG with mild generalized slowing  Brain MRI with no acute pathology  Chest CT with severe emphysema and groundglass infiltrate of left lung with possible pulmonary fibrosis that is roughly 3 cm  Echo with right atrial and ventricular dilation        Lab 08/09/22  0454 08/08/22  0707 08/07/22  2331   WBC 5.97  --  7.92   HEMOGLOBIN 11.3*  --  12.4*   HEMATOCRIT 36.9*  --  42.3   PLATELETS 233  --  230   SODIUM 141 143 145   POTASSIUM 3.2* 4.1 4.1   CHLORIDE 94* 98 98   CO2 40.1* 41.9* 42.5*   BUN 28* 28* 29*   CREATININE 0.56* 0.52* 0.53*   GLUCOSE 98 157* 147*   CALCIUM 9.6 9.8 9.7   PHOSPHORUS 2.8  --   --    TOTAL PROTEIN  --   --  7.6   ALBUMIN  --   --  4.20   GLOBULIN  --   --  3.4         Assessment & Plan   Assessment / Plan     Active Hospital Problems:  Active Hospital Problems    Diagnosis    • COPD exacerbation (HCC)    • Severe malnutrition (HCC)    • Acute on chronic respiratory failure with hypoxia and hypercapnia (HCC)        Impression:  Altered mental status  CO2 narcosis  Acute exacerbation of COPD  Acute on chronic hypoxemic and hypercapnic respiratory failure  Severe protein calorie malnutrition with muscle wasting  Community-acquired pneumonia from unspecified organism  3 cm groundglass infiltrate left lung consistent with infection.  Will need to follow-up as outpatient.  Tobacco use of cigarettes in remission  Hyperglycemia  Hypokalemia  Hypomagnesemia     Plan:  Continue NIPPV nightly with naps on current settings.  Wean O2 to keep SPO2 greater than 90%  Encourage patient to try daily use  NIPPV at home.  He is agreeable to least hearing about it.  Appreciate RT  assistance.  Patient will require noninvasive positive pressure ventilation for COPD and chronic respiratory failure.  Alternative modes of ventilation are insufficient due to persistent hypercapnia despite BiPAP use.  This patient requires frequent durations of ventilatory support with battery back-up and continuous enhanced along monitoring.  Intermittent support will be insufficient.  This patient quickly deteriorates without noninvasive ventilation.  Removal of ventilator may cause serious harm to the patient or frequent readmissions to the hospital.  Chest CT shows severe emphysema and groundglass infiltrate of the left lung that is slightly greater than 3 cm.  Given clinical presentation this is likely pneumonia.  Recommend repeating noncontrast chest CT in 3 months to confirm resolution.  If persistent, then will need PET/CT and potential biopsy to rule out slow-growing bronchoalveolar carcinoma  Day 2/7 of prednisone 40 mg daily  Complete course of empiric antibiotics.  Currently on Rocephin and Zithromax.  Cultures so far negative.  Will need 7 days of antibiotics and can change to oral Augmentin at discharge if cultures remain negative  Continue nebulizers and bronchopulmonary hygiene  Continue home Daliresp 500 mcg daily  Wean O2 to keep SPO2 greater than 90%  Supplements per dietitian  Trend renal function and electrolytes.  Replace magnesium IV and potassium orally  EEG, MRI consistent with encephalopathy from hypercapnia    DVT prophylaxis:  Medical DVT prophylaxis orders are present.    CODE STATUS:   Code Status (Patient has no pulse and is not breathing): CPR (Attempt to Resuscitate)  Medical Interventions (Patient has pulse or is breathing): Full Support    Patient's primary pulmonologist is Dr. TAMIA Myers.  Did discuss with him over the phone regarding patient's care.  He would like for us to follow the patient  while he is in-house and will see him as an outpatient.      Labs, imaging, microbiology, notes and medications personally reviewed  Discussed with primary    Electronically signed by Nish Schaefer MD, 08/09/22, 11:08 AM EDT.

## 2022-08-09 NOTE — THERAPY EVALUATION
Acute Care - Physical Therapy Initial Evaluation  DANYA Matos     Patient Name: Pavel Dai  : 1955  MRN: [id number]  Today's Date: 2022      Visit Dx:     ICD-10-CM ICD-9-CM   1. COPD exacerbation (HCC)  J44.1 491.21   2. Dysphagia, oropharyngeal  R13.12 787.22   3. Decreased activities of daily living (ADL)  Z78.9 V49.89   4. Difficulty walking  R26.2 719.7     Patient Active Problem List   Diagnosis   • Rectal bleeding   • Acute blood loss anemia   • COPD (chronic obstructive pulmonary disease) (HCC)   • CAD (coronary artery disease)   • Essential hypertension   • History of ischemic stroke   • Body mass index (BMI) 19.9 or less, adult   • Encounter for immunization   • Ex-smoker   • Hyperlipidemia   • Hypoxemia   • Hypertensive heart disease without congestive heart failure   • Oxygen dependent   • Postnasal drip   • Shortness of breath   • Rectal cancer (HCC)   • Iron deficiency anemia due to chronic blood loss   • Cancer related pain   • Gastroesophageal reflux disease   • COPD exacerbation (HCC)   • Severe malnutrition (HCC)   • Acute on chronic respiratory failure with hypoxia and hypercapnia (HCC)     Past Medical History:   Diagnosis Date   • Anemia due to chemotherapy 2021   • Cancer related pain 1/3/2022   • CHF (congestive heart failure) (HCC)    • COPD (chronic obstructive pulmonary disease) (HCC)    • Coronary artery disease    • Frequent urination    • Hyperlipidemia    • Hypertension    • Iron deficiency anemia due to chronic blood loss 2021   • Rectal cancer (HCC)      Past Surgical History:   Procedure Laterality Date   • COLONOSCOPY     • HERNIA REPAIR      approximately 4 years ago    • RECTAL SURGERY     • SHOULDER SURGERY      joint loose, calcium particles removed from shoulder joint     PT Assessment (last 12 hours)     PT Evaluation and Treatment     Row Name 22 1300          Physical Therapy Time and Intention    Subjective Information no complaints  -CS      Document Type evaluation  -CS     Mode of Treatment individual therapy;physical therapy  -CS     Patient Effort fair  -CS     Row Name 08/09/22 1300          General Information    Patient Profile Reviewed yes  -CS     Patient Observations alert;cooperative;agree to therapy  -CS     Prior Level of Function independent:;all household mobility;community mobility;gait;transfer;bed mobility;ADL's  his son grocery shops for him or assists in transportation  -CS     Equipment Currently Used at Home oxygen;shower chair  3L O2 at baseline  -CS     Existing Precautions/Restrictions no known precautions/restrictions  -CS     Barriers to Rehab none identified  -CS     Row Name 08/09/22 1300          Living Environment    Current Living Arrangements home  -CS     Home Accessibility stairs to enter home;stairs within home  -CS     People in Home alone  -CS     Primary Care Provided by self  -CS     Row Name 08/09/22 1300          Home Main Entrance    Number of Stairs, Main Entrance five  -CS     Stair Railings, Main Entrance railings safe and in good condition  -CS     Row Name 08/09/22 1300          Stairs Within Home, Primary    Number of Stairs, Within Home, Primary none  -CS     Row Name 08/09/22 1300          Cognition    Orientation Status (Cognition) oriented x 3  -CS     Row Name 08/09/22 1300          Range of Motion Comprehensive    General Range of Motion bilateral lower extremity ROM WFL  -CS     Row Name 08/09/22 1300          Strength Comprehensive (MMT)    General Manual Muscle Testing (MMT) Assessment no strength deficits identified  -CS     Row Name 08/09/22 1300          Bed Mobility    Bed Mobility bed mobility (all) activities  -CS     All Activities, [place] (Bed Mobility) independent  -CS     Row Name 08/09/22 1300          Transfers    Transfers sit-stand transfer  -CS     Sit-Stand [place] (Transfers) modified independence  -CS     Row Name 08/09/22 1300          Sit-Stand Transfer     Assistive Device (Sit-Stand Transfers) --  bed rail  -     Row Name 08/09/22 1300          Gait/Stairs (Locomotion)    Gait/Stairs Locomotion gait/ambulation assistive device  -     Beatrice Level (Gait) supervision  -     Assistive Device (Gait) --  none  -CS     Distance in Feet (Gait) 25  -CS     Comment, (Gait/Stairs) Patient provided supervision due to oxygen tubing.  Patient with no loss of balance or demonstration of safety impairments.  Patient needed no outside assist or assistive device.  -     Row Name 08/09/22 1300          Safety Issues, Functional Mobility    Impairments Affecting Function (Mobility) endurance/activity tolerance  Patient with shortness of breath with minimal activity  -     Row Name 08/09/22 1300          Balance    Balance Assessment standing dynamic balance  -     Dynamic Standing Balance independent  -[facility] Name 08/09/22 1300          Plan of Care Review    Plan of Care Reviewed With patient  -     Progress no change  -CS     Outcome Evaluation Patient presents with no physical limitations that impede his ability to return home independently or with assist from his son as needed.  Patient encouraged to continue ambulating in the room multiple times a day to improve endurance.  Patient will be discharged from physical therapy caseload  -     Row Name 08/09/22 1300          Therapy Assessment/Plan (PT)    Criteria for Skilled Interventions Met (PT) no problems identified which require skilled intervention  -     Therapy Frequency (PT) evaluation only  -[facility] Name 08/09/22 1300          PT Evaluation Complexity    History, PT Evaluation Complexity no personal factors and/or comorbidities  -CS     Examination of Body Systems (PT Eval Complexity) total of 4 or more elements  -CS     Clinical Presentation (PT Evaluation Complexity) stable  -CS     Clinical Decision Making (PT Evaluation Complexity) low complexity  -CS     Overall Complexity (PT Evaluation  Complexity) low complexity  -CS     Row Name 08/09/22 1300          Therapy Plan Review/Discharge Plan (PT)    Therapy Plan Review (PT) evaluation/treatment results reviewed;patient  -CS           User Key  (r) = Recorded By, (t) = Taken By, (c) = Cosigned By    Initials Name Provider Type    CS Trina Espitia PT Physical Therapist                Physical Therapy Education                 Title: PT OT SLP Therapies (In Progress)     Topic: Physical Therapy (In Progress)     Point: Mobility training (Done)     Learning Progress Summary           Patient Acceptance, E,TB, VU by CS at 8/9/2022 1404                   Point: Home exercise program (Not Started)     Learner Progress:  Not documented in this visit.          Point: Body mechanics (Not Started)     Learner Progress:  Not documented in this visit.          Point: Precautions (Done)     Learning Progress Summary           Patient Acceptance, E,TB, VU by CS at 8/9/2022 1404                               User Key     Initials Effective Dates Name Provider Type Discipline    CS 04/25/21 -  Trina Espitia PT Physical Therapist PT              PT Recommendation and Plan  Anticipated Discharge Disposition (PT): home, home with assist, home with outpatient therapy services (Pulmonary rehab)  Therapy Frequency (PT): evaluation only  Plan of Care Reviewed With: patient  Progress: no change  Outcome Evaluation: Patient presents with no physical limitations that impede his ability to return home independently or with assist from his son as needed.  Patient encouraged to continue ambulating in the room multiple times a day to improve endurance.  Patient will be discharged from physical therapy caseload   Outcome Measures     Row Name 08/09/22 1300             How much help from another person do you currently need...    Turning from your back to your side while in flat bed without using bedrails? 4  -CS      Moving from lying on back to sitting on the side of a flat  bed without bedrails? 4  -CS      Moving to and from a bed to a chair (including a wheelchair)? 4  -CS      Standing up from a chair using your arms (e.g., wheelchair, bedside chair)? 4  -CS      Climbing 3-5 steps with a railing? 4  -CS      To walk in hospital room? 4  -CS      AM-PAC 6 Clicks Score (PT) 24  -CS              Functional Assessment    Outcome Measure Options AM-PAC 6 Clicks Basic Mobility (PT)  -CS            User Key  (r) = Recorded By, (t) = Taken By, (c) = Cosigned By    Initials Name Provider Type    Trina Stockton PT Physical Therapist                 Time Calculation:    PT Charges     Row Name 08/09/22 1358             Time Calculation    PT Received On 08/09/22  -CS              Untimed Charges    PT Eval/Re-eval Minutes 46  -CS              Total Minutes    Untimed Charges Total Minutes 46  -CS       Total Minutes 46  -CS            User Key  (r) = Recorded By, (t) = Taken By, (c) = Cosigned By    Initials Name Provider Type    Trina Stockton PT Physical Therapist              Therapy Charges for Today     Code Description Service Date Service Provider Modifiers Qty    [id number]  PT EVAL LOW COMPLEXITY 4 8/9/2022 Trina Espitia, PT GP 1          PT G-Codes  Outcome Measure Options: AM-PAC 6 Clicks Basic Mobility (PT)  AM-PAC 6 Clicks Score (PT): 24  AM-PAC 6 Clicks Score (OT): 21    Trina Espitia PT  8/9/2022

## 2022-08-09 NOTE — THERAPY EVALUATION
Patient Name: Pavel Dai  : 1955    MRN: [id number]                              Today's Date: 2022       Admit Date: 2022    Visit Dx:     ICD-10-CM ICD-9-CM   1. COPD exacerbation (HCC)  J44.1 491.21   2. Dysphagia, oropharyngeal  R13.12 787.22   3. Decreased activities of daily living (ADL)  Z78.9 V49.89     Patient Active Problem List   Diagnosis   • Rectal bleeding   • Acute blood loss anemia   • COPD (chronic obstructive pulmonary disease) (HCC)   • CAD (coronary artery disease)   • Essential hypertension   • History of ischemic stroke   • Body mass index (BMI) 19.9 or less, adult   • Encounter for immunization   • Ex-smoker   • Hyperlipidemia   • Hypoxemia   • Hypertensive heart disease without congestive heart failure   • Oxygen dependent   • Postnasal drip   • Shortness of breath   • Rectal cancer (HCC)   • Iron deficiency anemia due to chronic blood loss   • Cancer related pain   • Gastroesophageal reflux disease   • COPD exacerbation (HCC)   • Severe malnutrition (HCC)   • Acute on chronic respiratory failure with hypoxia and hypercapnia (HCC)     Past Medical History:   Diagnosis Date   • Anemia due to chemotherapy 2021   • Cancer related pain 1/3/2022   • CHF (congestive heart failure) (HCC)    • COPD (chronic obstructive pulmonary disease) (HCC)    • Coronary artery disease    • Frequent urination    • Hyperlipidemia    • Hypertension    • Iron deficiency anemia due to chronic blood loss 2021   • Rectal cancer (HCC)      Past Surgical History:   Procedure Laterality Date   • COLONOSCOPY     • HERNIA REPAIR      approximately 4 years ago    • RECTAL SURGERY     • SHOULDER SURGERY      joint loose, calcium particles removed from shoulder joint      General Information     Row Name 22 1008          OT Time and Intention    Document Type evaluation  -AV     Mode of Treatment individual therapy;occupational therapy  -AV     Row Name 22 1008          General  Information    Patient Profile Reviewed yes  -AV     Prior Level of Function independent:;ADL's;cooking;all household mobility  Sits to shower (walk-in shower stall).  Stands at sink to groom.  Regular commode.  Ambulates without assistive device.  3 L continuous home oxygen.  -AV     Existing Precautions/Restrictions fall;oxygen therapy device and L/min  -AV     Barriers to Rehab none identified  -AV     Row Name 08/09/22 1008          Occupational Profile    Reason for Services/Referral (Occupational Profile) Patient is a 67year old male admitted to [facility], 2022 with shortness of air and confusion.  He is currently on 4 [address]/supplemental oxygen.   OT consulted due to recent decline in ADL/transfer independence.  No previous OT services for current condition.  -AV     Row Name 08/09/22 1008          Living Environment    People in Home alone  -AV     Row Name 08/09/22 1008          Home Main Entrance    Number of Stairs, Main Entrance five  -AV     Row Name 08/09/22 1008          Stairs Within Home, Primary    Number of Stairs, Within Home, Primary none  -AV     Row Name 08/09/22 1008          Cognition    Orientation Status (Cognition) --  Alert, pleasant and cooperative.  Follows commands.  Confused at times per staff.  -AV     Row Name 08/09/22 1008          Safety Issues, Functional Mobility    Impairments Affecting Function (Mobility) balance;endurance/activity tolerance  -AV           User Key  (r) = Recorded By, (t) = Taken By, (c) = Cosigned By    Initials Name Provider Type    AV Jesus Justin, OT Occupational Therapist                 Mobility/ADL's     Row Name 08/09/22 1010          Transfers    Comment, (Transfers) CGA/min assist  -AV     Row Name 08/09/22 1010          Activities of Daily Living    BADL Assessment/Intervention --  Patient is able to feed self and groom independently with set up while seated.  He requires min assist for bathing/dressing.  CGA toileting  (BSC/urinal).  Patient requires set up and rest breaks due to fatigue.  -AV           User Key  (r) = Recorded By, (t) = Taken By, (c) = Cosigned By    Initials Name Provider Type    Jesus Martin OT Occupational Therapist               Obj/Interventions     Row Name 08/09/22 1011          Sensory Assessment (Somatosensory)    Sensory Assessment (Somatosensory) UE sensation intact  -AV     [facility] Name 08/09/22 1011          Vision Assessment/Intervention    Visual Impairment/Limitations WFL  Glasses for TV and reading  -AV     [facility] Name 08/09/22 1011          Range of Motion Comprehensive    General Range of Motion bilateral upper extremity ROM WFL  AROM  -AV     [facility] Name 08/09/22 1011          Strength Comprehensive (MMT)    Comment, General Manual Muscle Testing (MMT) Assessment 5/5 bilateral upper extremities  -AV     [facility] Name 08/09/22 1011          Motor Skills    Motor Skills coordination;functional endurance  -AV     Coordination WFL  Right dominant  -AV     Functional Endurance Fair minus  -AV     Row Name 08/09/22 1011          Balance    Comment, Balance CGA/min assist  -AV           User Key  (r) = Recorded By, (t) = Taken By, (c) = Cosigned By    Initials Name Provider Type    Jesus Martin OT Occupational Therapist               Goals/Plan     [facility] Name 08/09/22 1012          Transfer Goal 1 (OT)    Activity/Assistive Device (Transfer Goal 1, OT) transfers, all;walker, rolling  -AV     [place] Level/Cues Needed (Transfer Goal 1, OT) modified independence  -AV     Time Frame (Transfer Goal 1, OT) long term goal (LTG);10 days  -AV     [facility] Name 08/09/22 1012          Bathing Goal 1 (OT)    Activity/Device (Bathing Goal 1, OT) bathing skills, all;shower chair  -AV     [place] Level/Cues Needed (Bathing Goal 1, OT) modified independence  -AV     Time Frame (Bathing Goal 1, OT) long term goal (LTG);10 days  -AV     [facility] Name 08/09/22 1012          Dressing Goal 1 (OT)    Activity/Device (Dressing  Goal 1, OT) dressing skills, all  -AV     [place]/Cues Needed (Dressing Goal 1, OT) modified independence  -AV     Time Frame (Dressing Goal 1, OT) long term goal (LTG);10 days  -AV     Row Name 08/09/22 1012          Toileting Goal 1 (OT)    Activity/Device (Toileting Goal 1, OT) toileting skills, all;raised toilet seat  -AV     [place] Level/Cues Needed (Toileting Goal 1, OT) modified independence  -AV     Time Frame (Toileting Goal 1, OT) long term goal (LTG);10 days  -AV     Row Name 08/09/22 1012          Grooming Goal 1 (OT)    Activity/Device (Grooming Goal 1, OT) grooming skills, all  -AV     [place] (Grooming Goal 1, OT) modified independence  Standing at sink  -AV     Time Frame (Grooming Goal 1, OT) long term goal (LTG);10 days  -AV     [facility] Name 08/09/22 1012          Problem Specific Goal 1 (OT)    Problem Specific Goal 1 (OT) Patient will demonstrate fair/fair plus endurance/activity tolerance needed to support ADLs.  -AV     Time Frame (Problem Specific Goal 1, OT) long term goal (LTG);10 days  -AV     [facility] Name 08/09/22 1012          Therapy Assessment/Plan (OT)    Planned Therapy Interventions (OT) activity tolerance training;BADL retraining;functional balance retraining;IADL retraining;occupation/activity based interventions;patient/caregiver education/training;transfer/mobility retraining  -AV           User Key  (r) = Recorded By, (t) = Taken By, (c) = Cosigned By    Initials Name Provider Type    AV Jesus Justin OT Occupational Therapist               Clinical Impression     Row Name 08/09/22 1012          Pain Assessment    Additional Documentation Pain Scale: FACES Pre/Post-Treatment (Group)  -AV     Row Name 08/09/22 1012          Pain Scale: FACES Pre/Post-Treatment    Pain: FACES Scale, Pretreatment 0-->no hurt  -AV     Posttreatment Pain Rating 0-->no hurt  -AV     Row Name 08/09/22 1012          Plan of Care Review    Plan of Care Reviewed With patient  -AV     Progress no  change  First session for evaluation  -AV     Outcome Evaluation Patient presents with limitations of balance and endurance/activity tolerance which impede his ability to perform ADL and transfers as prior.  The skills of a therapist will be required to safely and effectively implement treatment plan to restore maximal level of function.  -AV     Row Name 08/09/22 1012          Therapy Assessment/Plan (OT)    Patient/Family Therapy Goal Statement (OT) Maximize independence  -AV     Rehab Potential (OT) good, to achieve stated therapy goals  -AV     Criteria for Skilled Therapeutic Interventions Met (OT) yes;meets criteria;skilled treatment is necessary  -AV     Therapy Frequency (OT) 5 times/wk  -AV     Row Name 08/09/22 1012          Therapy Plan Review/Discharge Plan (OT)    Equipment Needs Upon Discharge (OT) walker, rolling;commode chair  Patient reports having shower seat  -AV     Anticipated Discharge Disposition (OT) inpatient rehabilitation facility;sub acute care setting  -AV     Row Name 08/09/22 1012          Vital Signs    O2 Delivery Pre Treatment nasal cannula  -AV     O2 Delivery Intra Treatment nasal cannula  -AV     O2 Delivery Post Treatment nasal cannula  -AV           User Key  (r) = Recorded By, (t) = Taken By, (c) = Cosigned By    Initials Name Provider Type    AV Jesus Justin, CYNTHIA Occupational Therapist               Outcome Measures     Row Name 08/09/22 1013          How much help from another is currently needed...    Putting on and taking off regular lower body clothing? 3  -AV     Bathing (including washing, rinsing, and drying) 3  -AV     Toileting (which includes using toilet bed pan or urinal) 3  -AV     Putting on and taking off regular upper body clothing 4  -AV     Taking care of personal grooming (such as brushing teeth) 4  -AV     Eating meals 4  -AV     AM-PAC 6 Clicks Score (OT) 21  -AV     Row Name 08/09/22 0753          How much help from another person do you currently  need...    Turning from your back to your side while in flat bed without using bedrails? 4  -ST     Moving from lying on back to sitting on the side of a flat bed without bedrails? 4  -ST     Moving to and from a bed to a chair (including a wheelchair)? 4  -ST     Standing up from a chair using your arms (e.g., wheelchair, bedside chair)? 4  -ST     Climbing 3-5 steps with a railing? 3  -ST     To walk in hospital room? 4  -ST     AM-PAC 6 Clicks Score (PT) 23  -ST     Row Name 08/09/22 1013          Functional Assessment    Outcome Measure Options AM-PAC 6 Clicks Daily Activity (OT);Optimal Instrument  -AV     Row Name 08/09/22 1013          Optimal Instrument    Optimal Instrument Optimal - 3  -AV     Bending/Stooping 1  -AV     Standing 2  -AV     Reaching 1  -AV     From the list, choose the 3 activities you would most like to be able to do without any difficulty Bending/stooping;Standing;Reaching  -AV     Total Score Optimal - 3 4  -AV           User Key  (r) = Recorded By, (t) = Taken By, (c) = Cosigned By    Initials Name Provider Type    Connie Fine, RN Registered Nurse    Jesus Martin OT Occupational Therapist                Occupational Therapy Education                 Title: PT OT SLP Therapies (Done)     Topic: Occupational Therapy (Done)     Point: ADL training (Done)     Description:   Instruct learner(s) on proper safety adaptation and remediation techniques during self care or transfers.   Instruct in proper use of assistive devices.              Learning Progress Summary           Patient Acceptance, E, VU by RONN at 8/9/2022 1014                   Point: Home exercise program (Done)     Description:   Instruct learner(s) on appropriate technique for monitoring, assisting and/or progressing therapeutic exercises/activities.              Learning Progress Summary           Patient Acceptance, E, VU by RONN at 8/9/2022 1014                   Point: Precautions (Done)     Description:    Instruct learner(s) on prescribed precautions during self-care and functional transfers.              Learning Progress Summary           Patient Acceptance, E, VU by AV at 8/9/2022 1014                   Point: Body mechanics (Done)     Description:   Instruct learner(s) on proper positioning and spine alignment during self-care, functional mobility activities and/or exercises.              Learning Progress Summary           Patient Acceptance, E, VU by AV at 8/9/2022 1014                               User Key     Initials Effective Dates Name Provider Type Discipline     06/16/21 -  Jesus Justin OT Occupational Therapist OT              OT Recommendation and Plan  Planned Therapy Interventions (OT): activity tolerance training, BADL retraining, functional balance retraining, IADL retraining, occupation/activity based interventions, patient/caregiver education/training, transfer/mobility retraining  Therapy Frequency (OT): 5 times/wk  Plan of Care Review  Plan of Care Reviewed With: patient  Progress: no change (First session for evaluation)  Outcome Evaluation: Patient presents with limitations of balance and endurance/activity tolerance which impede his ability to perform ADL and transfers as prior.  The skills of a therapist will be required to safely and effectively implement treatment plan to restore maximal level of function.     Time Calculation:    Time Calculation- OT     Row Name 08/09/22 1015             Time Calculation- OT    OT Received On 08/09/22  -AV      OT Goal Re-Cert Due Date 08/18/22  -AV              Untimed Charges    OT Eval/Re-eval Minutes 35  -AV              Total Minutes    Untimed Charges Total Minutes 35  -AV       Total Minutes 35  -AV            User Key  (r) = Recorded By, (t) = Taken By, (c) = Cosigned By    Initials Name Provider Type     Jesus Justin OT Occupational Therapist              Therapy Charges for Today     Code Description Service Date Service Provider  Modifiers Qty    [id number] HC OT EVAL LOW COMPLEXITY 3 8/9/2022 Jesus Justin, OT GO 1               Jesus Justin OT  8/9/2022

## 2022-08-09 NOTE — CONSULTS
Date of service: 8/9/2022    Reason for consultation: Elevated troponin    HPI: 67-year-old male presented with worsening dyspnea and confusion.  This has been ongoing for approximately 1 week.  He has no chest pain, palpitations, syncope or near syncope.  He has no recent pneumonia, bronchitis or COVID infection.  His ABGs were significantly abnormal:  PCO2: 84.3 and PO2 was 49.8..  Troponin T was elevated.  Therefore, I was consulted to see him for further cardiac evaluation.    Review of systems: No headaches, tinnitus, vertigo, nausea, vomiting, abdominal pain, fever, chills, TIA or CVA-like symptoms    Past medical and surgical history:    1.  CAD  2.  Chronic diastolic CHF  3.  Hypertension  4.  COPD  5.  Rectal cancer status post chemo and radiation therapies in 2022    Medication: See the patient's medication list    Social history: He quit smoking many years ago.  Used to smoke cigarettes, 1 to 2 PPD.  No alcohol or illicit drug use.    Family history: Noncontributory.    Physical examinations: He was in no pain or respiratory distress.  Vital signs: Reviewed  HEENT: Sclera nonicteric.  EOMI.  Neck: No JVD or carotid bruit.  Carotid upstroke was normal  Chest: Appeared like pigeon chest.  Decreased breath sounds all over.  No wheeze or rales.  Cardiac: RRR.  Distant S1-S2.  No murmurs or S3 gallop.  Abdomen: Soft and nontender.  No megalies or masses.  Extremities: No edema or cyanosis.  Pulses intact  Neuro: Alert oriented x3, no facial droop and speech was clear.    Records: Reviewed    Assessment and plan    1.  Acute non-STEMI, mostly due to severe hypoxemia on presentation.  Rule out obstructive CAD    2.  Acute hypoxemic, hypercapnic respiratory failure, improving    3.  CO2 narcosis and confusion due to #2    4.  Severe exacerbation of COPD    5.  Hypokalemia.  Given KCl.    6.  Community-acquired pneumonia    7.  Hypercholesterolemia    8.  Will proceed with dobutamine nuclear stress test when he  becomes more stable. He will be reassessed later on today.

## 2022-08-09 NOTE — PROGRESS NOTES
" [facility]   Hospitalist Progress Note  Date: 2022  Patient Name: Pavel Dai  : 1955  MRN: [id number]  Date of admission: 2022      Subjective   Subjective     Chief Complaint: Shortness of air, confusion    Summary: 67 y.o.  male former smoker with COPD, CHF, CAD, HLD, HTN, and rectal cancer with chemo and radiation in  presents with with SOB and confusion/memory loss.  His report of the duration of this is somewhat inconsistent from the past 2-3 days to over a week.  Reports that he had a headache for the last week although he denies having it now; denies recent head trauma.  Pt also reports confusion and memory loss since around 9:30-10 AM today, but he denies any confusion currently. Pt states his relative said he was \"in and out\" during a conversation they had earlier today.  On arrival to the ED, infectious work-up negative.  Found to have hypercapnia with CO2 84, admitted for further care, started on BiPAP.  Pulmonology consulted.  MRI obtained.    Interval Followup: No acute events overnight, patient remains with some shortness of breath, patient on 3 L nasal cannula.  Still with significant wheezing    Review of Systems  Denies nausea vomiting or diarrhea  No chest pain no palpitations, positive shortness of breath  No fever no chills    Objective   Objective     Vitals:   Temp:  [97.7 °F (36.5 °C)-98.3 °F (36.8 °C)] 98.2 °F (36.8 °C)  Heart Rate:  [] 91  Resp:  [18-22] 20  BP: (110-145)/(65-89) 110/69  Flow (L/min):  [2.5-3] 3  Physical Exam   General appearance: NAD, conversant   Eyes: anicteric sclerae, moist conjunctivae; no lid-lag; PERRLA  HENT: Atraumatic; oropharynx clear with moist mucous membranes and no mucosal ulcerations; normal hard and soft palate  Neck: Trachea midline; FROM, supple, no thyromegaly or lymphadenopathy  Lungs: Prolonged expiratory phase, inspiratory and expiratory wheezing bilaterally, with normal respiratory effort and no intercostal " retractions  CV: Regular Rate and Rhythm, no Murmurs, Rubs, or Gallops   Abdomen: Soft, non-tender; no masses or Heptosplenomegally  Extremities: No peripheral edema or extremity lymphadenopathy  Skin: Normal temperature, turgor and texture; no rash, ulcers or subcutaneous nodules  Psych: Appropriate affect, alert and oriented to person, place and time  Neuro: CN II - XII intact no motor deficits, no sensory defecits      Result Review    Result Review:  I have personally reviewed the results from the time of this admission to 8/9/2022 15:34 EDT and agree with these findings:  [x]  Laboratory all labs reviewed  []  Microbiology  []  Radiology  [x]  EKG/Telemetry   []  Cardiology/Vascular   []  Pathology  []  Old records  []  Other:  CBC    CBC 4/22/22 8/7/22 8/9/22   WBC 7.17 7.92 5.97   RBC 3.94 (A) 4.16 3.76 (A)   Hemoglobin 10.1 (A) 12.4 (A) 11.3 (A)   Hematocrit 36.0 (A) 42.3 36.9 (A)   MCV 91.4 101.7 (A) 98.1 (A)   MCH 25.6 (A) 29.8 30.1   MCHC 28.1 (A) 29.3 (A) 30.6 (A)   RDW 14.5 12.6 12.7   Platelets 331 230 233   (A) Abnormal value            CMP    CMP 8/7/22 8/8/22 8/9/22   Glucose 147 (A) 157 (A) 98   BUN 29 (A) 28 (A) 28 (A)   Creatinine 0.53 (A) 0.52 (A) 0.56 (A)   Sodium 145 143 141   Potassium 4.1 4.1 3.2 (A)   Chloride 98 98 94 (A)   Calcium 9.7 9.8 9.6   Albumin 4.20     Total Bilirubin 0.2     Alkaline Phosphatase 74     AST (SGOT) 17     ALT (SGPT) 17     (A) Abnormal value              Assessment & Plan   Assessment / Plan     Assessment/Plan:  Acute hypercapnic hypoxemic respiratory failure  COPD with acute exacerbation  Metabolic encephalopathy secondary to above  Concern for community-acquired pneumonia due to unknown bacterial source  Headache  Chronic hypoxemia on 2-3 nasal cannula  Confusion  Elevated troponin, ACS ruled out  History of chronic CHF  Severe protein calorie malnutrition  Cachexia  CAD  HLD  Hypertension  Rectal cancer, recently completed chemo and radiation    Continue to  monitor in the hospital for management of the above  Consult pulmonology, appreciate assistance.  If patient does not have a CPAP/BiPAP at home, he may benefit from one at discharge  Hypercapnia is resolved, patient still remains intermittently confused  Mental status improved  Continue with scheduled duo nebs, Pulmicort and Brovana twice daily, albuterol as needed, bronchopulmonary hygiene  Continue prednisone  CT chest without contrast  Continue Rocephin azithromycin due to concern for community-acquired pneumonia, de-escalate based on cultures  Obtain sputum culture, strep and Legionella antigens  Follow-up echocardiogram  Continue appropriate home medications  Trend renal function and electrolytes with a.m. BMP  Trend Hgb and WBC with a.m. CBC     Reviewed patients labs and imaging, and discussed with patient and nurse at bedside.    DVT prophylaxis:  Medical DVT prophylaxis orders are present.    CODE STATUS:   Code Status (Patient has no pulse and is not breathing): CPR (Attempt to Resuscitate)  Medical Interventions (Patient has pulse or is breathing): Full Support      Electronically signed by Robbie Mai MD, 08/09/22, 3:36 PM EDT.

## 2022-08-10 ENCOUNTER — APPOINTMENT (OUTPATIENT)
Dept: NUCLEAR MEDICINE | Facility: HOSPITAL | Age: 67
End: 2022-08-10

## 2022-08-10 LAB
ALBUMIN SERPL-MCNC: 3.9 G/DL (ref 3.5–5.2)
ALBUMIN/GLOB SERPL: 1.3 G/DL
ALP SERPL-CCNC: 57 U/L (ref 39–117)
ALT SERPL W P-5'-P-CCNC: 20 U/L (ref 1–41)
ANION GAP SERPL CALCULATED.3IONS-SCNC: 4.5 MMOL/L (ref 5–15)
AST SERPL-CCNC: 27 U/L (ref 1–40)
BACTERIA SPEC RESP CULT: NORMAL
BASOPHILS # BLD AUTO: 0.02 10*3/MM3 (ref 0–0.2)
BASOPHILS NFR BLD AUTO: 0.4 % (ref 0–1.5)
BH CV IMMEDIATE POST TECH DATA BLOOD PRESSURE: NORMAL MMHG
BH CV IMMEDIATE POST TECH DATA HEART RATE: 152 BPM
BH CV IMMEDIATE POST TECH DATA OXYGEN SATS: 90 %
BH CV NINE MINUTE RECOVERY TECH DATA BLOOD PRESSURE: NORMAL MMHG
BH CV NINE MINUTE RECOVERY TECH DATA HEART RATE: 117 BPM
BH CV NINE MINUTE RECOVERY TECH DATA OXYGEN SATURATION: 89 %
BH CV REST NUCLEAR ISOTOPE DOSE: 9.3 MCI
BH CV SIX MINUTE RECOVERY TECH DATA BLOOD PRESSURE: NORMAL
BH CV SIX MINUTE RECOVERY TECH DATA HEART RATE: 122 BPM
BH CV SIX MINUTE RECOVERY TECH DATA OXYGEN SATURATION: 89 %
BH CV STRESS BP STAGE 1: NORMAL
BH CV STRESS BP STAGE 2: NORMAL
BH CV STRESS BP STAGE 3: NORMAL
BH CV STRESS BP STAGE 4: NORMAL
BH CV STRESS DOSE DOBUTAMINE STAGE 1: 10
BH CV STRESS DOSE DOBUTAMINE STAGE 2: 20
BH CV STRESS DOSE DOBUTAMINE STAGE 3: 30
BH CV STRESS DOSE DOBUTAMINE STAGE 4: 40
BH CV STRESS DURATION MIN STAGE 1: 2
BH CV STRESS DURATION MIN STAGE 2: 2
BH CV STRESS DURATION MIN STAGE 3: 2
BH CV STRESS DURATION MIN STAGE 4: 2
BH CV STRESS DURATION SEC STAGE 1: 0
BH CV STRESS DURATION SEC STAGE 2: 0
BH CV STRESS DURATION SEC STAGE 3: 0
BH CV STRESS DURATION SEC STAGE 4: 0
BH CV STRESS HR STAGE 1: 99
BH CV STRESS HR STAGE 2: 111
BH CV STRESS HR STAGE 3: 124
BH CV STRESS HR STAGE 4: 146
BH CV STRESS NUCLEAR ISOTOPE DOSE: 33.1 MCI
BH CV STRESS O2 STAGE 1: 90
BH CV STRESS O2 STAGE 2: 91
BH CV STRESS O2 STAGE 3: 91
BH CV STRESS O2 STAGE 4: 91
BH CV STRESS PROTOCOL 1: NORMAL
BH CV STRESS RECOVERY BP: NORMAL MMHG
BH CV STRESS RECOVERY HR: 117 BPM
BH CV STRESS RECOVERY O2: 89 %
BH CV STRESS STAGE 1: 1
BH CV STRESS STAGE 2: 2
BH CV STRESS STAGE 3: 3
BH CV STRESS STAGE 4: 4
BH CV THREE MINUTE POST TECH DATA BLOOD PRESSURE: NORMAL MMHG
BH CV THREE MINUTE POST TECH DATA HEART RATE: 145 BPM
BH CV THREE MINUTE POST TECH DATA OXYGEN SATURATION: 89 %
BILIRUB SERPL-MCNC: 0.3 MG/DL (ref 0–1.2)
BUN SERPL-MCNC: 24 MG/DL (ref 8–23)
BUN/CREAT SERPL: 38.1 (ref 7–25)
CALCIUM SPEC-SCNC: 9.2 MG/DL (ref 8.6–10.5)
CHLORIDE SERPL-SCNC: 98 MMOL/L (ref 98–107)
CO2 SERPL-SCNC: 39.5 MMOL/L (ref 22–29)
CREAT SERPL-MCNC: 0.63 MG/DL (ref 0.76–1.27)
DEPRECATED RDW RBC AUTO: 45 FL (ref 37–54)
EGFRCR SERPLBLD CKD-EPI 2021: 105.6 ML/MIN/1.73
EOSINOPHIL # BLD AUTO: 0.14 10*3/MM3 (ref 0–0.4)
EOSINOPHIL NFR BLD AUTO: 3 % (ref 0.3–6.2)
ERYTHROCYTE [DISTWIDTH] IN BLOOD BY AUTOMATED COUNT: 12.5 % (ref 12.3–15.4)
GLOBULIN UR ELPH-MCNC: 2.9 GM/DL
GLUCOSE BLDC GLUCOMTR-MCNC: 101 MG/DL (ref 70–99)
GLUCOSE BLDC GLUCOMTR-MCNC: 120 MG/DL (ref 70–99)
GLUCOSE BLDC GLUCOMTR-MCNC: 148 MG/DL (ref 70–99)
GLUCOSE BLDC GLUCOMTR-MCNC: 98 MG/DL (ref 70–99)
GLUCOSE SERPL-MCNC: 103 MG/DL (ref 65–99)
GRAM STN SPEC: NORMAL
HCT VFR BLD AUTO: 39.3 % (ref 37.5–51)
HGB BLD-MCNC: 12.3 G/DL (ref 13–17.7)
IMM GRANULOCYTES # BLD AUTO: 0.03 10*3/MM3 (ref 0–0.05)
IMM GRANULOCYTES NFR BLD AUTO: 0.6 % (ref 0–0.5)
LV EF NUC BP: 56 %
LYMPHOCYTES # BLD AUTO: 0.66 10*3/MM3 (ref 0.7–3.1)
LYMPHOCYTES NFR BLD AUTO: 14 % (ref 19.6–45.3)
MAGNESIUM SERPL-MCNC: 1.9 MG/DL (ref 1.6–2.4)
MAXIMAL PREDICTED HEART RATE: 155 BPM
MCH RBC QN AUTO: 30.6 PG (ref 26.6–33)
MCHC RBC AUTO-ENTMCNC: 31.3 G/DL (ref 31.5–35.7)
MCV RBC AUTO: 97.8 FL (ref 79–97)
MONOCYTES # BLD AUTO: 0.57 10*3/MM3 (ref 0.1–0.9)
MONOCYTES NFR BLD AUTO: 12.1 % (ref 5–12)
NEUTROPHILS NFR BLD AUTO: 3.3 10*3/MM3 (ref 1.7–7)
NEUTROPHILS NFR BLD AUTO: 69.9 % (ref 42.7–76)
NRBC BLD AUTO-RTO: 0 /100 WBC (ref 0–0.2)
PERCENT MAX PREDICTED HR: 98.06 %
PHOSPHATE SERPL-MCNC: 3.7 MG/DL (ref 2.5–4.5)
PLATELET # BLD AUTO: 230 10*3/MM3 (ref 140–450)
PMV BLD AUTO: 10.1 FL (ref 6–12)
POTASSIUM SERPL-SCNC: 3.5 MMOL/L (ref 3.5–5.2)
PROT SERPL-MCNC: 6.8 G/DL (ref 6–8.5)
RBC # BLD AUTO: 4.02 10*6/MM3 (ref 4.14–5.8)
SODIUM SERPL-SCNC: 142 MMOL/L (ref 136–145)
STRESS BASELINE BP: NORMAL MMHG
STRESS BASELINE HR: 98 BPM
STRESS O2 SAT REST: 89 %
STRESS PERCENT HR: 115 %
STRESS POST O2 SAT PEAK: 91 %
STRESS POST PEAK BP: NORMAL MMHG
STRESS POST PEAK HR: 152 BPM
STRESS TARGET HR: 132 BPM
WBC NRBC COR # BLD: 4.72 10*3/MM3 (ref 3.4–10.8)

## 2022-08-10 PROCEDURE — 82962 GLUCOSE BLOOD TEST: CPT

## 2022-08-10 PROCEDURE — 78452 HT MUSCLE IMAGE SPECT MULT: CPT

## 2022-08-10 PROCEDURE — 83735 ASSAY OF MAGNESIUM: CPT | Performed by: INTERNAL MEDICINE

## 2022-08-10 PROCEDURE — 94799 UNLISTED PULMONARY SVC/PX: CPT

## 2022-08-10 PROCEDURE — 99233 SBSQ HOSP IP/OBS HIGH 50: CPT | Performed by: INTERNAL MEDICINE

## 2022-08-10 PROCEDURE — 25010000002 CEFTRIAXONE PER 250 MG: Performed by: INTERNAL MEDICINE

## 2022-08-10 PROCEDURE — 25010000002 DOBUTAMINE PER 250 MG: Performed by: SPECIALIST

## 2022-08-10 PROCEDURE — A9502 TC99M TETROFOSMIN: HCPCS | Performed by: INTERNAL MEDICINE

## 2022-08-10 PROCEDURE — 94761 N-INVAS EAR/PLS OXIMETRY MLT: CPT

## 2022-08-10 PROCEDURE — 84100 ASSAY OF PHOSPHORUS: CPT | Performed by: INTERNAL MEDICINE

## 2022-08-10 PROCEDURE — 0 TECHNETIUM TETROFOSMIN KIT: Performed by: INTERNAL MEDICINE

## 2022-08-10 PROCEDURE — 99232 SBSQ HOSP IP/OBS MODERATE 35: CPT | Performed by: INTERNAL MEDICINE

## 2022-08-10 PROCEDURE — 25010000002 ENOXAPARIN PER 10 MG: Performed by: INTERNAL MEDICINE

## 2022-08-10 PROCEDURE — 93017 CV STRESS TEST TRACING ONLY: CPT

## 2022-08-10 PROCEDURE — 80053 COMPREHEN METABOLIC PANEL: CPT | Performed by: INTERNAL MEDICINE

## 2022-08-10 PROCEDURE — 85025 COMPLETE CBC W/AUTO DIFF WBC: CPT | Performed by: INTERNAL MEDICINE

## 2022-08-10 PROCEDURE — 63710000001 PREDNISONE PER 1 MG: Performed by: INTERNAL MEDICINE

## 2022-08-10 PROCEDURE — 94660 CPAP INITIATION&MGMT: CPT

## 2022-08-10 RX ORDER — DOBUTAMINE HYDROCHLORIDE 200 MG/100ML
5-50 INJECTION INTRAVENOUS
Status: DISCONTINUED | OUTPATIENT
Start: 2022-08-10 | End: 2022-08-12

## 2022-08-10 RX ADMIN — TETROFOSMIN 1 DOSE: 1.38 INJECTION, POWDER, LYOPHILIZED, FOR SOLUTION INTRAVENOUS at 09:20

## 2022-08-10 RX ADMIN — BUDESONIDE 0.5 MG: 0.5 SUSPENSION RESPIRATORY (INHALATION) at 06:40

## 2022-08-10 RX ADMIN — TETROFOSMIN 1 DOSE: 1.38 INJECTION, POWDER, LYOPHILIZED, FOR SOLUTION INTRAVENOUS at 10:50

## 2022-08-10 RX ADMIN — Medication 10 ML: at 14:02

## 2022-08-10 RX ADMIN — ASPIRIN 81 MG CHEWABLE TABLET 81 MG: 81 TABLET CHEWABLE at 13:05

## 2022-08-10 RX ADMIN — IPRATROPIUM BROMIDE AND ALBUTEROL SULFATE 3 ML: 2.5; .5 SOLUTION RESPIRATORY (INHALATION) at 18:59

## 2022-08-10 RX ADMIN — AZITHROMYCIN MONOHYDRATE 500 MG: 250 TABLET ORAL at 13:05

## 2022-08-10 RX ADMIN — ARFORMOTEROL TARTRATE 15 MCG: 15 SOLUTION RESPIRATORY (INHALATION) at 06:40

## 2022-08-10 RX ADMIN — GUAIFENESIN 600 MG: 600 TABLET ORAL at 13:04

## 2022-08-10 RX ADMIN — IPRATROPIUM BROMIDE AND ALBUTEROL SULFATE 3 ML: 2.5; .5 SOLUTION RESPIRATORY (INHALATION) at 23:56

## 2022-08-10 RX ADMIN — ARFORMOTEROL TARTRATE 15 MCG: 15 SOLUTION RESPIRATORY (INHALATION) at 19:00

## 2022-08-10 RX ADMIN — GUAIFENESIN 600 MG: 600 TABLET ORAL at 21:32

## 2022-08-10 RX ADMIN — IPRATROPIUM BROMIDE AND ALBUTEROL SULFATE 3 ML: 2.5; .5 SOLUTION RESPIRATORY (INHALATION) at 06:40

## 2022-08-10 RX ADMIN — PREDNISONE 40 MG: 20 TABLET ORAL at 13:20

## 2022-08-10 RX ADMIN — ATORVASTATIN CALCIUM 10 MG: 10 TABLET, FILM COATED ORAL at 21:31

## 2022-08-10 RX ADMIN — ENOXAPARIN SODIUM 40 MG: 100 INJECTION SUBCUTANEOUS at 13:05

## 2022-08-10 RX ADMIN — ACETAMINOPHEN 325MG 650 MG: 325 TABLET ORAL at 00:20

## 2022-08-10 RX ADMIN — IPRATROPIUM BROMIDE AND ALBUTEROL SULFATE 3 ML: 2.5; .5 SOLUTION RESPIRATORY (INHALATION) at 00:32

## 2022-08-10 RX ADMIN — BUDESONIDE 0.5 MG: 0.5 SUSPENSION RESPIRATORY (INHALATION) at 19:00

## 2022-08-10 RX ADMIN — PANTOPRAZOLE SODIUM 40 MG: 40 TABLET, DELAYED RELEASE ORAL at 06:07

## 2022-08-10 RX ADMIN — ROFLUMILAST 500 MCG: 500 TABLET ORAL at 13:05

## 2022-08-10 RX ADMIN — CEFTRIAXONE SODIUM 1 G: 1 INJECTION, SOLUTION INTRAVENOUS at 13:06

## 2022-08-10 RX ADMIN — DOBUTAMINE HYDROCHLORIDE 10 MCG/KG/MIN: 200 INJECTION INTRAVENOUS at 10:45

## 2022-08-10 RX ADMIN — Medication 10 ML: at 21:32

## 2022-08-10 NOTE — PROGRESS NOTES
" [facility]   Hospitalist Progress Note  Date: 8/10/2022  Patient Name: Pavel Dai  : 1955  MRN: [id number]  Date of admission: 2022      Subjective   Subjective     Chief Complaint: Shortness of air, confusion    Summary: 67 y.o.  male former smoker with COPD, CHF, CAD, HLD, HTN, and rectal cancer with chemo and radiation in  presents with with SOB and confusion/memory loss.  His report of the duration of this is somewhat inconsistent from the past 2-3 days to over a week.  Reports that he had a headache for the last week although he denies having it now; denies recent head trauma.  Pt also reports confusion and memory loss since around 9:30-10 AM today, but he denies any confusion currently. Pt states his relative said he was \"in and out\" during a conversation they had earlier today.  On arrival to the ED, infectious work-up negative.  Found to have hypercapnia with CO2 84, admitted for further care, started on BiPAP.  Pulmonology consulted.  MRI obtained.    Interval Followup: No acute events overnight, breathing improving, discussed with pulmonology, likely if improves can discharge in the next 24 to 48 hours    Review of Systems  Denies nausea vomiting or diarrhea  No chest pain no palpitations, positive shortness of breath  No fever no chills    Objective   Objective     Vitals:   Temp:  [97.5 °F (36.4 °C)-98.6 °F (37 °C)] 97.9 °F (36.6 °C)  Heart Rate:  [78-97] 90  Resp:  [16-22] 18  BP: (108-128)/(64-80) 116/64  Flow (L/min):  [3] 3  Physical Exam   General appearance: NAD, conversant   Eyes: anicteric sclerae, moist conjunctivae; no lid-lag; PERRLA  HENT: Atraumatic; oropharynx clear with moist mucous membranes and no mucosal ulcerations; normal hard and soft palate  Neck: Trachea midline; FROM, supple, no thyromegaly or lymphadenopathy  Lungs: Prolonged expiratory phase, inspiratory and expiratory wheezing bilaterally, with normal respiratory effort and no intercostal " retractions  CV: Regular Rate and Rhythm, no Murmurs, Rubs, or Gallops   Abdomen: Soft, non-tender; no masses or Heptosplenomegally  Extremities: No peripheral edema or extremity lymphadenopathy  Skin: Normal temperature, turgor and texture; no rash, ulcers or subcutaneous nodules  Psych: Appropriate affect, alert and oriented to person, place and time  Neuro: CN II - XII intact no motor deficits, no sensory defecits      Result Review    Result Review:  I have personally reviewed the results from the time of this admission to 8/10/2022 16:41 EDT and agree with these findings:  [x]  Laboratory all labs reviewed  []  Microbiology  []  Radiology  [x]  EKG/Telemetry   []  Cardiology/Vascular   []  Pathology  []  Old records  []  Other:  CBC    CBC 8/7/22 8/9/22 8/10/22   WBC 7.92 5.97 4.72   RBC 4.16 3.76 (A) 4.02 (A)   Hemoglobin 12.4 (A) 11.3 (A) 12.3 (A)   Hematocrit 42.3 36.9 (A) 39.3   .7 (A) 98.1 (A) 97.8 (A)   MCH 29.8 30.1 30.6   MCHC 29.3 (A) 30.6 (A) 31.3 (A)   RDW 12.6 12.7 12.5   Platelets 230 233 230   (A) Abnormal value            CMP    CMP 8/8/22 8/9/22 8/10/22   Glucose 157 (A) 98 103 (A)   BUN 28 (A) 28 (A) 24 (A)   Creatinine 0.52 (A) 0.56 (A) 0.63 (A)   Sodium 143 141 142   Potassium 4.1 3.2 (A) 3.5   Chloride 98 94 (A) 98   Calcium 9.8 9.6 9.2   Albumin   3.90   Total Bilirubin   0.3   Alkaline Phosphatase   57   AST (SGOT)   27   ALT (SGPT)   20   (A) Abnormal value              Assessment & Plan   Assessment / Plan     Assessment/Plan:  Acute hypercapnic hypoxemic respiratory failure  COPD with acute exacerbation  Metabolic encephalopathy secondary to above  Concern for community-acquired pneumonia due to unknown bacterial source  Headache  Chronic hypoxemia on 2-3 nasal cannula  Confusion  Elevated troponin, ACS ruled out  History of chronic CHF  Severe protein calorie malnutrition  Cachexia  CAD  HLD  Hypertension  Rectal cancer, recently completed chemo and radiation    Continue to  monitor in the hospital for management of the above  Consult pulmonology, appreciate assistance.  If patient does not have a CPAP/BiPAP at home, he may benefit from one at discharge  Hypercapnia is resolved, patient still remains intermittently confused  Mental status at baseline  Continue with scheduled duo nebs, Pulmicort and Brovana twice daily, albuterol as needed, bronchopulmonary hygiene  Continue prednisone  CT chest without contrast  Continue Rocephin azithromycin due to concern for community-acquired pneumonia, de-escalate based on cultures  Follow-up sputum culture  Echocardiogram  Continue appropriate home medications  Trend renal function and electrolytes with a.m. BMP  Trend Hgb and WBC with a.m. CBC     Reviewed patients labs and imaging, and discussed with patient and nurse at bedside.    DVT prophylaxis:  Medical DVT prophylaxis orders are present.    CODE STATUS:   Code Status (Patient has no pulse and is not breathing): CPR (Attempt to Resuscitate)  Medical Interventions (Patient has pulse or is breathing): Full Support      Electronically signed by Robbie Mai MD, 08/10/22, 4:41 PM EDT.

## 2022-08-10 NOTE — PROGRESS NOTES
Pulmonary / Critical Care Progress Note      Patient Name: Pavel Dia  : 1955  MRN: [id number]  Attending:  Robbie Mai MD  Date of admission: 2022    Subjective   Subjective   Follow-up for COPD exacerbation and respiratory failure    Over past 24 hours:  Continues to slowly improve  Tolerating NIPPV overnight and is agreeable to home machine.  Dyspnea improving  Wheezing is improving somewhat  Cough is decreased.  Remains productive of thick yellow sputum  Getting stress test today per cardiology  Remains on empiric antibiotics.  Cultures so far negative  No chest pain or hemoptysis  Very weak and fatigued  No nausea, fevers or chills    Review of Systems  General: Fatigue and weakness, otherwise denied complaints  Cardiovascular:  Denied complaints  Respiratory: Dyspnea, cough, wheeze, otherwise denied complaints  Gastrointestinal: Denied complaints        Objective   Objective     Vitals:   Temp:  [97.5 °F (36.4 °C)-98.6 °F (37 °C)] 97.9 °F (36.6 °C)  Heart Rate:  [78-97] 87  Resp:  [16-22] 18  BP: (108-136)/(64-89) 113/69  Flow (L/min):  [3] 3    Physical Exam   Vital Signs Reviewed   General: Thin chronically ill-appearing male, Alert, NAD.    HEENT:  PERRL, EOMI.  OP, nares clear  Neck:  Supple, no JVD, no thyromegaly  Chest:   Scaphoid chest, barrel chested, poor aeration, coarse wheezing and rhonchi bilaterally somewhat better, tympanic to percussion bilaterally, pursed lip breathing noted  CV: RRR, no MGR, pulses 2+, equal.  Abd:  Soft, NT, ND, + BS, no HSM  EXT:  no clubbing, no cyanosis, no edema, no joint tenderness, muscle wasting noted all 4 extremities  Neuro:  A&Ox3, CN grossly intact, no focal deficits.  Skin: No rashes or lesions noted       Result Review    Result Review:  I have personally reviewed the results from the time of this admission to 8/10/2022 09:01 EDT and agree with these findings:  [x]  Laboratory  [x]  Microbiology  [x]  Radiology  [x]  EKG/Telemetry    [x]  Cardiology/Vascular   []  Pathology  []  Old records  []  Other:  Most notable findings include:      Lab 08/10/22  0830 08/09/22  0454 08/08/22  0707 08/07/22  2331   WBC 4.72 5.97  --  7.92   HEMOGLOBIN 12.3* 11.3*  --  12.4*   HEMATOCRIT 39.3 36.9*  --  42.3   PLATELETS 230 233  --  230   SODIUM  --  141 143 145   POTASSIUM  --  3.2* 4.1 4.1   CHLORIDE  --  94* 98 98   CO2  --  40.1* 41.9* 42.5*   BUN  --  28* 28* 29*   CREATININE  --  0.56* 0.52* 0.53*   GLUCOSE  --  98 157* 147*   CALCIUM  --  9.6 9.8 9.7   PHOSPHORUS  --  2.8  --   --    TOTAL PROTEIN  --   --   --  7.6   ALBUMIN  --   --   --  4.20   GLOBULIN  --   --   --  3.4         Assessment & Plan   Assessment / Plan     Active Hospital Problems:  Active Hospital Problems    Diagnosis    • COPD exacerbation (HCC)    • Severe malnutrition (HCC)    • Acute on chronic respiratory failure with hypoxia and hypercapnia (HCC)        Impression:  Altered mental status  CO2 narcosis  Acute exacerbation of COPD  Acute on chronic hypoxemic and hypercapnic respiratory failure  Severe protein calorie malnutrition with muscle wasting  Community-acquired pneumonia from unspecified organism  3 cm groundglass infiltrate left lung consistent with infection.  Will need to follow-up as outpatient.  Tobacco use of cigarettes in remission  Hyperglycemia  Hypokalemia  Hypomagnesemia     Plan:  Continue NIPPV nightly with naps on current settings.  Wean O2 to keep SPO2 greater than 90%  Appreciate RT  arranging home NIPPV  Patient will require noninvasive positive pressure ventilation for COPD and chronic respiratory failure.  Alternative modes of ventilation are insufficient due to persistent hypercapnia despite BiPAP use.  This patient requires frequent durations of ventilatory support with battery back-up and continuous enhanced along monitoring.  Intermittent support will be insufficient.  This patient quickly deteriorates without noninvasive  ventilation.  Removal of ventilator may cause serious harm to the patient or frequent readmissions to the hospital.  We will repeat noncontrast chest CT in 3 months to confirm resolutio of 3 cm left lung groundglass nodule versus infiltrate n.  If persistent, then will need PET/CT and potential biopsy to rule out slow-growing bronchoalveolar carcinoma  Day 3/7 of prednisone 40 mg daily  Finish 7 days of antibiotics.  Currently on Rocephin and Zithromax.  Cultures so far negative.  Can discharge on oral Augmentin to complete  Continue nebulizers and bronchopulmonary hygiene  Continue home Daliresp 500 mcg daily  Supplements per dietitian  Trend renal function and electrolytes.      DVT prophylaxis:  Medical DVT prophylaxis orders are present.    CODE STATUS:   Code Status (Patient has no pulse and is not breathing): CPR (Attempt to Resuscitate)  Medical Interventions (Patient has pulse or is breathing): Full Support    Patient's primary pulmonologist is Dr. TAMIA Myers.  Did discuss with him over the phone regarding patient's care.  He would like for us to follow the patient while he is in-house and will see him as an outpatient.      Labs, imaging, microbiology, notes and medications personally reviewed  Discussed with primary    Electronically signed by Nish Schaefer MD, 08/10/22, 12:03 PM EDT.

## 2022-08-10 NOTE — PLAN OF CARE
Goal Outcome Evaluation:  Plan of Care Reviewed With: patient   Tolerated bipap until 5 am. NPO for stress test.

## 2022-08-10 NOTE — CONSULTS
"Nutrition Services    Patient Name: Pavel Dai  YOB: 1955  MRN: [id number]  Admission date: 8/7/2022      CLINICAL NUTRITION ASSESSMENT      Reason for Assessment  Follow-up protocol   H&P:    Past Medical History:   Diagnosis Date   • Anemia due to chemotherapy 12/13/2021   • Cancer related pain 1/3/2022   • CHF (congestive heart failure) (HCC)    • COPD (chronic obstructive pulmonary disease) (HCC)    • Coronary artery disease    • Frequent urination    • Hyperlipidemia    • Hypertension    • Iron deficiency anemia due to chronic blood loss 12/13/2021   • Rectal cancer (HCC)         Current Problems:   Active Hospital Problems    Diagnosis    • COPD exacerbation (HCC)    • Severe malnutrition (HCC)    • Acute on chronic respiratory failure with hypoxia and hypercapnia (HCC)         Nutrition/Diet History         Narrative     RD consult for BMI of only 16.46.  In talking with pt UBW was 167# before he started chemo & radiation treatments for rectal CA.  Pt notes his last chemo was in Jan.  Pt has lost a total of 43#, a 26% clinically significant change.  NFPE shows severe muscle & fat wasting, meeting ASPEN guidelines for malnutrition.  Pt very SOB while RD was visiting him 2' to hx of COPD.  Talked with pt about importance of eating small frequent meal & snacks to help control SOB but also make sure gets in needed nutrition for wt gain.  [facility] which he likes but they only send him vanilla.  Pt agreeable to snacks in between meals of Ensure Enlive TID, that way he can eat first then this is his back up nutrition.    8/10  Pt eating % of meals & supplements at this time.  Pt NPO since midnight for a stress test today.  Labs reviewed.  Continue with supplements in between meals once diet resumed.     Anthropometrics        Current Height, Weight Height: 185.4 cm (73\")  Weight: 56.6 kg (124 lb 12.5 oz)   Current BMI Body mass index is 16.46 kg/m².  Underweight "       Weight Hx  Wt Readings from Last 30 Encounters:   08/08/22 0237 56.6 kg (124 lb 12.5 oz)   08/08/22 0109 56.6 kg (124 lb 12.5 oz)   08/07/22 2319 56.2 kg (124 lb)   05/26/22 1126 56.3 kg (124 lb 1.9 oz)   05/18/22 1305 56 kg (123 lb 7.3 oz)   04/27/22 1040 56 kg (123 lb 7.3 oz)   02/09/22 1126 58.1 kg (128 lb 1.4 oz)   01/31/22 1445 54.3 kg (119 lb 11.4 oz)   01/26/22 0842 55.4 kg (122 lb 2.2 oz)   01/05/22 1046 55.3 kg (121 lb 14.6 oz)   01/04/22 1043 55.2 kg (121 lb 11.1 oz)   01/03/22 1137 54 kg (119 lb 0.8 oz)   12/29/21 1051 55.1 kg (121 lb 7.6 oz)   12/28/21 1043 54.9 kg (121 lb 0.5 oz)   12/22/21 1033 57.1 kg (125 lb 14.1 oz)   12/21/21 1042 55.9 kg (123 lb 3.8 oz)   12/20/21 0928 55.3 kg (121 lb 14.6 oz)   12/16/21 1045 55.6 kg (122 lb 9.2 oz)   12/15/21 1048 55.6 kg (122 lb 9.2 oz)   12/13/21 0821 55 kg (121 lb 4.1 oz)   12/09/21 1046 56.9 kg (125 lb 7.1 oz)   12/07/21 1044 56.9 kg (125 lb 7.1 oz)   12/01/21 1031 57.5 kg (126 lb 12.2 oz)   11/30/21 1046 57 kg (125 lb 10.6 oz)   11/29/21 0918 57 kg (125 lb 10.6 oz)   11/24/21 1058 57.3 kg (126 lb 5.2 oz)   11/03/21 1440 56.5 kg (124 lb 9 oz)   11/03/21 1029 57.1 kg (125 lb 14.1 oz)   06/04/21 1142 57.9 kg (127 lb 10.3 oz)            Wt Change Observation 43# wt loss form UBW of 167#     Estimated/Assessed Needs       Energy Requirements    EST Needs (kcal/day) 0971-0619 (IBW)       Protein Requirements    EST Daily Needs (g/day) 80-96       Fluid Requirements     Estimated Needs (mL/day) 2293     Labs/Medications         Pertinent Labs Reviewed.   Results from last 7 days   Lab Units 08/10/22  0830 08/09/22  0454 08/08/22  0707 08/07/22  2331   SODIUM mmol/L 142 141 143 145   POTASSIUM mmol/L 3.5 3.2* 4.1 4.1   CHLORIDE mmol/L 98 94* 98 98   CO2 mmol/L 39.5* 40.1* 41.9* 42.5*   BUN mg/dL 24* 28* 28* 29*   CREATININE mg/dL 0.63* 0.56* 0.52* 0.53*   CALCIUM mg/dL 9.2 9.6 9.8 9.7   BILIRUBIN mg/dL 0.3  --   --  0.2   ALK PHOS U/L 57  --   --  74   ALT  (SGPT) U/L 20  --   --  17   AST (SGOT) U/L 27  --   --  17   GLUCOSE mg/dL 103* 98 157* 147*     Results from last 7 days   Lab Units 08/10/22  0830 08/09/22  0454 08/08/22  0707 08/07/22  2331   MAGNESIUM mg/dL 1.9 1.7 1.8  --    PHOSPHORUS mg/dL 3.7 2.8  --    < >   HEMOGLOBIN g/dL 12.3* 11.3*  --   --    HEMATOCRIT % 39.3 36.9*  --   --     < > = values in this interval not displayed.       Pertinent Medications Reviewed.     Current Nutrition Orders & Evaluation of Intake       Oral Nutrition     Current PO Diet NPO Diet NPO Type: Sips with Meds   Supplement Orders Placed This Encounter      Snack: Ensure Enlive TID between meals (chocolate or vanilla)       Malnutrition Severity Assessment      Patient meets criteria for : Severe Malnutrition       Nutrition Diagnosis         Nutrition Dx Problem 1 Severe malnutrition related to rectal CA with previous chemo & radiation as evidenced by inadequate energy intake., decreased appetite., unintended wt change. and body composition changes.     Nutrition Intervention         Continue cardiac diet  Encouraged small frequent meals/snacks for COPD  Continue Ensure Enlive TID between meals     Medical Nutrition Therapy/Nutrition Education          Learner     Readiness Patient  Acceptance     Method     Response Explanation  Verbalizes understanding     Monitor/Evaluation        Monitor PO intake, Supplement intake, Pertinent labs, Weight     Nutrition Discharge Plan         Small frequent meals/snacks; continue Ensure Enlive     Electronically signed by:  Emma Harrison RD  08/10/22 12:55 EDT

## 2022-08-10 NOTE — PROGRESS NOTES
Date of service: 8/10/2022    I saw the patient in the nuclear lab earlier this morning.    Subjective: Breathing is the same.  No chest pain, palpitations or lightheadedness.    Review of systems: No headaches, tinnitus, vertigo, nausea, vomiting, abdominal pain, fever, chills, TIA or CVA-like symptoms    Physical examinations: He was in no pain or respiratory distress.  Vital signs: Reviewed  HEENT: Sclera nonicteric.    Neck: No JVD or carotid bruit.  Carotid upstroke was normal  Chest: Appeared like pigeon chest.  Decreased breath sounds all over.  No wheeze or rales.  Cardiac: RRR.  Distant S1-S2.  No murmurs or S3 gallop.  Abdomen: Soft and nontender.  No megalies or masses.  Extremities: No edema or cyanosis.  Pulses intact  Neuro: Alert oriented x3, no facial droop and speech was clear.     Records: Reviewed     Assessment and plan     1.  Acute non-STEMI, mostly due to severe hypoxemia on presentation.  Rule out obstructive CAD  2.  Acute hypoxemic, hypercapnic respiratory failure, improving   3.  CO2 narcosis and confusion due to #, resolved   4.  Severe exacerbation of COPD  5.  Hypokalemia, corrected   6.  Community-acquired pneumonia  7.  Hypercholesterolemia  8.  Will proceed with dobutamine nuclear stress test today.      Addendum: I discussed with him the nuclear stress test results.  Probably had a microscopic acute non-STEMI due to severe hypoxemia and increase myocardial oxygen demand (type II acute MI).  Continue the same cardiac medications.  Follow-up will be in office in 1 month.

## 2022-08-11 LAB
ALBUMIN SERPL-MCNC: 3.8 G/DL (ref 3.5–5.2)
ALBUMIN/GLOB SERPL: 1.2 G/DL
ALP SERPL-CCNC: 60 U/L (ref 39–117)
ALT SERPL W P-5'-P-CCNC: 21 U/L (ref 1–41)
ANION GAP SERPL CALCULATED.3IONS-SCNC: 8 MMOL/L (ref 5–15)
AST SERPL-CCNC: 23 U/L (ref 1–40)
BASOPHILS # BLD AUTO: 0.01 10*3/MM3 (ref 0–0.2)
BASOPHILS NFR BLD AUTO: 0.2 % (ref 0–1.5)
BILIRUB SERPL-MCNC: 0.3 MG/DL (ref 0–1.2)
BUN SERPL-MCNC: 24 MG/DL (ref 8–23)
BUN/CREAT SERPL: 38.1 (ref 7–25)
CALCIUM SPEC-SCNC: 9.3 MG/DL (ref 8.6–10.5)
CHLORIDE SERPL-SCNC: 95 MMOL/L (ref 98–107)
CO2 SERPL-SCNC: 35 MMOL/L (ref 22–29)
CREAT SERPL-MCNC: 0.63 MG/DL (ref 0.76–1.27)
DEPRECATED RDW RBC AUTO: 43.6 FL (ref 37–54)
EGFRCR SERPLBLD CKD-EPI 2021: 105.6 ML/MIN/1.73
EOSINOPHIL # BLD AUTO: 0.03 10*3/MM3 (ref 0–0.4)
EOSINOPHIL NFR BLD AUTO: 0.6 % (ref 0.3–6.2)
ERYTHROCYTE [DISTWIDTH] IN BLOOD BY AUTOMATED COUNT: 12.4 % (ref 12.3–15.4)
GLOBULIN UR ELPH-MCNC: 3.2 GM/DL
GLUCOSE BLDC GLUCOMTR-MCNC: 101 MG/DL (ref 70–99)
GLUCOSE BLDC GLUCOMTR-MCNC: 135 MG/DL (ref 70–99)
GLUCOSE BLDC GLUCOMTR-MCNC: 77 MG/DL (ref 70–99)
GLUCOSE SERPL-MCNC: 95 MG/DL (ref 65–99)
HCT VFR BLD AUTO: 41 % (ref 37.5–51)
HGB BLD-MCNC: 12.7 G/DL (ref 13–17.7)
IMM GRANULOCYTES # BLD AUTO: 0.03 10*3/MM3 (ref 0–0.05)
IMM GRANULOCYTES NFR BLD AUTO: 0.6 % (ref 0–0.5)
LYMPHOCYTES # BLD AUTO: 0.59 10*3/MM3 (ref 0.7–3.1)
LYMPHOCYTES NFR BLD AUTO: 11.3 % (ref 19.6–45.3)
MAGNESIUM SERPL-MCNC: 2 MG/DL (ref 1.6–2.4)
MCH RBC QN AUTO: 29.8 PG (ref 26.6–33)
MCHC RBC AUTO-ENTMCNC: 31 G/DL (ref 31.5–35.7)
MCV RBC AUTO: 96.2 FL (ref 79–97)
MONOCYTES # BLD AUTO: 0.64 10*3/MM3 (ref 0.1–0.9)
MONOCYTES NFR BLD AUTO: 12.3 % (ref 5–12)
NEUTROPHILS NFR BLD AUTO: 3.92 10*3/MM3 (ref 1.7–7)
NEUTROPHILS NFR BLD AUTO: 75 % (ref 42.7–76)
NRBC BLD AUTO-RTO: 0 /100 WBC (ref 0–0.2)
PHOSPHATE SERPL-MCNC: 3.6 MG/DL (ref 2.5–4.5)
PLATELET # BLD AUTO: 255 10*3/MM3 (ref 140–450)
PMV BLD AUTO: 9.5 FL (ref 6–12)
POTASSIUM SERPL-SCNC: 3.9 MMOL/L (ref 3.5–5.2)
PROT SERPL-MCNC: 7 G/DL (ref 6–8.5)
RBC # BLD AUTO: 4.26 10*6/MM3 (ref 4.14–5.8)
SODIUM SERPL-SCNC: 138 MMOL/L (ref 136–145)
WBC NRBC COR # BLD: 5.22 10*3/MM3 (ref 3.4–10.8)

## 2022-08-11 PROCEDURE — 25010000002 ENOXAPARIN PER 10 MG: Performed by: INTERNAL MEDICINE

## 2022-08-11 PROCEDURE — 25010000002 CEFTRIAXONE PER 250 MG: Performed by: INTERNAL MEDICINE

## 2022-08-11 PROCEDURE — 80053 COMPREHEN METABOLIC PANEL: CPT | Performed by: INTERNAL MEDICINE

## 2022-08-11 PROCEDURE — 99233 SBSQ HOSP IP/OBS HIGH 50: CPT | Performed by: INTERNAL MEDICINE

## 2022-08-11 PROCEDURE — 84100 ASSAY OF PHOSPHORUS: CPT | Performed by: INTERNAL MEDICINE

## 2022-08-11 PROCEDURE — 83735 ASSAY OF MAGNESIUM: CPT | Performed by: INTERNAL MEDICINE

## 2022-08-11 PROCEDURE — 63710000001 PREDNISONE PER 1 MG: Performed by: INTERNAL MEDICINE

## 2022-08-11 PROCEDURE — 82962 GLUCOSE BLOOD TEST: CPT

## 2022-08-11 PROCEDURE — 94799 UNLISTED PULMONARY SVC/PX: CPT

## 2022-08-11 PROCEDURE — 85025 COMPLETE CBC W/AUTO DIFF WBC: CPT | Performed by: INTERNAL MEDICINE

## 2022-08-11 PROCEDURE — 94760 N-INVAS EAR/PLS OXIMETRY 1: CPT

## 2022-08-11 RX ADMIN — ACETAMINOPHEN 325MG 650 MG: 325 TABLET ORAL at 18:04

## 2022-08-11 RX ADMIN — ROFLUMILAST 500 MCG: 500 TABLET ORAL at 08:43

## 2022-08-11 RX ADMIN — GUAIFENESIN 600 MG: 600 TABLET ORAL at 21:43

## 2022-08-11 RX ADMIN — Medication 10 ML: at 08:43

## 2022-08-11 RX ADMIN — PREDNISONE 40 MG: 20 TABLET ORAL at 08:43

## 2022-08-11 RX ADMIN — BUDESONIDE 0.5 MG: 0.5 SUSPENSION RESPIRATORY (INHALATION) at 06:40

## 2022-08-11 RX ADMIN — IPRATROPIUM BROMIDE AND ALBUTEROL SULFATE 3 ML: 2.5; .5 SOLUTION RESPIRATORY (INHALATION) at 06:39

## 2022-08-11 RX ADMIN — BUDESONIDE 0.5 MG: 0.5 SUSPENSION RESPIRATORY (INHALATION) at 19:58

## 2022-08-11 RX ADMIN — GUAIFENESIN 600 MG: 600 TABLET ORAL at 08:43

## 2022-08-11 RX ADMIN — ASPIRIN 81 MG CHEWABLE TABLET 81 MG: 81 TABLET CHEWABLE at 08:42

## 2022-08-11 RX ADMIN — ENOXAPARIN SODIUM 40 MG: 100 INJECTION SUBCUTANEOUS at 08:43

## 2022-08-11 RX ADMIN — IPRATROPIUM BROMIDE AND ALBUTEROL SULFATE 3 ML: 2.5; .5 SOLUTION RESPIRATORY (INHALATION) at 11:46

## 2022-08-11 RX ADMIN — ARFORMOTEROL TARTRATE 15 MCG: 15 SOLUTION RESPIRATORY (INHALATION) at 19:58

## 2022-08-11 RX ADMIN — ARFORMOTEROL TARTRATE 15 MCG: 15 SOLUTION RESPIRATORY (INHALATION) at 06:40

## 2022-08-11 RX ADMIN — ATORVASTATIN CALCIUM 10 MG: 10 TABLET, FILM COATED ORAL at 21:43

## 2022-08-11 RX ADMIN — AZITHROMYCIN MONOHYDRATE 500 MG: 250 TABLET ORAL at 08:43

## 2022-08-11 RX ADMIN — IPRATROPIUM BROMIDE AND ALBUTEROL SULFATE 3 ML: 2.5; .5 SOLUTION RESPIRATORY (INHALATION) at 19:58

## 2022-08-11 RX ADMIN — CEFTRIAXONE SODIUM 1 G: 1 INJECTION, SOLUTION INTRAVENOUS at 10:24

## 2022-08-11 RX ADMIN — Medication 10 ML: at 21:43

## 2022-08-11 RX ADMIN — PANTOPRAZOLE SODIUM 40 MG: 40 TABLET, DELAYED RELEASE ORAL at 08:42

## 2022-08-11 NOTE — PROGRESS NOTES
" [facility]   Hospitalist Progress Note  Date: 2022  Patient Name: Pavel Dai  : 1955  MRN: [id number]  Date of admission: 2022      Subjective   Subjective     Chief Complaint: Shortness of air, confusion    Summary: 67 y.o.  male former smoker with COPD, CHF, CAD, HLD, HTN, and rectal cancer with chemo and radiation in  presents with with SOB and confusion/memory loss.  His report of the duration of this is somewhat inconsistent from the past 2-3 days to over a week.  Reports that he had a headache for the last week although he denies having it now; denies recent head trauma.  Pt also reports confusion and memory loss since around 9:30-10 AM today, but he denies any confusion currently. Pt states his relative said he was \"in and out\" during a conversation they had earlier today.  On arrival to the ED, infectious work-up negative.  Found to have hypercapnia with CO2 84, admitted for further care, started on BiPAP.  Pulmonology consulted.  MRI obtained.    Interval Followup: No acute events overnight, patient still very short of breath and easily winded    Review of Systems  Denies nausea vomiting or diarrhea  No chest pain no palpitations, positive shortness of breath  No fever no chills    Objective   Objective     Vitals:   Temp:  [97.7 °F (36.5 °C)-98.2 °F (36.8 °C)] 98.2 °F (36.8 °C)  Heart Rate:  [] 99  Resp:  [16-22] 18  BP: (116-149)/(64-83) 149/74  Flow (L/min):  [3] 3  Physical Exam   General appearance: NAD, conversant   Eyes: anicteric sclerae, moist conjunctivae; no lid-lag; PERRLA  HENT: Atraumatic; oropharynx clear with moist mucous membranes and no mucosal ulcerations; normal hard and soft palate  Neck: Trachea midline; FROM, supple, no thyromegaly or lymphadenopathy  Lungs: Prolonged expiratory phase, inspiratory and expiratory wheezing bilaterally, with normal respiratory effort and no intercostal retractions  CV: Regular Rate and Rhythm, no Murmurs, Rubs, or " Gallops   Abdomen: Soft, non-tender; no masses or Heptosplenomegally  Extremities: No peripheral edema or extremity lymphadenopathy  Skin: Normal temperature, turgor and texture; no rash, ulcers or subcutaneous nodules  Psych: Appropriate affect, alert and oriented to person, place and time  Neuro: CN II - XII intact no motor deficits, no sensory defecits      Result Review    Result Review:  I have personally reviewed the results from the time of this admission to 8/11/2022 14:34 EDT and agree with these findings:  [x]  Laboratory all labs reviewed  []  Microbiology  []  Radiology  [x]  EKG/Telemetry   []  Cardiology/Vascular   []  Pathology  []  Old records  []  Other:  CBC    CBC 8/9/22 8/10/22 8/11/22   WBC 5.97 4.72 5.22   RBC 3.76 (A) 4.02 (A) 4.26   Hemoglobin 11.3 (A) 12.3 (A) 12.7 (A)   Hematocrit 36.9 (A) 39.3 41.0   MCV 98.1 (A) 97.8 (A) 96.2   MCH 30.1 30.6 29.8   MCHC 30.6 (A) 31.3 (A) 31.0 (A)   RDW 12.7 12.5 12.4   Platelets 233 230 255   (A) Abnormal value            CMP    CMP 8/9/22 8/10/22 8/11/22   Glucose 98 103 (A) 95   BUN 28 (A) 24 (A) 24 (A)   Creatinine 0.56 (A) 0.63 (A) 0.63 (A)   Sodium 141 142 138   Potassium 3.2 (A) 3.5 3.9   Chloride 94 (A) 98 95 (A)   Calcium 9.6 9.2 9.3   Albumin  3.90 3.80   Total Bilirubin  0.3 0.3   Alkaline Phosphatase  57 60   AST (SGOT)  27 23   ALT (SGPT)  20 21   (A) Abnormal value              Assessment & Plan   Assessment / Plan     Assessment/Plan:  Acute hypercapnic hypoxemic respiratory failure  COPD with acute exacerbation  Metabolic encephalopathy secondary to above  Concern for community-acquired pneumonia due to unknown bacterial source  Headache  Chronic hypoxemia on 2-3 nasal cannula  Confusion  Elevated troponin, ACS ruled out  History of chronic CHF  Severe protein calorie malnutrition  Cachexia  CAD  HLD  Hypertension  Rectal cancer, recently completed chemo and radiation    Continue to monitor in the hospital for management of the  above  Consult pulmonology, appreciate assistance.  If patient does not have a CPAP/BiPAP at home, he may benefit from one at discharge  Hypercapnia is resolved, patient still remains intermittently confused  Mental status at baseline  Continue with scheduled duo nebs, Pulmicort and Brovana twice daily, albuterol as needed, bronchopulmonary hygiene  Continue prednisone  CT chest without contrast reviewed  Continue Rocephin azithromycin due to concern for community-acquired pneumonia, de-escalate based on cultures  Follow-up sputum culture  Echocardiogram  Stress test negative for inducible ischemia  Continue appropriate home medications  Trend renal function and electrolytes with a.m. BMP  Trend Hgb and WBC with a.m. CBC     Reviewed patients labs and imaging, and discussed with patient and nurse at bedside.    DVT prophylaxis:  Medical DVT prophylaxis orders are present.    CODE STATUS:   Code Status (Patient has no pulse and is not breathing): CPR (Attempt to Resuscitate)  Medical Interventions (Patient has pulse or is breathing): Full Support      Electronically signed by Robbie Mai MD, 08/11/22, 2:35 PM EDT.

## 2022-08-11 NOTE — SIGNIFICANT NOTE
08/11/22 3965   Plan   Plan Met with patient that reports lives by himself but has assistance with sons and sister if needed.  Patient [facility] O[facility]  [facility].  Patient reports drives himself but sister helps at times.  Patient would like home health.  Facesheet verified.   will send referrals for home health.  Patient may also need bi-pap at discharge.  Will continue to monitor for possible other needs.

## 2022-08-11 NOTE — PROGRESS NOTES
Pulmonary / Critical Care Progress Note      Patient Name: Pavel Dai  : 1955  MRN: [id number]  Attending:  Robbie Mai MD  Date of admission: 2022    Subjective   Subjective   Follow-up for COPD exacerbation and respiratory failure    Over past 24 hours: Remained on Brovana, Pulmicort and prednisone.  Has been on Daliresp as well.  Has been on ceftriaxone and azithromycin.  Has been using home NIPPV unit with sleep and naps.    No acute events overnight.    This morning,  Feels about the same.  Shortness of breath has minimally improved.  Wheezing is better.  Cough is decreased.  Still producing thick yellow phlegm.  Cardiology on board.  Remains on empiric antibiotics.  Cultures so far negative.  No chest pain or hemoptysis.  Very weak and fatigued.  No nausea, fevers or chills.    Review of Systems  General: Fatigue and weakness, otherwise denied complaints  Cardiovascular:  Denied complaints  Respiratory: Dyspnea, cough, wheeze, otherwise denied complaints  Gastrointestinal: Denied complaints        Objective   Objective     Vitals:   Temp:  [97.7 °F (36.5 °C)-98.2 °F (36.8 °C)] 98 °F (36.7 °C)  Heart Rate:  [] 88  Resp:  [16-20] 20  BP: (113-135)/(64-83) 127/79  Flow (L/min):  [3] 3    Physical Exam   Vital Signs Reviewed   General: Thin chronically ill-appearing male, Alert, in mild distress, has conversational dyspnea.    HEENT:  PERRL, EOMI.  OP, nares clear  Neck:  Supple, no JVD, no thyromegaly  Chest:   Scaphoid chest, barrel chested, poor aeration, coarse wheezing and rhonchi bilaterally somewhat better, tympanic to percussion bilaterally, pursed lip breathing noted  CV: RRR, no MGR, pulses 2+, equal.  Abd:  Soft, NT, ND, + BS, no HSM  EXT:  no clubbing, no cyanosis, no edema, no joint tenderness, muscle wasting noted all 4 extremities  Neuro:  A&Ox3, CN grossly intact, no focal deficits.  Skin: No rashes or lesions noted       Result Review    Result Review:  I have  personally reviewed the results from the time of this admission to 8/11/2022 07:11 EDT and agree with these findings:  [x]  Laboratory  [x]  Microbiology  [x]  Radiology  [x]  EKG/Telemetry   [x]  Cardiology/Vascular   []  Pathology  []  Old records  []  Other:  Most notable findings include:      Lab 08/11/22  0451 08/10/22  0830 08/09/22  0454 08/08/22  0707 08/07/22  2331   WBC 5.22 4.72 5.97  --  7.92   HEMOGLOBIN 12.7* 12.3* 11.3*  --  12.4*   HEMATOCRIT 41.0 39.3 36.9*  --  42.3   PLATELETS 255 230 233  --  230   SODIUM 138 142 141 143 145   POTASSIUM 3.9 3.5 3.2* 4.1 4.1   CHLORIDE 95* 98 94* 98 98   CO2 35.0* 39.5* 40.1* 41.9* 42.5*   BUN 24* 24* 28* 28* 29*   CREATININE 0.63* 0.63* 0.56* 0.52* 0.53*   GLUCOSE 95 103* 98 157* 147*   CALCIUM 9.3 9.2 9.6 9.8 9.7   PHOSPHORUS 3.6 3.7 2.8  --   --    TOTAL PROTEIN 7.0 6.8  --   --  7.6   ALBUMIN 3.80 3.90  --   --  4.20   GLOBULIN 3.2 2.9  --   --  3.4         Assessment & Plan   Assessment / Plan     Active Hospital Problems:  Active Hospital Problems    Diagnosis    • COPD exacerbation (HCC)    • Severe malnutrition (HCC)    • Acute on chronic respiratory failure with hypoxia and hypercapnia (HCC)        Impression:  Altered mental status  CO2 narcosis  Acute exacerbation of COPD  Acute on chronic hypoxemic and hypercapnic respiratory failure  Severe protein calorie malnutrition with muscle wasting  Community-acquired pneumonia from unspecified organism  3 cm groundglass infiltrate left lung consistent with infection.  Will need to follow-up as outpatient.  Tobacco use of cigarettes in remission  Hyperglycemia  Hypokalemia  Hypomagnesemia     Plan:  Continue NIPPV nightly with naps on current settings.  Wean O2 to keep SPO2 greater than 90%  Appreciate RT  arranging home NIPPV  Patient will require noninvasive positive pressure ventilation for COPD and chronic respiratory failure.  Alternative modes of ventilation are insufficient due to persistent  hypercapnia despite BiPAP use.  This patient requires frequent durations of ventilatory support with battery back-up and continuous enhanced along monitoring.  Intermittent support will be insufficient.  This patient quickly deteriorates without noninvasive ventilation.  Removal of ventilator may cause serious harm to the patient or frequent readmissions to the hospital.  We will repeat noncontrast chest CT in 3 months to confirm resolutio of 3 cm left lung groundglass nodule versus infiltrate n.  If persistent, then will need PET/CT and potential biopsy to rule out slow-growing bronchoalveolar carcinoma  Day 4/7 of prednisone 40 mg daily  Finish 7 days of antibiotics.  Currently on Rocephin and Zithromax.  Cultures so far negative.  Can discharge on oral Augmentin to complete the course.  Continue nebulizers and bronchopulmonary hygiene  Continue home Daliresp 500 mcg daily.  May benefit from chronic macrolide therapy.  Supplements per dietitian  Trend renal function and electrolytes.      DVT prophylaxis:  Medical DVT prophylaxis orders are present.    CODE STATUS:   Code Status (Patient has no pulse and is not breathing): CPR (Attempt to Resuscitate)  Medical Interventions (Patient has pulse or is breathing): Full Support    He will follow-up with Dr. Myers as an outpatient.    Labs, imaging, microbiology, notes and medications personally reviewed  Discussed with primary service and bedside nurse.     Electronically signed by Ted Petty MD, 08/11/22, 7:12 AM EDT.

## 2022-08-11 NOTE — PLAN OF CARE
Goal Outcome Evaluation:  Plan of Care Reviewed With: patient            Alert and oriented x 4, Patient requests options for home health services to promote activities of daily living with assistance of meals preparation due to fatigue. On exam, patient is quickly exhausted with transfers to chair. Will continue with plan of care.

## 2022-08-12 LAB
ALBUMIN SERPL-MCNC: 3.7 G/DL (ref 3.5–5.2)
ALBUMIN/GLOB SERPL: 1.2 G/DL
ALP SERPL-CCNC: 58 U/L (ref 39–117)
ALT SERPL W P-5'-P-CCNC: 20 U/L (ref 1–41)
ANION GAP SERPL CALCULATED.3IONS-SCNC: 10.8 MMOL/L (ref 5–15)
AST SERPL-CCNC: 19 U/L (ref 1–40)
BASOPHILS # BLD AUTO: 0.01 10*3/MM3 (ref 0–0.2)
BASOPHILS NFR BLD AUTO: 0.2 % (ref 0–1.5)
BILIRUB SERPL-MCNC: 0.2 MG/DL (ref 0–1.2)
BUN SERPL-MCNC: 23 MG/DL (ref 8–23)
BUN/CREAT SERPL: 37.1 (ref 7–25)
CALCIUM SPEC-SCNC: 9.1 MG/DL (ref 8.6–10.5)
CHLORIDE SERPL-SCNC: 99 MMOL/L (ref 98–107)
CO2 SERPL-SCNC: 34.2 MMOL/L (ref 22–29)
CREAT SERPL-MCNC: 0.62 MG/DL (ref 0.76–1.27)
DEPRECATED RDW RBC AUTO: 44.3 FL (ref 37–54)
EGFRCR SERPLBLD CKD-EPI 2021: 106.1 ML/MIN/1.73
EOSINOPHIL # BLD AUTO: 0.06 10*3/MM3 (ref 0–0.4)
EOSINOPHIL NFR BLD AUTO: 1.2 % (ref 0.3–6.2)
ERYTHROCYTE [DISTWIDTH] IN BLOOD BY AUTOMATED COUNT: 12.5 % (ref 12.3–15.4)
GLOBULIN UR ELPH-MCNC: 3.2 GM/DL
GLUCOSE BLDC GLUCOMTR-MCNC: 112 MG/DL (ref 70–99)
GLUCOSE BLDC GLUCOMTR-MCNC: 119 MG/DL (ref 70–99)
GLUCOSE BLDC GLUCOMTR-MCNC: 87 MG/DL (ref 70–99)
GLUCOSE SERPL-MCNC: 90 MG/DL (ref 65–99)
HCT VFR BLD AUTO: 41.6 % (ref 37.5–51)
HGB BLD-MCNC: 12.8 G/DL (ref 13–17.7)
IMM GRANULOCYTES # BLD AUTO: 0.03 10*3/MM3 (ref 0–0.05)
IMM GRANULOCYTES NFR BLD AUTO: 0.6 % (ref 0–0.5)
LYMPHOCYTES # BLD AUTO: 0.61 10*3/MM3 (ref 0.7–3.1)
LYMPHOCYTES NFR BLD AUTO: 12.1 % (ref 19.6–45.3)
MAGNESIUM SERPL-MCNC: 2 MG/DL (ref 1.6–2.4)
MCH RBC QN AUTO: 29.8 PG (ref 26.6–33)
MCHC RBC AUTO-ENTMCNC: 30.8 G/DL (ref 31.5–35.7)
MCV RBC AUTO: 96.7 FL (ref 79–97)
MONOCYTES # BLD AUTO: 0.66 10*3/MM3 (ref 0.1–0.9)
MONOCYTES NFR BLD AUTO: 13 % (ref 5–12)
NEUTROPHILS NFR BLD AUTO: 3.69 10*3/MM3 (ref 1.7–7)
NEUTROPHILS NFR BLD AUTO: 72.9 % (ref 42.7–76)
NRBC BLD AUTO-RTO: 0 /100 WBC (ref 0–0.2)
PHOSPHATE SERPL-MCNC: 4.2 MG/DL (ref 2.5–4.5)
PLATELET # BLD AUTO: 264 10*3/MM3 (ref 140–450)
PMV BLD AUTO: 10.3 FL (ref 6–12)
POTASSIUM SERPL-SCNC: 3.8 MMOL/L (ref 3.5–5.2)
PROT SERPL-MCNC: 6.9 G/DL (ref 6–8.5)
RBC # BLD AUTO: 4.3 10*6/MM3 (ref 4.14–5.8)
SODIUM SERPL-SCNC: 144 MMOL/L (ref 136–145)
WBC NRBC COR # BLD: 5.06 10*3/MM3 (ref 3.4–10.8)

## 2022-08-12 PROCEDURE — 80053 COMPREHEN METABOLIC PANEL: CPT | Performed by: INTERNAL MEDICINE

## 2022-08-12 PROCEDURE — 85025 COMPLETE CBC W/AUTO DIFF WBC: CPT | Performed by: INTERNAL MEDICINE

## 2022-08-12 PROCEDURE — 94761 N-INVAS EAR/PLS OXIMETRY MLT: CPT

## 2022-08-12 PROCEDURE — 82962 GLUCOSE BLOOD TEST: CPT

## 2022-08-12 PROCEDURE — 84100 ASSAY OF PHOSPHORUS: CPT | Performed by: INTERNAL MEDICINE

## 2022-08-12 PROCEDURE — 25010000002 CEFTRIAXONE PER 250 MG: Performed by: INTERNAL MEDICINE

## 2022-08-12 PROCEDURE — 94799 UNLISTED PULMONARY SVC/PX: CPT

## 2022-08-12 PROCEDURE — 97110 THERAPEUTIC EXERCISES: CPT

## 2022-08-12 PROCEDURE — 63710000001 PREDNISONE PER 1 MG: Performed by: INTERNAL MEDICINE

## 2022-08-12 PROCEDURE — 25010000002 ENOXAPARIN PER 10 MG: Performed by: INTERNAL MEDICINE

## 2022-08-12 PROCEDURE — 83735 ASSAY OF MAGNESIUM: CPT | Performed by: INTERNAL MEDICINE

## 2022-08-12 PROCEDURE — 99233 SBSQ HOSP IP/OBS HIGH 50: CPT | Performed by: INTERNAL MEDICINE

## 2022-08-12 PROCEDURE — 99232 SBSQ HOSP IP/OBS MODERATE 35: CPT | Performed by: INTERNAL MEDICINE

## 2022-08-12 RX ADMIN — ENOXAPARIN SODIUM 40 MG: 100 INJECTION SUBCUTANEOUS at 08:03

## 2022-08-12 RX ADMIN — PREDNISONE 40 MG: 20 TABLET ORAL at 08:02

## 2022-08-12 RX ADMIN — ARFORMOTEROL TARTRATE 15 MCG: 15 SOLUTION RESPIRATORY (INHALATION) at 19:05

## 2022-08-12 RX ADMIN — ACETAMINOPHEN 325MG 650 MG: 325 TABLET ORAL at 20:22

## 2022-08-12 RX ADMIN — GUAIFENESIN 600 MG: 600 TABLET ORAL at 20:22

## 2022-08-12 RX ADMIN — CEFTRIAXONE SODIUM 1 G: 1 INJECTION, SOLUTION INTRAVENOUS at 10:01

## 2022-08-12 RX ADMIN — ACETAMINOPHEN 325MG 650 MG: 325 TABLET ORAL at 08:02

## 2022-08-12 RX ADMIN — ASPIRIN 81 MG CHEWABLE TABLET 81 MG: 81 TABLET CHEWABLE at 08:02

## 2022-08-12 RX ADMIN — PANTOPRAZOLE SODIUM 40 MG: 40 TABLET, DELAYED RELEASE ORAL at 06:30

## 2022-08-12 RX ADMIN — AZITHROMYCIN MONOHYDRATE 500 MG: 250 TABLET ORAL at 08:02

## 2022-08-12 RX ADMIN — BUDESONIDE 0.5 MG: 0.5 SUSPENSION RESPIRATORY (INHALATION) at 19:05

## 2022-08-12 RX ADMIN — GUAIFENESIN 600 MG: 600 TABLET ORAL at 08:02

## 2022-08-12 RX ADMIN — Medication 10 ML: at 08:03

## 2022-08-12 RX ADMIN — ROFLUMILAST 500 MCG: 500 TABLET ORAL at 08:02

## 2022-08-12 RX ADMIN — IPRATROPIUM BROMIDE AND ALBUTEROL SULFATE 3 ML: 2.5; .5 SOLUTION RESPIRATORY (INHALATION) at 00:35

## 2022-08-12 RX ADMIN — IPRATROPIUM BROMIDE AND ALBUTEROL SULFATE 3 ML: 2.5; .5 SOLUTION RESPIRATORY (INHALATION) at 19:05

## 2022-08-12 RX ADMIN — BUDESONIDE 0.5 MG: 0.5 SUSPENSION RESPIRATORY (INHALATION) at 07:17

## 2022-08-12 RX ADMIN — IPRATROPIUM BROMIDE AND ALBUTEROL SULFATE 3 ML: 2.5; .5 SOLUTION RESPIRATORY (INHALATION) at 11:34

## 2022-08-12 RX ADMIN — Medication 10 ML: at 20:22

## 2022-08-12 RX ADMIN — ATORVASTATIN CALCIUM 10 MG: 10 TABLET, FILM COATED ORAL at 20:22

## 2022-08-12 RX ADMIN — ARFORMOTEROL TARTRATE 15 MCG: 15 SOLUTION RESPIRATORY (INHALATION) at 07:17

## 2022-08-12 RX ADMIN — IPRATROPIUM BROMIDE AND ALBUTEROL SULFATE 3 ML: 2.5; .5 SOLUTION RESPIRATORY (INHALATION) at 07:17

## 2022-08-12 NOTE — THERAPY TREATMENT NOTE
Patient Name: Pavel Dai  : 1955    MRN: [id number]                              Today's Date: 2022       Admit Date: 2022    Visit Dx:     ICD-10-CM ICD-9-CM   1. COPD exacerbation (HCC)  J44.1 491.21   2. Dysphagia, oropharyngeal  R13.12 787.22   3. Decreased activities of daily living (ADL)  Z78.9 V49.89   4. Difficulty walking  R26.2 719.7     Patient Active Problem List   Diagnosis   • Rectal bleeding   • Acute blood loss anemia   • COPD (chronic obstructive pulmonary disease) (HCC)   • CAD (coronary artery disease)   • Essential hypertension   • History of ischemic stroke   • Body mass index (BMI) 19.9 or less, adult   • Encounter for immunization   • Ex-smoker   • Hyperlipidemia   • Hypoxemia   • Hypertensive heart disease without congestive heart failure   • Oxygen dependent   • Postnasal drip   • Shortness of breath   • Rectal cancer (HCC)   • Iron deficiency anemia due to chronic blood loss   • Cancer related pain   • Gastroesophageal reflux disease   • COPD exacerbation (HCC)   • Severe malnutrition (HCC)   • Acute on chronic respiratory failure with hypoxia and hypercapnia (HCC)     Past Medical History:   Diagnosis Date   • Anemia due to chemotherapy 2021   • Cancer related pain 1/3/2022   • CHF (congestive heart failure) (HCC)    • COPD (chronic obstructive pulmonary disease) (HCC)    • Coronary artery disease    • Frequent urination    • Hyperlipidemia    • Hypertension    • Iron deficiency anemia due to chronic blood loss 2021   • Rectal cancer (HCC)      Past Surgical History:   Procedure Laterality Date   • COLONOSCOPY     • HERNIA REPAIR      approximately 4 years ago    • RECTAL SURGERY     • SHOULDER SURGERY      joint loose, calcium particles removed from shoulder joint      General Information     Row Name 22 1053          OT Time and Intention    Document Type therapy note (daily note)  -AV     Mode of Treatment individual therapy;occupational  therapy  -AV     [facility] Name 08/12/22 1053          General Information    Existing Precautions/Restrictions oxygen therapy device and L/min  -AV     Barriers to Rehab none identified  -AV     [facility] Name 08/12/22 1053          Cognition    Orientation Status (Cognition) --  Alert, pleasant and cooperative.  Able to perform exercises with only minimal cues/demonstration.  -AV     [facility] Name 08/12/22 1053          Safety Issues, Functional Mobility    Impairments Affecting Function (Mobility) endurance/activity tolerance  -AV           User Key  (r) = Recorded By, (t) = Taken By, (c) = Cosigned By    Initials Name Provider Type    Jesus Martin OT Occupational Therapist                 Mobility/ADL's    No documentation.                Obj/Interventions     [facility] Name 08/12/22 1053          Shoulder (Therapeutic Exercise)    Shoulder (Therapeutic Exercise) strengthening exercise  -AV     Shoulder Strengthening (Therapeutic Exercise) bilateral;horizontal aBduction/aDduction;1 lb free weight;15 repititions  -AV     [facility] Name 08/12/22 1053          Elbow/Forearm (Therapeutic Exercise)    Elbow/Forearm (Therapeutic Exercise) strengthening exercise  -AV     Elbow/Forearm Strengthening (Therapeutic Exercise) bilateral;flexion;extension;supination;pronation;1 lb free weight;15 repititions  -AV     [facility] Name 08/12/22 1053          Motor Skills    Therapeutic Exercise shoulder;elbow/forearm  Endurance exercises in high Larry's.  O2 sats 88-90% with supplemental oxygen in place.  -AV           User Key  (r) = Recorded By, (t) = Taken By, (c) = Cosigned By    Initials Name Provider Type    Jesus Martin OT Occupational Therapist               Goals/Plan    No documentation.                Clinical Impression     [facility] Name 08/12/22 1054          Pain Scale: FACES Pre/Post-Treatment    Pain: FACES Scale, Pretreatment 0-->no hurt  -AV     Posttreatment Pain Rating 0-->no hurt  -AV     [facility] Name 08/12/22 1054          Plan of Care  Review    Progress no change  -AV     Outcome Evaluation Continued OT is needed to remediate/compensate for deficits to maximize independence.  -AV     Row Name 08/12/22 1054          Vital Signs    O2 Delivery Pre Treatment nasal cannula  7  -AV     O2 Delivery Intra Treatment nasal cannula  7  -AV     O2 Delivery Post Treatment nasal cannula  7  -AV           User Key  (r) = Recorded By, (t) = Taken By, (c) = Cosigned By    Initials Name Provider Type    Jesus Martin OT Occupational Therapist               Outcome Measures     Row Name 08/12/22 1054          How much help from another is currently needed...    Putting on and taking off regular lower body clothing? 3  -AV     Bathing (including washing, rinsing, and drying) 3  -AV     Toileting (which includes using toilet bed pan or urinal) 3  -AV     Putting on and taking off regular upper body clothing 4  -AV     Taking care of personal grooming (such as brushing teeth) 4  -AV     Eating meals 4  -AV     AM-PAC 6 Clicks Score (OT) 21  -AV     Row Name 08/12/22 1054          Optimal Instrument    Bending/Stooping 1  -AV     Standing 2  -AV     Reaching 1  -AV           User Key  (r) = Recorded By, (t) = Taken By, (c) = Cosigned By    Initials Name Provider Type    Jesus Martin OT Occupational Therapist                Occupational Therapy Education                 Title: PT OT SLP Therapies (In Progress)     Topic: Occupational Therapy (Done)     Point: ADL training (Done)     Description:   Instruct learner(s) on proper safety adaptation and remediation techniques during self care or transfers.   Instruct in proper use of assistive devices.              Learning Progress Summary           Patient Acceptance, E, VU by AV at 8/9/2022 1014                   Point: Home exercise program (Done)     Description:   Instruct learner(s) on appropriate technique for monitoring, assisting and/or progressing therapeutic exercises/activities.              Learning  Progress Summary           Patient Acceptance, E, VU by AV at 8/9/2022 1014                   Point: Precautions (Done)     Description:   Instruct learner(s) on prescribed precautions during self-care and functional transfers.              Learning Progress Summary           Patient Acceptance, E, VU by AV at 8/9/2022 1014                   Point: Body mechanics (Done)     Description:   Instruct learner(s) on proper positioning and spine alignment during self-care, functional mobility activities and/or exercises.              Learning Progress Summary           Patient Acceptance, E, VU by AV at 8/9/2022 1014                               User Key     Initials Effective Dates Name Provider Type Discipline    AV 06/16/21 -  Jesus Justin OT Occupational Therapist OT              OT Recommendation and Plan  Planned Therapy Interventions (OT): activity tolerance training, BADL retraining, functional balance retraining, IADL retraining, occupation/activity based interventions, patient/caregiver education/training, transfer/mobility retraining  Therapy Frequency (OT): 5 times/wk  Plan of Care Review  Plan of Care Reviewed With: patient  Progress: no change  Outcome Evaluation: Continued OT is needed to remediate/compensate for deficits to maximize independence.     Time Calculation:    Time Calculation- OT     Row Name 08/12/22 1055             Time Calculation- OT    OT Received On 08/12/22  -AV      OT Goal Re-Cert Due Date 08/18/22  -AV              Timed Charges    23663 - OT Therapeutic Exercise Minutes 10  -AV              Total Minutes    Timed Charges Total Minutes 10  -AV       Total Minutes 10  -AV            User Key  (r) = Recorded By, (t) = Taken By, (c) = Cosigned By    Initials Name Provider Type    AV Jesus Justin OT Occupational Therapist              Therapy Charges for Today     Code Description Service Date Service Provider Modifiers Qty    [id number]  OT THER PROC EA 15 MIN 8/12/2022  Jesus Justin, OT GO 1               Jesus Justin, OT  8/12/2022

## 2022-08-12 NOTE — PROGRESS NOTES
Pulmonary / Critical Care Progress Note      Patient Name: Pavel Dai  : 1955  MRN: [id number]  Attending:  Robbie Mai MD  Date of admission: 2022    Subjective   Subjective   Follow-up for COPD exacerbation and respiratory failure    Slowly improving  Shortness of breath a little bit better.  Still gets breathless going to bedside commode  No wheezing  Cough is improving.  Bringing up thick yellow phlegm.  Does not feel like secretions are stuck like they were earlier in week  Remains weak and fatigued  No chest pain or hemoptysis.  Very weak and fatigued.  No nausea, fevers or chills.    Review of Systems  General: Fatigue and weakness, otherwise denied complaints  Cardiovascular:  Denied complaints  Respiratory: Dyspnea, cough, wheeze, otherwise denied complaints  Gastrointestinal: Denied complaints        Objective   Objective     Vitals:   Temp:  [97.5 °F (36.4 °C)-98.8 °F (37.1 °C)] 98.2 °F (36.8 °C)  Heart Rate:  [] 87  Resp:  [16-21] 21  BP: (119-151)/(67-80) 120/76  Flow (L/min):  [3] 3    Physical Exam   Vital Signs Reviewed   General: Thin chronically ill-appearing male, Alert, has conversational dyspnea.    HEENT:  PERRL, EOMI.  OP, nares clear  Neck:  Supple, no JVD, no thyromegaly  Chest:   Scaphoid chest, barrel chested, poor aeration, decreased wheezing and rhonchi bilaterally, tympanic to percussion bilaterally, pursed lip breathing noted  CV: RRR, no MGR, pulses 2+, equal.  Abd:  Soft, NT, ND, + BS, no HSM  EXT:  no clubbing, no cyanosis, no edema, muscle wasting noted all 4 extremities  Neuro:  A&Ox3, CN grossly intact, no focal deficits.  Skin: No rashes or lesions noted       Result Review    Result Review:  I have personally reviewed the results from the time of this admission to 2022 12:20 EDT and agree with these findings:  [x]  Laboratory  [x]  Microbiology  [x]  Radiology  [x]  EKG/Telemetry   [x]  Cardiology/Vascular   []  Pathology  []  Old  records  []  Other:  Most notable findings include:      Lab 08/12/22  0453 08/11/22  0451 08/10/22  0830 08/09/22  0454 08/08/22  0707 08/07/22  2331   WBC 5.06 5.22 4.72 5.97  --  7.92   HEMOGLOBIN 12.8* 12.7* 12.3* 11.3*  --  12.4*   HEMATOCRIT 41.6 41.0 39.3 36.9*  --  42.3   PLATELETS 264 255 230 233  --  230   SODIUM 144 138 142 141 143 145   POTASSIUM 3.8 3.9 3.5 3.2* 4.1 4.1   CHLORIDE 99 95* 98 94* 98 98   CO2 34.2* 35.0* 39.5* 40.1* 41.9* 42.5*   BUN 23 24* 24* 28* 28* 29*   CREATININE 0.62* 0.63* 0.63* 0.56* 0.52* 0.53*   GLUCOSE 90 95 103* 98 157* 147*   CALCIUM 9.1 9.3 9.2 9.6 9.8 9.7   PHOSPHORUS 4.2 3.6 3.7 2.8  --   --    TOTAL PROTEIN 6.9 7.0 6.8  --   --  7.6   ALBUMIN 3.70 3.80 3.90  --   --  4.20   GLOBULIN 3.2 3.2 2.9  --   --  3.4         Assessment & Plan   Assessment / Plan     Active Hospital Problems:  Active Hospital Problems    Diagnosis    • COPD exacerbation (HCC)    • Severe malnutrition (HCC)    • Acute on chronic respiratory failure with hypoxia and hypercapnia (HCC)        Impression:  Altered mental status  CO2 narcosis  Acute exacerbation of COPD  Acute on chronic hypoxemic and hypercapnic respiratory failure  Severe protein calorie malnutrition with muscle wasting  Community-acquired pneumonia from unspecified organism  3 cm groundglass infiltrate left lung consistent with infection.  Will need to follow-up as outpatient.  Tobacco use of cigarettes in remission  Hyperglycemia  Hypokalemia  Hypomagnesemia     Plan:  Continue NIPPV nightly with naps on current settings.  Wean O2 to keep SPO2 greater than 90%  Appreciate RT  arranging home NIPPV  Patient will require noninvasive positive pressure ventilation for COPD and chronic respiratory failure.  Alternative modes of ventilation are insufficient due to persistent hypercapnia despite BiPAP use.  This patient requires frequent durations of ventilatory support with battery back-up and continuous enhanced along  monitoring.  Intermittent support will be insufficient.  This patient quickly deteriorates without noninvasive ventilation.  Removal of ventilator may cause serious harm to the patient or frequent readmissions to the hospital.  We will repeat noncontrast chest CT in 3 months to confirm resolution of 3 cm left lung groundglass nodule versus infiltrate.  If persistent, then will need PET/CT and potential biopsy to rule out slow-growing bronchoalveolar carcinoma  Day 4/7 of prednisone 40 mg daily  Continue antibiotics.  Can discharge on Augmentin to complete 7 days of therapy  Continue nebulizers and bronchopulmonary hygiene  Continue home Daliresp 500 mcg daily.  May benefit from chronic macrolide therapy.  We will defer this decision to his outpatient pulmonologist, Dr. Myers  Supplements per dietitian  Trend renal function and electrolytes.      DVT prophylaxis:  Medical DVT prophylaxis orders are present.    CODE STATUS:   Code Status (Patient has no pulse and is not breathing): CPR (Attempt to Resuscitate)  Medical Interventions (Patient has pulse or is breathing): Full Support    He will follow-up with Dr. Myers as an outpatient.    Labs, imaging, microbiology, notes and medications personally reviewed  Discussed with primary service and bedside nurse.     Electronically signed by Nish Schaefer MD, 08/12/22, 12:21 PM EDT.

## 2022-08-12 NOTE — PROGRESS NOTES
" [facility]   Hospitalist Progress Note  Date: 2022  Patient Name: Pavel Dai  : 1955  MRN: [id number]  Date of admission: 2022      Subjective   Subjective     Chief Complaint: Shortness of air, confusion    Summary: 67 y.o.  male former smoker with COPD, CHF, CAD, HLD, HTN, and rectal cancer with chemo and radiation in  presents with with SOB and confusion/memory loss.  His report of the duration of this is somewhat inconsistent from the past 2-3 days to over a week.  Reports that he had a headache for the last week although he denies having it now; denies recent head trauma.  Pt also reports confusion and memory loss since around 9:30-10 AM today, but he denies any confusion currently. Pt states his relative said he was \"in and out\" during a conversation they had earlier today.  On arrival to the ED, infectious work-up negative.  Found to have hypercapnia with CO2 84, admitted for further care, started on BiPAP.  Pulmonology consulted.  MRI obtained.    Interval Followup: No acute events overnight, still short of breath, easily winded with minimal exertion    Review of Systems  Denies nausea vomiting or diarrhea  No chest pain no palpitations, positive shortness of breath  No fever no chills    Objective   Objective     Vitals:   Temp:  [97.5 °F (36.4 °C)-98.2 °F (36.8 °C)] 98.2 °F (36.8 °C)  Heart Rate:  [] 87  Resp:  [16-21] 21  BP: (119-136)/(67-80) 120/76  Flow (L/min):  [3] 3  Physical Exam   General appearance: NAD, conversant   Eyes: anicteric sclerae, moist conjunctivae; no lid-lag; PERRLA  HENT: Atraumatic; oropharynx clear with moist mucous membranes and no mucosal ulcerations; normal hard and soft palate  Neck: Trachea midline; FROM, supple, no thyromegaly or lymphadenopathy  Lungs: Prolonged expiratory phase, inspiratory and expiratory wheezing bilaterally, with normal respiratory effort and no intercostal retractions  CV: Regular Rate and Rhythm, no Murmurs, " Rubs, or Gallops   Abdomen: Soft, non-tender; no masses or Heptosplenomegally  Extremities: No peripheral edema or extremity lymphadenopathy  Skin: Normal temperature, turgor and texture; no rash, ulcers or subcutaneous nodules  Psych: Appropriate affect, alert and oriented to person, place and time  Neuro: CN II - XII intact no motor deficits, no sensory defecits      Result Review    Result Review:  I have personally reviewed the results from the time of this admission to 8/12/2022 15:08 EDT and agree with these findings:  [x]  Laboratory all labs reviewed  []  Microbiology  []  Radiology  [x]  EKG/Telemetry   []  Cardiology/Vascular   []  Pathology  []  Old records  []  Other:  CBC    CBC 8/10/22 8/11/22 8/12/22   WBC 4.72 5.22 5.06   RBC 4.02 (A) 4.26 4.30   Hemoglobin 12.3 (A) 12.7 (A) 12.8 (A)   Hematocrit 39.3 41.0 41.6   MCV 97.8 (A) 96.2 96.7   MCH 30.6 29.8 29.8   MCHC 31.3 (A) 31.0 (A) 30.8 (A)   RDW 12.5 12.4 12.5   Platelets 230 255 264   (A) Abnormal value            CMP    CMP 8/10/22 8/11/22 8/12/22   Glucose 103 (A) 95 90   BUN 24 (A) 24 (A) 23   Creatinine 0.63 (A) 0.63 (A) 0.62 (A)   Sodium 142 138 144   Potassium 3.5 3.9 3.8   Chloride 98 95 (A) 99   Calcium 9.2 9.3 9.1   Albumin 3.90 3.80 3.70   Total Bilirubin 0.3 0.3 0.2   Alkaline Phosphatase 57 60 58   AST (SGOT) 27 23 19   ALT (SGPT) 20 21 20   (A) Abnormal value              Assessment & Plan   Assessment / Plan     Assessment/Plan:  Acute hypercapnic hypoxemic respiratory failure  COPD with acute exacerbation  Metabolic encephalopathy secondary to above  Concern for community-acquired pneumonia due to unknown bacterial source  Headache  Chronic hypoxemia on 2-3 nasal cannula  Confusion  Elevated troponin, ACS ruled out  History of chronic CHF  Severe protein calorie malnutrition  Cachexia  CAD  HLD  Hypertension  Rectal cancer, recently completed chemo and radiation    Continue to monitor in the hospital for management of the  above  Consult pulmonology, appreciate assistance.  If patient does not have a CPAP/BiPAP at home, he may benefit from one at discharge  Hypercapnia is resolved, patient still remains intermittently confused  Mental status at baseline  Continue with scheduled duo nebs, Pulmicort and Brovana twice daily, albuterol as needed, bronchopulmonary hygiene  Continue prednisone  CT chest without contrast reviewed  Continue Rocephin azithromycin due to concern for community-acquired pneumonia, de-escalate based on cultures  Follow-up sputum culture  Echocardiogram  Stress test negative for inducible ischemia, no further chest pain continue appropriate home medications  Trend renal function and electrolytes with a.m. BMP  Trend Hgb and WBC with a.m. CBC     Reviewed patients labs and imaging, and discussed with patient and nurse at bedside.    DVT prophylaxis:  Medical DVT prophylaxis orders are present.    CODE STATUS:   Code Status (Patient has no pulse and is not breathing): CPR (Attempt to Resuscitate)  Medical Interventions (Patient has pulse or is breathing): Full Support      Electronically signed by Robbie Mai MD, 08/12/22, 3:08 PM EDT.

## 2022-08-12 NOTE — PLAN OF CARE
Goal Outcome Evaluation:  Plan of Care Reviewed With: patient        Progress: no change  Outcome Evaluation: No issues overnight. Patient wore CPAP while sleeping and maintained saturations consistently above 88%

## 2022-08-13 LAB
ALBUMIN SERPL-MCNC: 4 G/DL (ref 3.5–5.2)
ALBUMIN/GLOB SERPL: 1.3 G/DL
ALP SERPL-CCNC: 63 U/L (ref 39–117)
ALT SERPL W P-5'-P-CCNC: 23 U/L (ref 1–41)
ANION GAP SERPL CALCULATED.3IONS-SCNC: 10.4 MMOL/L (ref 5–15)
AST SERPL-CCNC: 21 U/L (ref 1–40)
BASOPHILS # BLD AUTO: 0.02 10*3/MM3 (ref 0–0.2)
BASOPHILS NFR BLD AUTO: 0.4 % (ref 0–1.5)
BILIRUB SERPL-MCNC: 0.2 MG/DL (ref 0–1.2)
BUN SERPL-MCNC: 23 MG/DL (ref 8–23)
BUN/CREAT SERPL: 35.9 (ref 7–25)
CALCIUM SPEC-SCNC: 9.3 MG/DL (ref 8.6–10.5)
CHLORIDE SERPL-SCNC: 100 MMOL/L (ref 98–107)
CO2 SERPL-SCNC: 32.6 MMOL/L (ref 22–29)
CREAT SERPL-MCNC: 0.64 MG/DL (ref 0.76–1.27)
DEPRECATED RDW RBC AUTO: 46 FL (ref 37–54)
EGFRCR SERPLBLD CKD-EPI 2021: 105.1 ML/MIN/1.73
EOSINOPHIL # BLD AUTO: 0.13 10*3/MM3 (ref 0–0.4)
EOSINOPHIL NFR BLD AUTO: 2.3 % (ref 0.3–6.2)
ERYTHROCYTE [DISTWIDTH] IN BLOOD BY AUTOMATED COUNT: 12.7 % (ref 12.3–15.4)
GLOBULIN UR ELPH-MCNC: 3 GM/DL
GLUCOSE BLDC GLUCOMTR-MCNC: 120 MG/DL (ref 70–99)
GLUCOSE BLDC GLUCOMTR-MCNC: 88 MG/DL (ref 70–99)
GLUCOSE BLDC GLUCOMTR-MCNC: 89 MG/DL (ref 70–99)
GLUCOSE SERPL-MCNC: 85 MG/DL (ref 65–99)
HCT VFR BLD AUTO: 42.7 % (ref 37.5–51)
HGB BLD-MCNC: 13 G/DL (ref 13–17.7)
IMM GRANULOCYTES # BLD AUTO: 0.04 10*3/MM3 (ref 0–0.05)
IMM GRANULOCYTES NFR BLD AUTO: 0.7 % (ref 0–0.5)
LYMPHOCYTES # BLD AUTO: 0.7 10*3/MM3 (ref 0.7–3.1)
LYMPHOCYTES NFR BLD AUTO: 12.4 % (ref 19.6–45.3)
MAGNESIUM SERPL-MCNC: 2 MG/DL (ref 1.6–2.4)
MCH RBC QN AUTO: 30 PG (ref 26.6–33)
MCHC RBC AUTO-ENTMCNC: 30.4 G/DL (ref 31.5–35.7)
MCV RBC AUTO: 98.4 FL (ref 79–97)
MONOCYTES # BLD AUTO: 0.68 10*3/MM3 (ref 0.1–0.9)
MONOCYTES NFR BLD AUTO: 12 % (ref 5–12)
NEUTROPHILS NFR BLD AUTO: 4.08 10*3/MM3 (ref 1.7–7)
NEUTROPHILS NFR BLD AUTO: 72.2 % (ref 42.7–76)
NRBC BLD AUTO-RTO: 0 /100 WBC (ref 0–0.2)
PHOSPHATE SERPL-MCNC: 4.1 MG/DL (ref 2.5–4.5)
PLATELET # BLD AUTO: 271 10*3/MM3 (ref 140–450)
PMV BLD AUTO: 10.1 FL (ref 6–12)
POTASSIUM SERPL-SCNC: 3.6 MMOL/L (ref 3.5–5.2)
PROT SERPL-MCNC: 7 G/DL (ref 6–8.5)
RBC # BLD AUTO: 4.34 10*6/MM3 (ref 4.14–5.8)
SODIUM SERPL-SCNC: 143 MMOL/L (ref 136–145)
WBC NRBC COR # BLD: 5.65 10*3/MM3 (ref 3.4–10.8)

## 2022-08-13 PROCEDURE — 99232 SBSQ HOSP IP/OBS MODERATE 35: CPT | Performed by: INTERNAL MEDICINE

## 2022-08-13 PROCEDURE — 25010000002 CEFTRIAXONE PER 250 MG: Performed by: INTERNAL MEDICINE

## 2022-08-13 PROCEDURE — 85025 COMPLETE CBC W/AUTO DIFF WBC: CPT | Performed by: INTERNAL MEDICINE

## 2022-08-13 PROCEDURE — 25010000002 ENOXAPARIN PER 10 MG: Performed by: INTERNAL MEDICINE

## 2022-08-13 PROCEDURE — 82962 GLUCOSE BLOOD TEST: CPT

## 2022-08-13 PROCEDURE — 83735 ASSAY OF MAGNESIUM: CPT | Performed by: INTERNAL MEDICINE

## 2022-08-13 PROCEDURE — 80053 COMPREHEN METABOLIC PANEL: CPT | Performed by: INTERNAL MEDICINE

## 2022-08-13 PROCEDURE — 94761 N-INVAS EAR/PLS OXIMETRY MLT: CPT

## 2022-08-13 PROCEDURE — 94799 UNLISTED PULMONARY SVC/PX: CPT

## 2022-08-13 PROCEDURE — 84100 ASSAY OF PHOSPHORUS: CPT | Performed by: INTERNAL MEDICINE

## 2022-08-13 PROCEDURE — 63710000001 PREDNISONE PER 1 MG: Performed by: INTERNAL MEDICINE

## 2022-08-13 RX ADMIN — CEFTRIAXONE SODIUM 1 G: 1 INJECTION, SOLUTION INTRAVENOUS at 09:17

## 2022-08-13 RX ADMIN — ACETAMINOPHEN 325MG 650 MG: 325 TABLET ORAL at 21:06

## 2022-08-13 RX ADMIN — BUDESONIDE 0.5 MG: 0.5 SUSPENSION RESPIRATORY (INHALATION) at 18:28

## 2022-08-13 RX ADMIN — PANTOPRAZOLE SODIUM 40 MG: 40 TABLET, DELAYED RELEASE ORAL at 06:26

## 2022-08-13 RX ADMIN — GUAIFENESIN 600 MG: 600 TABLET ORAL at 09:12

## 2022-08-13 RX ADMIN — BUDESONIDE 0.5 MG: 0.5 SUSPENSION RESPIRATORY (INHALATION) at 07:24

## 2022-08-13 RX ADMIN — IPRATROPIUM BROMIDE AND ALBUTEROL SULFATE 3 ML: 2.5; .5 SOLUTION RESPIRATORY (INHALATION) at 18:28

## 2022-08-13 RX ADMIN — ARFORMOTEROL TARTRATE 15 MCG: 15 SOLUTION RESPIRATORY (INHALATION) at 18:28

## 2022-08-13 RX ADMIN — ARFORMOTEROL TARTRATE 15 MCG: 15 SOLUTION RESPIRATORY (INHALATION) at 07:24

## 2022-08-13 RX ADMIN — Medication 10 ML: at 21:06

## 2022-08-13 RX ADMIN — GUAIFENESIN 600 MG: 600 TABLET ORAL at 21:06

## 2022-08-13 RX ADMIN — PREDNISONE 40 MG: 20 TABLET ORAL at 09:12

## 2022-08-13 RX ADMIN — ASPIRIN 81 MG CHEWABLE TABLET 81 MG: 81 TABLET CHEWABLE at 09:12

## 2022-08-13 RX ADMIN — Medication 10 ML: at 09:12

## 2022-08-13 RX ADMIN — IPRATROPIUM BROMIDE AND ALBUTEROL SULFATE 3 ML: 2.5; .5 SOLUTION RESPIRATORY (INHALATION) at 07:24

## 2022-08-13 RX ADMIN — ATORVASTATIN CALCIUM 10 MG: 10 TABLET, FILM COATED ORAL at 21:06

## 2022-08-13 RX ADMIN — ACETAMINOPHEN 325MG 325 MG: 325 TABLET ORAL at 06:33

## 2022-08-13 RX ADMIN — IPRATROPIUM BROMIDE AND ALBUTEROL SULFATE 3 ML: 2.5; .5 SOLUTION RESPIRATORY (INHALATION) at 00:06

## 2022-08-13 RX ADMIN — IPRATROPIUM BROMIDE AND ALBUTEROL SULFATE 3 ML: 2.5; .5 SOLUTION RESPIRATORY (INHALATION) at 12:53

## 2022-08-13 RX ADMIN — ACETAMINOPHEN 325MG 325 MG: 325 TABLET ORAL at 06:26

## 2022-08-13 RX ADMIN — ROFLUMILAST 500 MCG: 500 TABLET ORAL at 09:12

## 2022-08-13 RX ADMIN — ENOXAPARIN SODIUM 40 MG: 100 INJECTION SUBCUTANEOUS at 09:12

## 2022-08-13 NOTE — PROGRESS NOTES
Pulmonary / Critical Care Progress Note      Patient Name: Pavel Dai  : 1955  MRN: [id number]  Attending:  Robbie Mai MD  Date of admission: 2022    Subjective   Subjective   Follow-up for COPD exacerbation and respiratory failure    Slowly improving  Shortness of breath a little bit better.  Still gets breathless going to bedside commode  No wheezing  Using NIPPV to sleep  Overall feels better  Still very weak    Review of Systems  General: Fatigue and weakness, otherwise denied complaints  Cardiovascular:  Denied complaints  Respiratory: Dyspnea, cough, wheeze, otherwise denied complaints  Gastrointestinal: Denied complaints        Objective   Objective     Vitals:   Temp:  [98 °F (36.7 °C)-98.5 °F (36.9 °C)] 98.5 °F (36.9 °C)  Heart Rate:  [] 103  Resp:  [18-20] 18  BP: (107-130)/(63-74) 110/68  Flow (L/min):  [3] 3    Physical Exam   Vital Signs Reviewed   General: Thin chronically ill-appearing male, Alert, has conversational dyspnea.    HEENT:  PERRL, EOMI.  OP, nares clear  Neck:  Supple, no JVD, no thyromegaly  Chest:   Scaphoid chest, barrel chested, poor aeration, decreased wheezing and rhonchi bilaterally, tympanic to percussion bilaterally, pursed lip breathing noted  CV: RRR, no MGR, pulses 2+, equal.  Abd:  Soft, NT, ND, + BS, no HSM  EXT:  no clubbing, no cyanosis, no edema, muscle wasting noted all 4 extremities  Neuro:  A&Ox3, CN grossly intact, no focal deficits.  Skin: No rashes or lesions noted       Result Review    Result Review:  I have personally reviewed the results from the time of this admission to 2022 16:00 EDT and agree with these findings:  [x]  Laboratory  [x]  Microbiology  [x]  Radiology  [x]  EKG/Telemetry   [x]  Cardiology/Vascular   []  Pathology  []  Old records  []  Other:  Most notable findings include:      Lab 22  0530 22  0453 22  0451 08/10/22  0830 22  0454 22  0707 22  2331   WBC 5.65 5.06 5.22  4.72 5.97  --  7.92   HEMOGLOBIN 13.0 12.8* 12.7* 12.3* 11.3*  --  12.4*   HEMATOCRIT 42.7 41.6 41.0 39.3 36.9*  --  42.3   PLATELETS 271 264 255 230 233  --  230   SODIUM 143 144 138 142 141 143 145   POTASSIUM 3.6 3.8 3.9 3.5 3.2* 4.1 4.1   CHLORIDE 100 99 95* 98 94* 98 98   CO2 32.6* 34.2* 35.0* 39.5* 40.1* 41.9* 42.5*   BUN 23 23 24* 24* 28* 28* 29*   CREATININE 0.64* 0.62* 0.63* 0.63* 0.56* 0.52* 0.53*   GLUCOSE 85 90 95 103* 98 157* 147*   CALCIUM 9.3 9.1 9.3 9.2 9.6 9.8 9.7   PHOSPHORUS 4.1 4.2 3.6 3.7 2.8  --   --    TOTAL PROTEIN 7.0 6.9 7.0 6.8  --   --  7.6   ALBUMIN 4.00 3.70 3.80 3.90  --   --  4.20   GLOBULIN 3.0 3.2 3.2 2.9  --   --  3.4         Assessment & Plan   Assessment / Plan     Active Hospital Problems:  Active Hospital Problems    Diagnosis    • COPD exacerbation (HCC)    • Severe malnutrition (HCC)    • Acute on chronic respiratory failure with hypoxia and hypercapnia (HCC)        Impression:  Altered mental status  CO2 narcosis  Acute exacerbation of COPD  Acute on chronic hypoxemic and hypercapnic respiratory failure  Severe protein calorie malnutrition with muscle wasting  Community-acquired pneumonia from unspecified organism  3 cm groundglass infiltrate left lung consistent with infection.  Will need to follow-up as outpatient.  Tobacco use of cigarettes in remission  Hyperglycemia  Hypokalemia  Hypomagnesemia     Plan:  We will repeat noncontrast chest CT in 3 months to confirm resolution of 3 cm left lung groundglass nodule versus infiltrate.  If persistent, then will need PET/CT and potential biopsy to rule out slow-growing bronchoalveolar carcinoma  Day 5/7 of prednisone 40 mg daily  Continue antibiotics.  Can discharge on Augmentin to complete 7 days of therapy  Continue nebulizers and bronchopulmonary hygiene  Continue home Daliresp 500 mcg daily.  May benefit from chronic macrolide therapy.  We will defer this decision to his outpatient pulmonologist, Dr. Myers  Supplements  per dietitian  Trend renal function and electrolytes.      DVT prophylaxis:  Medical DVT prophylaxis orders are present.    CODE STATUS:   Code Status (Patient has no pulse and is not breathing): CPR (Attempt to Resuscitate)  Medical Interventions (Patient has pulse or is breathing): Full Support    Electronically signed by Enzo Del Castillo DO, 08/13/22, 4:00 PM EDT.

## 2022-08-13 NOTE — PLAN OF CARE
Goal Outcome Evaluation:  Plan of Care Reviewed With: patient        Progress: no change  Outcome Evaluation: Patient remains alert and oriented x4. No complaints of pain throughout the shift. Patient remains on 3L n/c tolerating well.

## 2022-08-13 NOTE — PROGRESS NOTES
" [facility]   Hospitalist Progress Note  Date: 2022  Patient Name: Pavel Dai  : 1955  MRN: [id number]  Date of admission: 2022      Subjective   Subjective     Chief Complaint: Shortness of air, confusion    Summary: 67 y.o.  male former smoker with COPD, CHF, CAD, HLD, HTN, and rectal cancer with chemo and radiation in  presents with with SOB and confusion/memory loss.  His report of the duration of this is somewhat inconsistent from the past 2-3 days to over a week.  Reports that he had a headache for the last week although he denies having it now; denies recent head trauma.  Pt also reports confusion and memory loss since around 9:30-10 AM today, but he denies any confusion currently. Pt states his relative said he was \"in and out\" during a conversation they had earlier today.  On arrival to the ED, infectious work-up negative.  Found to have hypercapnia with CO2 84, admitted for further care, started on BiPAP.  Pulmonology consulted.  MRI obtained.    Interval Followup: No acute events overnight, patient remains easily winded, short of breath with minimal exertion.  Is improving    Review of Systems  Denies nausea vomiting or diarrhea  No chest pain no palpitations, positive shortness of breath  No fever no chills    Objective   Objective     Vitals:   Temp:  [98 °F (36.7 °C)-98.4 °F (36.9 °C)] 98 °F (36.7 °C)  Heart Rate:  [] 88  Resp:  [18-21] 18  BP: (107-130)/(63-74) 107/69  Flow (L/min):  [3] 3  Physical Exam   General appearance: NAD, conversant   Eyes: anicteric sclerae, moist conjunctivae; no lid-lag; PERRLA  HENT: Atraumatic; oropharynx clear with moist mucous membranes and no mucosal ulcerations; normal hard and soft palate  Neck: Trachea midline; FROM, supple, no thyromegaly or lymphadenopathy  Lungs: Prolonged expiratory phase, inspiratory and expiratory wheezing bilaterally, with normal respiratory effort and no intercostal retractions  CV: Regular Rate and " Rhythm, no Murmurs, Rubs, or Gallops   Abdomen: Soft, non-tender; no masses or Heptosplenomegally  Extremities: No peripheral edema or extremity lymphadenopathy  Skin: Normal temperature, turgor and texture; no rash, ulcers or subcutaneous nodules  Psych: Appropriate affect, alert and oriented to person, place and time  Neuro: CN II - XII intact no motor deficits, no sensory defecits      Result Review    Result Review:  I have personally reviewed the results from the time of this admission to 8/13/2022 11:05 EDT and agree with these findings:  [x]  Laboratory all labs reviewed  []  Microbiology  []  Radiology  [x]  EKG/Telemetry   []  Cardiology/Vascular   []  Pathology  []  Old records  []  Other:  CBC    CBC 8/11/22 8/12/22 8/13/22   WBC 5.22 5.06 5.65   RBC 4.26 4.30 4.34   Hemoglobin 12.7 (A) 12.8 (A) 13.0   Hematocrit 41.0 41.6 42.7   MCV 96.2 96.7 98.4 (A)   MCH 29.8 29.8 30.0   MCHC 31.0 (A) 30.8 (A) 30.4 (A)   RDW 12.4 12.5 12.7   Platelets 255 264 271   (A) Abnormal value            CMP    CMP 8/11/22 8/12/22 8/13/22   Glucose 95 90 85   BUN 24 (A) 23 23   Creatinine 0.63 (A) 0.62 (A) 0.64 (A)   Sodium 138 144 143   Potassium 3.9 3.8 3.6   Chloride 95 (A) 99 100   Calcium 9.3 9.1 9.3   Albumin 3.80 3.70 4.00   Total Bilirubin 0.3 0.2 0.2   Alkaline Phosphatase 60 58 63   AST (SGOT) 23 19 21   ALT (SGPT) 21 20 23   (A) Abnormal value              Assessment & Plan   Assessment / Plan     Assessment/Plan:  Acute hypercapnic hypoxemic respiratory failure  COPD with acute exacerbation  Metabolic encephalopathy secondary to above  Concern for community-acquired pneumonia due to unknown bacterial source  Headache  Chronic hypoxemia on 2-3 nasal cannula  Confusion  Elevated troponin, ACS ruled out  History of chronic CHF  Severe protein calorie malnutrition  Cachexia  CAD  HLD  Hypertension  Rectal cancer, recently completed chemo and radiation    Continue to monitor in the hospital for management of the  above  Consult pulmonology, appreciate assistance.  If patient does not have a CPAP/BiPAP at home, he may benefit from one at discharge  Hypercapnia is resolved, mental status at baseline  Continue with scheduled duo nebs, Pulmicort and Brovana twice daily, albuterol as needed, bronchopulmonary hygiene  Continue prednisone to completion  CT chest without contrast reviewed  Continue Rocephin azithromycin due to concern for community-acquired pneumonia, de-escalate based on cultures  Follow-up sputum culture  Echocardiogram  Stress test negative for inducible ischemia, no further chest pain continue appropriate home medications  Home when okay with pulmonology  Trend renal function and electrolytes with a.m. BMP  Trend Hgb and WBC with a.m. CBC     Reviewed patients labs and imaging, and discussed with patient and nurse at bedside.    DVT prophylaxis:  Medical DVT prophylaxis orders are present.    CODE STATUS:   Code Status (Patient has no pulse and is not breathing): CPR (Attempt to Resuscitate)  Medical Interventions (Patient has pulse or is breathing): Full Support      Electronically signed by Robbie Mai MD, 08/13/22, 11:05 AM EDT.

## 2022-08-13 NOTE — PLAN OF CARE
Goal Outcome Evaluation:  Plan of Care Reviewed With: patient        Progress: no change  Outcome Evaluation: Patient remains alert and oriented. Slept with cpap. No complaints, vitals stable, patient slept between care.

## 2022-08-14 LAB
ALBUMIN SERPL-MCNC: 3.7 G/DL (ref 3.5–5.2)
ALBUMIN/GLOB SERPL: 1.4 G/DL
ALP SERPL-CCNC: 56 U/L (ref 39–117)
ALT SERPL W P-5'-P-CCNC: 22 U/L (ref 1–41)
ANION GAP SERPL CALCULATED.3IONS-SCNC: 4.3 MMOL/L (ref 5–15)
AST SERPL-CCNC: 20 U/L (ref 1–40)
BASOPHILS # BLD AUTO: 0.01 10*3/MM3 (ref 0–0.2)
BASOPHILS NFR BLD AUTO: 0.2 % (ref 0–1.5)
BILIRUB SERPL-MCNC: 0.2 MG/DL (ref 0–1.2)
BUN SERPL-MCNC: 23 MG/DL (ref 8–23)
BUN/CREAT SERPL: 37.7 (ref 7–25)
CALCIUM SPEC-SCNC: 9.5 MG/DL (ref 8.6–10.5)
CHLORIDE SERPL-SCNC: 96 MMOL/L (ref 98–107)
CO2 SERPL-SCNC: 36.7 MMOL/L (ref 22–29)
CREAT SERPL-MCNC: 0.61 MG/DL (ref 0.76–1.27)
DEPRECATED RDW RBC AUTO: 43.8 FL (ref 37–54)
EGFRCR SERPLBLD CKD-EPI 2021: 106.6 ML/MIN/1.73
EOSINOPHIL # BLD AUTO: 0.11 10*3/MM3 (ref 0–0.4)
EOSINOPHIL NFR BLD AUTO: 1.9 % (ref 0.3–6.2)
ERYTHROCYTE [DISTWIDTH] IN BLOOD BY AUTOMATED COUNT: 12.5 % (ref 12.3–15.4)
GLOBULIN UR ELPH-MCNC: 2.7 GM/DL
GLUCOSE BLDC GLUCOMTR-MCNC: 107 MG/DL (ref 70–99)
GLUCOSE BLDC GLUCOMTR-MCNC: 82 MG/DL (ref 70–99)
GLUCOSE BLDC GLUCOMTR-MCNC: 98 MG/DL (ref 70–99)
GLUCOSE SERPL-MCNC: 95 MG/DL (ref 65–99)
HCT VFR BLD AUTO: 38.8 % (ref 37.5–51)
HGB BLD-MCNC: 12.4 G/DL (ref 13–17.7)
IMM GRANULOCYTES # BLD AUTO: 0.03 10*3/MM3 (ref 0–0.05)
IMM GRANULOCYTES NFR BLD AUTO: 0.5 % (ref 0–0.5)
LYMPHOCYTES # BLD AUTO: 0.68 10*3/MM3 (ref 0.7–3.1)
LYMPHOCYTES NFR BLD AUTO: 11.6 % (ref 19.6–45.3)
MAGNESIUM SERPL-MCNC: 2 MG/DL (ref 1.6–2.4)
MCH RBC QN AUTO: 30.8 PG (ref 26.6–33)
MCHC RBC AUTO-ENTMCNC: 32 G/DL (ref 31.5–35.7)
MCV RBC AUTO: 96.5 FL (ref 79–97)
MONOCYTES # BLD AUTO: 0.72 10*3/MM3 (ref 0.1–0.9)
MONOCYTES NFR BLD AUTO: 12.3 % (ref 5–12)
NEUTROPHILS NFR BLD AUTO: 4.32 10*3/MM3 (ref 1.7–7)
NEUTROPHILS NFR BLD AUTO: 73.5 % (ref 42.7–76)
NRBC BLD AUTO-RTO: 0 /100 WBC (ref 0–0.2)
PHOSPHATE SERPL-MCNC: 3.7 MG/DL (ref 2.5–4.5)
PLATELET # BLD AUTO: 248 10*3/MM3 (ref 140–450)
PMV BLD AUTO: 10.4 FL (ref 6–12)
POTASSIUM SERPL-SCNC: 4.1 MMOL/L (ref 3.5–5.2)
PROT SERPL-MCNC: 6.4 G/DL (ref 6–8.5)
RBC # BLD AUTO: 4.02 10*6/MM3 (ref 4.14–5.8)
SODIUM SERPL-SCNC: 137 MMOL/L (ref 136–145)
WBC NRBC COR # BLD: 5.87 10*3/MM3 (ref 3.4–10.8)

## 2022-08-14 PROCEDURE — 63710000001 PREDNISONE PER 1 MG: Performed by: INTERNAL MEDICINE

## 2022-08-14 PROCEDURE — 94799 UNLISTED PULMONARY SVC/PX: CPT

## 2022-08-14 PROCEDURE — 99232 SBSQ HOSP IP/OBS MODERATE 35: CPT | Performed by: INTERNAL MEDICINE

## 2022-08-14 PROCEDURE — 25010000002 CEFTRIAXONE PER 250 MG: Performed by: INTERNAL MEDICINE

## 2022-08-14 PROCEDURE — 83735 ASSAY OF MAGNESIUM: CPT | Performed by: INTERNAL MEDICINE

## 2022-08-14 PROCEDURE — 82962 GLUCOSE BLOOD TEST: CPT

## 2022-08-14 PROCEDURE — 85025 COMPLETE CBC W/AUTO DIFF WBC: CPT | Performed by: INTERNAL MEDICINE

## 2022-08-14 PROCEDURE — 99233 SBSQ HOSP IP/OBS HIGH 50: CPT | Performed by: INTERNAL MEDICINE

## 2022-08-14 PROCEDURE — 94761 N-INVAS EAR/PLS OXIMETRY MLT: CPT

## 2022-08-14 PROCEDURE — 94664 DEMO&/EVAL PT USE INHALER: CPT

## 2022-08-14 PROCEDURE — 84100 ASSAY OF PHOSPHORUS: CPT | Performed by: INTERNAL MEDICINE

## 2022-08-14 PROCEDURE — 80053 COMPREHEN METABOLIC PANEL: CPT | Performed by: INTERNAL MEDICINE

## 2022-08-14 PROCEDURE — 25010000002 ENOXAPARIN PER 10 MG: Performed by: INTERNAL MEDICINE

## 2022-08-14 RX ADMIN — PREDNISONE 40 MG: 20 TABLET ORAL at 08:47

## 2022-08-14 RX ADMIN — ROFLUMILAST 500 MCG: 500 TABLET ORAL at 08:48

## 2022-08-14 RX ADMIN — GUAIFENESIN 600 MG: 600 TABLET ORAL at 20:40

## 2022-08-14 RX ADMIN — ATORVASTATIN CALCIUM 10 MG: 10 TABLET, FILM COATED ORAL at 20:40

## 2022-08-14 RX ADMIN — ARFORMOTEROL TARTRATE 15 MCG: 15 SOLUTION RESPIRATORY (INHALATION) at 07:03

## 2022-08-14 RX ADMIN — Medication 10 ML: at 08:48

## 2022-08-14 RX ADMIN — PANTOPRAZOLE SODIUM 40 MG: 40 TABLET, DELAYED RELEASE ORAL at 06:16

## 2022-08-14 RX ADMIN — IPRATROPIUM BROMIDE AND ALBUTEROL SULFATE 3 ML: 2.5; .5 SOLUTION RESPIRATORY (INHALATION) at 18:59

## 2022-08-14 RX ADMIN — ENOXAPARIN SODIUM 40 MG: 100 INJECTION SUBCUTANEOUS at 08:47

## 2022-08-14 RX ADMIN — Medication 10 ML: at 20:40

## 2022-08-14 RX ADMIN — ACETAMINOPHEN 325MG 650 MG: 325 TABLET ORAL at 20:40

## 2022-08-14 RX ADMIN — BUDESONIDE 0.5 MG: 0.5 SUSPENSION RESPIRATORY (INHALATION) at 18:59

## 2022-08-14 RX ADMIN — CEFTRIAXONE SODIUM 1 G: 1 INJECTION, SOLUTION INTRAVENOUS at 10:40

## 2022-08-14 RX ADMIN — BUDESONIDE 0.5 MG: 0.5 SUSPENSION RESPIRATORY (INHALATION) at 07:03

## 2022-08-14 RX ADMIN — IPRATROPIUM BROMIDE AND ALBUTEROL SULFATE 3 ML: 2.5; .5 SOLUTION RESPIRATORY (INHALATION) at 11:48

## 2022-08-14 RX ADMIN — IPRATROPIUM BROMIDE AND ALBUTEROL SULFATE 3 ML: 2.5; .5 SOLUTION RESPIRATORY (INHALATION) at 00:00

## 2022-08-14 RX ADMIN — GUAIFENESIN 600 MG: 600 TABLET ORAL at 08:48

## 2022-08-14 RX ADMIN — IPRATROPIUM BROMIDE AND ALBUTEROL SULFATE 3 ML: 2.5; .5 SOLUTION RESPIRATORY (INHALATION) at 07:03

## 2022-08-14 RX ADMIN — ARFORMOTEROL TARTRATE 15 MCG: 15 SOLUTION RESPIRATORY (INHALATION) at 18:59

## 2022-08-14 RX ADMIN — ASPIRIN 81 MG CHEWABLE TABLET 81 MG: 81 TABLET CHEWABLE at 08:48

## 2022-08-14 NOTE — PLAN OF CARE
Goal Outcome Evaluation:  Plan of Care Reviewed With: patient        Progress: no change  Outcome Evaluation: Patient remains alert and oriented x4. No complaints of pain throughout the shift. Patient remains on 3L n/c, tolerating well.

## 2022-08-14 NOTE — PROGRESS NOTES
Pulmonary / Critical Care Progress Note      Patient Name: Pavel Dai  : 1955  MRN: [id number]  Attending:  Robbie Mai MD  Date of admission: 2022    Subjective   Subjective   Follow-up for COPD exacerbation and respiratory failure    Slowly improving  Shortness of breath a little bit better.  Still gets breathless going to bedside commode  No wheezing  Using NIPPV to sleep  Has some issues with it leaking  Overall feels better  Still very weak    Review of Systems  General: Fatigue and weakness, otherwise denied complaints  Cardiovascular:  Denied complaints  Respiratory: Dyspnea, cough, wheeze, otherwise denied complaints  Gastrointestinal: Denied complaints        Objective   Objective     Vitals:   Temp:  [97.16 °F (36.2 °C)-98.6 °F (37 °C)] 98.1 °F (36.7 °C)  Heart Rate:  [] 100  Resp:  [18-20] 20  BP: (104-128)/(68-81) 104/69  Flow (L/min):  [3] 3    Physical Exam   Vital Signs Reviewed   General: Thin chronically ill-appearing male, Alert, has conversational dyspnea.    HEENT:  PERRL, EOMI.  OP, nares clear  Neck:  Supple, no JVD, no thyromegaly  Chest:   Scaphoid chest, barrel chested, poor aeration, decreased wheezing and rhonchi bilaterally, tympanic to percussion bilaterally, pursed lip breathing noted  CV: RRR, no MGR, pulses 2+, equal.  Abd:  Soft, NT, ND, + BS, no HSM  EXT:  no clubbing, no cyanosis, no edema, muscle wasting noted all 4 extremities  Neuro:  A&Ox3, CN grossly intact, no focal deficits.  Skin: No rashes or lesions noted       Result Review    Result Review:  I have personally reviewed the results from the time of this admission to 2022 13:47 EDT and agree with these findings:  [x]  Laboratory  [x]  Microbiology  [x]  Radiology  [x]  EKG/Telemetry   [x]  Cardiology/Vascular   []  Pathology  []  Old records  []  Other:  Most notable findings include:      Lab 22  0619 22  0530 22  0453 22  0451 08/10/22  0830 22  0454  08/08/22  0707 08/07/22  2331   WBC 5.87 5.65 5.06 5.22 4.72 5.97  --  7.92   HEMOGLOBIN 12.4* 13.0 12.8* 12.7* 12.3* 11.3*  --  12.4*   HEMATOCRIT 38.8 42.7 41.6 41.0 39.3 36.9*  --  42.3   PLATELETS 248 271 264 255 230 233  --  230   SODIUM 137 143 144 138 142 141 143 145   POTASSIUM 4.1 3.6 3.8 3.9 3.5 3.2* 4.1 4.1   CHLORIDE 96* 100 99 95* 98 94* 98 98   CO2 36.7* 32.6* 34.2* 35.0* 39.5* 40.1* 41.9* 42.5*   BUN 23 23 23 24* 24* 28* 28* 29*   CREATININE 0.61* 0.64* 0.62* 0.63* 0.63* 0.56* 0.52* 0.53*   GLUCOSE 95 85 90 95 103* 98 157* 147*   CALCIUM 9.5 9.3 9.1 9.3 9.2 9.6 9.8 9.7   PHOSPHORUS 3.7 4.1 4.2 3.6 3.7 2.8  --   --    TOTAL PROTEIN 6.4 7.0 6.9 7.0 6.8  --   --  7.6   ALBUMIN 3.70 4.00 3.70 3.80 3.90  --   --  4.20   GLOBULIN 2.7 3.0 3.2 3.2 2.9  --   --  3.4         Assessment & Plan   Assessment / Plan     Active Hospital Problems:  Active Hospital Problems    Diagnosis    • COPD exacerbation (HCC)    • Severe malnutrition (HCC)    • Acute on chronic respiratory failure with hypoxia and hypercapnia (HCC)        Impression:  Altered mental status  CO2 narcosis  Acute exacerbation of COPD  Acute on chronic hypoxemic and hypercapnic respiratory failure  Severe protein calorie malnutrition with muscle wasting  Community-acquired pneumonia from unspecified organism  3 cm groundglass infiltrate left lung consistent with infection.  Will need to follow-up as outpatient.  Tobacco use of cigarettes in remission  Hyperglycemia  Hypokalemia  Hypomagnesemia     Plan:  We will repeat noncontrast chest CT in 3 months to confirm resolution of 3 cm left lung groundglass nodule versus infiltrate.  If persistent, then will need PET/CT and potential biopsy to rule out slow-growing bronchoalveolar carcinoma  Day 6/7 of prednisone 40 mg daily  Continue antibiotics  Continue nebulizers and bronchopulmonary hygiene  Continue home Daliresp 500 mcg daily.  May benefit from chronic macrolide therapy.  We will defer this  decision to his outpatient pulmonologist, Dr. Myers  Supplements per dietitian  Trend renal function and electrolytes.      DVT prophylaxis:  Medical DVT prophylaxis orders are present.    CODE STATUS:   Code Status (Patient has no pulse and is not breathing): CPR (Attempt to Resuscitate)  Medical Interventions (Patient has pulse or is breathing): Full Support    Electronically signed by Enzo Del Castillo DO, 08/14/22, 1:47 PM EDT.

## 2022-08-14 NOTE — PROGRESS NOTES
" [facility]   Hospitalist Progress Note  Date: 2022  Patient Name: Pavel Dai  : 1955  MRN: [id number]  Date of admission: 2022      Subjective   Subjective     Chief Complaint: Shortness of air, confusion    Summary: 67 y.o.  male former smoker with COPD, CHF, CAD, HLD, HTN, and rectal cancer with chemo and radiation in  presents with with SOB and confusion/memory loss.  His report of the duration of this is somewhat inconsistent from the past 2-3 days to over a week.  Reports that he had a headache for the last week although he denies having it now; denies recent head trauma.  Pt also reports confusion and memory loss since around 9:30-10 AM today, but he denies any confusion currently. Pt states his relative said he was \"in and out\" during a conversation they had earlier today.  On arrival to the ED, infectious work-up negative.  Found to have hypercapnia with CO2 84, admitted for further care, started on BiPAP.  Pulmonology consulted.  MRI obtained.    Interval Followup: No acute events overnight, patient still with shortness of breath, overall improved but seems to have plateaued and improvements    Review of Systems  Denies nausea vomiting or diarrhea  No chest pain no palpitations, positive shortness of breath  No fever no chills    Objective   Objective     Vitals:   Temp:  [97.16 °F (36.2 °C)-98.6 °F (37 °C)] 97.16 °F (36.2 °C)  Heart Rate:  [] 84  Resp:  [18-20] 20  BP: (110-128)/(63-81) 119/70  Flow (L/min):  [3] 3  Physical Exam   General appearance: NAD, conversant, dyspnea with conversation  Eyes: anicteric sclerae, moist conjunctivae; no lid-lag; PERRLA  HENT: Atraumatic; oropharynx clear with moist mucous membranes and no mucosal ulcerations; normal hard and soft palate  Neck: Trachea midline; FROM, supple, no thyromegaly or lymphadenopathy  Lungs: Bilateral wheezing, with normal respiratory effort and no intercostal retractions  CV: Regular Rate and Rhythm, no " Murmurs, Rubs, or Gallops   Abdomen: Soft, non-tender; no masses or Heptosplenomegally  Extremities: No peripheral edema or extremity lymphadenopathy  Skin: Normal temperature, turgor and texture; no rash, ulcers or subcutaneous nodules  Psych: Appropriate affect, alert and oriented to person, place and time  Neuro: CN II - XII intact no motor deficits, no sensory defecits      Result Review    Result Review:  I have personally reviewed the results from the time of this admission to 8/14/2022 11:04 EDT and agree with these findings:  [x]  Laboratory all labs reviewed  []  Microbiology  []  Radiology  [x]  EKG/Telemetry   []  Cardiology/Vascular   []  Pathology  []  Old records  []  Other:  CBC    CBC 8/12/22 8/13/22 8/14/22   WBC 5.06 5.65 5.87   RBC 4.30 4.34 4.02 (A)   Hemoglobin 12.8 (A) 13.0 12.4 (A)   Hematocrit 41.6 42.7 38.8   MCV 96.7 98.4 (A) 96.5   MCH 29.8 30.0 30.8   MCHC 30.8 (A) 30.4 (A) 32.0   RDW 12.5 12.7 12.5   Platelets 264 271 248   (A) Abnormal value            CMP    CMP 8/12/22 8/13/22 8/14/22   Glucose 90 85 95   BUN 23 23 23   Creatinine 0.62 (A) 0.64 (A) 0.61 (A)   Sodium 144 143 137   Potassium 3.8 3.6 4.1   Chloride 99 100 96 (A)   Calcium 9.1 9.3 9.5   Albumin 3.70 4.00 3.70   Total Bilirubin 0.2 0.2 0.2   Alkaline Phosphatase 58 63 56   AST (SGOT) 19 21 20   ALT (SGPT) 20 23 22   (A) Abnormal value       Comments are available for some flowsheets but are not being displayed.             Assessment & Plan   Assessment / Plan     Assessment/Plan:  Acute hypercapnic hypoxemic respiratory failure  COPD with acute exacerbation  Metabolic encephalopathy secondary to above  Concern for community-acquired pneumonia due to unknown bacterial source  Headache  Chronic hypoxemia on 2-3 nasal cannula  Confusion  Elevated troponin, ACS ruled out  History of chronic CHF  Severe protein calorie malnutrition  Cachexia  CAD  HLD  Hypertension  Rectal cancer, recently completed chemo and  radiation    Continue to monitor in the hospital for management of the above  Consult pulmonology, appreciate assistance.  If patient does not have a CPAP/BiPAP at home, he may benefit from one at discharge  Hypercapnia is resolved, mental status at baseline  Continue with scheduled duo nebs, Pulmicort and Brovana twice daily, albuterol as needed, bronchopulmonary hygiene  Continue prednisone to completion  CT chest without contrast reviewed  Continue Rocephin azithromycin due to concern for community-acquired pneumonia, de-escalate based on cultures  Follow-up sputum culture  Echocardiogram  Stress test negative for inducible ischemia, no further chest pain continue appropriate home medications  Patient still feels shortness of breath past his baseline, will plan to discharge home when okay with pulmonology, patient would benefit from pulmonary rehab  Trend renal function and electrolytes with a.m. BMP  Trend Hgb and WBC with a.m. CBC     Reviewed patients labs and imaging, and discussed with patient and nurse at bedside.    DVT prophylaxis:  Medical DVT prophylaxis orders are present.    CODE STATUS:   Code Status (Patient has no pulse and is not breathing): CPR (Attempt to Resuscitate)  Medical Interventions (Patient has pulse or is breathing): Full Support      Electronically signed by Robbie Mai MD, 08/14/22, 11:04 AM EDT.

## 2022-08-14 NOTE — PLAN OF CARE
Goal Outcome Evaluation:  Plan of Care Reviewed With: patient        Progress: no change  Outcome Evaluation: No significant changes over night. Wore cpap while sleeping. Tylenol given once for headache. No apparent needs at this time, bed alert in use, call light in reach.

## 2022-08-15 LAB
ALBUMIN SERPL-MCNC: 3.8 G/DL (ref 3.5–5.2)
ALBUMIN/GLOB SERPL: 1.4 G/DL
ALP SERPL-CCNC: 58 U/L (ref 39–117)
ALT SERPL W P-5'-P-CCNC: 23 U/L (ref 1–41)
ANION GAP SERPL CALCULATED.3IONS-SCNC: 10.1 MMOL/L (ref 5–15)
AST SERPL-CCNC: 18 U/L (ref 1–40)
BASOPHILS # BLD AUTO: 0.01 10*3/MM3 (ref 0–0.2)
BASOPHILS NFR BLD AUTO: 0.2 % (ref 0–1.5)
BILIRUB SERPL-MCNC: 0.2 MG/DL (ref 0–1.2)
BUN SERPL-MCNC: 23 MG/DL (ref 8–23)
BUN/CREAT SERPL: 35.9 (ref 7–25)
CALCIUM SPEC-SCNC: 8.8 MG/DL (ref 8.6–10.5)
CHLORIDE SERPL-SCNC: 96 MMOL/L (ref 98–107)
CO2 SERPL-SCNC: 31.9 MMOL/L (ref 22–29)
CREAT SERPL-MCNC: 0.64 MG/DL (ref 0.76–1.27)
DEPRECATED RDW RBC AUTO: 44 FL (ref 37–54)
EGFRCR SERPLBLD CKD-EPI 2021: 105.1 ML/MIN/1.73
EOSINOPHIL # BLD AUTO: 0.21 10*3/MM3 (ref 0–0.4)
EOSINOPHIL NFR BLD AUTO: 3.6 % (ref 0.3–6.2)
ERYTHROCYTE [DISTWIDTH] IN BLOOD BY AUTOMATED COUNT: 12.4 % (ref 12.3–15.4)
GLOBULIN UR ELPH-MCNC: 2.7 GM/DL
GLUCOSE SERPL-MCNC: 98 MG/DL (ref 65–99)
HCT VFR BLD AUTO: 40.2 % (ref 37.5–51)
HGB BLD-MCNC: 12.5 G/DL (ref 13–17.7)
IMM GRANULOCYTES # BLD AUTO: 0.04 10*3/MM3 (ref 0–0.05)
IMM GRANULOCYTES NFR BLD AUTO: 0.7 % (ref 0–0.5)
LYMPHOCYTES # BLD AUTO: 0.82 10*3/MM3 (ref 0.7–3.1)
LYMPHOCYTES NFR BLD AUTO: 14.1 % (ref 19.6–45.3)
MAGNESIUM SERPL-MCNC: 1.9 MG/DL (ref 1.6–2.4)
MCH RBC QN AUTO: 30.3 PG (ref 26.6–33)
MCHC RBC AUTO-ENTMCNC: 31.1 G/DL (ref 31.5–35.7)
MCV RBC AUTO: 97.3 FL (ref 79–97)
MONOCYTES # BLD AUTO: 0.64 10*3/MM3 (ref 0.1–0.9)
MONOCYTES NFR BLD AUTO: 11 % (ref 5–12)
NEUTROPHILS NFR BLD AUTO: 4.11 10*3/MM3 (ref 1.7–7)
NEUTROPHILS NFR BLD AUTO: 70.4 % (ref 42.7–76)
NRBC BLD AUTO-RTO: 0 /100 WBC (ref 0–0.2)
PHOSPHATE SERPL-MCNC: 3.4 MG/DL (ref 2.5–4.5)
PLATELET # BLD AUTO: 267 10*3/MM3 (ref 140–450)
PMV BLD AUTO: 10.1 FL (ref 6–12)
POTASSIUM SERPL-SCNC: 3.6 MMOL/L (ref 3.5–5.2)
PROT SERPL-MCNC: 6.5 G/DL (ref 6–8.5)
RBC # BLD AUTO: 4.13 10*6/MM3 (ref 4.14–5.8)
SODIUM SERPL-SCNC: 138 MMOL/L (ref 136–145)
WBC NRBC COR # BLD: 5.83 10*3/MM3 (ref 3.4–10.8)

## 2022-08-15 PROCEDURE — 63710000001 PREDNISONE PER 1 MG: Performed by: INTERNAL MEDICINE

## 2022-08-15 PROCEDURE — 99233 SBSQ HOSP IP/OBS HIGH 50: CPT | Performed by: INTERNAL MEDICINE

## 2022-08-15 PROCEDURE — 84100 ASSAY OF PHOSPHORUS: CPT | Performed by: INTERNAL MEDICINE

## 2022-08-15 PROCEDURE — 94761 N-INVAS EAR/PLS OXIMETRY MLT: CPT

## 2022-08-15 PROCEDURE — 85025 COMPLETE CBC W/AUTO DIFF WBC: CPT | Performed by: INTERNAL MEDICINE

## 2022-08-15 PROCEDURE — 94799 UNLISTED PULMONARY SVC/PX: CPT

## 2022-08-15 PROCEDURE — 80053 COMPREHEN METABOLIC PANEL: CPT | Performed by: INTERNAL MEDICINE

## 2022-08-15 PROCEDURE — 83735 ASSAY OF MAGNESIUM: CPT | Performed by: INTERNAL MEDICINE

## 2022-08-15 PROCEDURE — 25010000002 ENOXAPARIN PER 10 MG: Performed by: INTERNAL MEDICINE

## 2022-08-15 RX ADMIN — Medication 10 ML: at 22:10

## 2022-08-15 RX ADMIN — ROFLUMILAST 500 MCG: 500 TABLET ORAL at 08:36

## 2022-08-15 RX ADMIN — GUAIFENESIN 600 MG: 600 TABLET ORAL at 08:36

## 2022-08-15 RX ADMIN — ATORVASTATIN CALCIUM 10 MG: 10 TABLET, FILM COATED ORAL at 22:11

## 2022-08-15 RX ADMIN — PANTOPRAZOLE SODIUM 40 MG: 40 TABLET, DELAYED RELEASE ORAL at 06:10

## 2022-08-15 RX ADMIN — PREDNISONE 40 MG: 20 TABLET ORAL at 08:36

## 2022-08-15 RX ADMIN — IPRATROPIUM BROMIDE AND ALBUTEROL SULFATE 3 ML: 2.5; .5 SOLUTION RESPIRATORY (INHALATION) at 12:03

## 2022-08-15 RX ADMIN — BUDESONIDE 0.5 MG: 0.5 SUSPENSION RESPIRATORY (INHALATION) at 19:01

## 2022-08-15 RX ADMIN — ASPIRIN 81 MG CHEWABLE TABLET 81 MG: 81 TABLET CHEWABLE at 08:36

## 2022-08-15 RX ADMIN — IPRATROPIUM BROMIDE AND ALBUTEROL SULFATE 3 ML: 2.5; .5 SOLUTION RESPIRATORY (INHALATION) at 07:07

## 2022-08-15 RX ADMIN — GUAIFENESIN 600 MG: 600 TABLET ORAL at 22:10

## 2022-08-15 RX ADMIN — BUDESONIDE 0.5 MG: 0.5 SUSPENSION RESPIRATORY (INHALATION) at 07:07

## 2022-08-15 RX ADMIN — ARFORMOTEROL TARTRATE 15 MCG: 15 SOLUTION RESPIRATORY (INHALATION) at 19:01

## 2022-08-15 RX ADMIN — ACETAMINOPHEN 325MG 650 MG: 325 TABLET ORAL at 08:42

## 2022-08-15 RX ADMIN — ENOXAPARIN SODIUM 40 MG: 100 INJECTION SUBCUTANEOUS at 08:36

## 2022-08-15 RX ADMIN — IPRATROPIUM BROMIDE AND ALBUTEROL SULFATE 3 ML: 2.5; .5 SOLUTION RESPIRATORY (INHALATION) at 00:14

## 2022-08-15 RX ADMIN — IPRATROPIUM BROMIDE AND ALBUTEROL SULFATE 3 ML: 2.5; .5 SOLUTION RESPIRATORY (INHALATION) at 19:01

## 2022-08-15 RX ADMIN — ARFORMOTEROL TARTRATE 15 MCG: 15 SOLUTION RESPIRATORY (INHALATION) at 07:07

## 2022-08-15 RX ADMIN — ACETAMINOPHEN 325MG 650 MG: 325 TABLET ORAL at 22:14

## 2022-08-15 RX ADMIN — Medication 10 ML: at 08:36

## 2022-08-15 NOTE — PROGRESS NOTES
" [facility]   Hospitalist Progress Note  Date: 8/15/2022  Patient Name: Pavel Dai  : 1955  MRN: [id number]  Date of admission: 2022      Subjective   Subjective     Chief Complaint: Shortness of air, confusion    Summary: 67 y.o.  male former smoker with COPD, CHF, CAD, HLD, HTN, and rectal cancer with chemo and radiation in  presents with with SOB and confusion/memory loss.  His report of the duration of this is somewhat inconsistent from the past 2-3 days to over a week.  Reports that he had a headache for the last week although he denies having it now; denies recent head trauma.  Pt also reports confusion and memory loss since around 9:30-10 AM today, but he denies any confusion currently. Pt states his relative said he was \"in and out\" during a conversation they had earlier today.  On arrival to the ED, infectious work-up negative.  Found to have hypercapnia with CO2 84, admitted for further care, started on BiPAP.  Pulmonology consulted.  MRI obtained.  Patient has severe COPD, likely is near baseline however states that he is still more short of breath than normal.  Patient will continue on current therapies, BiPAP was arranged for home.  Patient will follow-up with his outpatient pulmonologist, Dr. Myers.  Patient will discharge home likely tomorrow if remains stable and breathing continues to improve    Interval Followup: No acute events overnight, still short of breath, stable on current FiO2    Review of Systems  Denies nausea vomiting or diarrhea  No chest pain no palpitations, positive shortness of breath  No fever no chills    Objective   Objective     Vitals:   Temp:  [97.7 °F (36.5 °C)-98.6 °F (37 °C)] 98.6 °F (37 °C)  Heart Rate:  [] 97  Resp:  [18-20] 18  BP: (103-119)/(65-74) 109/69  Flow (L/min):  [3] 3    Physical Exam   General appearance: NAD, conversant, dyspnea with conversation  Eyes: anicteric sclerae, moist conjunctivae; no lid-lag; PERRLA  HENT: " Atraumatic; oropharynx clear with moist mucous membranes and no mucosal ulcerations; normal hard and soft palate  Neck: Trachea midline; FROM, supple, no thyromegaly or lymphadenopathy  Lungs: Bilateral wheezing, with normal respiratory effort and no intercostal retractions  CV: Regular Rate and Rhythm, no Murmurs, Rubs, or Gallops   Abdomen: Soft, non-tender; no masses or Heptosplenomegally  Extremities: No peripheral edema or extremity lymphadenopathy  Skin: Normal temperature, turgor and texture; no rash, ulcers or subcutaneous nodules  Psych: Appropriate affect, alert and oriented to person, place and time  Neuro: CN II - XII intact no motor deficits, no sensory defecits      Result Review    Result Review:  I have personally reviewed the results from the time of this admission to 8/15/2022 14:25 EDT and agree with these findings:  [x]  Laboratory all labs reviewed  []  Microbiology  []  Radiology  [x]  EKG/Telemetry   []  Cardiology/Vascular   []  Pathology  []  Old records  []  Other:  CBC    CBC 8/13/22 8/14/22 8/15/22   WBC 5.65 5.87 5.83   RBC 4.34 4.02 (A) 4.13 (A)   Hemoglobin 13.0 12.4 (A) 12.5 (A)   Hematocrit 42.7 38.8 40.2   MCV 98.4 (A) 96.5 97.3 (A)   MCH 30.0 30.8 30.3   MCHC 30.4 (A) 32.0 31.1 (A)   RDW 12.7 12.5 12.4   Platelets 271 248 267   (A) Abnormal value            CMP    CMP 8/13/22 8/14/22 8/15/22   Glucose 85 95 98   BUN 23 23 23   Creatinine 0.64 (A) 0.61 (A) 0.64 (A)   Sodium 143 137 138   Potassium 3.6 4.1 3.6   Chloride 100 96 (A) 96 (A)   Calcium 9.3 9.5 8.8   Albumin 4.00 3.70 3.80   Total Bilirubin 0.2 0.2 0.2   Alkaline Phosphatase 63 56 58   AST (SGOT) 21 20 18   ALT (SGPT) 23 22 23   (A) Abnormal value       Comments are available for some flowsheets but are not being displayed.             Assessment & Plan   Assessment / Plan     Assessment/Plan:  Acute hypercapnic hypoxemic respiratory failure  COPD with acute exacerbation  Metabolic encephalopathy secondary to  above  Concern for community-acquired pneumonia due to unknown bacterial source  Headache  Chronic hypoxemia on 2-3 nasal cannula  Confusion  Elevated troponin, ACS ruled out  History of chronic CHF  Severe protein calorie malnutrition  Cachexia  CAD  HLD  Hypertension  Rectal cancer, recently completed chemo and radiation    Continue to monitor in the hospital for management of the above  Consult pulmonology, appreciate assistance.  If patient does not have a CPAP/BiPAP at home, he may benefit from one at discharge, this has been arranged  Hypercapnia is resolved, mental status at baseline  Continue with scheduled duo nebs, Pulmicort and Brovana twice daily, albuterol as needed, bronchopulmonary hygiene  Continue prednisone to completion  CT chest without contrast reviewed  Continue Rocephin azithromycin due to concern for community-acquired pneumonia, de-escalate based on cultures  Follow-up sputum culture  Echocardiogram  Stress test negative for inducible ischemia, no further chest pain continue appropriate home medications  Patient still feels shortness of breath past his baseline, will plan to discharge home when okay with pulmonology, patient would benefit from pulmonary rehab  Patient states his breathing is improved but still with significant dyspnea, patient will likely be ready for discharge on 8/16/2022, this was discussed with pulmonology  Trend renal function and electrolytes with a.m. BMP  Trend Hgb and WBC with a.m. CBC     Reviewed patients labs and imaging, and discussed with patient and nurse at bedside.    DVT prophylaxis:  Medical DVT prophylaxis orders are present.    CODE STATUS:   Code Status (Patient has no pulse and is not breathing): CPR (Attempt to Resuscitate)  Medical Interventions (Patient has pulse or is breathing): Full Support      Electronically signed by Robbie Mai MD, 08/15/22, 2:25 PM EDT.

## 2022-08-15 NOTE — CONSULTS
Nutrition Services    Patient Name: Pavel Dai  YOB: 1955  MRN: [id number]  Admission date: 8/7/2022      CLINICAL NUTRITION ASSESSMENT      Reason for Assessment  Follow-up protocol, LOS   H&P:    Past Medical History:   Diagnosis Date   • Anemia due to chemotherapy 12/13/2021   • Cancer related pain 1/3/2022   • CHF (congestive heart failure) (HCC)    • COPD (chronic obstructive pulmonary disease) (HCC)    • Coronary artery disease    • Frequent urination    • Hyperlipidemia    • Hypertension    • Iron deficiency anemia due to chronic blood loss 12/13/2021   • Rectal cancer (HCC)         Current Problems:   Active Hospital Problems    Diagnosis    • COPD exacerbation (HCC)    • Severe malnutrition (HCC)    • Acute on chronic respiratory failure with hypoxia and hypercapnia (HCC)         Nutrition/Diet History         Narrative     RD consult for BMI of only 16.46.  In talking with pt UBW was 167# before he started chemo & radiation treatments for rectal CA.  Pt notes his last chemo was in Jan.  Pt has lost a total of 43#, a 26% clinically significant change.  NFPE shows severe muscle & fat wasting, meeting ASPEN guidelines for malnutrition.  Pt very SOB while RD was visiting him 2' to hx of COPD.  Talked with pt about importance of eating small frequent meal & snacks to help control SOB but also make sure gets in needed nutrition for wt gain.  [facility] Ensure which he likes but they only send him vanilla.  Pt agreeable to snacks in between meals of Ensure Enlive TID, that way he can eat first then this is his back up nutrition.    8/10  Pt eating % of meals & supplements at this time.  Pt NPO since midnight for a stress test today.  Labs reviewed.  Continue with supplements in between meals once diet resumed.    8/15:  Pt continues to eat well % meals.  Labs reviewed.  BM yesterday.  No new wt since admission, will request a new wt to be taken.  "    Anthropometrics        Current Height, Weight Height: 185.4 cm (73\")  Weight: 56.6 kg (124 lb 12.5 oz)   Current BMI Body mass index is 16.46 kg/m².  Underweight       Weight Hx  Wt Readings from Last 30 Encounters:   08/08/22 0237 56.6 kg (124 lb 12.5 oz)   08/08/22 0109 56.6 kg (124 lb 12.5 oz)   08/07/22 2319 56.2 kg (124 lb)   05/26/22 1126 56.3 kg (124 lb 1.9 oz)   05/18/22 1305 56 kg (123 lb 7.3 oz)   04/27/22 1040 56 kg (123 lb 7.3 oz)   02/09/22 1126 58.1 kg (128 lb 1.4 oz)   01/31/22 1445 54.3 kg (119 lb 11.4 oz)   01/26/22 0842 55.4 kg (122 lb 2.2 oz)   01/05/22 1046 55.3 kg (121 lb 14.6 oz)   01/04/22 1043 55.2 kg (121 lb 11.1 oz)   01/03/22 1137 54 kg (119 lb 0.8 oz)   12/29/21 1051 55.1 kg (121 lb 7.6 oz)   12/28/21 1043 54.9 kg (121 lb 0.5 oz)   12/22/21 1033 57.1 kg (125 lb 14.1 oz)   12/21/21 1042 55.9 kg (123 lb 3.8 oz)   12/20/21 0928 55.3 kg (121 lb 14.6 oz)   12/16/21 1045 55.6 kg (122 lb 9.2 oz)   12/15/21 1048 55.6 kg (122 lb 9.2 oz)   12/13/21 0821 55 kg (121 lb 4.1 oz)   12/09/21 1046 56.9 kg (125 lb 7.1 oz)   12/07/21 1044 56.9 kg (125 lb 7.1 oz)   12/01/21 1031 57.5 kg (126 lb 12.2 oz)   11/30/21 1046 57 kg (125 lb 10.6 oz)   11/29/21 0918 57 kg (125 lb 10.6 oz)   11/24/21 1058 57.3 kg (126 lb 5.2 oz)   11/03/21 1440 56.5 kg (124 lb 9 oz)   11/03/21 1029 57.1 kg (125 lb 14.1 oz)   06/04/21 1142 57.9 kg (127 lb 10.3 oz)            Wt Change Observation 43# wt loss form UBW of 167#     Estimated/Assessed Needs       Energy Requirements    EST Needs (kcal/day) 1594-0353 (IBW)       Protein Requirements    EST Daily Needs (g/day) 80-96       Fluid Requirements     Estimated Needs (mL/day) 2293     Labs/Medications         Pertinent Labs Reviewed.   Results from last 7 days   Lab Units 08/15/22  0542 08/14/22  0619 08/13/22  0530   SODIUM mmol/L 138 137 143   POTASSIUM mmol/L 3.6 4.1 3.6   CHLORIDE mmol/L 96* 96* 100   CO2 mmol/L 31.9* 36.7* 32.6*   BUN mg/dL 23 23 23   CREATININE mg/dL " 0.64* 0.61* 0.64*   CALCIUM mg/dL 8.8 9.5 9.3   BILIRUBIN mg/dL 0.2 0.2 0.2   ALK PHOS U/L 58 56 63   ALT (SGPT) U/L 23 22 23   AST (SGOT) U/L 18 20 21   GLUCOSE mg/dL 98 95 85     Results from last 7 days   Lab Units 08/15/22  0542 08/14/22  0619 08/13/22  0530   MAGNESIUM mg/dL 1.9 2.0 2.0   PHOSPHORUS mg/dL 3.4 3.7 4.1   HEMOGLOBIN g/dL 12.5* 12.4* 13.0   HEMATOCRIT % 40.2 38.8 42.7       Pertinent Medications Reviewed.     Current Nutrition Orders & Evaluation of Intake       Oral Nutrition     Current PO Diet Diet Regular; Cardiac   Supplement Orders Placed This Encounter      Snack: Ensure Enlive TID between meals (chocolate or vanilla)       Malnutrition Severity Assessment      Patient meets criteria for : Severe Malnutrition       Nutrition Diagnosis         Nutrition Dx Problem 1 Severe malnutrition related to rectal CA with previous chemo & radiation as evidenced by inadequate energy intake., decreased appetite., unintended wt change. and body composition changes.     Nutrition Intervention         Continue cardiac diet  Encouraged small frequent meals/snacks for COPD  Continue Ensure Enlive TID between meals     Medical Nutrition Therapy/Nutrition Education          Learner     Readiness Patient  Acceptance     Method     Response Explanation  Verbalizes understanding     Monitor/Evaluation        Monitor PO intake, Supplement intake, Pertinent labs, Weight     Nutrition Discharge Plan         Small frequent meals/snacks; continue Ensure Enlive     Electronically signed by:  Emma Harrison RD  08/15/22 15:31 EDT

## 2022-08-15 NOTE — PLAN OF CARE
Goal Outcome Evaluation:  Plan of Care Reviewed With: patient        Progress: no change  Outcome Evaluation: Pt remained A&Ox4 this shift. Also, remained on 3L via nasal cannula maintaining stable oxygen saturation. Pt was medicated with PRN Tylenol once this shift to help achieve an acceptable pain tolerance level. All other vital signs remained stable.

## 2022-08-15 NOTE — PROGRESS NOTES
Pulmonary / Critical Care Progress Note      Patient Name: Pavel Dai  : 1955  MRN: [id number]  Attending:  Robbie Mai MD  Date of admission: 2022    Subjective   Subjective   Follow-up for COPD exacerbation and respiratory failure.    Over last 24 hrs,   Remained on nebulizers including Brovana, Pulmicort, DuoNeb.  Has been on prednisone at 40 mg once daily.  Also on Daliresp 500 mcg once daily.  Has been using astral vent with sleep.    No acute events overnight.    This morning,  Shortness of breath a little bit better.    Still gets breathless going moving around in room.  No wheezing or chest tightness.  Using NIPPV to sleep.  Overall feels better.  Still very weak.  No nausea or vomiting.    Review of Systems  General: Fatigue and weakness, otherwise denied complaints  Cardiovascular:  Denied complaints  Respiratory: Dyspnea, cough, wheeze, otherwise denied complaints  Gastrointestinal: Denied complaints        Objective   Objective     Vitals:   Temp:  [97.7 °F (36.5 °C)-98.2 °F (36.8 °C)] 98.1 °F (36.7 °C)  Heart Rate:  [] 90  Resp:  [18-20] 18  BP: (103-119)/(65-74) 117/74  Flow (L/min):  [3] 3    Physical Exam   Vital Signs Reviewed   General: Thin chronically ill-appearing male, Alert, has conversational dyspnea.    HEENT:  PERRL, EOMI.  OP, nares clear  Neck:  Supple, no JVD, no thyromegaly  Chest:   Scaphoid chest, barrel chested, poor aeration, decreased wheezing and rhonchi bilaterally, tympanic to percussion bilaterally, pursed lip breathing noted  CV: RRR, no MGR, pulses 2+, equal.  Abd:  Soft, NT, ND, + BS, no HSM  EXT:  no clubbing, no cyanosis, no edema, muscle wasting noted all 4 extremities  Neuro:  A&Ox3, CN grossly intact, no focal deficits.  Skin: No rashes or lesions noted       Result Review    Result Review:  I have personally reviewed the results from the time of this admission to 8/15/2022 08:46 EDT and agree with these findings:  [x]  Laboratory  [x]   Microbiology  [x]  Radiology  [x]  EKG/Telemetry   [x]  Cardiology/Vascular   []  Pathology  []  Old records  []  Other:  Most notable findings include:      Lab 08/15/22  0542 08/14/22  0619 08/13/22  0530 08/12/22  0453 08/11/22  0451 08/10/22  0830 08/09/22  0454   WBC 5.83 5.87 5.65 5.06 5.22 4.72 5.97   HEMOGLOBIN 12.5* 12.4* 13.0 12.8* 12.7* 12.3* 11.3*   HEMATOCRIT 40.2 38.8 42.7 41.6 41.0 39.3 36.9*   PLATELETS 267 248 271 264 255 230 233   SODIUM 138 137 143 144 138 142 141   POTASSIUM 3.6 4.1 3.6 3.8 3.9 3.5 3.2*   CHLORIDE 96* 96* 100 99 95* 98 94*   CO2 31.9* 36.7* 32.6* 34.2* 35.0* 39.5* 40.1*   BUN 23 23 23 23 24* 24* 28*   CREATININE 0.64* 0.61* 0.64* 0.62* 0.63* 0.63* 0.56*   GLUCOSE 98 95 85 90 95 103* 98   CALCIUM 8.8 9.5 9.3 9.1 9.3 9.2 9.6   PHOSPHORUS 3.4 3.7 4.1 4.2 3.6 3.7 2.8   TOTAL PROTEIN 6.5 6.4 7.0 6.9 7.0 6.8  --    ALBUMIN 3.80 3.70 4.00 3.70 3.80 3.90  --    GLOBULIN 2.7 2.7 3.0 3.2 3.2 2.9  --          Assessment & Plan   Assessment / Plan     Active Hospital Problems:  Active Hospital Problems    Diagnosis    • COPD exacerbation (HCC)    • Severe malnutrition (HCC)    • Acute on chronic respiratory failure with hypoxia and hypercapnia (HCC)        Impression:  Altered mental status  CO2 narcosis  Acute exacerbation of COPD  Acute on chronic hypoxemic and hypercapnic respiratory failure  Severe protein calorie malnutrition with muscle wasting  Community-acquired pneumonia from unspecified organism  3 cm groundglass infiltrate left lung consistent with infection.  Will need to follow-up as outpatient.  Tobacco use of cigarettes in remission  Hyperglycemia  Hypokalemia  Hypomagnesemia     Plan:  Needs to repeat noncontrast chest CT in 3 months to confirm resolution of 3 cm left lung groundglass nodule versus infiltrate.  If persistent, then will need PET/CT and potential biopsy to rule out slow-growing bronchoalveolar carcinoma  Day 8 of prednisone 40 mg daily.  Given his chronic  respiratory failure and severe COPD, will do tapering dose, drop by 10 mg every 3 days.  Completed a course of antibiotics with ceftriaxone and azithromycin.  Continue nebulizers and bronchopulmonary hygiene.  Continue home Daliresp 500 mcg daily.  May benefit from chronic macrolide therapy.  We will defer this decision to his outpatient pulmonologist, Dr. Myers.  Supplements per dietitian.  Trend renal function and electrolytes.      DVT prophylaxis:  Medical DVT prophylaxis orders are present.    CODE STATUS:   Code Status (Patient has no pulse and is not breathing): CPR (Attempt to Resuscitate)  Medical Interventions (Patient has pulse or is breathing): Full Support    Reviewed labs, imaging and provider notes.   Discussed with bedside nurse and primary service.    Electronically signed by Ted Petty MD, 08/15/22, 10:17 AM EDT.

## 2022-08-15 NOTE — SIGNIFICANT NOTE
08/15/22 1230   Coping/Psychosocial   Observed Emotional State anxious;cooperative   Verbalized Emotional State hopefulness   Trust Relationship/Rapport empathic listening provided   Involvement in Care interacting with patient   Additional Documentation Spiritual Care (Group)   Spiritual Care   Use of Spiritual Resources non-Confucianism use of spiritual care   Spiritual Care Source  initiative   Spiritual Care Follow-Up follow-up, none required as presently assessed   Response to Spiritual Care engaged in conversation;receptive of support;thanks expressed   Spiritual Care Interventions supportive conversation provided   Spiritual Care Visit Type initial   Receptivity to Spiritual Care visit welcomed

## 2022-08-16 ENCOUNTER — READMISSION MANAGEMENT (OUTPATIENT)
Dept: CALL CENTER | Facility: HOSPITAL | Age: 67
End: 2022-08-16

## 2022-08-16 VITALS
RESPIRATION RATE: 20 BRPM | WEIGHT: 124.78 LBS | HEIGHT: 73 IN | DIASTOLIC BLOOD PRESSURE: 66 MMHG | TEMPERATURE: 98.6 F | OXYGEN SATURATION: 92 % | SYSTOLIC BLOOD PRESSURE: 111 MMHG | HEART RATE: 120 BPM | BODY MASS INDEX: 16.54 KG/M2

## 2022-08-16 LAB
ALBUMIN SERPL-MCNC: 3.9 G/DL (ref 3.5–5.2)
ALBUMIN/GLOB SERPL: 1.4 G/DL
ALP SERPL-CCNC: 59 U/L (ref 39–117)
ALT SERPL W P-5'-P-CCNC: 27 U/L (ref 1–41)
ANION GAP SERPL CALCULATED.3IONS-SCNC: 7.8 MMOL/L (ref 5–15)
AST SERPL-CCNC: 21 U/L (ref 1–40)
BASOPHILS # BLD AUTO: 0.02 10*3/MM3 (ref 0–0.2)
BASOPHILS NFR BLD AUTO: 0.4 % (ref 0–1.5)
BILIRUB SERPL-MCNC: 0.3 MG/DL (ref 0–1.2)
BUN SERPL-MCNC: 19 MG/DL (ref 8–23)
BUN/CREAT SERPL: 32.2 (ref 7–25)
CALCIUM SPEC-SCNC: 9.3 MG/DL (ref 8.6–10.5)
CHLORIDE SERPL-SCNC: 99 MMOL/L (ref 98–107)
CO2 SERPL-SCNC: 33.2 MMOL/L (ref 22–29)
CREAT SERPL-MCNC: 0.59 MG/DL (ref 0.76–1.27)
DEPRECATED RDW RBC AUTO: 44.7 FL (ref 37–54)
EGFRCR SERPLBLD CKD-EPI 2021: 107.7 ML/MIN/1.73
EOSINOPHIL # BLD AUTO: 0.19 10*3/MM3 (ref 0–0.4)
EOSINOPHIL NFR BLD AUTO: 3.5 % (ref 0.3–6.2)
ERYTHROCYTE [DISTWIDTH] IN BLOOD BY AUTOMATED COUNT: 12.6 % (ref 12.3–15.4)
GLOBULIN UR ELPH-MCNC: 2.7 GM/DL
GLUCOSE SERPL-MCNC: 94 MG/DL (ref 65–99)
HCT VFR BLD AUTO: 41.1 % (ref 37.5–51)
HGB BLD-MCNC: 12.8 G/DL (ref 13–17.7)
IMM GRANULOCYTES # BLD AUTO: 0.03 10*3/MM3 (ref 0–0.05)
IMM GRANULOCYTES NFR BLD AUTO: 0.5 % (ref 0–0.5)
LYMPHOCYTES # BLD AUTO: 0.68 10*3/MM3 (ref 0.7–3.1)
LYMPHOCYTES NFR BLD AUTO: 12.4 % (ref 19.6–45.3)
MAGNESIUM SERPL-MCNC: 2 MG/DL (ref 1.6–2.4)
MCH RBC QN AUTO: 30.3 PG (ref 26.6–33)
MCHC RBC AUTO-ENTMCNC: 31.1 G/DL (ref 31.5–35.7)
MCV RBC AUTO: 97.4 FL (ref 79–97)
MONOCYTES # BLD AUTO: 0.48 10*3/MM3 (ref 0.1–0.9)
MONOCYTES NFR BLD AUTO: 8.8 % (ref 5–12)
NEUTROPHILS NFR BLD AUTO: 4.08 10*3/MM3 (ref 1.7–7)
NEUTROPHILS NFR BLD AUTO: 74.4 % (ref 42.7–76)
NRBC BLD AUTO-RTO: 0 /100 WBC (ref 0–0.2)
PHOSPHATE SERPL-MCNC: 3.4 MG/DL (ref 2.5–4.5)
PLATELET # BLD AUTO: 279 10*3/MM3 (ref 140–450)
PMV BLD AUTO: 10 FL (ref 6–12)
POTASSIUM SERPL-SCNC: 3.8 MMOL/L (ref 3.5–5.2)
PROT SERPL-MCNC: 6.6 G/DL (ref 6–8.5)
RBC # BLD AUTO: 4.22 10*6/MM3 (ref 4.14–5.8)
SODIUM SERPL-SCNC: 140 MMOL/L (ref 136–145)
WBC NRBC COR # BLD: 5.48 10*3/MM3 (ref 3.4–10.8)

## 2022-08-16 PROCEDURE — 83735 ASSAY OF MAGNESIUM: CPT | Performed by: INTERNAL MEDICINE

## 2022-08-16 PROCEDURE — 94799 UNLISTED PULMONARY SVC/PX: CPT

## 2022-08-16 PROCEDURE — 99232 SBSQ HOSP IP/OBS MODERATE 35: CPT | Performed by: INTERNAL MEDICINE

## 2022-08-16 PROCEDURE — 99239 HOSP IP/OBS DSCHRG MGMT >30: CPT | Performed by: INTERNAL MEDICINE

## 2022-08-16 PROCEDURE — 84100 ASSAY OF PHOSPHORUS: CPT | Performed by: INTERNAL MEDICINE

## 2022-08-16 PROCEDURE — 94761 N-INVAS EAR/PLS OXIMETRY MLT: CPT

## 2022-08-16 PROCEDURE — 63710000001 PREDNISONE PER 1 MG: Performed by: INTERNAL MEDICINE

## 2022-08-16 PROCEDURE — 63710000001 PREDNISONE PER 5 MG: Performed by: INTERNAL MEDICINE

## 2022-08-16 PROCEDURE — 25010000002 ENOXAPARIN PER 10 MG: Performed by: INTERNAL MEDICINE

## 2022-08-16 PROCEDURE — 80053 COMPREHEN METABOLIC PANEL: CPT | Performed by: INTERNAL MEDICINE

## 2022-08-16 PROCEDURE — 85025 COMPLETE CBC W/AUTO DIFF WBC: CPT | Performed by: INTERNAL MEDICINE

## 2022-08-16 RX ORDER — PREDNISONE 10 MG/1
TABLET ORAL
Qty: 18 TABLET | Refills: 0 | Status: SHIPPED | OUTPATIENT
Start: 2022-08-16 | End: 2022-08-25

## 2022-08-16 RX ADMIN — ENOXAPARIN SODIUM 40 MG: 100 INJECTION SUBCUTANEOUS at 08:39

## 2022-08-16 RX ADMIN — GUAIFENESIN 600 MG: 600 TABLET ORAL at 08:39

## 2022-08-16 RX ADMIN — IPRATROPIUM BROMIDE AND ALBUTEROL SULFATE 3 ML: 2.5; .5 SOLUTION RESPIRATORY (INHALATION) at 06:51

## 2022-08-16 RX ADMIN — IPRATROPIUM BROMIDE AND ALBUTEROL SULFATE 3 ML: 2.5; .5 SOLUTION RESPIRATORY (INHALATION) at 12:14

## 2022-08-16 RX ADMIN — PANTOPRAZOLE SODIUM 40 MG: 40 TABLET, DELAYED RELEASE ORAL at 06:28

## 2022-08-16 RX ADMIN — BUDESONIDE 0.5 MG: 0.5 SUSPENSION RESPIRATORY (INHALATION) at 06:51

## 2022-08-16 RX ADMIN — Medication 10 ML: at 08:39

## 2022-08-16 RX ADMIN — IPRATROPIUM BROMIDE AND ALBUTEROL SULFATE 3 ML: 2.5; .5 SOLUTION RESPIRATORY (INHALATION) at 00:00

## 2022-08-16 RX ADMIN — ROFLUMILAST 500 MCG: 500 TABLET ORAL at 08:39

## 2022-08-16 RX ADMIN — ASPIRIN 81 MG CHEWABLE TABLET 81 MG: 81 TABLET CHEWABLE at 08:39

## 2022-08-16 RX ADMIN — ARFORMOTEROL TARTRATE 15 MCG: 15 SOLUTION RESPIRATORY (INHALATION) at 06:51

## 2022-08-16 RX ADMIN — PREDNISONE 30 MG: 20 TABLET ORAL at 08:39

## 2022-08-16 NOTE — PROGRESS NOTES
Pulmonary / Critical Care Progress Note      Patient Name: Pavel Dai  : 1955  MRN: [id number]  Attending:  Alma Obrien MD  Date of admission: 2022    Subjective   Subjective   Follow-up for COPD exacerbation and respiratory failure.    Wore NIPPV overnight currently on 3 L of oxygen  Distant activity.  Is walking around room more with less dyspnea  No wheezing  Scant dry cough with thin clear sputum  Weak and fatigued  No chest pain or hemoptysis  Asking about going home  No nausea, fevers or chills    Review of Systems  General: Fatigue and weakness, otherwise denied complaints  Cardiovascular:  Denied complaints  Respiratory: Dyspnea, cough,  otherwise denied complaints  Gastrointestinal: Denied complaints        Objective   Objective     Vitals:   Temp:  [97.9 °F (36.6 °C)-98.6 °F (37 °C)] 98.1 °F (36.7 °C)  Heart Rate:  [] 91  Resp:  [18] 18  BP: (106-128)/(67-72) 120/67  Flow (L/min):  [3] 3    Physical Exam   Vital Signs Reviewed   General: Thin chronically ill-appearing male, Alert, has conversational dyspnea.    HEENT:  PERRL, EOMI.  OP, nares clear  Neck:  Supple, no JVD, no thyromegaly  Chest:   Scaphoid chest, barrel chested, poor aeration, trace rhonchi bilaterally, tympanic to percussion bilaterally, pursed lip breathing noted  CV: RRR, no MGR, pulses 2+, equal.  Abd:  Soft, NT, ND, + BS, no HSM  EXT:  no clubbing, no cyanosis, no edema, muscle wasting noted all 4 extremities  Neuro:  A&Ox3, CN grossly intact, no focal deficits.  Skin: No rashes or lesions noted       Result Review    Result Review:  I have personally reviewed the results from the time of this admission to 2022 10:52 EDT and agree with these findings:  [x]  Laboratory  [x]  Microbiology  [x]  Radiology  [x]  EKG/Telemetry   [x]  Cardiology/Vascular   []  Pathology  []  Old records  []  Other:  Most notable findings include:      Lab 22  0633 08/15/22  0542 22  0619 22  0530  08/12/22  0453 08/11/22  0451 08/10/22  0830   WBC 5.48 5.83 5.87 5.65 5.06 5.22 4.72   HEMOGLOBIN 12.8* 12.5* 12.4* 13.0 12.8* 12.7* 12.3*   HEMATOCRIT 41.1 40.2 38.8 42.7 41.6 41.0 39.3   PLATELETS 279 267 248 271 264 255 230   SODIUM 140 138 137 143 144 138 142   POTASSIUM 3.8 3.6 4.1 3.6 3.8 3.9 3.5   CHLORIDE 99 96* 96* 100 99 95* 98   CO2 33.2* 31.9* 36.7* 32.6* 34.2* 35.0* 39.5*   BUN 19 23 23 23 23 24* 24*   CREATININE 0.59* 0.64* 0.61* 0.64* 0.62* 0.63* 0.63*   GLUCOSE 94 98 95 85 90 95 103*   CALCIUM 9.3 8.8 9.5 9.3 9.1 9.3 9.2   PHOSPHORUS 3.4 3.4 3.7 4.1 4.2 3.6 3.7   TOTAL PROTEIN 6.6 6.5 6.4 7.0 6.9 7.0 6.8   ALBUMIN 3.90 3.80 3.70 4.00 3.70 3.80 3.90   GLOBULIN 2.7 2.7 2.7 3.0 3.2 3.2 2.9         Assessment & Plan   Assessment / Plan     Active Hospital Problems:  Active Hospital Problems    Diagnosis    • COPD exacerbation (HCC)    • Severe malnutrition (HCC)    • Acute on chronic respiratory failure with hypoxia and hypercapnia (HCC)        Impression:  Altered mental status  CO2 narcosis  Acute exacerbation of COPD  Acute on chronic hypoxemic and hypercapnic respiratory failure  Severe protein calorie malnutrition with muscle wasting  Community-acquired pneumonia from unspecified organism  3 cm groundglass infiltrate left lung consistent with infection.  Will need to follow-up as outpatient.  Tobacco use of cigarettes in remission  Hyperglycemia  Hypokalemia  Hypomagnesemia     Plan:  Repeat noncontrast chest CT in 3 months to confirm resolution of 3 cm left lung groundglass nodule versus infiltrate.  If persistent, then will need PET/CT and potential biopsy to rule out slow-growing bronchoalveolar carcinoma  Drop prednisone to 30 mg daily.  Decrease by 10 mg every 3 days until off steroids  Completed antibiotics  Continue nebulizers and bronchopulmonary hygiene.  Continue home Daliresp 500 mcg daily.  May benefit from chronic macrolide therapy.  We will defer this decision to his outpatient  pulmonologist, Dr. Myers.  Supplements per dietitian.  Trend renal function and electrolytes.    Wean O2 to keep SPO2 greater than 90%    DVT prophylaxis:  Medical DVT prophylaxis orders are present.    CODE STATUS:   Code Status (Patient has no pulse and is not breathing): CPR (Attempt to Resuscitate)  Medical Interventions (Patient has pulse or is breathing): Full Support    From a pulmonary perspective, the patient can discharge.  Needs to follow-up with his primary pulmonologist, Dr. Myers.  Needs repeat noncontrast chest CT in 3 months to reassess groundglass nodule of left upper lobe      Personally reviewed labs, imaging and provider notes.   Discussed with primary    Electronically signed by Nish Schaefer MD, 08/16/22, 10:53 AM EDT.

## 2022-08-16 NOTE — PLAN OF CARE
Goal Outcome Evaluation:  Plan of Care Reviewed With: patient        Progress: no change  Outcome Evaluation: Pt remained A&Ox4 this shift. Also, remained on 3L via nasal cannula maintaining stable oxygen saturation. Pt denied pain this shift. Pt will discharge to home. All other vital signs remained stable.

## 2022-08-16 NOTE — CASE MANAGEMENT/SOCIAL WORK
RT CM notifed [facility] of patient's discharge. He states he will need assistance once home with connecting [employer] to home oxygen concentrator and with mask and humidification issues.

## 2022-08-16 NOTE — CONSULTS
[facility]. Jeanie working with pt on NIV for home. Medications sent to [facility] in [facility]- pt is agreeable to pay cash price as [facility] do not [facility].    SW discussed ACP with pt and provided education on what an Advanced Directive is. Pt stated his son is handling this outside of the hospital and denied the need to complete an AD at this time.

## 2022-08-16 NOTE — DISCHARGE SUMMARY
"               [facility]         HOSPITALIST  DISCHARGE SUMMARY    Patient Name: Pavel Dai  : 1955  MRN: [id number]    Date of Admission: 2022  Date of Discharge:  22  Primary Care Physician: Yoel Geller MD    Consultants:  Cardiology Dr. Sergei Barrett  Pulmonology    Discharge Diagnosis:  Acute hypercapnic hypoxemic respiratory failure  COPD with acute exacerbation  Metabolic encephalopathy secondary to above  Concern for community-acquired pneumonia due to unknown bacterial source  Headache  Chronic hypoxemia on 2-3 nasal cannula  Confusion  Elevated troponin, ACS ruled out  History of chronic CHF  Severe protein calorie malnutrition  Cachexia  CAD  HLD  Hypertension  Rectal cancer, recently completed chemo and radiation    Hospital Course     Hospital Course:  67 y.o.  male former smoker with COPD, CHF, CAD, HLD, HTN, and rectal cancer with chemo and radiation in  presents with with SOB and confusion/memory loss.  His report of the duration of this is somewhat inconsistent from the past 2-3 days to over a week.  Reports that he had a headache for the last week although he denies having it now; denies recent head trauma.  Pt also reports confusion and memory loss since around 9:30-10 AM today, but he denies any confusion currently. Pt states his relative said he was \"in and out\" during a conversation they had earlier today.  On arrival to the ED, infectious work-up negative.  Found to have hypercapnia with CO2 84, admitted for further care, started on BiPAP.  Pulmonology consulted.  MRI obtained.  Patient has severe COPD, likely is near baseline however states that he is still more short of breath than normal.  Patient will continue on current therapies, BiPAP was arranged for home.  Patient will follow-up with his outpatient pulmonologist, Dr. Myers.  Discharging home today on steroid taper.  Will need CT in approximately 3 months to follow-up on a groundglass nodule to ensure " does not need PET scan.  Defer to pulmonologist Dr. Myers.    DISCHARGE Follow Up Recommendations:   Follow-up with Dr. Louis LICONA, repeat CT chest in 3 months  Steroid taper    Day of Discharge     Vital Signs:  Temp:  [97.9 °F (36.6 °C)-98.6 °F (37 °C)] 98.6 °F (37 °C)  Heart Rate:  [] 120  Resp:  [18-20] 20  BP: (106-128)/(66-72) 111/66  Flow (L/min):  [3] 3  Physical Exam:   Gen: NAD, WDWN  Resp: no dyspnea  CV: no LE edema  GI: Abdomen soft +bs  Psych: AOx3, normal mood and affect    Discharge Details        Discharge Medications      New Medications      Instructions Start Date   predniSONE 10 MG tablet  Commonly known as: DELTASONE   Take 3 tablets by mouth Daily for 3 days, THEN 2 tablets Daily for 3 days, THEN 1 tablet Daily for 3 days.   Start Date: August 16, 2022        Continue These Medications      Instructions Start Date   albuterol (2.5 MG/3ML) 0.083% nebulizer solution  Commonly known as: PROVENTIL   2.5 mg, Nebulization, Every 8 Hours PRN      aspirin 81 MG chewable tablet   81 mg, Oral, Daily      atorvastatin 10 MG tablet  Commonly known as: LIPITOR   10 mg, Oral, Nightly      Fluticasone-Salmeterol 250-50 MCG/ACT DISKUS  Commonly known as: ADVAIR   1 puff, Inhalation, 2 Times Daily - RT      guaiFENesin 600 MG 12 hr tablet  Commonly known as: MUCINEX   600 mg, Oral, 2 Times Daily PRN      ipratropium-albuterol  MCG/ACT inhaler  Commonly known as: COMBIVENT RESPIMAT   1 puff, Inhalation, 4 Times Daily - RT      loratadine 10 MG tablet  Commonly known as: CLARITIN   10 mg, Oral, Daily      metoprolol tartrate 50 MG tablet  Commonly known as: LOPRESSOR   50 mg, Oral, 2 Times Daily      multivitamin with minerals tablet tablet   1 tablet, Oral, Daily      omeprazole 20 MG capsule  Commonly known as: priLOSEC   20 mg, Oral, Daily      roflumilast 500 MCG tablet tablet  Commonly known as: DALIRESP   500 mcg, Oral, Daily             Discharge Disposition:   Home or Self  Care    Discharge Condition: Stable    Diet:  Hospital:  Diet Order   Procedures   • Diet Regular; Cardiac       Discharge Activity: As tolerated      Future Appointments   Date Time Provider Department Center   10/27/2022 10:45 AM Issac Walker MD [facility] ONC E521 [facility]   1/4/2023 11:30 AM [facility] CT 2 BH [facility] CT [facility]   1/11/2023 11:30 AM Kandace Lucio MD [facility] RO [facility]       Additional Instructions for the Follow-ups that You Need to Schedule     Discharge Follow-up with PCP   As directed       Currently Documented PCP:    Yoel Geller MD    PCP Phone Number:    [phone]     Follow Up Details: 1 week         Discharge Follow-up with Specified Provider: Pulmonology Dr. Louis LICONA   As directed      To: Pulmonology Dr. Louis LICONA    Follow Up Details: discuss CT chest and possible daily azithromycin               Pertinent  and/or Most Recent Results     PROCEDURES:   None    LAB and IMAGING RESULTS:      Lab 08/16/22 0633 08/15/22  0542 08/14/22 0619 08/13/22 0530 08/12/22  0453   WBC 5.48 5.83 5.87 5.65 5.06   HEMOGLOBIN 12.8* 12.5* 12.4* 13.0 12.8*   HEMATOCRIT 41.1 40.2 38.8 42.7 41.6   PLATELETS 279 267 248 271 264   NEUTROS ABS 4.08 4.11 4.32 4.08 3.69   IMMATURE GRANS (ABS) 0.03 0.04 0.03 0.04 0.03   LYMPHS ABS 0.68* 0.82 0.68* 0.70 0.61*   MONOS ABS 0.48 0.64 0.72 0.68 0.66   EOS ABS 0.19 0.21 0.11 0.13 0.06   MCV 97.4* 97.3* 96.5 98.4* 96.7         Lab 08/16/22  0633 08/15/22  0542 08/14/22 0619 08/13/22  0530 08/12/22  0453   SODIUM 140 138 137 143 144   POTASSIUM 3.8 3.6 4.1 3.6 3.8   CHLORIDE 99 96* 96* 100 99   CO2 33.2* 31.9* 36.7* 32.6* 34.2*   ANION GAP 7.8 10.1 4.3* 10.4 10.8   BUN 19 23 23 23 23   CREATININE 0.59* 0.64* 0.61* 0.64* 0.62*   EGFR 107.7 105.1 106.6 105.1 106.1   GLUCOSE 94 98 95 85 90   CALCIUM 9.3 8.8 9.5 9.3 9.1   MAGNESIUM 2.0 1.9 2.0 2.0 2.0   PHOSPHORUS 3.4 3.4 3.7 4.1 4.2         Lab 08/16/22  0633 08/15/22  0542 08/14/22  0619 08/13/22  0530 08/12/22  0453   TOTAL  PROTEIN 6.6 6.5 6.4 7.0 6.9   ALBUMIN 3.90 3.80 3.70 4.00 3.70   GLOBULIN 2.7 2.7 2.7 3.0 3.2   ALT (SGPT) 27 23 22 23 20   AST (SGOT) 21 18 20 21 19   BILIRUBIN 0.3 0.2 0.2 0.2 0.2   ALK PHOS 59 58 56 63 58                     Brief Urine Lab Results  (Last result in the past 365 days)      Color   Clarity   Blood   Leuk Est   Nitrite   Protein   CREAT   Urine HCG        08/07/22 2336 Yellow   Clear   Small (1+)   Negative   Negative   30 mg/dL (1+)               Microbiology Results (last 10 days)     Procedure Component Value - Date/Time    Respiratory Culture - Sputum, Cough [[id number]] Collected: 08/08/22 1240    Lab Status: Final result Specimen: Sputum from Cough Updated: 08/10/22 0937     Respiratory Culture Scant growth (1+) Normal respiratory martín. No S. aureus or Pseudomonas aeruginosa detected. Final report.     Gram Stain Moderate (3+) Mucous strands      Few (2+) Gram positive cocci in clusters      Few (2+) Gram positive cocci in pairs and chains      Rare (1+) Gram negative bacilli, tiny      Few (2+) WBCs seen    Legionella Antigen, Urine - Urine, Urine, Clean Catch [[id number]]  (Normal) Collected: 08/07/22 2336    Lab Status: Final result Specimen: Urine, Clean Catch Updated: 08/08/22 1031     LEGIONELLA ANTIGEN, URINE Negative    S. Pneumo Ag Urine or CSF - Urine, Urine, Clean Catch [[id number]]  (Normal) Collected: 08/07/22 2336    Lab Status: Final result Specimen: Urine, Clean Catch Updated: 08/08/22 1032     Strep Pneumo Ag Negative          CT Chest Without Contrast Diagnostic    Result Date: 8/8/2022  Impression:    1. There is a new mild opacity in the posterolateral left upper lobe, which is nonspecific and age-indeterminate.  In the differential diagnosis would be an acute infiltrate.  Fibrotic changes (accounting for the finding) cannot be excluded.  2. Severe emphysematous changes involve the lungs, seen previously.  3. Please see above comments for further detail.     COMMENT:  Part of  this note is an electronic transcription of spoken language to printed text. The electronic translation/transcription may permit erroneous, or at times, nonsensical (or even sensical) words or phrases to be inadvertently transcribed or omitted; this  has reviewed the note for such errors (as well as additional errors); however, some may still exist.  MAYLIN TANNER JR, MD       Electronically Signed and Approved By: MAYLIN TANNER JR, MD on 8/08/2022 at 23:58              MRI Brain Without Contrast    Result Date: 8/8/2022  Impression:   1. No acute intracranial abnormality.      Kiran Daugherty M.D.       Electronically Signed and Approved By: Kiran Daugherty M.D. on 8/08/2022 at 15:41             XR Chest 1 View    Result Date: 8/8/2022  Impression:   No acute infiltrate is appreciated.      COMMENT:  Part of this note is an electronic transcription of spoken language to printed text. The electronic translation/transcription may permit erroneous, or at times, nonsensical (or even sensical) words or phrases to be inadvertently transcribed or omitted; this  has reviewed the note for such errors (as well as additional errors); however, some may still exist.  MAYLIN TANNER JR, MD       Electronically Signed and Approved By: MAYLIN TANNER JR, MD on 8/08/2022 at 0:26              Results for orders placed during the hospital encounter of 08/07/22    Adult Transthoracic Echo Limited W/ Cont if Necessary Per Protocol    Interpretation Summary  · Left ventricular ejection fraction appears to be 61 - 65%.  · The right ventricular cavity is dilated.  · The right atrial cavity is borderline dilated.    There were no apparent intracardiac masses, vegetations or thrombi.      Labs Pending at Discharge: None      Time spent on Discharge including face to face service: Greater than 35 minutes    Electronically signed by Alma Obrien MD, 08/16/22, 12:48 PM EDT.

## 2022-08-16 NOTE — PLAN OF CARE
Goal Outcome Evaluation:  Plan of Care Reviewed With: patient      Pt is A&O x4, vitals were stable. On 3 L of oxygen. Complained of headache and was given tylenol as par MAR. Rested most of the shift. Will continue to monitor.

## 2022-08-17 NOTE — OUTREACH NOTE
Prep Survey    Flowsheet Row Responses   [facility]   Is LACE score < 7 ? No   Emergency Room discharge w/ pulse ox? No   Eligibility Readm Mgmt   Discharge diagnosis COPD with acute exacerbation   Does the patient have one of the following disease processes/diagnoses(primary or secondary)? Other   Does the patient have Home health ordered? Yes   What is the Home health agency?   [facility]    Is there a DME ordered? No   Prep survey completed? Yes          SUZAN WALKER - Registered Nurse

## 2022-08-25 ENCOUNTER — READMISSION MANAGEMENT (OUTPATIENT)
Dept: CALL CENTER | Facility: HOSPITAL | Age: 67
End: 2022-08-25

## 2022-08-25 LAB — QT INTERVAL: 306 MS

## 2022-08-25 NOTE — OUTREACH NOTE
Medical Week 2 Survey    Flowsheet Row Responses   [facility] patient discharged from? [facility]   Does the patient have one of the following disease processes/diagnoses(primary or secondary)? Other   Week 2 attempt successful? Yes   Call start time 1505   Discharge diagnosis COPD with acute exacerbation   Call end time 1509   Meds reviewed with patient/caregiver? Yes   Is the patient having any side effects they believe may be caused by any medication additions or changes? No   Does the patient have all medications ordered at discharge? Yes   Is the patient taking all medications as directed (includes completed medication regime)? Yes   Medication comments steroids completed today   Comments regarding appointments Pulmonary appt completed on 8/23/22   Does the patient have a primary care provider?  Yes   Does the patient have an appointment with their PCP within 7 days of discharge? No   [facility]--encouraged to call and schedule appt   Nursing Interventions Advised patient to make appointment   Has the patient kept scheduled appointments due by today? Yes   What is the Home health agency?   [facility]    Has home health visited the patient within 72 hours of discharge? Call prior to 72 hours   Home health comments Patient reports agency called but has never f/u with visit--provided him number for f/u as he is undecided if he would like to pursue services at this time   What DME was ordered? [facility]   Has all DME been delivered? Yes   DME comments Patient adjusting well to Bip[facility] and wearing at least 5-6 hours nightly   Did the patient receive a copy of their discharge instructions? Yes   Nursing interventions Reviewed instructions with patient   What is the patient's perception of their health status since discharge? Improving   Is the patient/caregiver able to teach back signs and symptoms related to disease process for when to call PCP? Yes   Is the patient/caregiver able to teach  back signs and symptoms related to disease process for when to call 911? Yes   Week 2 Call Completed? Yes          ANAT JOE - Registered Nurse

## 2022-10-24 ENCOUNTER — LAB (OUTPATIENT)
Dept: LAB | Facility: HOSPITAL | Age: 67
End: 2022-10-24

## 2022-10-24 DIAGNOSIS — D50.9 IRON DEFICIENCY ANEMIA, UNSPECIFIED IRON DEFICIENCY ANEMIA TYPE: ICD-10-CM

## 2022-10-24 DIAGNOSIS — C20 RECTAL CANCER: ICD-10-CM

## 2022-10-24 LAB
ALBUMIN SERPL-MCNC: 4.4 G/DL (ref 3.5–5.2)
ALBUMIN/GLOB SERPL: 1.6 G/DL
ALP SERPL-CCNC: 63 U/L (ref 39–117)
ALT SERPL W P-5'-P-CCNC: 19 U/L (ref 1–41)
ANION GAP SERPL CALCULATED.3IONS-SCNC: 9.1 MMOL/L (ref 5–15)
AST SERPL-CCNC: 20 U/L (ref 1–40)
BASOPHILS # BLD AUTO: 0.04 10*3/MM3 (ref 0–0.2)
BASOPHILS NFR BLD AUTO: 0.6 % (ref 0–1.5)
BILIRUB SERPL-MCNC: 0.3 MG/DL (ref 0–1.2)
BUN SERPL-MCNC: 16 MG/DL (ref 8–23)
BUN/CREAT SERPL: 29.1 (ref 7–25)
CALCIUM SPEC-SCNC: 9.5 MG/DL (ref 8.6–10.5)
CEA SERPL-MCNC: 2.12 NG/ML
CHLORIDE SERPL-SCNC: 99 MMOL/L (ref 98–107)
CO2 SERPL-SCNC: 32.9 MMOL/L (ref 22–29)
CREAT SERPL-MCNC: 0.55 MG/DL (ref 0.76–1.27)
DEPRECATED RDW RBC AUTO: 35.9 FL (ref 37–54)
EGFRCR SERPLBLD CKD-EPI 2021: 110 ML/MIN/1.73
EOSINOPHIL # BLD AUTO: 0.23 10*3/MM3 (ref 0–0.4)
EOSINOPHIL NFR BLD AUTO: 3.5 % (ref 0.3–6.2)
ERYTHROCYTE [DISTWIDTH] IN BLOOD BY AUTOMATED COUNT: 11.1 % (ref 12.3–15.4)
FERRITIN SERPL-MCNC: 251.6 NG/ML (ref 30–400)
GLOBULIN UR ELPH-MCNC: 2.7 GM/DL
GLUCOSE SERPL-MCNC: 88 MG/DL (ref 65–99)
HCT VFR BLD AUTO: 37.6 % (ref 37.5–51)
HGB BLD-MCNC: 12.8 G/DL (ref 13–17.7)
IMM GRANULOCYTES # BLD AUTO: 0.04 10*3/MM3 (ref 0–0.05)
IMM GRANULOCYTES NFR BLD AUTO: 0.6 % (ref 0–0.5)
IRON 24H UR-MRATE: 80 MCG/DL (ref 59–158)
IRON SATN MFR SERPL: 24 % (ref 20–50)
LYMPHOCYTES # BLD AUTO: 0.53 10*3/MM3 (ref 0.7–3.1)
LYMPHOCYTES NFR BLD AUTO: 8.1 % (ref 19.6–45.3)
MCH RBC QN AUTO: 30.4 PG (ref 26.6–33)
MCHC RBC AUTO-ENTMCNC: 34 G/DL (ref 31.5–35.7)
MCV RBC AUTO: 89.3 FL (ref 79–97)
MONOCYTES # BLD AUTO: 0.52 10*3/MM3 (ref 0.1–0.9)
MONOCYTES NFR BLD AUTO: 8 % (ref 5–12)
NEUTROPHILS NFR BLD AUTO: 5.18 10*3/MM3 (ref 1.7–7)
NEUTROPHILS NFR BLD AUTO: 79.2 % (ref 42.7–76)
NRBC BLD AUTO-RTO: 0 /100 WBC (ref 0–0.2)
PLATELET # BLD AUTO: 272 10*3/MM3 (ref 140–450)
PMV BLD AUTO: 10.2 FL (ref 6–12)
POTASSIUM SERPL-SCNC: 4.2 MMOL/L (ref 3.5–5.2)
PROT SERPL-MCNC: 7.1 G/DL (ref 6–8.5)
RBC # BLD AUTO: 4.21 10*6/MM3 (ref 4.14–5.8)
SODIUM SERPL-SCNC: 141 MMOL/L (ref 136–145)
TIBC SERPL-MCNC: 332 MCG/DL (ref 298–536)
TRANSFERRIN SERPL-MCNC: 223 MG/DL (ref 200–360)
WBC NRBC COR # BLD: 6.54 10*3/MM3 (ref 3.4–10.8)

## 2022-10-24 PROCEDURE — 84466 ASSAY OF TRANSFERRIN: CPT

## 2022-10-24 PROCEDURE — 85025 COMPLETE CBC W/AUTO DIFF WBC: CPT

## 2022-10-24 PROCEDURE — 83540 ASSAY OF IRON: CPT

## 2022-10-24 PROCEDURE — 82728 ASSAY OF FERRITIN: CPT

## 2022-10-24 PROCEDURE — 36415 COLL VENOUS BLD VENIPUNCTURE: CPT

## 2022-10-24 PROCEDURE — 82378 CARCINOEMBRYONIC ANTIGEN: CPT

## 2022-10-24 PROCEDURE — 80053 COMPREHEN METABOLIC PANEL: CPT

## 2022-10-27 ENCOUNTER — OFFICE VISIT (OUTPATIENT)
Dept: ONCOLOGY | Facility: HOSPITAL | Age: 67
End: 2022-10-27

## 2022-10-27 VITALS
DIASTOLIC BLOOD PRESSURE: 72 MMHG | BODY MASS INDEX: 16.23 KG/M2 | RESPIRATION RATE: 18 BRPM | TEMPERATURE: 99 F | HEART RATE: 84 BPM | OXYGEN SATURATION: 92 % | WEIGHT: 123.02 LBS | SYSTOLIC BLOOD PRESSURE: 126 MMHG

## 2022-10-27 DIAGNOSIS — D50.0 IRON DEFICIENCY ANEMIA DUE TO CHRONIC BLOOD LOSS: ICD-10-CM

## 2022-10-27 DIAGNOSIS — C20 RECTAL CANCER: Primary | ICD-10-CM

## 2022-10-27 PROCEDURE — 99214 OFFICE O/P EST MOD 30 MIN: CPT | Performed by: INTERNAL MEDICINE

## 2022-10-27 PROCEDURE — G0463 HOSPITAL OUTPT CLINIC VISIT: HCPCS | Performed by: INTERNAL MEDICINE

## 2022-10-27 NOTE — ASSESSMENT & PLAN NOTE
Pt is s/p treatment as outlined. He is doing well from the cancer standpoint. CEA is normal. He is up to date on colonoscopy. He already has scans scheduled with Radiation Oncology.   I will see him back in 6 moths for ongoing surveillance.

## 2022-11-10 ENCOUNTER — TELEPHONE (OUTPATIENT)
Dept: ONCOLOGY | Facility: HOSPITAL | Age: 67
End: 2022-11-10

## 2022-11-10 NOTE — TELEPHONE ENCOUNTER
[facility] received a fax that requires your attention. The document has been indexed to the patient’s chart for your review.      Reason for sending: CONTINUATION OF CARE    Documents Description: UPDATED [facility]IZATION FROM [facility]CER    Name of Sender: EDMUND CASTAÑEDA    Date Indexed: 11/10/22

## 2022-11-10 NOTE — TELEPHONE ENCOUNTER
Provider: DR SUH  Caller: [facility]  [facility]     Reason for Call: [facility] WOULD LIKE LAST OFFICE NOTE FAXED TO THEIR OFFICE    FAX # [phone]

## 2023-01-01 ENCOUNTER — APPOINTMENT (OUTPATIENT)
Dept: GENERAL RADIOLOGY | Facility: HOSPITAL | Age: 68
End: 2023-01-01
Payer: COMMERCIAL

## 2023-01-01 ENCOUNTER — HOSPITAL ENCOUNTER (INPATIENT)
Facility: HOSPITAL | Age: 68
LOS: 2 days | End: 2023-11-09
Attending: STUDENT IN AN ORGANIZED HEALTH CARE EDUCATION/TRAINING PROGRAM | Admitting: STUDENT IN AN ORGANIZED HEALTH CARE EDUCATION/TRAINING PROGRAM
Payer: COMMERCIAL

## 2023-01-01 ENCOUNTER — APPOINTMENT (OUTPATIENT)
Dept: CT IMAGING | Facility: HOSPITAL | Age: 68
End: 2023-01-01
Payer: COMMERCIAL

## 2023-01-01 ENCOUNTER — HOSPITAL ENCOUNTER (INPATIENT)
Facility: HOSPITAL | Age: 68
LOS: 11 days | End: 2023-11-07
Attending: EMERGENCY MEDICINE
Payer: OTHER GOVERNMENT

## 2023-01-01 ENCOUNTER — APPOINTMENT (OUTPATIENT)
Dept: ULTRASOUND IMAGING | Facility: HOSPITAL | Age: 68
End: 2023-01-01
Payer: COMMERCIAL

## 2023-01-01 VITALS
TEMPERATURE: 98.2 F | DIASTOLIC BLOOD PRESSURE: 64 MMHG | WEIGHT: 106.26 LBS | SYSTOLIC BLOOD PRESSURE: 132 MMHG | RESPIRATION RATE: 30 BRPM | HEART RATE: 139 BPM | HEIGHT: 73 IN | OXYGEN SATURATION: 73 % | BODY MASS INDEX: 14.08 KG/M2

## 2023-01-01 VITALS
BODY MASS INDEX: 15.22 KG/M2 | DIASTOLIC BLOOD PRESSURE: 68 MMHG | WEIGHT: 114.86 LBS | OXYGEN SATURATION: 95 % | RESPIRATION RATE: 20 BRPM | HEART RATE: 93 BPM | HEIGHT: 73 IN | SYSTOLIC BLOOD PRESSURE: 120 MMHG | TEMPERATURE: 97.7 F

## 2023-01-01 DIAGNOSIS — R26.2 DIFFICULTY WALKING: ICD-10-CM

## 2023-01-01 DIAGNOSIS — T17.998A MUCUS PLUG IN RESPIRATORY TRACT: ICD-10-CM

## 2023-01-01 DIAGNOSIS — Z78.9 DECREASED ACTIVITIES OF DAILY LIVING (ADL): ICD-10-CM

## 2023-01-01 DIAGNOSIS — J44.1 COPD EXACERBATION: Primary | ICD-10-CM

## 2023-01-01 LAB
ACB CMPLX DNA BAL NAA+NON-PRB-NCNCRNG: NOT DETECTED
ALBUMIN SERPL-MCNC: 3.2 G/DL (ref 3.5–5.2)
ALBUMIN SERPL-MCNC: 3.2 G/DL (ref 3.5–5.2)
ALBUMIN SERPL-MCNC: 3.3 G/DL (ref 3.5–5.2)
ALBUMIN SERPL-MCNC: 3.5 G/DL (ref 3.5–5.2)
ALBUMIN SERPL-MCNC: 3.5 G/DL (ref 3.5–5.2)
ALBUMIN SERPL-MCNC: 3.8 G/DL (ref 3.5–5.2)
ALBUMIN SERPL-MCNC: 3.9 G/DL (ref 3.5–5.2)
ALBUMIN/GLOB SERPL: 1 G/DL
ALBUMIN/GLOB SERPL: 1 G/DL
ALBUMIN/GLOB SERPL: 1.1 G/DL
ALBUMIN/GLOB SERPL: 1.2 G/DL
ALBUMIN/GLOB SERPL: 1.2 G/DL
ALP SERPL-CCNC: 104 U/L (ref 39–117)
ALP SERPL-CCNC: 123 U/L (ref 39–117)
ALP SERPL-CCNC: 71 U/L (ref 39–117)
ALP SERPL-CCNC: 73 U/L (ref 39–117)
ALP SERPL-CCNC: 74 U/L (ref 39–117)
ALP SERPL-CCNC: 75 U/L (ref 39–117)
ALP SERPL-CCNC: 77 U/L (ref 39–117)
ALP SERPL-CCNC: 79 U/L (ref 39–117)
ALP SERPL-CCNC: 81 U/L (ref 39–117)
ALP SERPL-CCNC: 91 U/L (ref 39–117)
ALP SERPL-CCNC: 91 U/L (ref 39–117)
ALP SERPL-CCNC: 93 U/L (ref 39–117)
ALT SERPL W P-5'-P-CCNC: 106 U/L (ref 1–41)
ALT SERPL W P-5'-P-CCNC: 118 U/L (ref 1–41)
ALT SERPL W P-5'-P-CCNC: 1264 U/L (ref 1–41)
ALT SERPL W P-5'-P-CCNC: 145 U/L (ref 1–41)
ALT SERPL W P-5'-P-CCNC: 165 U/L (ref 1–41)
ALT SERPL W P-5'-P-CCNC: 235 U/L (ref 1–41)
ALT SERPL W P-5'-P-CCNC: 318 U/L (ref 1–41)
ALT SERPL W P-5'-P-CCNC: 446 U/L (ref 1–41)
ALT SERPL W P-5'-P-CCNC: 589 U/L (ref 1–41)
ALT SERPL W P-5'-P-CCNC: 885 U/L (ref 1–41)
ALT SERPL W P-5'-P-CCNC: 96 U/L (ref 1–41)
ALT SERPL W P-5'-P-CCNC: 97 U/L (ref 1–41)
ANION GAP SERPL CALCULATED.3IONS-SCNC: 0.1 MMOL/L (ref 5–15)
ANION GAP SERPL CALCULATED.3IONS-SCNC: 1.2 MMOL/L (ref 5–15)
ANION GAP SERPL CALCULATED.3IONS-SCNC: 1.2 MMOL/L (ref 5–15)
ANION GAP SERPL CALCULATED.3IONS-SCNC: 1.5 MMOL/L (ref 5–15)
ANION GAP SERPL CALCULATED.3IONS-SCNC: 1.9 MMOL/L (ref 5–15)
ANION GAP SERPL CALCULATED.3IONS-SCNC: 2.6 MMOL/L (ref 5–15)
ANION GAP SERPL CALCULATED.3IONS-SCNC: 3.5 MMOL/L (ref 5–15)
ANION GAP SERPL CALCULATED.3IONS-SCNC: 3.6 MMOL/L (ref 5–15)
ANION GAP SERPL CALCULATED.3IONS-SCNC: 4 MMOL/L (ref 5–15)
ANION GAP SERPL CALCULATED.3IONS-SCNC: 4.1 MMOL/L (ref 5–15)
ANION GAP SERPL CALCULATED.3IONS-SCNC: 4.1 MMOL/L (ref 5–15)
ANION GAP SERPL CALCULATED.3IONS-SCNC: 6.4 MMOL/L (ref 5–15)
APAP SERPL-MCNC: <5 MCG/ML (ref 0–30)
APTT PPP: 28.2 SECONDS (ref 24.2–34.2)
ARTERIAL PATENCY WRIST A: POSITIVE
ARTERIAL PATENCY WRIST A: POSITIVE
AST SERPL-CCNC: 154 U/L (ref 1–40)
AST SERPL-CCNC: 23 U/L (ref 1–40)
AST SERPL-CCNC: 29 U/L (ref 1–40)
AST SERPL-CCNC: 30 U/L (ref 1–40)
AST SERPL-CCNC: 31 U/L (ref 1–40)
AST SERPL-CCNC: 33 U/L (ref 1–40)
AST SERPL-CCNC: 367 U/L (ref 1–40)
AST SERPL-CCNC: 37 U/L (ref 1–40)
AST SERPL-CCNC: 41 U/L (ref 1–40)
AST SERPL-CCNC: 45 U/L (ref 1–40)
AST SERPL-CCNC: 782 U/L (ref 1–40)
AST SERPL-CCNC: 81 U/L (ref 1–40)
B PARAPERT DNA SPEC QL NAA+PROBE: NOT DETECTED
B PERT DNA SPEC QL NAA+PROBE: NOT DETECTED
BACTERIA SPEC AEROBE CULT: ABNORMAL
BACTERIA SPEC AEROBE CULT: ABNORMAL
BACTERIA SPEC RESP CULT: ABNORMAL
BASE EXCESS BLDA CALC-SCNC: 10.6 MMOL/L (ref -2–2)
BASE EXCESS BLDA CALC-SCNC: 9.6 MMOL/L (ref -2–2)
BASOPHILS # BLD AUTO: 0 10*3/MM3 (ref 0–0.2)
BASOPHILS # BLD AUTO: 0.01 10*3/MM3 (ref 0–0.2)
BASOPHILS # BLD AUTO: 0.02 10*3/MM3 (ref 0–0.2)
BASOPHILS # BLD AUTO: 0.02 10*3/MM3 (ref 0–0.2)
BASOPHILS # BLD AUTO: 0.03 10*3/MM3 (ref 0–0.2)
BASOPHILS # BLD AUTO: 0.03 10*3/MM3 (ref 0–0.2)
BASOPHILS # BLD AUTO: 0.05 10*3/MM3 (ref 0–0.2)
BASOPHILS # BLD AUTO: 0.05 10*3/MM3 (ref 0–0.2)
BASOPHILS NFR BLD AUTO: 0 % (ref 0–1.5)
BASOPHILS NFR BLD AUTO: 0.1 % (ref 0–1.5)
BASOPHILS NFR BLD AUTO: 0.2 % (ref 0–1.5)
BASOPHILS NFR BLD AUTO: 0.3 % (ref 0–1.5)
BASOPHILS NFR BLD AUTO: 0.4 % (ref 0–1.5)
BASOPHILS NFR BLD AUTO: 0.6 % (ref 0–1.5)
BASOPHILS NFR BLD AUTO: 0.8 % (ref 0–1.5)
BDY SITE: ABNORMAL
BDY SITE: ABNORMAL
BILIRUB SERPL-MCNC: 0.2 MG/DL (ref 0–1.2)
BILIRUB SERPL-MCNC: 0.3 MG/DL (ref 0–1.2)
BILIRUB SERPL-MCNC: <0.2 MG/DL (ref 0–1.2)
BILIRUB SERPL-MCNC: <0.2 MG/DL (ref 0–1.2)
BLACTX-M ISLT/SPM QL: NOT DETECTED
BLAIMP ISLT/SPM QL: NOT DETECTED
BLAKPC ISLT/SPM QL: NOT DETECTED
BLAOXA-48-LIKE ISLT/SPM QL: ABNORMAL
BLAVIM ISLT/SPM QL: NOT DETECTED
BUN SERPL-MCNC: 21 MG/DL (ref 8–23)
BUN SERPL-MCNC: 24 MG/DL (ref 8–23)
BUN SERPL-MCNC: 25 MG/DL (ref 8–23)
BUN SERPL-MCNC: 27 MG/DL (ref 8–23)
BUN SERPL-MCNC: 30 MG/DL (ref 8–23)
BUN SERPL-MCNC: 32 MG/DL (ref 8–23)
BUN SERPL-MCNC: 33 MG/DL (ref 8–23)
BUN SERPL-MCNC: 37 MG/DL (ref 8–23)
BUN SERPL-MCNC: 49 MG/DL (ref 8–23)
BUN SERPL-MCNC: 55 MG/DL (ref 8–23)
BUN/CREAT SERPL: 31.8 (ref 7–25)
BUN/CREAT SERPL: 35.5 (ref 7–25)
BUN/CREAT SERPL: 37.3 (ref 7–25)
BUN/CREAT SERPL: 37.5 (ref 7–25)
BUN/CREAT SERPL: 41.7 (ref 7–25)
BUN/CREAT SERPL: 47.1 (ref 7–25)
BUN/CREAT SERPL: 47.8 (ref 7–25)
BUN/CREAT SERPL: 49.1 (ref 7–25)
BUN/CREAT SERPL: 50 (ref 7–25)
BUN/CREAT SERPL: 51.4 (ref 7–25)
BUN/CREAT SERPL: 51.7 (ref 7–25)
BUN/CREAT SERPL: 52.4 (ref 7–25)
C PNEUM DNA NPH QL NAA+NON-PROBE: NOT DETECTED
C PNEUM DNA NPH QL NAA+NON-PROBE: NOT DETECTED
CA-I BLDA-SCNC: 1.2 MMOL/L (ref 1.13–1.32)
CALCIUM SPEC-SCNC: 9 MG/DL (ref 8.6–10.5)
CALCIUM SPEC-SCNC: 9.1 MG/DL (ref 8.6–10.5)
CALCIUM SPEC-SCNC: 9.2 MG/DL (ref 8.6–10.5)
CALCIUM SPEC-SCNC: 9.3 MG/DL (ref 8.6–10.5)
CALCIUM SPEC-SCNC: 9.4 MG/DL (ref 8.6–10.5)
CALCIUM SPEC-SCNC: 9.7 MG/DL (ref 8.6–10.5)
CHLORIDE BLDA-SCNC: 98 MMOL/L (ref 98–106)
CHLORIDE SERPL-SCNC: 100 MMOL/L (ref 98–107)
CHLORIDE SERPL-SCNC: 101 MMOL/L (ref 98–107)
CHLORIDE SERPL-SCNC: 91 MMOL/L (ref 98–107)
CHLORIDE SERPL-SCNC: 92 MMOL/L (ref 98–107)
CHLORIDE SERPL-SCNC: 94 MMOL/L (ref 98–107)
CHLORIDE SERPL-SCNC: 97 MMOL/L (ref 98–107)
CILIATED BAL QL: 1 %
CK SERPL-CCNC: 92 U/L (ref 20–200)
CO2 SERPL-SCNC: 37.6 MMOL/L (ref 22–29)
CO2 SERPL-SCNC: 38.9 MMOL/L (ref 22–29)
CO2 SERPL-SCNC: 40.5 MMOL/L (ref 22–29)
CO2 SERPL-SCNC: 41.1 MMOL/L (ref 22–29)
CO2 SERPL-SCNC: 41.4 MMOL/L (ref 22–29)
CO2 SERPL-SCNC: 42 MMOL/L (ref 22–29)
CO2 SERPL-SCNC: 42.5 MMOL/L (ref 22–29)
CO2 SERPL-SCNC: 42.9 MMOL/L (ref 22–29)
CO2 SERPL-SCNC: 43.4 MMOL/L (ref 22–29)
CO2 SERPL-SCNC: 44.8 MMOL/L (ref 22–29)
CO2 SERPL-SCNC: 45.8 MMOL/L (ref 22–29)
CO2 SERPL-SCNC: 46.9 MMOL/L (ref 22–29)
COHGB MFR BLD: 0.3 % (ref 0–1.5)
COHGB MFR BLD: 1.3 % (ref 0–1.5)
CREAT SERPL-MCNC: 0.55 MG/DL (ref 0.76–1.27)
CREAT SERPL-MCNC: 0.58 MG/DL (ref 0.76–1.27)
CREAT SERPL-MCNC: 0.6 MG/DL (ref 0.76–1.27)
CREAT SERPL-MCNC: 0.63 MG/DL (ref 0.76–1.27)
CREAT SERPL-MCNC: 0.64 MG/DL (ref 0.76–1.27)
CREAT SERPL-MCNC: 0.66 MG/DL (ref 0.76–1.27)
CREAT SERPL-MCNC: 0.67 MG/DL (ref 0.76–1.27)
CREAT SERPL-MCNC: 0.67 MG/DL (ref 0.76–1.27)
CREAT SERPL-MCNC: 0.72 MG/DL (ref 0.76–1.27)
CREAT SERPL-MCNC: 0.72 MG/DL (ref 0.76–1.27)
CREAT SERPL-MCNC: 1.04 MG/DL (ref 0.76–1.27)
CREAT SERPL-MCNC: 1.55 MG/DL (ref 0.76–1.27)
CYTO UR: NORMAL
DEPRECATED RDW RBC AUTO: 44.1 FL (ref 37–54)
DEPRECATED RDW RBC AUTO: 44.2 FL (ref 37–54)
DEPRECATED RDW RBC AUTO: 44.5 FL (ref 37–54)
DEPRECATED RDW RBC AUTO: 44.5 FL (ref 37–54)
DEPRECATED RDW RBC AUTO: 44.8 FL (ref 37–54)
DEPRECATED RDW RBC AUTO: 44.8 FL (ref 37–54)
DEPRECATED RDW RBC AUTO: 44.9 FL (ref 37–54)
DEPRECATED RDW RBC AUTO: 45.3 FL (ref 37–54)
DEPRECATED RDW RBC AUTO: 45.4 FL (ref 37–54)
DEPRECATED RDW RBC AUTO: 45.6 FL (ref 37–54)
DEPRECATED RDW RBC AUTO: 46.3 FL (ref 37–54)
DEPRECATED RDW RBC AUTO: 46.5 FL (ref 37–54)
E CLOAC COMP DNA BAL NAA+NON-PRB-NCNCRNG: NOT DETECTED
E COLI DNA BAL NAA+NON-PRB-NCNCRNG: NOT DETECTED
EGFRCR SERPLBLD CKD-EPI 2021: 100.8 ML/MIN/1.73
EGFRCR SERPLBLD CKD-EPI 2021: 100.8 ML/MIN/1.73
EGFRCR SERPLBLD CKD-EPI 2021: 103 ML/MIN/1.73
EGFRCR SERPLBLD CKD-EPI 2021: 103 ML/MIN/1.73
EGFRCR SERPLBLD CKD-EPI 2021: 103.4 ML/MIN/1.73
EGFRCR SERPLBLD CKD-EPI 2021: 104.4 ML/MIN/1.73
EGFRCR SERPLBLD CKD-EPI 2021: 104.9 ML/MIN/1.73
EGFRCR SERPLBLD CKD-EPI 2021: 106.5 ML/MIN/1.73
EGFRCR SERPLBLD CKD-EPI 2021: 107.6 ML/MIN/1.73
EGFRCR SERPLBLD CKD-EPI 2021: 109.3 ML/MIN/1.73
EGFRCR SERPLBLD CKD-EPI 2021: 49.1 ML/MIN/1.73
EGFRCR SERPLBLD CKD-EPI 2021: 79.2 ML/MIN/1.73
EOSINOPHIL # BLD AUTO: 0.01 10*3/MM3 (ref 0–0.4)
EOSINOPHIL # BLD AUTO: 0.01 10*3/MM3 (ref 0–0.4)
EOSINOPHIL # BLD AUTO: 0.02 10*3/MM3 (ref 0–0.4)
EOSINOPHIL # BLD AUTO: 0.1 10*3/MM3 (ref 0–0.4)
EOSINOPHIL # BLD AUTO: 0.13 10*3/MM3 (ref 0–0.4)
EOSINOPHIL # BLD AUTO: 0.15 10*3/MM3 (ref 0–0.4)
EOSINOPHIL # BLD AUTO: 0.31 10*3/MM3 (ref 0–0.4)
EOSINOPHIL # BLD AUTO: 0.37 10*3/MM3 (ref 0–0.4)
EOSINOPHIL # BLD AUTO: 0.43 10*3/MM3 (ref 0–0.4)
EOSINOPHIL # BLD AUTO: 0.47 10*3/MM3 (ref 0–0.4)
EOSINOPHIL # BLD AUTO: 0.68 10*3/MM3 (ref 0–0.4)
EOSINOPHIL NFR BLD AUTO: 0.1 % (ref 0.3–6.2)
EOSINOPHIL NFR BLD AUTO: 0.1 % (ref 0.3–6.2)
EOSINOPHIL NFR BLD AUTO: 0.3 % (ref 0.3–6.2)
EOSINOPHIL NFR BLD AUTO: 1.3 % (ref 0.3–6.2)
EOSINOPHIL NFR BLD AUTO: 1.7 % (ref 0.3–6.2)
EOSINOPHIL NFR BLD AUTO: 2.3 % (ref 0.3–6.2)
EOSINOPHIL NFR BLD AUTO: 3.8 % (ref 0.3–6.2)
EOSINOPHIL NFR BLD AUTO: 5.6 % (ref 0.3–6.2)
EOSINOPHIL NFR BLD AUTO: 5.6 % (ref 0.3–6.2)
EOSINOPHIL NFR BLD AUTO: 5.8 % (ref 0.3–6.2)
EOSINOPHIL NFR BLD AUTO: 7 % (ref 0.3–6.2)
ERYTHROCYTE [DISTWIDTH] IN BLOOD BY AUTOMATED COUNT: 12 % (ref 12.3–15.4)
ERYTHROCYTE [DISTWIDTH] IN BLOOD BY AUTOMATED COUNT: 12.1 % (ref 12.3–15.4)
ERYTHROCYTE [DISTWIDTH] IN BLOOD BY AUTOMATED COUNT: 12.2 % (ref 12.3–15.4)
ERYTHROCYTE [DISTWIDTH] IN BLOOD BY AUTOMATED COUNT: 12.3 % (ref 12.3–15.4)
ERYTHROCYTE [DISTWIDTH] IN BLOOD BY AUTOMATED COUNT: 12.4 % (ref 12.3–15.4)
FHHB: 10.3 % (ref 0–5)
FHHB: 12.5 % (ref 0–5)
FLUAV SUBTYP SPEC NAA+PROBE: NOT DETECTED
FLUAV SUBTYP SPEC NAA+PROBE: NOT DETECTED
FLUBV RNA ISLT QL NAA+PROBE: NOT DETECTED
FLUBV RNA ISLT QL NAA+PROBE: NOT DETECTED
FUNGUS WND CULT: ABNORMAL
FUNGUS WND CULT: NORMAL
GAS FLOW AIRWAY: 3 LPM
GEN 5 2HR TROPONIN T REFLEX: 124 NG/L
GLOBULIN UR ELPH-MCNC: 2.9 GM/DL
GLOBULIN UR ELPH-MCNC: 3 GM/DL
GLOBULIN UR ELPH-MCNC: 3.1 GM/DL
GLOBULIN UR ELPH-MCNC: 3.2 GM/DL
GLOBULIN UR ELPH-MCNC: 3.3 GM/DL
GLOBULIN UR ELPH-MCNC: 3.3 GM/DL
GLOBULIN UR ELPH-MCNC: 3.4 GM/DL
GLOBULIN UR ELPH-MCNC: 3.5 GM/DL
GLUCOSE BLDA-MCNC: 126 MG/DL (ref 70–99)
GLUCOSE SERPL-MCNC: 101 MG/DL (ref 65–99)
GLUCOSE SERPL-MCNC: 111 MG/DL (ref 65–99)
GLUCOSE SERPL-MCNC: 133 MG/DL (ref 65–99)
GLUCOSE SERPL-MCNC: 84 MG/DL (ref 65–99)
GLUCOSE SERPL-MCNC: 86 MG/DL (ref 65–99)
GLUCOSE SERPL-MCNC: 89 MG/DL (ref 65–99)
GLUCOSE SERPL-MCNC: 90 MG/DL (ref 65–99)
GLUCOSE SERPL-MCNC: 93 MG/DL (ref 65–99)
GLUCOSE SERPL-MCNC: 97 MG/DL (ref 65–99)
GLUCOSE SERPL-MCNC: 98 MG/DL (ref 65–99)
GLUCOSE SERPL-MCNC: 99 MG/DL (ref 65–99)
GLUCOSE SERPL-MCNC: 99 MG/DL (ref 65–99)
GP B STREP DNA BAL NAA+NON-PRB-NCNCRNG: NOT DETECTED
GRAM STN SPEC: ABNORMAL
HADV DNA SPEC NAA+PROBE: NOT DETECTED
HADV DNA SPEC NAA+PROBE: NOT DETECTED
HAEM INFLU DNA BAL NAA+NON-PRB-NCNCRNG: NOT DETECTED
HAV IGM SERPL QL IA: NORMAL
HBV CORE IGM SERPL QL IA: NORMAL
HBV SURFACE AG SERPL QL IA: NORMAL
HCO3 BLDA-SCNC: 38.6 MMOL/L (ref 22–26)
HCO3 BLDA-SCNC: 40.4 MMOL/L (ref 22–26)
HCOV 229E RNA SPEC QL NAA+PROBE: NOT DETECTED
HCOV HKU1 RNA SPEC QL NAA+PROBE: NOT DETECTED
HCOV NL63 RNA SPEC QL NAA+PROBE: NOT DETECTED
HCOV OC43 RNA SPEC QL NAA+PROBE: NOT DETECTED
HCOV RNA LOWER RESP QL NAA+NON-PROBE: NOT DETECTED
HCT VFR BLD AUTO: 36 % (ref 37.5–51)
HCT VFR BLD AUTO: 36.8 % (ref 37.5–51)
HCT VFR BLD AUTO: 38.2 % (ref 37.5–51)
HCT VFR BLD AUTO: 38.9 % (ref 37.5–51)
HCT VFR BLD AUTO: 38.9 % (ref 37.5–51)
HCT VFR BLD AUTO: 40.1 % (ref 37.5–51)
HCT VFR BLD AUTO: 40.3 % (ref 37.5–51)
HCT VFR BLD AUTO: 40.3 % (ref 37.5–51)
HCT VFR BLD AUTO: 40.6 % (ref 37.5–51)
HCT VFR BLD AUTO: 41.2 % (ref 37.5–51)
HCT VFR BLD AUTO: 41.4 % (ref 37.5–51)
HCT VFR BLD AUTO: 41.7 % (ref 37.5–51)
HCV AB SER DONR QL: NORMAL
HGB BLD-MCNC: 10.7 G/DL (ref 13–17.7)
HGB BLD-MCNC: 10.7 G/DL (ref 13–17.7)
HGB BLD-MCNC: 11 G/DL (ref 13–17.7)
HGB BLD-MCNC: 11.1 G/DL (ref 13–17.7)
HGB BLD-MCNC: 11.2 G/DL (ref 13–17.7)
HGB BLD-MCNC: 11.4 G/DL (ref 13–17.7)
HGB BLD-MCNC: 11.6 G/DL (ref 13–17.7)
HGB BLD-MCNC: 11.7 G/DL (ref 13–17.7)
HGB BLD-MCNC: 11.8 G/DL (ref 13–17.7)
HGB BLD-MCNC: 11.9 G/DL (ref 13–17.7)
HGB BLD-MCNC: 12.2 G/DL (ref 13–17.7)
HGB BLD-MCNC: 12.5 G/DL (ref 13–17.7)
HGB BLDA-MCNC: 12.4 G/DL (ref 13.8–16.4)
HGB BLDA-MCNC: 12.8 G/DL (ref 13.8–16.4)
HMPV RNA NPH QL NAA+NON-PROBE: NOT DETECTED
HMPV RNA NPH QL NAA+NON-PROBE: NOT DETECTED
HOLD SPECIMEN: NORMAL
HPIV RNA LOWER RESP QL NAA+NON-PROBE: NOT DETECTED
HPIV1 RNA ISLT QL NAA+PROBE: NOT DETECTED
HPIV2 RNA SPEC QL NAA+PROBE: NOT DETECTED
HPIV3 RNA NPH QL NAA+PROBE: NOT DETECTED
HPIV4 P GENE NPH QL NAA+PROBE: NOT DETECTED
IMM GRANULOCYTES # BLD AUTO: 0.02 10*3/MM3 (ref 0–0.05)
IMM GRANULOCYTES # BLD AUTO: 0.04 10*3/MM3 (ref 0–0.05)
IMM GRANULOCYTES # BLD AUTO: 0.05 10*3/MM3 (ref 0–0.05)
IMM GRANULOCYTES # BLD AUTO: 0.06 10*3/MM3 (ref 0–0.05)
IMM GRANULOCYTES # BLD AUTO: 0.06 10*3/MM3 (ref 0–0.05)
IMM GRANULOCYTES # BLD AUTO: 0.07 10*3/MM3 (ref 0–0.05)
IMM GRANULOCYTES # BLD AUTO: 0.08 10*3/MM3 (ref 0–0.05)
IMM GRANULOCYTES NFR BLD AUTO: 0.2 % (ref 0–0.5)
IMM GRANULOCYTES NFR BLD AUTO: 0.5 % (ref 0–0.5)
IMM GRANULOCYTES NFR BLD AUTO: 0.5 % (ref 0–0.5)
IMM GRANULOCYTES NFR BLD AUTO: 0.6 % (ref 0–0.5)
IMM GRANULOCYTES NFR BLD AUTO: 0.8 % (ref 0–0.5)
IMM GRANULOCYTES NFR BLD AUTO: 0.8 % (ref 0–0.5)
IMM GRANULOCYTES NFR BLD AUTO: 0.9 % (ref 0–0.5)
IMM GRANULOCYTES NFR BLD AUTO: 1.3 % (ref 0–0.5)
INHALED O2 CONCENTRATION: 32 %
INHALED O2 CONCENTRATION: 32 %
INR PPP: 1.12 (ref 0.86–1.15)
K AEROGENES DNA BAL NAA+NON-PRB-NCNCRNG: NOT DETECTED
K OXYTOCA DNA BAL NAA+NON-PRB-NCNCRNG: NOT DETECTED
K PNEU GRP DNA BAL NAA+NON-PRB-NCNCRNG: NOT DETECTED
L PNEUMO DNA LOWER RESP QL NAA+NON-PROBE: NOT DETECTED
L PNEUMO1 AG UR QL IA: NEGATIVE
LAB AP CASE REPORT: NORMAL
LAB AP CLINICAL INFORMATION: NORMAL
LACTATE BLDA-SCNC: 1.22 MMOL/L (ref 0.5–2)
LYMPHOCYTES # BLD AUTO: 0.35 10*3/MM3 (ref 0.7–3.1)
LYMPHOCYTES # BLD AUTO: 0.48 10*3/MM3 (ref 0.7–3.1)
LYMPHOCYTES # BLD AUTO: 0.48 10*3/MM3 (ref 0.7–3.1)
LYMPHOCYTES # BLD AUTO: 0.54 10*3/MM3 (ref 0.7–3.1)
LYMPHOCYTES # BLD AUTO: 0.56 10*3/MM3 (ref 0.7–3.1)
LYMPHOCYTES # BLD AUTO: 0.58 10*3/MM3 (ref 0.7–3.1)
LYMPHOCYTES # BLD AUTO: 0.6 10*3/MM3 (ref 0.7–3.1)
LYMPHOCYTES # BLD AUTO: 0.69 10*3/MM3 (ref 0.7–3.1)
LYMPHOCYTES # BLD AUTO: 0.71 10*3/MM3 (ref 0.7–3.1)
LYMPHOCYTES # BLD AUTO: 0.72 10*3/MM3 (ref 0.7–3.1)
LYMPHOCYTES # BLD AUTO: 0.79 10*3/MM3 (ref 0.7–3.1)
LYMPHOCYTES NFR BLD AUTO: 10.8 % (ref 19.6–45.3)
LYMPHOCYTES NFR BLD AUTO: 11.1 % (ref 19.6–45.3)
LYMPHOCYTES NFR BLD AUTO: 3.4 % (ref 19.6–45.3)
LYMPHOCYTES NFR BLD AUTO: 6.3 % (ref 19.6–45.3)
LYMPHOCYTES NFR BLD AUTO: 6.4 % (ref 19.6–45.3)
LYMPHOCYTES NFR BLD AUTO: 6.7 % (ref 19.6–45.3)
LYMPHOCYTES NFR BLD AUTO: 7.3 % (ref 19.6–45.3)
LYMPHOCYTES NFR BLD AUTO: 7.4 % (ref 19.6–45.3)
LYMPHOCYTES NFR BLD AUTO: 7.8 % (ref 19.6–45.3)
LYMPHOCYTES NFR BLD AUTO: 8.2 % (ref 19.6–45.3)
LYMPHOCYTES NFR BLD AUTO: 8.8 % (ref 19.6–45.3)
LYMPHOCYTES NFR FLD MANUAL: 19 %
M CATARRHALIS DNA BAL NAA+NON-PRB-NCNCRNG: NOT DETECTED
M PNEUMO IGG SER IA-ACNC: NOT DETECTED
M PNEUMO IGG SER IA-ACNC: NOT DETECTED
MACROPHAGE FLUID: 8 %
MAGNESIUM SERPL-MCNC: 1.7 MG/DL (ref 1.6–2.4)
MAGNESIUM SERPL-MCNC: 1.8 MG/DL (ref 1.6–2.4)
MAGNESIUM SERPL-MCNC: 1.8 MG/DL (ref 1.6–2.4)
MAGNESIUM SERPL-MCNC: 1.9 MG/DL (ref 1.6–2.4)
MAGNESIUM SERPL-MCNC: 2 MG/DL (ref 1.6–2.4)
MAGNESIUM SERPL-MCNC: 2.1 MG/DL (ref 1.6–2.4)
MAGNESIUM SERPL-MCNC: 2.1 MG/DL (ref 1.6–2.4)
MAGNESIUM SERPL-MCNC: 2.3 MG/DL (ref 1.6–2.4)
MAGNESIUM SERPL-MCNC: 2.4 MG/DL (ref 1.6–2.4)
MCH RBC QN AUTO: 28.6 PG (ref 26.6–33)
MCH RBC QN AUTO: 28.7 PG (ref 26.6–33)
MCH RBC QN AUTO: 28.7 PG (ref 26.6–33)
MCH RBC QN AUTO: 28.8 PG (ref 26.6–33)
MCH RBC QN AUTO: 28.9 PG (ref 26.6–33)
MCH RBC QN AUTO: 28.9 PG (ref 26.6–33)
MCH RBC QN AUTO: 29.1 PG (ref 26.6–33)
MCH RBC QN AUTO: 29.1 PG (ref 26.6–33)
MCH RBC QN AUTO: 29.2 PG (ref 26.6–33)
MCH RBC QN AUTO: 29.3 PG (ref 26.6–33)
MCH RBC QN AUTO: 29.7 PG (ref 26.6–33)
MCH RBC QN AUTO: 30.1 PG (ref 26.6–33)
MCHC RBC AUTO-ENTMCNC: 28.4 G/DL (ref 31.5–35.7)
MCHC RBC AUTO-ENTMCNC: 28.5 G/DL (ref 31.5–35.7)
MCHC RBC AUTO-ENTMCNC: 28.7 G/DL (ref 31.5–35.7)
MCHC RBC AUTO-ENTMCNC: 28.8 G/DL (ref 31.5–35.7)
MCHC RBC AUTO-ENTMCNC: 29 G/DL (ref 31.5–35.7)
MCHC RBC AUTO-ENTMCNC: 29.1 G/DL (ref 31.5–35.7)
MCHC RBC AUTO-ENTMCNC: 29.1 G/DL (ref 31.5–35.7)
MCHC RBC AUTO-ENTMCNC: 29.6 G/DL (ref 31.5–35.7)
MCHC RBC AUTO-ENTMCNC: 29.7 G/DL (ref 31.5–35.7)
MCHC RBC AUTO-ENTMCNC: 30 G/DL (ref 31.5–35.7)
MCV RBC AUTO: 100.2 FL (ref 79–97)
MCV RBC AUTO: 100.2 FL (ref 79–97)
MCV RBC AUTO: 100.5 FL (ref 79–97)
MCV RBC AUTO: 101.3 FL (ref 79–97)
MCV RBC AUTO: 101.5 FL (ref 79–97)
MCV RBC AUTO: 102 FL (ref 79–97)
MCV RBC AUTO: 97.8 FL (ref 79–97)
MCV RBC AUTO: 98.8 FL (ref 79–97)
MCV RBC AUTO: 98.9 FL (ref 79–97)
MCV RBC AUTO: 99.7 FL (ref 79–97)
MECA+MECC ISLT/SPM QL: ABNORMAL
METHGB BLD QL: 0.1 % (ref 0–1.5)
METHGB BLD QL: 0.3 % (ref 0–1.5)
MODALITY: ABNORMAL
MODALITY: ABNORMAL
MONOCYTES # BLD AUTO: 0.67 10*3/MM3 (ref 0.1–0.9)
MONOCYTES # BLD AUTO: 0.69 10*3/MM3 (ref 0.1–0.9)
MONOCYTES # BLD AUTO: 0.72 10*3/MM3 (ref 0.1–0.9)
MONOCYTES # BLD AUTO: 0.75 10*3/MM3 (ref 0.1–0.9)
MONOCYTES # BLD AUTO: 0.79 10*3/MM3 (ref 0.1–0.9)
MONOCYTES # BLD AUTO: 0.84 10*3/MM3 (ref 0.1–0.9)
MONOCYTES # BLD AUTO: 0.84 10*3/MM3 (ref 0.1–0.9)
MONOCYTES # BLD AUTO: 0.85 10*3/MM3 (ref 0.1–0.9)
MONOCYTES # BLD AUTO: 0.87 10*3/MM3 (ref 0.1–0.9)
MONOCYTES # BLD AUTO: 0.93 10*3/MM3 (ref 0.1–0.9)
MONOCYTES # BLD AUTO: 0.96 10*3/MM3 (ref 0.1–0.9)
MONOCYTES NFR BLD AUTO: 10 % (ref 5–12)
MONOCYTES NFR BLD AUTO: 10.5 % (ref 5–12)
MONOCYTES NFR BLD AUTO: 10.6 % (ref 5–12)
MONOCYTES NFR BLD AUTO: 11 % (ref 5–12)
MONOCYTES NFR BLD AUTO: 11.8 % (ref 5–12)
MONOCYTES NFR BLD AUTO: 12.5 % (ref 5–12)
MONOCYTES NFR BLD AUTO: 13.1 % (ref 5–12)
MONOCYTES NFR BLD AUTO: 7.8 % (ref 5–12)
MONOCYTES NFR BLD AUTO: 8.8 % (ref 5–12)
MONOCYTES NFR BLD AUTO: 8.9 % (ref 5–12)
MONOCYTES NFR BLD AUTO: 9 % (ref 5–12)
MYCOBACTERIUM SPEC CULT: NORMAL
MYCOBACTERIUM SPEC CULT: NORMAL
NDM GENE: NOT DETECTED
NEUTROPHILS NFR BLD AUTO: 4.56 10*3/MM3 (ref 1.7–7)
NEUTROPHILS NFR BLD AUTO: 4.62 10*3/MM3 (ref 1.7–7)
NEUTROPHILS NFR BLD AUTO: 5.87 10*3/MM3 (ref 1.7–7)
NEUTROPHILS NFR BLD AUTO: 5.95 10*3/MM3 (ref 1.7–7)
NEUTROPHILS NFR BLD AUTO: 6 10*3/MM3 (ref 1.7–7)
NEUTROPHILS NFR BLD AUTO: 6.15 10*3/MM3 (ref 1.7–7)
NEUTROPHILS NFR BLD AUTO: 6.15 10*3/MM3 (ref 1.7–7)
NEUTROPHILS NFR BLD AUTO: 6.28 10*3/MM3 (ref 1.7–7)
NEUTROPHILS NFR BLD AUTO: 6.57 10*3/MM3 (ref 1.7–7)
NEUTROPHILS NFR BLD AUTO: 7.25 10*3/MM3 (ref 1.7–7)
NEUTROPHILS NFR BLD AUTO: 71 % (ref 42.7–76)
NEUTROPHILS NFR BLD AUTO: 72.3 % (ref 42.7–76)
NEUTROPHILS NFR BLD AUTO: 75.1 % (ref 42.7–76)
NEUTROPHILS NFR BLD AUTO: 75.3 % (ref 42.7–76)
NEUTROPHILS NFR BLD AUTO: 76.4 % (ref 42.7–76)
NEUTROPHILS NFR BLD AUTO: 78.3 % (ref 42.7–76)
NEUTROPHILS NFR BLD AUTO: 78.3 % (ref 42.7–76)
NEUTROPHILS NFR BLD AUTO: 78.4 % (ref 42.7–76)
NEUTROPHILS NFR BLD AUTO: 8.99 10*3/MM3 (ref 1.7–7)
NEUTROPHILS NFR BLD AUTO: 81.9 % (ref 42.7–76)
NEUTROPHILS NFR BLD AUTO: 82.5 % (ref 42.7–76)
NEUTROPHILS NFR BLD AUTO: 88.3 % (ref 42.7–76)
NEUTROPHILS NFR FLD MANUAL: 72 %
NIGHT BLUE STAIN TISS: NORMAL
NOTE: ABNORMAL
NRBC BLD AUTO-RTO: 0 /100 WBC (ref 0–0.2)
NT-PROBNP SERPL-MCNC: 2112 PG/ML (ref 0–900)
NT-PROBNP SERPL-MCNC: 8771 PG/ML (ref 0–900)
OXYHGB MFR BLDV: 85.9 % (ref 94–99)
OXYHGB MFR BLDV: 89.3 % (ref 94–99)
P AERUGINOSA DNA BAL NAA+NON-PRB-NCNCRNG: DETECTED
PATH REPORT.FINAL DX SPEC: NORMAL
PATH REPORT.GROSS SPEC: NORMAL
PCO2 BLDA: 69.9 MM HG (ref 35–45)
PCO2 BLDA: 94.2 MM HG (ref 35–45)
PH BLDA: 7.25 PH UNITS (ref 7.35–7.45)
PH BLDA: 7.36 PH UNITS (ref 7.35–7.45)
PHOSPHATE SERPL-MCNC: 2 MG/DL (ref 2.5–4.5)
PHOSPHATE SERPL-MCNC: 2.1 MG/DL (ref 2.5–4.5)
PHOSPHATE SERPL-MCNC: 2.5 MG/DL (ref 2.5–4.5)
PHOSPHATE SERPL-MCNC: 2.8 MG/DL (ref 2.5–4.5)
PHOSPHATE SERPL-MCNC: 3 MG/DL (ref 2.5–4.5)
PHOSPHATE SERPL-MCNC: 3.2 MG/DL (ref 2.5–4.5)
PHOSPHATE SERPL-MCNC: 3.2 MG/DL (ref 2.5–4.5)
PHOSPHATE SERPL-MCNC: 3.3 MG/DL (ref 2.5–4.5)
PHOSPHATE SERPL-MCNC: 3.4 MG/DL (ref 2.5–4.5)
PLATELET # BLD AUTO: 200 10*3/MM3 (ref 140–450)
PLATELET # BLD AUTO: 205 10*3/MM3 (ref 140–450)
PLATELET # BLD AUTO: 231 10*3/MM3 (ref 140–450)
PLATELET # BLD AUTO: 232 10*3/MM3 (ref 140–450)
PLATELET # BLD AUTO: 232 10*3/MM3 (ref 140–450)
PLATELET # BLD AUTO: 233 10*3/MM3 (ref 140–450)
PLATELET # BLD AUTO: 253 10*3/MM3 (ref 140–450)
PLATELET # BLD AUTO: 255 10*3/MM3 (ref 140–450)
PLATELET # BLD AUTO: 258 10*3/MM3 (ref 140–450)
PLATELET # BLD AUTO: 258 10*3/MM3 (ref 140–450)
PLATELET # BLD AUTO: 259 10*3/MM3 (ref 140–450)
PLATELET # BLD AUTO: 271 10*3/MM3 (ref 140–450)
PMV BLD AUTO: 10.3 FL (ref 6–12)
PMV BLD AUTO: 10.4 FL (ref 6–12)
PMV BLD AUTO: 10.6 FL (ref 6–12)
PMV BLD AUTO: 10.7 FL (ref 6–12)
PMV BLD AUTO: 10.8 FL (ref 6–12)
PMV BLD AUTO: 10.9 FL (ref 6–12)
PMV BLD AUTO: 11 FL (ref 6–12)
PMV BLD AUTO: 11.1 FL (ref 6–12)
PO2 BLD: 181 MM[HG] (ref 0–500)
PO2 BLD: 188 MM[HG] (ref 0–500)
PO2 BLDA: 57.9 MM HG (ref 80–100)
PO2 BLDA: 60 MM HG (ref 80–100)
POTASSIUM BLDA-SCNC: 4.07 MMOL/L (ref 3.5–5)
POTASSIUM SERPL-SCNC: 3.9 MMOL/L (ref 3.5–5.2)
POTASSIUM SERPL-SCNC: 4 MMOL/L (ref 3.5–5.2)
POTASSIUM SERPL-SCNC: 4.2 MMOL/L (ref 3.5–5.2)
POTASSIUM SERPL-SCNC: 4.4 MMOL/L (ref 3.5–5.2)
POTASSIUM SERPL-SCNC: 4.5 MMOL/L (ref 3.5–5.2)
POTASSIUM SERPL-SCNC: 4.6 MMOL/L (ref 3.5–5.2)
POTASSIUM SERPL-SCNC: 4.6 MMOL/L (ref 3.5–5.2)
POTASSIUM SERPL-SCNC: 4.8 MMOL/L (ref 3.5–5.2)
POTASSIUM SERPL-SCNC: 4.8 MMOL/L (ref 3.5–5.2)
POTASSIUM SERPL-SCNC: 5 MMOL/L (ref 3.5–5.2)
PROCALCITONIN SERPL-MCNC: 1.63 NG/ML (ref 0–0.25)
PROT SERPL-MCNC: 6.1 G/DL (ref 6–8.5)
PROT SERPL-MCNC: 6.3 G/DL (ref 6–8.5)
PROT SERPL-MCNC: 6.6 G/DL (ref 6–8.5)
PROT SERPL-MCNC: 6.6 G/DL (ref 6–8.5)
PROT SERPL-MCNC: 6.7 G/DL (ref 6–8.5)
PROT SERPL-MCNC: 6.9 G/DL (ref 6–8.5)
PROT SERPL-MCNC: 7.1 G/DL (ref 6–8.5)
PROT SERPL-MCNC: 7.2 G/DL (ref 6–8.5)
PROT SERPL-MCNC: 7.4 G/DL (ref 6–8.5)
PROTEUS SP DNA BAL NAA+NON-PRB-NCNCRNG: NOT DETECTED
PROTHROMBIN TIME: 14.5 SECONDS (ref 11.8–14.9)
QT INTERVAL: 307 MS
QT INTERVAL: 321 MS
QTC INTERVAL: 385 MS
QTC INTERVAL: 388 MS
RBC # BLD AUTO: 3.68 10*6/MM3 (ref 4.14–5.8)
RBC # BLD AUTO: 3.72 10*6/MM3 (ref 4.14–5.8)
RBC # BLD AUTO: 3.8 10*6/MM3 (ref 4.14–5.8)
RBC # BLD AUTO: 3.84 10*6/MM3 (ref 4.14–5.8)
RBC # BLD AUTO: 3.9 10*6/MM3 (ref 4.14–5.8)
RBC # BLD AUTO: 3.97 10*6/MM3 (ref 4.14–5.8)
RBC # BLD AUTO: 3.99 10*6/MM3 (ref 4.14–5.8)
RBC # BLD AUTO: 4.02 10*6/MM3 (ref 4.14–5.8)
RBC # BLD AUTO: 4.06 10*6/MM3 (ref 4.14–5.8)
RBC # BLD AUTO: 4.11 10*6/MM3 (ref 4.14–5.8)
RBC # BLD AUTO: 4.11 10*6/MM3 (ref 4.14–5.8)
RBC # BLD AUTO: 4.15 10*6/MM3 (ref 4.14–5.8)
RHINOVIRUS RNA SPEC NAA+PROBE: NOT DETECTED
RHINOVIRUS RNA SPEC NAA+PROBE: NOT DETECTED
RSV RNA NPH QL NAA+NON-PROBE: NOT DETECTED
RSV RNA NPH QL NAA+NON-PROBE: NOT DETECTED
S AUREUS DNA BAL NAA+NON-PRB-NCNCRNG: NOT DETECTED
S MARCESCENS DNA BAL NAA+NON-PRB-NCNCRNG: NOT DETECTED
S PNEUM AG SPEC QL LA: NEGATIVE
S PNEUM DNA BAL NAA+NON-PRB-NCNCRNG: NOT DETECTED
S PYO DNA BAL NAA+NON-PRB-NCNCRNG: NOT DETECTED
SAO2 % BLDCOA: 87.3 % (ref 95–99)
SAO2 % BLDCOA: 89.7 % (ref 95–99)
SARS-COV-2 RNA RESP QL NAA+PROBE: NOT DETECTED
SODIUM BLDA-SCNC: 143 MMOL/L (ref 136–146)
SODIUM SERPL-SCNC: 136 MMOL/L (ref 136–145)
SODIUM SERPL-SCNC: 136 MMOL/L (ref 136–145)
SODIUM SERPL-SCNC: 137 MMOL/L (ref 136–145)
SODIUM SERPL-SCNC: 140 MMOL/L (ref 136–145)
SODIUM SERPL-SCNC: 141 MMOL/L (ref 136–145)
SODIUM SERPL-SCNC: 143 MMOL/L (ref 136–145)
SODIUM SERPL-SCNC: 143 MMOL/L (ref 136–145)
SODIUM SERPL-SCNC: 144 MMOL/L (ref 136–145)
SODIUM SERPL-SCNC: 145 MMOL/L (ref 136–145)
SODIUM SERPL-SCNC: 146 MMOL/L (ref 136–145)
TROPONIN T DELTA: -20 NG/L
TROPONIN T SERPL HS-MCNC: 113 NG/L
TROPONIN T SERPL HS-MCNC: 144 NG/L
VISUAL PRESENCE OF BLOOD: PRESENT
WBC NRBC COR # BLD: 10.18 10*3/MM3 (ref 3.4–10.8)
WBC NRBC COR # BLD: 5.38 10*3/MM3 (ref 3.4–10.8)
WBC NRBC COR # BLD: 6.39 10*3/MM3 (ref 3.4–10.8)
WBC NRBC COR # BLD: 6.41 10*3/MM3 (ref 3.4–10.8)
WBC NRBC COR # BLD: 7.21 10*3/MM3 (ref 3.4–10.8)
WBC NRBC COR # BLD: 7.66 10*3/MM3 (ref 3.4–10.8)
WBC NRBC COR # BLD: 7.67 10*3/MM3 (ref 3.4–10.8)
WBC NRBC COR # BLD: 7.68 10*3/MM3 (ref 3.4–10.8)
WBC NRBC COR # BLD: 7.85 10*3/MM3 (ref 3.4–10.8)
WBC NRBC COR # BLD: 8.17 10*3/MM3 (ref 3.4–10.8)
WBC NRBC COR # BLD: 8.4 10*3/MM3 (ref 3.4–10.8)
WBC NRBC COR # BLD: 9.66 10*3/MM3 (ref 3.4–10.8)
WHOLE BLOOD HOLD COAG: NORMAL
WHOLE BLOOD HOLD SPECIMEN: NORMAL

## 2023-01-01 PROCEDURE — 25010000002 MAGNESIUM SULFATE 2 GM/50ML SOLUTION: Performed by: INTERNAL MEDICINE

## 2023-01-01 PROCEDURE — 63710000001 PREDNISONE PER 1 MG: Performed by: INTERNAL MEDICINE

## 2023-01-01 PROCEDURE — 87070 CULTURE OTHR SPECIMN AEROBIC: CPT | Performed by: INTERNAL MEDICINE

## 2023-01-01 PROCEDURE — 93005 ELECTROCARDIOGRAM TRACING: CPT | Performed by: STUDENT IN AN ORGANIZED HEALTH CARE EDUCATION/TRAINING PROGRAM

## 2023-01-01 PROCEDURE — 94799 UNLISTED PULMONARY SVC/PX: CPT

## 2023-01-01 PROCEDURE — 99233 SBSQ HOSP IP/OBS HIGH 50: CPT | Performed by: STUDENT IN AN ORGANIZED HEALTH CARE EDUCATION/TRAINING PROGRAM

## 2023-01-01 PROCEDURE — 94660 CPAP INITIATION&MGMT: CPT

## 2023-01-01 PROCEDURE — 93010 ELECTROCARDIOGRAM REPORT: CPT | Performed by: INTERNAL MEDICINE

## 2023-01-01 PROCEDURE — 83735 ASSAY OF MAGNESIUM: CPT | Performed by: INTERNAL MEDICINE

## 2023-01-01 PROCEDURE — 80053 COMPREHEN METABOLIC PANEL: CPT | Performed by: INTERNAL MEDICINE

## 2023-01-01 PROCEDURE — 85025 COMPLETE CBC W/AUTO DIFF WBC: CPT | Performed by: INTERNAL MEDICINE

## 2023-01-01 PROCEDURE — 94761 N-INVAS EAR/PLS OXIMETRY MLT: CPT

## 2023-01-01 PROCEDURE — 25010000002 CEFEPIME PER 500 MG: Performed by: INTERNAL MEDICINE

## 2023-01-01 PROCEDURE — 84484 ASSAY OF TROPONIN QUANT: CPT | Performed by: EMERGENCY MEDICINE

## 2023-01-01 PROCEDURE — 94664 DEMO&/EVAL PT USE INHALER: CPT

## 2023-01-01 PROCEDURE — 94640 AIRWAY INHALATION TREATMENT: CPT

## 2023-01-01 PROCEDURE — 97530 THERAPEUTIC ACTIVITIES: CPT

## 2023-01-01 PROCEDURE — 99233 SBSQ HOSP IP/OBS HIGH 50: CPT | Performed by: INTERNAL MEDICINE

## 2023-01-01 PROCEDURE — 31645 BRNCHSC W/THER ASPIR 1ST: CPT | Performed by: INTERNAL MEDICINE

## 2023-01-01 PROCEDURE — 83880 ASSAY OF NATRIURETIC PEPTIDE: CPT | Performed by: INTERNAL MEDICINE

## 2023-01-01 PROCEDURE — 87186 SC STD MICRODIL/AGAR DIL: CPT

## 2023-01-01 PROCEDURE — 80074 ACUTE HEPATITIS PANEL: CPT | Performed by: STUDENT IN AN ORGANIZED HEALTH CARE EDUCATION/TRAINING PROGRAM

## 2023-01-01 PROCEDURE — 99232 SBSQ HOSP IP/OBS MODERATE 35: CPT | Performed by: INTERNAL MEDICINE

## 2023-01-01 PROCEDURE — 84484 ASSAY OF TROPONIN QUANT: CPT

## 2023-01-01 PROCEDURE — 84100 ASSAY OF PHOSPHORUS: CPT | Performed by: INTERNAL MEDICINE

## 2023-01-01 PROCEDURE — 99239 HOSP IP/OBS DSCHRG MGMT >30: CPT | Performed by: STUDENT IN AN ORGANIZED HEALTH CARE EDUCATION/TRAINING PROGRAM

## 2023-01-01 PROCEDURE — 71260 CT THORAX DX C+: CPT

## 2023-01-01 PROCEDURE — 84484 ASSAY OF TROPONIN QUANT: CPT | Performed by: STUDENT IN AN ORGANIZED HEALTH CARE EDUCATION/TRAINING PROGRAM

## 2023-01-01 PROCEDURE — 85027 COMPLETE CBC AUTOMATED: CPT | Performed by: STUDENT IN AN ORGANIZED HEALTH CARE EDUCATION/TRAINING PROGRAM

## 2023-01-01 PROCEDURE — 0202U NFCT DS 22 TRGT SARS-COV-2: CPT

## 2023-01-01 PROCEDURE — 25010000002 ENOXAPARIN PER 10 MG: Performed by: INTERNAL MEDICINE

## 2023-01-01 PROCEDURE — 25010000002 ENOXAPARIN PER 10 MG: Performed by: STUDENT IN AN ORGANIZED HEALTH CARE EDUCATION/TRAINING PROGRAM

## 2023-01-01 PROCEDURE — 82805 BLOOD GASES W/O2 SATURATION: CPT | Performed by: EMERGENCY MEDICINE

## 2023-01-01 PROCEDURE — 99285 EMERGENCY DEPT VISIT HI MDM: CPT

## 2023-01-01 PROCEDURE — 25810000003 SODIUM CHLORIDE 0.9 % SOLUTION: Performed by: STUDENT IN AN ORGANIZED HEALTH CARE EDUCATION/TRAINING PROGRAM

## 2023-01-01 PROCEDURE — 83735 ASSAY OF MAGNESIUM: CPT | Performed by: STUDENT IN AN ORGANIZED HEALTH CARE EDUCATION/TRAINING PROGRAM

## 2023-01-01 PROCEDURE — 87633 RESP VIRUS 12-25 TARGETS: CPT | Performed by: INTERNAL MEDICINE

## 2023-01-01 PROCEDURE — 87102 FUNGUS ISOLATION CULTURE: CPT | Performed by: INTERNAL MEDICINE

## 2023-01-01 PROCEDURE — 25810000003 LACTATED RINGERS PER 1000 ML: Performed by: INTERNAL MEDICINE

## 2023-01-01 PROCEDURE — 71045 X-RAY EXAM CHEST 1 VIEW: CPT

## 2023-01-01 PROCEDURE — 25010000002 CEFTRIAXONE PER 250 MG

## 2023-01-01 PROCEDURE — 87205 SMEAR GRAM STAIN: CPT | Performed by: INTERNAL MEDICINE

## 2023-01-01 PROCEDURE — 36600 WITHDRAWAL OF ARTERIAL BLOOD: CPT | Performed by: EMERGENCY MEDICINE

## 2023-01-01 PROCEDURE — 87206 SMEAR FLUORESCENT/ACID STAI: CPT | Performed by: INTERNAL MEDICINE

## 2023-01-01 PROCEDURE — 87116 MYCOBACTERIA CULTURE: CPT | Performed by: INTERNAL MEDICINE

## 2023-01-01 PROCEDURE — 82375 ASSAY CARBOXYHB QUANT: CPT | Performed by: EMERGENCY MEDICINE

## 2023-01-01 PROCEDURE — 0BDB8ZX EXTRACTION OF LEFT LOWER LOBE BRONCHUS, VIA NATURAL OR ARTIFICIAL OPENING ENDOSCOPIC, DIAGNOSTIC: ICD-10-PCS | Performed by: INTERNAL MEDICINE

## 2023-01-01 PROCEDURE — 5A09357 ASSISTANCE WITH RESPIRATORY VENTILATION, LESS THAN 24 CONSECUTIVE HOURS, CONTINUOUS POSITIVE AIRWAY PRESSURE: ICD-10-PCS | Performed by: EMERGENCY MEDICINE

## 2023-01-01 PROCEDURE — 83880 ASSAY OF NATRIURETIC PEPTIDE: CPT

## 2023-01-01 PROCEDURE — 25010000002 LORAZEPAM PER 2 MG: Performed by: STUDENT IN AN ORGANIZED HEALTH CARE EDUCATION/TRAINING PROGRAM

## 2023-01-01 PROCEDURE — 97165 OT EVAL LOW COMPLEX 30 MIN: CPT

## 2023-01-01 PROCEDURE — 25010000002 AZITHROMYCIN PER 500 MG: Performed by: STUDENT IN AN ORGANIZED HEALTH CARE EDUCATION/TRAINING PROGRAM

## 2023-01-01 PROCEDURE — 97161 PT EVAL LOW COMPLEX 20 MIN: CPT

## 2023-01-01 PROCEDURE — 87070 CULTURE OTHR SPECIMN AEROBIC: CPT

## 2023-01-01 PROCEDURE — 87205 SMEAR GRAM STAIN: CPT

## 2023-01-01 PROCEDURE — 25010000002 CEFEPIME PER 500 MG: Performed by: NURSE PRACTITIONER

## 2023-01-01 PROCEDURE — 85025 COMPLETE CBC W/AUTO DIFF WBC: CPT

## 2023-01-01 PROCEDURE — 99232 SBSQ HOSP IP/OBS MODERATE 35: CPT | Performed by: NURSE PRACTITIONER

## 2023-01-01 PROCEDURE — 31624 DX BRONCHOSCOPE/LAVAGE: CPT | Performed by: INTERNAL MEDICINE

## 2023-01-01 PROCEDURE — 99223 1ST HOSP IP/OBS HIGH 75: CPT | Performed by: STUDENT IN AN ORGANIZED HEALTH CARE EDUCATION/TRAINING PROGRAM

## 2023-01-01 PROCEDURE — 97110 THERAPEUTIC EXERCISES: CPT

## 2023-01-01 PROCEDURE — 36600 WITHDRAWAL OF ARTERIAL BLOOD: CPT

## 2023-01-01 PROCEDURE — 87077 CULTURE AEROBIC IDENTIFY: CPT | Performed by: INTERNAL MEDICINE

## 2023-01-01 PROCEDURE — 25010000002 METHYLPREDNISOLONE PER 125 MG: Performed by: EMERGENCY MEDICINE

## 2023-01-01 PROCEDURE — 87449 NOS EACH ORGANISM AG IA: CPT

## 2023-01-01 PROCEDURE — 63710000001 PREDNISONE PER 1 MG: Performed by: STUDENT IN AN ORGANIZED HEALTH CARE EDUCATION/TRAINING PROGRAM

## 2023-01-01 PROCEDURE — 76705 ECHO EXAM OF ABDOMEN: CPT

## 2023-01-01 PROCEDURE — 89051 BODY FLUID CELL COUNT: CPT | Performed by: INTERNAL MEDICINE

## 2023-01-01 PROCEDURE — 25510000001 IOPAMIDOL PER 1 ML: Performed by: INTERNAL MEDICINE

## 2023-01-01 PROCEDURE — 25010000002 MORPHINE PER 10 MG: Performed by: INTERNAL MEDICINE

## 2023-01-01 PROCEDURE — 36415 COLL VENOUS BLD VENIPUNCTURE: CPT

## 2023-01-01 PROCEDURE — 88108 CYTOPATH CONCENTRATE TECH: CPT | Performed by: INTERNAL MEDICINE

## 2023-01-01 PROCEDURE — 25010000002 MORPHINE PER 10 MG: Performed by: STUDENT IN AN ORGANIZED HEALTH CARE EDUCATION/TRAINING PROGRAM

## 2023-01-01 PROCEDURE — 87077 CULTURE AEROBIC IDENTIFY: CPT

## 2023-01-01 PROCEDURE — 87186 SC STD MICRODIL/AGAR DIL: CPT | Performed by: INTERNAL MEDICINE

## 2023-01-01 PROCEDURE — 25010000002 MAGNESIUM SULFATE IN D5W 1G/100ML (PREMIX) 1-5 GM/100ML-% SOLUTION: Performed by: INTERNAL MEDICINE

## 2023-01-01 PROCEDURE — 82550 ASSAY OF CK (CPK): CPT | Performed by: STUDENT IN AN ORGANIZED HEALTH CARE EDUCATION/TRAINING PROGRAM

## 2023-01-01 PROCEDURE — 80143 DRUG ASSAY ACETAMINOPHEN: CPT | Performed by: STUDENT IN AN ORGANIZED HEALTH CARE EDUCATION/TRAINING PROGRAM

## 2023-01-01 PROCEDURE — 25810000003 LACTATED RINGERS PER 1000 ML: Performed by: STUDENT IN AN ORGANIZED HEALTH CARE EDUCATION/TRAINING PROGRAM

## 2023-01-01 PROCEDURE — 84100 ASSAY OF PHOSPHORUS: CPT | Performed by: STUDENT IN AN ORGANIZED HEALTH CARE EDUCATION/TRAINING PROGRAM

## 2023-01-01 PROCEDURE — 85730 THROMBOPLASTIN TIME PARTIAL: CPT | Performed by: STUDENT IN AN ORGANIZED HEALTH CARE EDUCATION/TRAINING PROGRAM

## 2023-01-01 PROCEDURE — 82805 BLOOD GASES W/O2 SATURATION: CPT

## 2023-01-01 PROCEDURE — 99291 CRITICAL CARE FIRST HOUR: CPT | Performed by: INTERNAL MEDICINE

## 2023-01-01 PROCEDURE — 84145 PROCALCITONIN (PCT): CPT | Performed by: INTERNAL MEDICINE

## 2023-01-01 PROCEDURE — 74177 CT ABD & PELVIS W/CONTRAST: CPT

## 2023-01-01 PROCEDURE — 87071 CULTURE AEROBIC QUANT OTHER: CPT | Performed by: INTERNAL MEDICINE

## 2023-01-01 PROCEDURE — 80053 COMPREHEN METABOLIC PANEL: CPT | Performed by: STUDENT IN AN ORGANIZED HEALTH CARE EDUCATION/TRAINING PROGRAM

## 2023-01-01 PROCEDURE — 0B9J8ZX DRAINAGE OF LEFT LOWER LUNG LOBE, VIA NATURAL OR ARTIFICIAL OPENING ENDOSCOPIC, DIAGNOSTIC: ICD-10-PCS | Performed by: INTERNAL MEDICINE

## 2023-01-01 PROCEDURE — 83050 HGB METHEMOGLOBIN QUAN: CPT | Performed by: EMERGENCY MEDICINE

## 2023-01-01 PROCEDURE — 82375 ASSAY CARBOXYHB QUANT: CPT

## 2023-01-01 PROCEDURE — 83050 HGB METHEMOGLOBIN QUAN: CPT

## 2023-01-01 PROCEDURE — 85610 PROTHROMBIN TIME: CPT | Performed by: STUDENT IN AN ORGANIZED HEALTH CARE EDUCATION/TRAINING PROGRAM

## 2023-01-01 PROCEDURE — 80053 COMPREHEN METABOLIC PANEL: CPT

## 2023-01-01 PROCEDURE — 25810000003 LACTATED RINGERS PER 1000 ML: Performed by: ANESTHESIOLOGY

## 2023-01-01 RX ORDER — TOBRAMYCIN INHALATION SOLUTION 300 MG/5ML
300 INHALANT RESPIRATORY (INHALATION)
Qty: 300 ML | Refills: 0 | Status: DISCONTINUED | OUTPATIENT
Start: 2023-01-01 | End: 2023-01-01

## 2023-01-01 RX ORDER — CETIRIZINE HYDROCHLORIDE 10 MG/1
5 TABLET ORAL DAILY
Status: DISCONTINUED | OUTPATIENT
Start: 2023-01-01 | End: 2023-01-01

## 2023-01-01 RX ORDER — ALBUTEROL SULFATE 2.5 MG/3ML
2.5 SOLUTION RESPIRATORY (INHALATION) EVERY 8 HOURS PRN
Status: DISCONTINUED | OUTPATIENT
Start: 2023-01-01 | End: 2023-01-01

## 2023-01-01 RX ORDER — ONDANSETRON 2 MG/ML
4 INJECTION INTRAMUSCULAR; INTRAVENOUS EVERY 4 HOURS PRN
Status: DISCONTINUED | OUTPATIENT
Start: 2023-01-01 | End: 2023-01-01 | Stop reason: HOSPADM

## 2023-01-01 RX ORDER — ALUMINA, MAGNESIA, AND SIMETHICONE 2400; 2400; 240 MG/30ML; MG/30ML; MG/30ML
15 SUSPENSION ORAL EVERY 6 HOURS PRN
Status: DISCONTINUED | OUTPATIENT
Start: 2023-01-01 | End: 2023-01-01 | Stop reason: HOSPADM

## 2023-01-01 RX ORDER — SODIUM CHLORIDE FOR INHALATION 3 %
4 VIAL, NEBULIZER (ML) INHALATION
Status: DISCONTINUED | OUTPATIENT
Start: 2023-01-01 | End: 2023-01-01

## 2023-01-01 RX ORDER — HYDROCORTISONE 25 MG/G
CREAM TOPICAL 2 TIMES DAILY
Status: DISCONTINUED | OUTPATIENT
Start: 2023-01-01 | End: 2023-01-01 | Stop reason: HOSPADM

## 2023-01-01 RX ORDER — CEFTRIAXONE SODIUM 1 G/50ML
1000 INJECTION, SOLUTION INTRAVENOUS EVERY 24 HOURS
Status: DISCONTINUED | OUTPATIENT
Start: 2023-01-01 | End: 2023-01-01

## 2023-01-01 RX ORDER — MORPHINE SULFATE 2 MG/ML
2 INJECTION, SOLUTION INTRAMUSCULAR; INTRAVENOUS
Status: DISCONTINUED | OUTPATIENT
Start: 2023-01-01 | End: 2023-01-01

## 2023-01-01 RX ORDER — ARFORMOTEROL TARTRATE 15 UG/2ML
15 SOLUTION RESPIRATORY (INHALATION)
Status: DISCONTINUED | OUTPATIENT
Start: 2023-01-01 | End: 2023-01-01 | Stop reason: HOSPADM

## 2023-01-01 RX ORDER — MAGNESIUM SULFATE 1 G/100ML
2 INJECTION INTRAVENOUS ONCE
Status: COMPLETED | OUTPATIENT
Start: 2023-01-01 | End: 2023-01-01

## 2023-01-01 RX ORDER — NITROGLYCERIN 0.4 MG/1
0.4 TABLET SUBLINGUAL
Status: DISCONTINUED | OUTPATIENT
Start: 2023-01-01 | End: 2023-01-01

## 2023-01-01 RX ORDER — BISACODYL 10 MG
10 SUPPOSITORY, RECTAL RECTAL DAILY PRN
Status: DISCONTINUED | OUTPATIENT
Start: 2023-01-01 | End: 2023-01-01

## 2023-01-01 RX ORDER — LORAZEPAM 0.5 MG/1
0.5 TABLET ORAL
Status: DISCONTINUED | OUTPATIENT
Start: 2023-01-01 | End: 2023-01-01

## 2023-01-01 RX ORDER — POLYETHYLENE GLYCOL 3350 17 G/17G
17 POWDER, FOR SOLUTION ORAL 2 TIMES DAILY PRN
Status: DISCONTINUED | OUTPATIENT
Start: 2023-01-01 | End: 2023-01-01 | Stop reason: HOSPADM

## 2023-01-01 RX ORDER — POLYETHYLENE GLYCOL 3350 17 G/17G
17 POWDER, FOR SOLUTION ORAL DAILY PRN
Status: DISCONTINUED | OUTPATIENT
Start: 2023-01-01 | End: 2023-01-01

## 2023-01-01 RX ORDER — LORAZEPAM 2 MG/ML
0.5 INJECTION INTRAMUSCULAR
Status: DISCONTINUED | OUTPATIENT
Start: 2023-01-01 | End: 2023-01-01

## 2023-01-01 RX ORDER — AMOXICILLIN 250 MG
2 CAPSULE ORAL 2 TIMES DAILY
Status: DISCONTINUED | OUTPATIENT
Start: 2023-01-01 | End: 2023-01-01

## 2023-01-01 RX ORDER — ATORVASTATIN CALCIUM 10 MG/1
10 TABLET, FILM COATED ORAL NIGHTLY
Status: DISCONTINUED | OUTPATIENT
Start: 2023-01-01 | End: 2023-01-01

## 2023-01-01 RX ORDER — MORPHINE SULFATE 10 MG/.5ML
5 SOLUTION ORAL
Status: DISCONTINUED | OUTPATIENT
Start: 2023-01-01 | End: 2023-01-01

## 2023-01-01 RX ORDER — LIDOCAINE HYDROCHLORIDE 40 MG/ML
INJECTION, SOLUTION RETROBULBAR; TOPICAL AS NEEDED
Status: DISCONTINUED | OUTPATIENT
Start: 2023-01-01 | End: 2023-01-01 | Stop reason: HOSPADM

## 2023-01-01 RX ORDER — ATROPINE SULFATE 10 MG/ML
2 SOLUTION/ DROPS OPHTHALMIC
Status: DISCONTINUED | OUTPATIENT
Start: 2023-01-01 | End: 2023-01-01 | Stop reason: HOSPADM

## 2023-01-01 RX ORDER — MORPHINE SULFATE 10 MG/.5ML
5 SOLUTION ORAL
Status: DISCONTINUED | OUTPATIENT
Start: 2023-01-01 | End: 2023-01-01 | Stop reason: HOSPADM

## 2023-01-01 RX ORDER — GUAIFENESIN 600 MG/1
600 TABLET, EXTENDED RELEASE ORAL 2 TIMES DAILY PRN
Status: DISCONTINUED | OUTPATIENT
Start: 2023-01-01 | End: 2023-01-01

## 2023-01-01 RX ORDER — ENOXAPARIN SODIUM 100 MG/ML
40 INJECTION SUBCUTANEOUS DAILY
Status: DISCONTINUED | OUTPATIENT
Start: 2023-01-01 | End: 2023-01-01 | Stop reason: HOSPADM

## 2023-01-01 RX ORDER — LORAZEPAM 2 MG/ML
0.5 CONCENTRATE ORAL
Status: DISCONTINUED | OUTPATIENT
Start: 2023-01-01 | End: 2023-01-01

## 2023-01-01 RX ORDER — LORAZEPAM 2 MG/ML
0.5 CONCENTRATE ORAL
Status: DISCONTINUED | OUTPATIENT
Start: 2023-01-01 | End: 2023-01-01 | Stop reason: HOSPADM

## 2023-01-01 RX ORDER — METOPROLOL SUCCINATE 25 MG/1
25 TABLET, EXTENDED RELEASE ORAL EVERY 12 HOURS SCHEDULED
Status: DISCONTINUED | OUTPATIENT
Start: 2023-01-01 | End: 2023-01-01

## 2023-01-01 RX ORDER — BISACODYL 5 MG/1
5 TABLET, DELAYED RELEASE ORAL DAILY PRN
Status: DISCONTINUED | OUTPATIENT
Start: 2023-01-01 | End: 2023-01-01 | Stop reason: HOSPADM

## 2023-01-01 RX ORDER — SODIUM CHLORIDE 0.9 % (FLUSH) 0.9 %
10 SYRINGE (ML) INJECTION EVERY 12 HOURS SCHEDULED
Status: DISCONTINUED | OUTPATIENT
Start: 2023-01-01 | End: 2023-01-01 | Stop reason: HOSPADM

## 2023-01-01 RX ORDER — BISACODYL 10 MG
10 SUPPOSITORY, RECTAL RECTAL DAILY PRN
Status: DISCONTINUED | OUTPATIENT
Start: 2023-01-01 | End: 2023-01-01 | Stop reason: HOSPADM

## 2023-01-01 RX ORDER — LIDOCAINE 4 G/G
1 PATCH TOPICAL DAILY PRN
Status: DISCONTINUED | OUTPATIENT
Start: 2023-01-01 | End: 2023-01-01 | Stop reason: HOSPADM

## 2023-01-01 RX ORDER — MORPHINE SULFATE 20 MG/ML
20 SOLUTION ORAL
Status: DISCONTINUED | OUTPATIENT
Start: 2023-01-01 | End: 2023-01-01

## 2023-01-01 RX ORDER — NICOTINE 21 MG/24HR
1 PATCH, TRANSDERMAL 24 HOURS TRANSDERMAL DAILY PRN
Status: DISCONTINUED | OUTPATIENT
Start: 2023-01-01 | End: 2023-01-01 | Stop reason: HOSPADM

## 2023-01-01 RX ORDER — BUDESONIDE 0.5 MG/2ML
0.5 INHALANT ORAL
Status: DISCONTINUED | OUTPATIENT
Start: 2023-01-01 | End: 2023-01-01 | Stop reason: HOSPADM

## 2023-01-01 RX ORDER — HYDROXYZINE HYDROCHLORIDE 25 MG/1
25 TABLET, FILM COATED ORAL 3 TIMES DAILY PRN
Status: DISCONTINUED | OUTPATIENT
Start: 2023-01-01 | End: 2023-01-01 | Stop reason: HOSPADM

## 2023-01-01 RX ORDER — SODIUM CHLORIDE, SODIUM LACTATE, POTASSIUM CHLORIDE, CALCIUM CHLORIDE 600; 310; 30; 20 MG/100ML; MG/100ML; MG/100ML; MG/100ML
100 INJECTION, SOLUTION INTRAVENOUS CONTINUOUS
Status: ACTIVE | OUTPATIENT
Start: 2023-01-01 | End: 2023-01-01

## 2023-01-01 RX ORDER — DIAPER,BRIEF,INFANT-TODD,DISP
1 EACH MISCELLANEOUS 2 TIMES DAILY PRN
Status: DISCONTINUED | OUTPATIENT
Start: 2023-01-01 | End: 2023-01-01 | Stop reason: HOSPADM

## 2023-01-01 RX ORDER — SODIUM CHLORIDE, SODIUM LACTATE, POTASSIUM CHLORIDE, CALCIUM CHLORIDE 600; 310; 30; 20 MG/100ML; MG/100ML; MG/100ML; MG/100ML
100 INJECTION, SOLUTION INTRAVENOUS CONTINUOUS
Status: DISCONTINUED | OUTPATIENT
Start: 2023-01-01 | End: 2023-01-01

## 2023-01-01 RX ORDER — SODIUM CHLORIDE, SODIUM LACTATE, POTASSIUM CHLORIDE, CALCIUM CHLORIDE 600; 310; 30; 20 MG/100ML; MG/100ML; MG/100ML; MG/100ML
30 INJECTION, SOLUTION INTRAVENOUS CONTINUOUS
Status: DISCONTINUED | OUTPATIENT
Start: 2023-01-01 | End: 2023-01-01

## 2023-01-01 RX ORDER — SODIUM CHLORIDE 0.9 % (FLUSH) 0.9 %
10 SYRINGE (ML) INJECTION AS NEEDED
Status: DISCONTINUED | OUTPATIENT
Start: 2023-01-01 | End: 2023-01-01 | Stop reason: HOSPADM

## 2023-01-01 RX ORDER — BISACODYL 5 MG/1
5 TABLET, DELAYED RELEASE ORAL DAILY PRN
Status: DISCONTINUED | OUTPATIENT
Start: 2023-01-01 | End: 2023-01-01

## 2023-01-01 RX ORDER — AMOXICILLIN 250 MG
2 CAPSULE ORAL NIGHTLY PRN
Status: DISCONTINUED | OUTPATIENT
Start: 2023-01-01 | End: 2023-01-01 | Stop reason: HOSPADM

## 2023-01-01 RX ORDER — PANTOPRAZOLE SODIUM 40 MG/1
40 TABLET, DELAYED RELEASE ORAL
Status: DISCONTINUED | OUTPATIENT
Start: 2023-01-01 | End: 2023-01-01 | Stop reason: HOSPADM

## 2023-01-01 RX ORDER — SODIUM CHLORIDE 9 MG/ML
100 INJECTION, SOLUTION INTRAVENOUS CONTINUOUS
Status: DISCONTINUED | OUTPATIENT
Start: 2023-01-01 | End: 2023-01-01

## 2023-01-01 RX ORDER — HALOPERIDOL 5 MG/ML
0.5 INJECTION INTRAMUSCULAR
Status: DISCONTINUED | OUTPATIENT
Start: 2023-01-01 | End: 2023-01-01

## 2023-01-01 RX ORDER — SODIUM CHLORIDE 9 MG/ML
40 INJECTION, SOLUTION INTRAVENOUS AS NEEDED
Status: DISCONTINUED | OUTPATIENT
Start: 2023-01-01 | End: 2023-01-01 | Stop reason: HOSPADM

## 2023-01-01 RX ORDER — ACETAMINOPHEN 325 MG/1
650 TABLET ORAL EVERY 6 HOURS PRN
Status: CANCELLED | OUTPATIENT
Start: 2023-01-01

## 2023-01-01 RX ORDER — IPRATROPIUM BROMIDE AND ALBUTEROL SULFATE 2.5; .5 MG/3ML; MG/3ML
3 SOLUTION RESPIRATORY (INHALATION)
Status: DISCONTINUED | OUTPATIENT
Start: 2023-01-01 | End: 2023-01-01

## 2023-01-01 RX ORDER — HYDROCODONE BITARTRATE AND ACETAMINOPHEN 5; 325 MG/1; MG/1
1 TABLET ORAL EVERY 6 HOURS PRN
Status: DISCONTINUED | OUTPATIENT
Start: 2023-01-01 | End: 2023-01-01 | Stop reason: HOSPADM

## 2023-01-01 RX ORDER — AMOXICILLIN 250 MG
1 CAPSULE ORAL NIGHTLY
Status: DISCONTINUED | OUTPATIENT
Start: 2023-01-01 | End: 2023-01-01

## 2023-01-01 RX ORDER — METHYLPREDNISOLONE SODIUM SUCCINATE 125 MG/2ML
125 INJECTION, POWDER, LYOPHILIZED, FOR SOLUTION INTRAMUSCULAR; INTRAVENOUS ONCE
Status: COMPLETED | OUTPATIENT
Start: 2023-01-01 | End: 2023-01-01

## 2023-01-01 RX ORDER — PROCHLORPERAZINE EDISYLATE 5 MG/ML
5 INJECTION INTRAMUSCULAR; INTRAVENOUS EVERY 6 HOURS PRN
Status: DISCONTINUED | OUTPATIENT
Start: 2023-01-01 | End: 2023-01-01 | Stop reason: HOSPADM

## 2023-01-01 RX ORDER — ASPIRIN 81 MG/1
81 TABLET, CHEWABLE ORAL DAILY
Status: DISCONTINUED | OUTPATIENT
Start: 2023-01-01 | End: 2023-01-01

## 2023-01-01 RX ORDER — LEVALBUTEROL INHALATION SOLUTION 1.25 MG/3ML
1.25 SOLUTION RESPIRATORY (INHALATION)
Status: DISCONTINUED | OUTPATIENT
Start: 2023-01-01 | End: 2023-01-01 | Stop reason: HOSPADM

## 2023-01-01 RX ORDER — BISACODYL 5 MG/1
10 TABLET, DELAYED RELEASE ORAL DAILY PRN
Status: DISCONTINUED | OUTPATIENT
Start: 2023-01-01 | End: 2023-01-01 | Stop reason: HOSPADM

## 2023-01-01 RX ORDER — MAGNESIUM SULFATE HEPTAHYDRATE 40 MG/ML
2 INJECTION, SOLUTION INTRAVENOUS ONCE
Status: COMPLETED | OUTPATIENT
Start: 2023-01-01 | End: 2023-01-01

## 2023-01-01 RX ORDER — NICOTINE 21 MG/24HR
1 PATCH, TRANSDERMAL 24 HOURS TRANSDERMAL DAILY PRN
Status: CANCELLED | OUTPATIENT
Start: 2023-01-01

## 2023-01-01 RX ORDER — LORAZEPAM 2 MG/ML
0.5 INJECTION INTRAMUSCULAR
Status: DISCONTINUED | OUTPATIENT
Start: 2023-01-01 | End: 2023-01-01 | Stop reason: HOSPADM

## 2023-01-01 RX ORDER — HALOPERIDOL 2 MG/ML
0.5 SOLUTION ORAL
Status: DISCONTINUED | OUTPATIENT
Start: 2023-01-01 | End: 2023-01-01

## 2023-01-01 RX ORDER — PREDNISONE 20 MG/1
40 TABLET ORAL
Status: COMPLETED | OUTPATIENT
Start: 2023-01-01 | End: 2023-01-01

## 2023-01-01 RX ORDER — GUAIFENESIN 600 MG/1
1200 TABLET, EXTENDED RELEASE ORAL EVERY 12 HOURS SCHEDULED
Status: DISCONTINUED | OUTPATIENT
Start: 2023-01-01 | End: 2023-01-01 | Stop reason: HOSPADM

## 2023-01-01 RX ORDER — ONDANSETRON 2 MG/ML
4 INJECTION INTRAMUSCULAR; INTRAVENOUS EVERY 4 HOURS PRN
Status: CANCELLED | OUTPATIENT
Start: 2023-01-01

## 2023-01-01 RX ORDER — MORPHINE SULFATE 10 MG/.5ML
5 SOLUTION ORAL EVERY 4 HOURS PRN
Status: DISCONTINUED | OUTPATIENT
Start: 2023-01-01 | End: 2023-01-01

## 2023-01-01 RX ORDER — ECHINACEA PURPUREA EXTRACT 125 MG
2 TABLET ORAL
Status: DISCONTINUED | OUTPATIENT
Start: 2023-01-01 | End: 2023-01-01 | Stop reason: HOSPADM

## 2023-01-01 RX ORDER — MORPHINE SULFATE 2 MG/ML
2 INJECTION, SOLUTION INTRAMUSCULAR; INTRAVENOUS
Status: CANCELLED | OUTPATIENT
Start: 2023-01-01 | End: 2023-11-16

## 2023-01-01 RX ORDER — MAGNESIUM SULFATE 1 G/100ML
1 INJECTION INTRAVENOUS ONCE
Status: DISCONTINUED | OUTPATIENT
Start: 2023-01-01 | End: 2023-01-01

## 2023-01-01 RX ORDER — ALBUTEROL SULFATE 2.5 MG/3ML
2.5 SOLUTION RESPIRATORY (INHALATION) EVERY 4 HOURS PRN
Status: DISCONTINUED | OUTPATIENT
Start: 2023-01-01 | End: 2023-01-01 | Stop reason: HOSPADM

## 2023-01-01 RX ORDER — HALOPERIDOL 0.5 MG/1
0.5 TABLET ORAL
Status: DISCONTINUED | OUTPATIENT
Start: 2023-01-01 | End: 2023-01-01

## 2023-01-01 RX ORDER — MAGNESIUM SULFATE 1 G/100ML
2 INJECTION INTRAVENOUS ONCE
Status: DISCONTINUED | OUTPATIENT
Start: 2023-01-01 | End: 2023-01-01

## 2023-01-01 RX ORDER — MORPHINE SULFATE 20 MG/ML
5 SOLUTION ORAL
Status: DISCONTINUED | OUTPATIENT
Start: 2023-01-01 | End: 2023-01-01

## 2023-01-01 RX ORDER — CHOLECALCIFEROL (VITAMIN D3) 125 MCG
5 CAPSULE ORAL NIGHTLY PRN
Status: DISCONTINUED | OUTPATIENT
Start: 2023-01-01 | End: 2023-01-01 | Stop reason: HOSPADM

## 2023-01-01 RX ORDER — GUAIFENESIN 600 MG/1
1200 TABLET, EXTENDED RELEASE ORAL EVERY 12 HOURS SCHEDULED
Status: CANCELLED | OUTPATIENT
Start: 2023-01-01

## 2023-01-01 RX ORDER — MORPHINE SULFATE 2 MG/ML
2 INJECTION, SOLUTION INTRAMUSCULAR; INTRAVENOUS
Status: DISCONTINUED | OUTPATIENT
Start: 2023-01-01 | End: 2023-01-01 | Stop reason: HOSPADM

## 2023-01-01 RX ORDER — ACETAMINOPHEN 325 MG/1
650 TABLET ORAL EVERY 6 HOURS PRN
Status: DISCONTINUED | OUTPATIENT
Start: 2023-01-01 | End: 2023-01-01 | Stop reason: HOSPADM

## 2023-01-01 RX ORDER — ROFLUMILAST 500 UG/1
500 TABLET ORAL DAILY
Status: DISCONTINUED | OUTPATIENT
Start: 2023-01-01 | End: 2023-01-01 | Stop reason: HOSPADM

## 2023-01-01 RX ORDER — POLYETHYLENE GLYCOL 3350 17 G/17G
17 POWDER, FOR SOLUTION ORAL DAILY
Status: DISCONTINUED | OUTPATIENT
Start: 2023-01-01 | End: 2023-01-01 | Stop reason: HOSPADM

## 2023-01-01 RX ORDER — METOPROLOL SUCCINATE 50 MG/1
50 TABLET, EXTENDED RELEASE ORAL EVERY 12 HOURS SCHEDULED
Status: DISCONTINUED | OUTPATIENT
Start: 2023-01-01 | End: 2023-01-01

## 2023-01-01 RX ORDER — LORAZEPAM 2 MG/ML
0.5 INJECTION INTRAMUSCULAR
Status: CANCELLED | OUTPATIENT
Start: 2023-01-01 | End: 2023-11-13

## 2023-01-01 RX ORDER — MAGNESIUM HYDROXIDE 1200 MG/15ML
LIQUID ORAL AS NEEDED
Status: DISCONTINUED | OUTPATIENT
Start: 2023-01-01 | End: 2023-01-01 | Stop reason: HOSPADM

## 2023-01-01 RX ORDER — ATROPINE SULFATE 10 MG/ML
2 SOLUTION/ DROPS OPHTHALMIC
Status: CANCELLED | OUTPATIENT
Start: 2023-01-01

## 2023-01-01 RX ADMIN — CEFEPIME 2000 MG: 2 INJECTION, POWDER, FOR SOLUTION INTRAVENOUS at 04:26

## 2023-01-01 RX ADMIN — ENOXAPARIN SODIUM 40 MG: 100 INJECTION SUBCUTANEOUS at 10:52

## 2023-01-01 RX ADMIN — SALINE NASAL SPRAY 2 SPRAY: 1.5 SOLUTION NASAL at 08:56

## 2023-01-01 RX ADMIN — ASPIRIN 81 MG CHEWABLE TABLET 81 MG: 81 TABLET CHEWABLE at 08:51

## 2023-01-01 RX ADMIN — ACETAMINOPHEN 650 MG: 325 TABLET ORAL at 08:36

## 2023-01-01 RX ADMIN — BUDESONIDE 0.5 MG: 0.5 INHALANT RESPIRATORY (INHALATION) at 18:36

## 2023-01-01 RX ADMIN — PANTOPRAZOLE SODIUM 40 MG: 40 TABLET, DELAYED RELEASE ORAL at 05:00

## 2023-01-01 RX ADMIN — CEFEPIME 2000 MG: 2 INJECTION, POWDER, FOR SOLUTION INTRAVENOUS at 20:50

## 2023-01-01 RX ADMIN — SODIUM CHLORIDE, POTASSIUM CHLORIDE, SODIUM LACTATE AND CALCIUM CHLORIDE 30 ML/HR: 600; 310; 30; 20 INJECTION, SOLUTION INTRAVENOUS at 09:12

## 2023-01-01 RX ADMIN — CEFEPIME 2000 MG: 2 INJECTION, POWDER, FOR SOLUTION INTRAVENOUS at 04:16

## 2023-01-01 RX ADMIN — CETIRIZINE HYDROCHLORIDE 5 MG: 10 TABLET, FILM COATED ORAL at 09:55

## 2023-01-01 RX ADMIN — LORAZEPAM 0.5 MG: 2 SOLUTION, CONCENTRATE ORAL at 10:53

## 2023-01-01 RX ADMIN — CEFEPIME 2000 MG: 2 INJECTION, POWDER, FOR SOLUTION INTRAVENOUS at 05:32

## 2023-01-01 RX ADMIN — ASPIRIN 81 MG CHEWABLE TABLET 81 MG: 81 TABLET CHEWABLE at 08:36

## 2023-01-01 RX ADMIN — LEVALBUTEROL HYDROCHLORIDE 1.25 MG: 1.25 SOLUTION RESPIRATORY (INHALATION) at 19:29

## 2023-01-01 RX ADMIN — SODIUM CHLORIDE, POTASSIUM CHLORIDE, SODIUM LACTATE AND CALCIUM CHLORIDE 100 ML/HR: 600; 310; 30; 20 INJECTION, SOLUTION INTRAVENOUS at 18:12

## 2023-01-01 RX ADMIN — ARFORMOTEROL TARTRATE 15 MCG: 15 SOLUTION RESPIRATORY (INHALATION) at 07:09

## 2023-01-01 RX ADMIN — SALINE NASAL SPRAY 2 SPRAY: 1.5 SOLUTION NASAL at 16:34

## 2023-01-01 RX ADMIN — GUAIFENESIN 1200 MG: 600 TABLET ORAL at 08:19

## 2023-01-01 RX ADMIN — POTASSIUM & SODIUM PHOSPHATES POWDER PACK 280-160-250 MG 1 PACKET: 280-160-250 PACK at 12:21

## 2023-01-01 RX ADMIN — IOPAMIDOL 100 ML: 755 INJECTION, SOLUTION INTRAVENOUS at 09:42

## 2023-01-01 RX ADMIN — CEFEPIME 2000 MG: 2 INJECTION, POWDER, FOR SOLUTION INTRAVENOUS at 04:08

## 2023-01-01 RX ADMIN — ARFORMOTEROL TARTRATE 15 MCG: 15 SOLUTION RESPIRATORY (INHALATION) at 06:29

## 2023-01-01 RX ADMIN — TOBRAMYCIN 300 MG: 300 SOLUTION RESPIRATORY (INHALATION) at 06:41

## 2023-01-01 RX ADMIN — CETIRIZINE HYDROCHLORIDE 5 MG: 10 TABLET, FILM COATED ORAL at 10:52

## 2023-01-01 RX ADMIN — LEVALBUTEROL HYDROCHLORIDE 1.25 MG: 1.25 SOLUTION RESPIRATORY (INHALATION) at 11:43

## 2023-01-01 RX ADMIN — ROFLUMILAST 500 MCG: 500 TABLET ORAL at 08:43

## 2023-01-01 RX ADMIN — CETIRIZINE HYDROCHLORIDE 5 MG: 10 TABLET, FILM COATED ORAL at 08:55

## 2023-01-01 RX ADMIN — LEVALBUTEROL HYDROCHLORIDE 1.25 MG: 1.25 SOLUTION RESPIRATORY (INHALATION) at 06:46

## 2023-01-01 RX ADMIN — MORPHINE SULFATE 2 MG: 2 INJECTION, SOLUTION INTRAMUSCULAR; INTRAVENOUS at 05:44

## 2023-01-01 RX ADMIN — SALINE NASAL SPRAY 2 SPRAY: 1.5 SOLUTION NASAL at 20:17

## 2023-01-01 RX ADMIN — SALINE NASAL SPRAY 2 SPRAY: 1.5 SOLUTION NASAL at 18:24

## 2023-01-01 RX ADMIN — TOBRAMYCIN 300 MG: 300 SOLUTION RESPIRATORY (INHALATION) at 18:35

## 2023-01-01 RX ADMIN — BUDESONIDE 0.5 MG: 0.5 INHALANT RESPIRATORY (INHALATION) at 18:38

## 2023-01-01 RX ADMIN — IPRATROPIUM BROMIDE AND ALBUTEROL SULFATE 3 ML: .5; 3 SOLUTION RESPIRATORY (INHALATION) at 11:44

## 2023-01-01 RX ADMIN — METOPROLOL SUCCINATE 50 MG: 50 TABLET, EXTENDED RELEASE ORAL at 20:23

## 2023-01-01 RX ADMIN — MAGNESIUM SULFATE HEPTAHYDRATE 2 G: 40 INJECTION, SOLUTION INTRAVENOUS at 10:03

## 2023-01-01 RX ADMIN — ASPIRIN 81 MG CHEWABLE TABLET 81 MG: 81 TABLET CHEWABLE at 10:51

## 2023-01-01 RX ADMIN — SALINE NASAL SPRAY 2 SPRAY: 1.5 SOLUTION NASAL at 12:23

## 2023-01-01 RX ADMIN — MORPHINE SULFATE 5 MG: 10 SOLUTION ORAL at 01:18

## 2023-01-01 RX ADMIN — BUDESONIDE 0.5 MG: 0.5 INHALANT RESPIRATORY (INHALATION) at 06:47

## 2023-01-01 RX ADMIN — MORPHINE SULFATE 5 MG: 10 SOLUTION ORAL at 07:50

## 2023-01-01 RX ADMIN — GUAIFENESIN 1200 MG: 600 TABLET ORAL at 20:24

## 2023-01-01 RX ADMIN — ATORVASTATIN CALCIUM 10 MG: 10 TABLET, FILM COATED ORAL at 20:46

## 2023-01-01 RX ADMIN — SODIUM CHLORIDE, POTASSIUM CHLORIDE, SODIUM LACTATE AND CALCIUM CHLORIDE 100 ML/HR: 600; 310; 30; 20 INJECTION, SOLUTION INTRAVENOUS at 08:21

## 2023-01-01 RX ADMIN — LEVALBUTEROL HYDROCHLORIDE 1.25 MG: 1.25 SOLUTION RESPIRATORY (INHALATION) at 11:57

## 2023-01-01 RX ADMIN — SALINE NASAL SPRAY 2 SPRAY: 1.5 SOLUTION NASAL at 08:20

## 2023-01-01 RX ADMIN — CEFEPIME 2000 MG: 2 INJECTION, POWDER, FOR SOLUTION INTRAVENOUS at 21:07

## 2023-01-01 RX ADMIN — SALINE NASAL SPRAY 2 SPRAY: 1.5 SOLUTION NASAL at 08:50

## 2023-01-01 RX ADMIN — SODIUM CHLORIDE SOLN NEBU 3% 4 ML: 3 NEBU SOLN at 11:41

## 2023-01-01 RX ADMIN — PANTOPRAZOLE SODIUM 40 MG: 40 TABLET, DELAYED RELEASE ORAL at 06:10

## 2023-01-01 RX ADMIN — ROFLUMILAST 500 MCG: 500 TABLET ORAL at 09:29

## 2023-01-01 RX ADMIN — CETIRIZINE HYDROCHLORIDE 5 MG: 10 TABLET, FILM COATED ORAL at 08:36

## 2023-01-01 RX ADMIN — ASPIRIN 81 MG CHEWABLE TABLET 81 MG: 81 TABLET CHEWABLE at 08:50

## 2023-01-01 RX ADMIN — ENOXAPARIN SODIUM 40 MG: 100 INJECTION SUBCUTANEOUS at 08:19

## 2023-01-01 RX ADMIN — ACETAMINOPHEN 650 MG: 325 TABLET ORAL at 23:32

## 2023-01-01 RX ADMIN — LEVALBUTEROL HYDROCHLORIDE 1.25 MG: 1.25 SOLUTION RESPIRATORY (INHALATION) at 00:00

## 2023-01-01 RX ADMIN — LEVALBUTEROL HYDROCHLORIDE 1.25 MG: 1.25 SOLUTION RESPIRATORY (INHALATION) at 18:12

## 2023-01-01 RX ADMIN — PREDNISONE 40 MG: 20 TABLET ORAL at 10:51

## 2023-01-01 RX ADMIN — BUDESONIDE 0.5 MG: 0.5 INHALANT RESPIRATORY (INHALATION) at 06:46

## 2023-01-01 RX ADMIN — ENOXAPARIN SODIUM 40 MG: 100 INJECTION SUBCUTANEOUS at 09:30

## 2023-01-01 RX ADMIN — DOCUSATE SODIUM 50MG AND SENNOSIDES 8.6MG 2 TABLET: 8.6; 5 TABLET, FILM COATED ORAL at 08:19

## 2023-01-01 RX ADMIN — CEFEPIME 2000 MG: 2 INJECTION, POWDER, FOR SOLUTION INTRAVENOUS at 05:06

## 2023-01-01 RX ADMIN — CEFEPIME 2000 MG: 2 INJECTION, POWDER, FOR SOLUTION INTRAVENOUS at 14:28

## 2023-01-01 RX ADMIN — Medication 10 ML: at 20:53

## 2023-01-01 RX ADMIN — ACETAMINOPHEN 650 MG: 325 TABLET ORAL at 20:54

## 2023-01-01 RX ADMIN — ARFORMOTEROL TARTRATE 15 MCG: 15 SOLUTION RESPIRATORY (INHALATION) at 06:46

## 2023-01-01 RX ADMIN — GUAIFENESIN 1200 MG: 600 TABLET ORAL at 20:49

## 2023-01-01 RX ADMIN — IPRATROPIUM BROMIDE AND ALBUTEROL SULFATE 3 ML: .5; 3 SOLUTION RESPIRATORY (INHALATION) at 06:38

## 2023-01-01 RX ADMIN — METOPROLOL SUCCINATE 50 MG: 50 TABLET, EXTENDED RELEASE ORAL at 08:50

## 2023-01-01 RX ADMIN — AZITHROMYCIN 500 MG: 500 INJECTION, POWDER, LYOPHILIZED, FOR SOLUTION INTRAVENOUS at 23:24

## 2023-01-01 RX ADMIN — LEVALBUTEROL HYDROCHLORIDE 1.25 MG: 1.25 SOLUTION RESPIRATORY (INHALATION) at 00:37

## 2023-01-01 RX ADMIN — CEFEPIME 2000 MG: 2 INJECTION, POWDER, FOR SOLUTION INTRAVENOUS at 20:52

## 2023-01-01 RX ADMIN — ARFORMOTEROL TARTRATE 15 MCG: 15 SOLUTION RESPIRATORY (INHALATION) at 19:29

## 2023-01-01 RX ADMIN — LEVALBUTEROL HYDROCHLORIDE 1.25 MG: 1.25 SOLUTION RESPIRATORY (INHALATION) at 11:40

## 2023-01-01 RX ADMIN — TOBRAMYCIN 300 MG: 300 SOLUTION RESPIRATORY (INHALATION) at 18:50

## 2023-01-01 RX ADMIN — LORAZEPAM 0.5 MG: 2 SOLUTION, CONCENTRATE ORAL at 06:36

## 2023-01-01 RX ADMIN — PANTOPRAZOLE SODIUM 40 MG: 40 TABLET, DELAYED RELEASE ORAL at 05:44

## 2023-01-01 RX ADMIN — DOCUSATE SODIUM 50MG AND SENNOSIDES 8.6MG 2 TABLET: 8.6; 5 TABLET, FILM COATED ORAL at 20:50

## 2023-01-01 RX ADMIN — METOPROLOL SUCCINATE 50 MG: 50 TABLET, EXTENDED RELEASE ORAL at 08:22

## 2023-01-01 RX ADMIN — GUAIFENESIN 1200 MG: 600 TABLET ORAL at 20:50

## 2023-01-01 RX ADMIN — SALINE NASAL SPRAY 2 SPRAY: 1.5 SOLUTION NASAL at 12:21

## 2023-01-01 RX ADMIN — PANTOPRAZOLE SODIUM 40 MG: 40 TABLET, DELAYED RELEASE ORAL at 05:27

## 2023-01-01 RX ADMIN — METOPROLOL SUCCINATE 50 MG: 50 TABLET, EXTENDED RELEASE ORAL at 08:43

## 2023-01-01 RX ADMIN — SALINE NASAL SPRAY 2 SPRAY: 1.5 SOLUTION NASAL at 20:51

## 2023-01-01 RX ADMIN — ENOXAPARIN SODIUM 40 MG: 100 INJECTION SUBCUTANEOUS at 08:45

## 2023-01-01 RX ADMIN — ARFORMOTEROL TARTRATE 15 MCG: 15 SOLUTION RESPIRATORY (INHALATION) at 06:47

## 2023-01-01 RX ADMIN — LEVALBUTEROL HYDROCHLORIDE 1.25 MG: 1.25 SOLUTION RESPIRATORY (INHALATION) at 11:35

## 2023-01-01 RX ADMIN — CEFEPIME 2000 MG: 2 INJECTION, POWDER, FOR SOLUTION INTRAVENOUS at 04:38

## 2023-01-01 RX ADMIN — LEVALBUTEROL HYDROCHLORIDE 1.25 MG: 1.25 SOLUTION RESPIRATORY (INHALATION) at 00:43

## 2023-01-01 RX ADMIN — ENOXAPARIN SODIUM 40 MG: 100 INJECTION SUBCUTANEOUS at 08:17

## 2023-01-01 RX ADMIN — METOPROLOL SUCCINATE 50 MG: 50 TABLET, EXTENDED RELEASE ORAL at 20:52

## 2023-01-01 RX ADMIN — ASPIRIN 81 MG CHEWABLE TABLET 81 MG: 81 TABLET CHEWABLE at 08:57

## 2023-01-01 RX ADMIN — LEVALBUTEROL HYDROCHLORIDE 1.25 MG: 1.25 SOLUTION RESPIRATORY (INHALATION) at 11:38

## 2023-01-01 RX ADMIN — ARFORMOTEROL TARTRATE 15 MCG: 15 SOLUTION RESPIRATORY (INHALATION) at 07:11

## 2023-01-01 RX ADMIN — CEFEPIME 2000 MG: 2 INJECTION, POWDER, FOR SOLUTION INTRAVENOUS at 11:54

## 2023-01-01 RX ADMIN — TOBRAMYCIN 300 MG: 300 SOLUTION RESPIRATORY (INHALATION) at 11:40

## 2023-01-01 RX ADMIN — TOBRAMYCIN 300 MG: 300 SOLUTION RESPIRATORY (INHALATION) at 07:39

## 2023-01-01 RX ADMIN — POTASSIUM & SODIUM PHOSPHATES POWDER PACK 280-160-250 MG 1 PACKET: 280-160-250 PACK at 18:24

## 2023-01-01 RX ADMIN — PANTOPRAZOLE SODIUM 40 MG: 40 TABLET, DELAYED RELEASE ORAL at 05:40

## 2023-01-01 RX ADMIN — ENOXAPARIN SODIUM 40 MG: 100 INJECTION SUBCUTANEOUS at 08:54

## 2023-01-01 RX ADMIN — LEVALBUTEROL HYDROCHLORIDE 1.25 MG: 1.25 SOLUTION RESPIRATORY (INHALATION) at 00:10

## 2023-01-01 RX ADMIN — CEFEPIME 2000 MG: 2 INJECTION, POWDER, FOR SOLUTION INTRAVENOUS at 04:19

## 2023-01-01 RX ADMIN — GUAIFENESIN 1200 MG: 600 TABLET ORAL at 08:36

## 2023-01-01 RX ADMIN — ROFLUMILAST 500 MCG: 500 TABLET ORAL at 08:50

## 2023-01-01 RX ADMIN — GUAIFENESIN 1200 MG: 600 TABLET ORAL at 08:50

## 2023-01-01 RX ADMIN — CETIRIZINE HYDROCHLORIDE 5 MG: 10 TABLET, FILM COATED ORAL at 08:43

## 2023-01-01 RX ADMIN — HYDROCORTISONE 2.5%: 25 CREAM TOPICAL at 20:49

## 2023-01-01 RX ADMIN — PREDNISONE 40 MG: 20 TABLET ORAL at 08:19

## 2023-01-01 RX ADMIN — BUDESONIDE 0.5 MG: 0.5 INHALANT RESPIRATORY (INHALATION) at 19:28

## 2023-01-01 RX ADMIN — METOPROLOL SUCCINATE 50 MG: 50 TABLET, EXTENDED RELEASE ORAL at 20:50

## 2023-01-01 RX ADMIN — PANTOPRAZOLE SODIUM 40 MG: 40 TABLET, DELAYED RELEASE ORAL at 05:06

## 2023-01-01 RX ADMIN — TOBRAMYCIN 300 MG: 300 SOLUTION RESPIRATORY (INHALATION) at 19:29

## 2023-01-01 RX ADMIN — MORPHINE SULFATE 2 MG: 2 INJECTION, SOLUTION INTRAMUSCULAR; INTRAVENOUS at 11:42

## 2023-01-01 RX ADMIN — Medication 10 ML: at 08:54

## 2023-01-01 RX ADMIN — METOPROLOL SUCCINATE 50 MG: 50 TABLET, EXTENDED RELEASE ORAL at 08:12

## 2023-01-01 RX ADMIN — ROFLUMILAST 500 MCG: 500 TABLET ORAL at 08:22

## 2023-01-01 RX ADMIN — ACETAMINOPHEN 650 MG: 325 TABLET ORAL at 12:12

## 2023-01-01 RX ADMIN — ENOXAPARIN SODIUM 40 MG: 100 INJECTION SUBCUTANEOUS at 22:50

## 2023-01-01 RX ADMIN — PANTOPRAZOLE SODIUM 40 MG: 40 TABLET, DELAYED RELEASE ORAL at 05:57

## 2023-01-01 RX ADMIN — LEVALBUTEROL HYDROCHLORIDE 1.25 MG: 1.25 SOLUTION RESPIRATORY (INHALATION) at 14:05

## 2023-01-01 RX ADMIN — LEVALBUTEROL HYDROCHLORIDE 1.25 MG: 1.25 SOLUTION RESPIRATORY (INHALATION) at 00:45

## 2023-01-01 RX ADMIN — ATORVASTATIN CALCIUM 10 MG: 10 TABLET, FILM COATED ORAL at 20:23

## 2023-01-01 RX ADMIN — CEFEPIME 2000 MG: 2 INJECTION, POWDER, FOR SOLUTION INTRAVENOUS at 20:13

## 2023-01-01 RX ADMIN — SODIUM CHLORIDE, POTASSIUM CHLORIDE, SODIUM LACTATE AND CALCIUM CHLORIDE 100 ML/HR: 600; 310; 30; 20 INJECTION, SOLUTION INTRAVENOUS at 23:37

## 2023-01-01 RX ADMIN — METOPROLOL SUCCINATE 50 MG: 50 TABLET, EXTENDED RELEASE ORAL at 21:03

## 2023-01-01 RX ADMIN — ATORVASTATIN CALCIUM 10 MG: 10 TABLET, FILM COATED ORAL at 22:50

## 2023-01-01 RX ADMIN — BUDESONIDE 0.5 MG: 0.5 INHALANT RESPIRATORY (INHALATION) at 06:33

## 2023-01-01 RX ADMIN — LEVALBUTEROL HYDROCHLORIDE 1.25 MG: 1.25 SOLUTION RESPIRATORY (INHALATION) at 18:36

## 2023-01-01 RX ADMIN — ARFORMOTEROL TARTRATE 15 MCG: 15 SOLUTION RESPIRATORY (INHALATION) at 06:34

## 2023-01-01 RX ADMIN — SALINE NASAL SPRAY 2 SPRAY: 1.5 SOLUTION NASAL at 12:34

## 2023-01-01 RX ADMIN — LORAZEPAM 0.5 MG: 2 SOLUTION, CONCENTRATE ORAL at 07:50

## 2023-01-01 RX ADMIN — CEFEPIME 2000 MG: 2 INJECTION, POWDER, FOR SOLUTION INTRAVENOUS at 12:53

## 2023-01-01 RX ADMIN — Medication 10 ML: at 08:49

## 2023-01-01 RX ADMIN — LEVALBUTEROL HYDROCHLORIDE 1.25 MG: 1.25 SOLUTION RESPIRATORY (INHALATION) at 18:37

## 2023-01-01 RX ADMIN — CETIRIZINE HYDROCHLORIDE 5 MG: 10 TABLET, FILM COATED ORAL at 08:19

## 2023-01-01 RX ADMIN — LEVALBUTEROL HYDROCHLORIDE 1.25 MG: 1.25 SOLUTION RESPIRATORY (INHALATION) at 00:15

## 2023-01-01 RX ADMIN — LORAZEPAM 0.5 MG: 2 SOLUTION, CONCENTRATE ORAL at 09:05

## 2023-01-01 RX ADMIN — LEVALBUTEROL HYDROCHLORIDE 1.25 MG: 1.25 SOLUTION RESPIRATORY (INHALATION) at 06:17

## 2023-01-01 RX ADMIN — CETIRIZINE HYDROCHLORIDE 5 MG: 10 TABLET, FILM COATED ORAL at 09:29

## 2023-01-01 RX ADMIN — LEVALBUTEROL HYDROCHLORIDE 1.25 MG: 1.25 SOLUTION RESPIRATORY (INHALATION) at 00:33

## 2023-01-01 RX ADMIN — GUAIFENESIN 1200 MG: 600 TABLET ORAL at 09:32

## 2023-01-01 RX ADMIN — LORAZEPAM 0.5 MG: 2 SOLUTION, CONCENTRATE ORAL at 05:29

## 2023-01-01 RX ADMIN — SALINE NASAL SPRAY 2 SPRAY: 1.5 SOLUTION NASAL at 16:03

## 2023-01-01 RX ADMIN — MORPHINE SULFATE 5 MG: 10 SOLUTION ORAL at 09:05

## 2023-01-01 RX ADMIN — MORPHINE SULFATE 2 MG: 2 INJECTION, SOLUTION INTRAMUSCULAR; INTRAVENOUS at 15:49

## 2023-01-01 RX ADMIN — MORPHINE SULFATE 5 MG: 10 SOLUTION ORAL at 05:10

## 2023-01-01 RX ADMIN — Medication 10 ML: at 20:14

## 2023-01-01 RX ADMIN — AZITHROMYCIN 500 MG: 500 INJECTION, POWDER, LYOPHILIZED, FOR SOLUTION INTRAVENOUS at 23:22

## 2023-01-01 RX ADMIN — SALINE NASAL SPRAY 2 SPRAY: 1.5 SOLUTION NASAL at 09:45

## 2023-01-01 RX ADMIN — MORPHINE SULFATE 5 MG: 10 SOLUTION ORAL at 22:39

## 2023-01-01 RX ADMIN — LEVALBUTEROL HYDROCHLORIDE 1.25 MG: 1.25 SOLUTION RESPIRATORY (INHALATION) at 07:09

## 2023-01-01 RX ADMIN — BUDESONIDE 0.5 MG: 0.5 INHALANT RESPIRATORY (INHALATION) at 19:29

## 2023-01-01 RX ADMIN — LEVALBUTEROL HYDROCHLORIDE 1.25 MG: 1.25 SOLUTION RESPIRATORY (INHALATION) at 11:30

## 2023-01-01 RX ADMIN — ACETAMINOPHEN 650 MG: 325 TABLET ORAL at 14:16

## 2023-01-01 RX ADMIN — LEVALBUTEROL HYDROCHLORIDE 1.25 MG: 1.25 SOLUTION RESPIRATORY (INHALATION) at 18:33

## 2023-01-01 RX ADMIN — METOPROLOL SUCCINATE 50 MG: 50 TABLET, EXTENDED RELEASE ORAL at 09:21

## 2023-01-01 RX ADMIN — CEFEPIME 2000 MG: 2 INJECTION, POWDER, FOR SOLUTION INTRAVENOUS at 11:06

## 2023-01-01 RX ADMIN — ARFORMOTEROL TARTRATE 15 MCG: 15 SOLUTION RESPIRATORY (INHALATION) at 18:12

## 2023-01-01 RX ADMIN — CEFEPIME 2000 MG: 2 INJECTION, POWDER, FOR SOLUTION INTRAVENOUS at 12:22

## 2023-01-01 RX ADMIN — ARFORMOTEROL TARTRATE 15 MCG: 15 SOLUTION RESPIRATORY (INHALATION) at 19:24

## 2023-01-01 RX ADMIN — MORPHINE SULFATE 2 MG: 2 INJECTION, SOLUTION INTRAMUSCULAR; INTRAVENOUS at 22:16

## 2023-01-01 RX ADMIN — LEVALBUTEROL HYDROCHLORIDE 1.25 MG: 1.25 SOLUTION RESPIRATORY (INHALATION) at 07:11

## 2023-01-01 RX ADMIN — ACETAMINOPHEN 650 MG: 325 TABLET ORAL at 10:00

## 2023-01-01 RX ADMIN — ARFORMOTEROL TARTRATE 15 MCG: 15 SOLUTION RESPIRATORY (INHALATION) at 08:55

## 2023-01-01 RX ADMIN — LEVALBUTEROL HYDROCHLORIDE 1.25 MG: 1.25 SOLUTION RESPIRATORY (INHALATION) at 06:40

## 2023-01-01 RX ADMIN — ROFLUMILAST 500 MCG: 500 TABLET ORAL at 10:51

## 2023-01-01 RX ADMIN — SALINE NASAL SPRAY 2 SPRAY: 1.5 SOLUTION NASAL at 21:02

## 2023-01-01 RX ADMIN — CEFEPIME 2000 MG: 2 INJECTION, POWDER, FOR SOLUTION INTRAVENOUS at 13:04

## 2023-01-01 RX ADMIN — DOCUSATE SODIUM 50MG AND SENNOSIDES 8.6MG 1 TABLET: 8.6; 5 TABLET, FILM COATED ORAL at 20:17

## 2023-01-01 RX ADMIN — LEVALBUTEROL HYDROCHLORIDE 1.25 MG: 1.25 SOLUTION RESPIRATORY (INHALATION) at 00:19

## 2023-01-01 RX ADMIN — METHYLPREDNISOLONE SODIUM SUCCINATE 125 MG: 125 INJECTION INTRAMUSCULAR; INTRAVENOUS at 19:28

## 2023-01-01 RX ADMIN — PANTOPRAZOLE SODIUM 40 MG: 40 TABLET, DELAYED RELEASE ORAL at 05:36

## 2023-01-01 RX ADMIN — LORAZEPAM 0.5 MG: 2 SOLUTION, CONCENTRATE ORAL at 01:39

## 2023-01-01 RX ADMIN — ARFORMOTEROL TARTRATE 15 MCG: 15 SOLUTION RESPIRATORY (INHALATION) at 18:33

## 2023-01-01 RX ADMIN — BUDESONIDE 0.5 MG: 0.5 INHALANT RESPIRATORY (INHALATION) at 06:40

## 2023-01-01 RX ADMIN — GUAIFENESIN 1200 MG: 600 TABLET ORAL at 10:52

## 2023-01-01 RX ADMIN — LEVALBUTEROL HYDROCHLORIDE 1.25 MG: 1.25 SOLUTION RESPIRATORY (INHALATION) at 00:28

## 2023-01-01 RX ADMIN — GUAIFENESIN 1200 MG: 600 TABLET ORAL at 08:21

## 2023-01-01 RX ADMIN — PREDNISONE 40 MG: 20 TABLET ORAL at 08:36

## 2023-01-01 RX ADMIN — Medication 10 ML: at 09:32

## 2023-01-01 RX ADMIN — METOPROLOL SUCCINATE 50 MG: 50 TABLET, EXTENDED RELEASE ORAL at 09:32

## 2023-01-01 RX ADMIN — LEVALBUTEROL HYDROCHLORIDE 1.25 MG: 1.25 SOLUTION RESPIRATORY (INHALATION) at 18:50

## 2023-01-01 RX ADMIN — BUDESONIDE 0.5 MG: 0.5 INHALANT RESPIRATORY (INHALATION) at 06:17

## 2023-01-01 RX ADMIN — Medication 10 ML: at 20:37

## 2023-01-01 RX ADMIN — ROFLUMILAST 500 MCG: 500 TABLET ORAL at 09:20

## 2023-01-01 RX ADMIN — GUAIFENESIN 1200 MG: 600 TABLET ORAL at 20:53

## 2023-01-01 RX ADMIN — Medication 10 ML: at 22:00

## 2023-01-01 RX ADMIN — CEFEPIME 2000 MG: 2 INJECTION, POWDER, FOR SOLUTION INTRAVENOUS at 21:02

## 2023-01-01 RX ADMIN — LEVALBUTEROL HYDROCHLORIDE 1.25 MG: 1.25 SOLUTION RESPIRATORY (INHALATION) at 06:33

## 2023-01-01 RX ADMIN — PANTOPRAZOLE SODIUM 40 MG: 40 TABLET, DELAYED RELEASE ORAL at 05:33

## 2023-01-01 RX ADMIN — ATORVASTATIN CALCIUM 10 MG: 10 TABLET, FILM COATED ORAL at 20:52

## 2023-01-01 RX ADMIN — PANTOPRAZOLE SODIUM 40 MG: 40 TABLET, DELAYED RELEASE ORAL at 05:58

## 2023-01-01 RX ADMIN — BUDESONIDE 0.5 MG: 0.5 INHALANT RESPIRATORY (INHALATION) at 18:35

## 2023-01-01 RX ADMIN — ATORVASTATIN CALCIUM 10 MG: 10 TABLET, FILM COATED ORAL at 20:50

## 2023-01-01 RX ADMIN — PREDNISONE 40 MG: 20 TABLET ORAL at 09:29

## 2023-01-01 RX ADMIN — GUAIFENESIN 1200 MG: 600 TABLET ORAL at 08:46

## 2023-01-01 RX ADMIN — CETIRIZINE HYDROCHLORIDE 5 MG: 10 TABLET, FILM COATED ORAL at 09:32

## 2023-01-01 RX ADMIN — Medication 10 ML: at 20:50

## 2023-01-01 RX ADMIN — METOPROLOL SUCCINATE 25 MG: 25 TABLET, EXTENDED RELEASE ORAL at 08:19

## 2023-01-01 RX ADMIN — BUDESONIDE 0.5 MG: 0.5 INHALANT RESPIRATORY (INHALATION) at 07:09

## 2023-01-01 RX ADMIN — TOBRAMYCIN 300 MG: 300 SOLUTION RESPIRATORY (INHALATION) at 07:09

## 2023-01-01 RX ADMIN — BUDESONIDE 0.5 MG: 0.5 INHALANT RESPIRATORY (INHALATION) at 08:55

## 2023-01-01 RX ADMIN — ATROPINE SULFATE 2 DROP: 10 SOLUTION/ DROPS OPHTHALMIC at 05:18

## 2023-01-01 RX ADMIN — Medication 10 ML: at 20:26

## 2023-01-01 RX ADMIN — Medication 10 ML: at 08:36

## 2023-01-01 RX ADMIN — GUAIFENESIN 1200 MG: 600 TABLET ORAL at 21:07

## 2023-01-01 RX ADMIN — TOBRAMYCIN 300 MG: 300 SOLUTION RESPIRATORY (INHALATION) at 06:38

## 2023-01-01 RX ADMIN — TOBRAMYCIN 300 MG: 300 SOLUTION RESPIRATORY (INHALATION) at 19:05

## 2023-01-01 RX ADMIN — BUDESONIDE 0.5 MG: 0.5 INHALANT RESPIRATORY (INHALATION) at 06:29

## 2023-01-01 RX ADMIN — SALINE NASAL SPRAY 2 SPRAY: 1.5 SOLUTION NASAL at 20:24

## 2023-01-01 RX ADMIN — CETIRIZINE HYDROCHLORIDE 5 MG: 10 TABLET, FILM COATED ORAL at 08:22

## 2023-01-01 RX ADMIN — ROFLUMILAST 500 MCG: 500 TABLET ORAL at 09:32

## 2023-01-01 RX ADMIN — GUAIFENESIN 1200 MG: 600 TABLET ORAL at 08:55

## 2023-01-01 RX ADMIN — TOBRAMYCIN 300 MG: 300 SOLUTION RESPIRATORY (INHALATION) at 19:24

## 2023-01-01 RX ADMIN — PANTOPRAZOLE SODIUM 40 MG: 40 TABLET, DELAYED RELEASE ORAL at 05:22

## 2023-01-01 RX ADMIN — CEFEPIME 2000 MG: 2 INJECTION, POWDER, FOR SOLUTION INTRAVENOUS at 20:45

## 2023-01-01 RX ADMIN — ATORVASTATIN CALCIUM 10 MG: 10 TABLET, FILM COATED ORAL at 20:26

## 2023-01-01 RX ADMIN — POLYETHYLENE GLYCOL 3350 17 G: 17 POWDER, FOR SOLUTION ORAL at 18:13

## 2023-01-01 RX ADMIN — ARFORMOTEROL TARTRATE 15 MCG: 15 SOLUTION RESPIRATORY (INHALATION) at 18:36

## 2023-01-01 RX ADMIN — METOPROLOL SUCCINATE 50 MG: 50 TABLET, EXTENDED RELEASE ORAL at 08:55

## 2023-01-01 RX ADMIN — BUDESONIDE 0.5 MG: 0.5 INHALANT RESPIRATORY (INHALATION) at 07:11

## 2023-01-01 RX ADMIN — GUAIFENESIN 1200 MG: 600 TABLET ORAL at 09:20

## 2023-01-01 RX ADMIN — ROFLUMILAST 500 MCG: 500 TABLET ORAL at 08:55

## 2023-01-01 RX ADMIN — AZITHROMYCIN 500 MG: 500 INJECTION, POWDER, LYOPHILIZED, FOR SOLUTION INTRAVENOUS at 23:37

## 2023-01-01 RX ADMIN — BUDESONIDE 0.5 MG: 0.5 INHALANT RESPIRATORY (INHALATION) at 18:33

## 2023-01-01 RX ADMIN — ARFORMOTEROL TARTRATE 15 MCG: 15 SOLUTION RESPIRATORY (INHALATION) at 19:05

## 2023-01-01 RX ADMIN — MAGNESIUM SULFATE 2 G: 1 INJECTION INTRAVENOUS at 08:48

## 2023-01-01 RX ADMIN — BUDESONIDE 0.5 MG: 0.5 INHALANT RESPIRATORY (INHALATION) at 06:21

## 2023-01-01 RX ADMIN — CEFEPIME 2000 MG: 2 INJECTION, POWDER, FOR SOLUTION INTRAVENOUS at 12:39

## 2023-01-01 RX ADMIN — CETIRIZINE HYDROCHLORIDE 5 MG: 10 TABLET, FILM COATED ORAL at 08:50

## 2023-01-01 RX ADMIN — LEVALBUTEROL HYDROCHLORIDE 1.25 MG: 1.25 SOLUTION RESPIRATORY (INHALATION) at 13:43

## 2023-01-01 RX ADMIN — ARFORMOTEROL TARTRATE 15 MCG: 15 SOLUTION RESPIRATORY (INHALATION) at 06:37

## 2023-01-01 RX ADMIN — LEVALBUTEROL HYDROCHLORIDE 1.25 MG: 1.25 SOLUTION RESPIRATORY (INHALATION) at 11:51

## 2023-01-01 RX ADMIN — Medication 10 ML: at 21:57

## 2023-01-01 RX ADMIN — BISACODYL 10 MG: 5 TABLET, COATED ORAL at 14:16

## 2023-01-01 RX ADMIN — ENOXAPARIN SODIUM 40 MG: 100 INJECTION SUBCUTANEOUS at 08:49

## 2023-01-01 RX ADMIN — ATORVASTATIN CALCIUM 10 MG: 10 TABLET, FILM COATED ORAL at 20:13

## 2023-01-01 RX ADMIN — ASPIRIN 81 MG CHEWABLE TABLET 81 MG: 81 TABLET CHEWABLE at 08:19

## 2023-01-01 RX ADMIN — BUDESONIDE 0.5 MG: 0.5 INHALANT RESPIRATORY (INHALATION) at 18:12

## 2023-01-01 RX ADMIN — LEVALBUTEROL HYDROCHLORIDE 1.25 MG: 1.25 SOLUTION RESPIRATORY (INHALATION) at 06:20

## 2023-01-01 RX ADMIN — IPRATROPIUM BROMIDE AND ALBUTEROL SULFATE 3 ML: .5; 3 SOLUTION RESPIRATORY (INHALATION) at 00:10

## 2023-01-01 RX ADMIN — ROFLUMILAST 500 MCG: 500 TABLET ORAL at 08:23

## 2023-01-01 RX ADMIN — HYDROCODONE BITARTRATE AND ACETAMINOPHEN 1 TABLET: 5; 325 TABLET ORAL at 15:42

## 2023-01-01 RX ADMIN — ARFORMOTEROL TARTRATE 15 MCG: 15 SOLUTION RESPIRATORY (INHALATION) at 18:50

## 2023-01-01 RX ADMIN — DOCUSATE SODIUM 50MG AND SENNOSIDES 8.6MG 2 TABLET: 8.6; 5 TABLET, FILM COATED ORAL at 08:48

## 2023-01-01 RX ADMIN — Medication 10 ML: at 09:30

## 2023-01-01 RX ADMIN — BUDESONIDE 0.5 MG: 0.5 INHALANT RESPIRATORY (INHALATION) at 18:50

## 2023-01-01 RX ADMIN — LEVALBUTEROL HYDROCHLORIDE 1.25 MG: 1.25 SOLUTION RESPIRATORY (INHALATION) at 06:47

## 2023-01-01 RX ADMIN — ASPIRIN 81 MG CHEWABLE TABLET 81 MG: 81 TABLET CHEWABLE at 09:30

## 2023-01-01 RX ADMIN — MORPHINE SULFATE 5 MG: 10 SOLUTION ORAL at 21:01

## 2023-01-01 RX ADMIN — ARFORMOTEROL TARTRATE 15 MCG: 15 SOLUTION RESPIRATORY (INHALATION) at 06:40

## 2023-01-01 RX ADMIN — ASPIRIN 81 MG CHEWABLE TABLET 81 MG: 81 TABLET CHEWABLE at 09:33

## 2023-01-01 RX ADMIN — LEVALBUTEROL HYDROCHLORIDE 1.25 MG: 1.25 SOLUTION RESPIRATORY (INHALATION) at 06:38

## 2023-01-01 RX ADMIN — CEFEPIME 2000 MG: 2 INJECTION, POWDER, FOR SOLUTION INTRAVENOUS at 22:14

## 2023-01-01 RX ADMIN — TOBRAMYCIN 300 MG: 300 SOLUTION RESPIRATORY (INHALATION) at 18:37

## 2023-01-01 RX ADMIN — TOBRAMYCIN 300 MG: 300 SOLUTION RESPIRATORY (INHALATION) at 06:34

## 2023-01-01 RX ADMIN — Medication 10 ML: at 09:56

## 2023-01-01 RX ADMIN — Medication 10 ML: at 20:22

## 2023-01-01 RX ADMIN — MORPHINE SULFATE 5 MG: 10 SOLUTION ORAL at 06:36

## 2023-01-01 RX ADMIN — ROFLUMILAST 500 MCG: 500 TABLET ORAL at 08:36

## 2023-01-01 RX ADMIN — SALINE NASAL SPRAY 2 SPRAY: 1.5 SOLUTION NASAL at 15:52

## 2023-01-01 RX ADMIN — DOCUSATE SODIUM 50MG AND SENNOSIDES 8.6MG 1 TABLET: 8.6; 5 TABLET, FILM COATED ORAL at 22:50

## 2023-01-01 RX ADMIN — BUDESONIDE 0.5 MG: 0.5 INHALANT RESPIRATORY (INHALATION) at 19:05

## 2023-01-01 RX ADMIN — ATORVASTATIN CALCIUM 10 MG: 10 TABLET, FILM COATED ORAL at 21:07

## 2023-01-01 RX ADMIN — ASPIRIN 81 MG CHEWABLE TABLET 81 MG: 81 TABLET CHEWABLE at 09:20

## 2023-01-01 RX ADMIN — MAGNESIUM SULFATE HEPTAHYDRATE 2 G: 40 INJECTION, SOLUTION INTRAVENOUS at 10:51

## 2023-01-01 RX ADMIN — CEFEPIME 2000 MG: 2 INJECTION, POWDER, FOR SOLUTION INTRAVENOUS at 14:18

## 2023-01-01 RX ADMIN — ENOXAPARIN SODIUM 40 MG: 100 INJECTION SUBCUTANEOUS at 09:21

## 2023-01-01 RX ADMIN — LEVALBUTEROL HYDROCHLORIDE 1.25 MG: 1.25 SOLUTION RESPIRATORY (INHALATION) at 00:42

## 2023-01-01 RX ADMIN — Medication 10 ML: at 22:54

## 2023-01-01 RX ADMIN — ASPIRIN 81 MG CHEWABLE TABLET 81 MG: 81 TABLET CHEWABLE at 08:21

## 2023-01-01 RX ADMIN — MORPHINE SULFATE 5 MG: 10 SOLUTION ORAL at 05:29

## 2023-01-01 RX ADMIN — SALINE NASAL SPRAY 2 SPRAY: 1.5 SOLUTION NASAL at 09:32

## 2023-01-01 RX ADMIN — LEVALBUTEROL HYDROCHLORIDE 1.25 MG: 1.25 SOLUTION RESPIRATORY (INHALATION) at 18:35

## 2023-01-01 RX ADMIN — METOPROLOL SUCCINATE 50 MG: 50 TABLET, EXTENDED RELEASE ORAL at 21:08

## 2023-01-01 RX ADMIN — ARFORMOTEROL TARTRATE 15 MCG: 15 SOLUTION RESPIRATORY (INHALATION) at 06:17

## 2023-01-01 RX ADMIN — CEFTRIAXONE SODIUM 1000 MG: 1 INJECTION, SOLUTION INTRAVENOUS at 13:03

## 2023-01-01 RX ADMIN — GUAIFENESIN 1200 MG: 600 TABLET ORAL at 20:38

## 2023-01-01 RX ADMIN — Medication 10 ML: at 08:21

## 2023-01-01 RX ADMIN — ARFORMOTEROL TARTRATE 15 MCG: 15 SOLUTION RESPIRATORY (INHALATION) at 18:35

## 2023-01-01 RX ADMIN — SALINE NASAL SPRAY 2 SPRAY: 1.5 SOLUTION NASAL at 10:53

## 2023-01-01 RX ADMIN — GUAIFENESIN 1200 MG: 600 TABLET ORAL at 20:46

## 2023-01-01 RX ADMIN — GUAIFENESIN 1200 MG: 600 TABLET ORAL at 13:03

## 2023-01-01 RX ADMIN — PREDNISONE 40 MG: 20 TABLET ORAL at 08:56

## 2023-01-01 RX ADMIN — GUAIFENESIN 1200 MG: 600 TABLET ORAL at 09:29

## 2023-01-01 RX ADMIN — ACETAMINOPHEN 650 MG: 325 TABLET ORAL at 19:32

## 2023-01-01 RX ADMIN — GUAIFENESIN 1200 MG: 600 TABLET ORAL at 20:13

## 2023-01-01 RX ADMIN — ATORVASTATIN CALCIUM 10 MG: 10 TABLET, FILM COATED ORAL at 21:02

## 2023-01-01 RX ADMIN — LEVALBUTEROL HYDROCHLORIDE 1.25 MG: 1.25 SOLUTION RESPIRATORY (INHALATION) at 06:29

## 2023-01-01 RX ADMIN — ACETAMINOPHEN 650 MG: 325 TABLET ORAL at 20:22

## 2023-01-01 RX ADMIN — BUDESONIDE 0.5 MG: 0.5 INHALANT RESPIRATORY (INHALATION) at 06:38

## 2023-01-01 RX ADMIN — POTASSIUM & SODIUM PHOSPHATES POWDER PACK 280-160-250 MG 1 PACKET: 280-160-250 PACK at 09:30

## 2023-01-01 RX ADMIN — METOPROLOL SUCCINATE 50 MG: 50 TABLET, EXTENDED RELEASE ORAL at 20:46

## 2023-01-01 RX ADMIN — CEFEPIME 2000 MG: 2 INJECTION, POWDER, FOR SOLUTION INTRAVENOUS at 12:12

## 2023-01-01 RX ADMIN — ARFORMOTEROL TARTRATE 15 MCG: 15 SOLUTION RESPIRATORY (INHALATION) at 18:38

## 2023-01-01 RX ADMIN — Medication 10 ML: at 08:23

## 2023-01-01 RX ADMIN — LEVALBUTEROL HYDROCHLORIDE 1.25 MG: 1.25 SOLUTION RESPIRATORY (INHALATION) at 19:05

## 2023-01-01 RX ADMIN — Medication 10 ML: at 08:45

## 2023-01-01 RX ADMIN — ACETAMINOPHEN 650 MG: 325 TABLET ORAL at 14:57

## 2023-01-01 RX ADMIN — CEFEPIME 2000 MG: 2 INJECTION, POWDER, FOR SOLUTION INTRAVENOUS at 04:55

## 2023-01-01 RX ADMIN — BUDESONIDE 0.5 MG: 0.5 INHALANT RESPIRATORY (INHALATION) at 19:24

## 2023-01-01 RX ADMIN — TOBRAMYCIN 300 MG: 300 SOLUTION RESPIRATORY (INHALATION) at 11:51

## 2023-01-01 RX ADMIN — ENOXAPARIN SODIUM 40 MG: 100 INJECTION SUBCUTANEOUS at 09:32

## 2023-01-01 RX ADMIN — DOCUSATE SODIUM 50MG AND SENNOSIDES 8.6MG 1 TABLET: 8.6; 5 TABLET, FILM COATED ORAL at 20:26

## 2023-01-01 RX ADMIN — GUAIFENESIN 1200 MG: 600 TABLET ORAL at 20:26

## 2023-01-01 RX ADMIN — MORPHINE SULFATE 5 MG: 10 SOLUTION ORAL at 10:53

## 2023-01-01 RX ADMIN — LEVALBUTEROL HYDROCHLORIDE 1.25 MG: 1.25 SOLUTION RESPIRATORY (INHALATION) at 19:24

## 2023-01-01 RX ADMIN — Medication 10 ML: at 10:53

## 2023-01-01 RX ADMIN — METOPROLOL SUCCINATE 50 MG: 50 TABLET, EXTENDED RELEASE ORAL at 20:56

## 2023-01-01 RX ADMIN — ARFORMOTEROL TARTRATE 15 MCG: 15 SOLUTION RESPIRATORY (INHALATION) at 06:20

## 2023-01-01 RX ADMIN — SALINE NASAL SPRAY 2 SPRAY: 1.5 SOLUTION NASAL at 08:22

## 2023-01-01 RX ADMIN — DOCUSATE SODIUM 50MG AND SENNOSIDES 8.6MG 2 TABLET: 8.6; 5 TABLET, FILM COATED ORAL at 20:46

## 2023-01-01 RX ADMIN — MORPHINE SULFATE 2 MG: 2 INJECTION, SOLUTION INTRAMUSCULAR; INTRAVENOUS at 04:12

## 2023-01-01 RX ADMIN — TOBRAMYCIN 300 MG: 300 SOLUTION RESPIRATORY (INHALATION) at 18:34

## 2023-01-01 RX ADMIN — SALINE NASAL SPRAY 2 SPRAY: 1.5 SOLUTION NASAL at 13:12

## 2023-01-01 RX ADMIN — Medication 10 ML: at 21:08

## 2023-01-01 RX ADMIN — LORAZEPAM 0.5 MG: 2 INJECTION INTRAMUSCULAR; INTRAVENOUS at 11:46

## 2023-01-01 RX ADMIN — GUAIFENESIN 1200 MG: 600 TABLET ORAL at 21:02

## 2023-01-04 ENCOUNTER — HOSPITAL ENCOUNTER (OUTPATIENT)
Dept: CT IMAGING | Facility: HOSPITAL | Age: 68
Discharge: HOME | End: 2023-01-04
Admitting: RADIOLOGY
Payer: OTHER GOVERNMENT

## 2023-01-04 DIAGNOSIS — C20 RECTAL CANCER: ICD-10-CM

## 2023-01-04 LAB
CREAT BLDA-MCNC: 0.6 MG/DL
EGFRCR SERPLBLD CKD-EPI 2021: 107.1 ML/MIN/1.73

## 2023-01-04 PROCEDURE — 0 IOPAMIDOL PER 1 ML: Performed by: RADIOLOGY

## 2023-01-04 PROCEDURE — 82565 ASSAY OF CREATININE: CPT

## 2023-01-04 PROCEDURE — 71260 CT THORAX DX C+: CPT

## 2023-01-04 PROCEDURE — 74177 CT ABD & PELVIS W/CONTRAST: CPT

## 2023-01-04 RX ADMIN — IOPAMIDOL 100 ML: 755 INJECTION, SOLUTION INTRAVENOUS at 12:00

## 2023-01-11 ENCOUNTER — APPOINTMENT (OUTPATIENT)
Dept: RADIATION ONCOLOGY | Facility: HOSPITAL | Age: 68
End: 2023-01-11
Payer: OTHER GOVERNMENT

## 2023-01-11 ENCOUNTER — OFFICE VISIT (OUTPATIENT)
Dept: RADIATION ONCOLOGY | Facility: HOSPITAL | Age: 68
End: 2023-01-11
Payer: OTHER GOVERNMENT

## 2023-01-11 VITALS
RESPIRATION RATE: 20 BRPM | WEIGHT: 122.58 LBS | SYSTOLIC BLOOD PRESSURE: 131 MMHG | DIASTOLIC BLOOD PRESSURE: 76 MMHG | OXYGEN SATURATION: 98 % | BODY MASS INDEX: 16.17 KG/M2 | HEART RATE: 86 BPM | TEMPERATURE: 98.5 F

## 2023-01-11 DIAGNOSIS — C20 RECTAL CANCER: ICD-10-CM

## 2023-01-11 LAB
ALBUMIN SERPL-MCNC: 4.3 G/DL (ref 3.5–5.2)
ALBUMIN/GLOB SERPL: 1.3 G/DL
ALP SERPL-CCNC: 77 U/L (ref 39–117)
ALT SERPL W P-5'-P-CCNC: 13 U/L (ref 1–41)
ANION GAP SERPL CALCULATED.3IONS-SCNC: 5.6 MMOL/L (ref 5–15)
AST SERPL-CCNC: 17 U/L (ref 1–40)
BASOPHILS # BLD AUTO: 0.03 10*3/MM3 (ref 0–0.2)
BASOPHILS NFR BLD AUTO: 0.3 % (ref 0–1.5)
BILIRUB SERPL-MCNC: 0.3 MG/DL (ref 0–1.2)
BUN SERPL-MCNC: 18 MG/DL (ref 8–23)
BUN/CREAT SERPL: 28.6 (ref 7–25)
CALCIUM SPEC-SCNC: 9.5 MG/DL (ref 8.6–10.5)
CEA SERPL-MCNC: 2.44 NG/ML
CHLORIDE SERPL-SCNC: 99 MMOL/L (ref 98–107)
CO2 SERPL-SCNC: 35.4 MMOL/L (ref 22–29)
CREAT SERPL-MCNC: 0.63 MG/DL (ref 0.76–1.27)
DEPRECATED RDW RBC AUTO: 41.9 FL (ref 37–54)
EGFRCR SERPLBLD CKD-EPI 2021: 105.6 ML/MIN/1.73
EOSINOPHIL # BLD AUTO: 0.12 10*3/MM3 (ref 0–0.4)
EOSINOPHIL NFR BLD AUTO: 1.3 % (ref 0.3–6.2)
ERYTHROCYTE [DISTWIDTH] IN BLOOD BY AUTOMATED COUNT: 12 % (ref 12.3–15.4)
GLOBULIN UR ELPH-MCNC: 3.4 GM/DL
GLUCOSE SERPL-MCNC: 140 MG/DL (ref 65–99)
HCT VFR BLD AUTO: 40.7 % (ref 37.5–51)
HGB BLD-MCNC: 12.8 G/DL (ref 13–17.7)
IMM GRANULOCYTES # BLD AUTO: 0.04 10*3/MM3 (ref 0–0.05)
IMM GRANULOCYTES NFR BLD AUTO: 0.4 % (ref 0–0.5)
LYMPHOCYTES # BLD AUTO: 0.51 10*3/MM3 (ref 0.7–3.1)
LYMPHOCYTES NFR BLD AUTO: 5.4 % (ref 19.6–45.3)
MCH RBC QN AUTO: 30.3 PG (ref 26.6–33)
MCHC RBC AUTO-ENTMCNC: 31.4 G/DL (ref 31.5–35.7)
MCV RBC AUTO: 96.2 FL (ref 79–97)
MONOCYTES # BLD AUTO: 0.57 10*3/MM3 (ref 0.1–0.9)
MONOCYTES NFR BLD AUTO: 6 % (ref 5–12)
NEUTROPHILS NFR BLD AUTO: 8.26 10*3/MM3 (ref 1.7–7)
NEUTROPHILS NFR BLD AUTO: 86.6 % (ref 42.7–76)
NRBC BLD AUTO-RTO: 0 /100 WBC (ref 0–0.2)
PLATELET # BLD AUTO: 325 10*3/MM3 (ref 140–450)
PMV BLD AUTO: 9.9 FL (ref 6–12)
POTASSIUM SERPL-SCNC: 4.8 MMOL/L (ref 3.5–5.2)
PROT SERPL-MCNC: 7.7 G/DL (ref 6–8.5)
RBC # BLD AUTO: 4.23 10*6/MM3 (ref 4.14–5.8)
SODIUM SERPL-SCNC: 140 MMOL/L (ref 136–145)
WBC NRBC COR # BLD: 9.53 10*3/MM3 (ref 3.4–10.8)

## 2023-01-11 PROCEDURE — G0463 HOSPITAL OUTPT CLINIC VISIT: HCPCS | Performed by: RADIOLOGY

## 2023-01-11 PROCEDURE — 85025 COMPLETE CBC W/AUTO DIFF WBC: CPT | Performed by: RADIOLOGY

## 2023-01-11 PROCEDURE — 36415 COLL VENOUS BLD VENIPUNCTURE: CPT | Performed by: RADIOLOGY

## 2023-01-11 PROCEDURE — 82378 CARCINOEMBRYONIC ANTIGEN: CPT | Performed by: RADIOLOGY

## 2023-01-11 PROCEDURE — 80053 COMPREHEN METABOLIC PANEL: CPT | Performed by: RADIOLOGY

## 2023-01-11 PROCEDURE — 99214 OFFICE O/P EST MOD 30 MIN: CPT | Performed by: RADIOLOGY

## 2023-01-11 NOTE — PROGRESS NOTES
Follow Up Office Visit      Encounter Date: 01/11/2023   Patient Name: Pavel Dai  YOB: 1955   Medical Record Number: [id number]   Primary Diagnosis: No primary diagnosis found.   Cancer Staging: Cancer Staging   Rectal cancer (HCC)  Staging form: Colon And Rectum, AJCC 8th Edition  - Clinical: Stage I (cT2, cN0, cM0) - Signed by Issac Walker MD on 11/3/2021                Cancer Staging   Stage I (cT2, cN0, cM0)        Chief Complaint:    Chief Complaint   Patient presents with   • Follow-up   • Rectal Cancer       Oncologic History: Pavel Dai is a 67 y.o. male  here for follow up re: a recently diagnosed adenocarcinoma of the rectum.  Presenting symptoms included a GI bleed which prompted a colonoscopy. This revealed a mass consistent in appearance with a tubulovillous adenoma located 6 to 7 cm from the anal verge.  He had a transanal resection of the mass on 9/22 at the [facility].  Pathology revealed a moderately differentiated adenocarcinoma with superficial invasion of the muscularis propria.  Positive margins were noted peripherally.  There was LVI present on the specimen.  His case was discussed in a multidisciplinary setting.  Recommendation for adjuvant chemoradiation was made. He received a radiation dose of 50.4 Gy/28 fractions and completed on 1/6/22    Interval History: anemia, managed through Dr. Walker's office - reports fatigue and weakness, remains O2 dependent and physical activity at home is limited - started CPAP use recently. Denies melena, hematochezia, hematuria, or dysuria. Good control of bowels and bladder.    Prior Radiation: 50.4 Gy/28 fractions - 1/6/22 - pelvis         Subjective      Review of Systems: Review of Systems   Constitutional: Positive for fatigue (3/10). Negative for appetite change and unexpected weight change.   HENT: Positive for tinnitus (Occasional, ongoing). Negative for  hearing loss, sore throat, trouble swallowing and voice change.    Eyes: Negative for visual disturbance.   Respiratory: Positive for cough (Productive with light green secretions- ongoing) and wheezing (Occasional, ongoing). Negative for shortness of breath.         Wears 3l of o2 at all times.     Cardiovascular: Negative for chest pain, palpitations and leg swelling.   Gastrointestinal: Positive for abdominal distention (Occasional), abdominal pain (Occasional- subsides on own.) and nausea (Occasional, subsides on own.). Negative for anal bleeding, blood in stool, constipation, diarrhea, rectal pain and vomiting.   Genitourinary: Negative for difficulty urinating, dysuria, frequency, hematuria and urgency.   Musculoskeletal: Positive for gait problem (In wheelchair, unable to walk long distances.). Negative for arthralgias, back pain and joint swelling.   Skin: Negative for color change and rash.   Neurological: Positive for weakness (Ongoing) and headaches (Ongoing, 40+yrs). Negative for dizziness.   Psychiatric/Behavioral: Negative for sleep disturbance.       The following portions of the patient's history were reviewed and updated as appropriate: allergies, current medications, past family history, past medical history, past social history, past surgical history and problem list.    Measures:   Pain: (on a scale of 0-10)   Pain Score    01/11/23 0954   PainSc: 0-No pain       Advanced Care Plan: N Advance Care Planning   ACP discussion was declined by the patient. Patient does not have an advance directive, declines further assistance.       KPS/Quality of Life: 80 - Restricted Physical Activity  ECOG: (1) Restricted in physically strenuous activity, ambulatory and able to do work of light nature  Depression Screening:   Patient screened positive for depression based on a PHQ-9 score of 0 on 1/11/2023.    Objective     Physical Exam:   Vital Signs:   Vitals:    01/11/23 0954   BP: 131/76   Pulse: 86   Resp:  20   Temp: 98.5 °F (36.9 °C)   TempSrc: Oral   SpO2: 98%  Comment: 3L 02   Weight: 55.6 kg (122 lb 9.2 oz)   PainSc: 0-No pain     Body mass index is 16.17 kg/m².     Constitutional: No acute distress, sitting comfortably  Eye: EOMI, anicteric sclerae  HENT: NC/AT, MMM   Respiratory: Symmetric expansion, nonlabored respiration  MSK: ROM intact in all four extremities, no obvious deformities  Neuro: Alert, oriented x3, CN3-12 grossly intact.   Psych: Appropriate mood and affect.    Results:   CT Chest w contrast 1/2023  1. Emphysema  2. New mucous plugging within the left lower lobe bronchi with areas of linear atelectasis within   the left lower lobe superimposed infection cannot be entirely excluded.  3. No evidence of metastatic disease within chest.    CT Abd/Pelvis with contrast 1/2023  Within the   right lobe of the liver there are 2 well-circumscribed subcentimeter low-density masses compatible   with cyst or hemangiomas.  These are unchanged.  No new suspicious contrast enhancing liver lesion   identified.  Gallbladder is normal.  Pancreas and spleen are within normal limits.  Bilateral   adrenal glands appear normal.  Within the upper pole the left kidney there is a 2 cm low-density   mass compatible with cyst.  Kidneys are otherwise unremarkable.  No abdominal or retroperitoneal   adenopathy.  The upper GI tract is within normal limits.     Pelvis:  Urinary bladder is within normal limits the GI tract is within normal limits.  There is no   pelvic or inguinal adenopathy.  No free intraperitoneal fluid.     There is osteopenia and degenerative changes spine.  No lytic or sclerotic lesion identified          Assessment / Plan        Assessment/Plan:     Diagnoses and all orders for this visit:    1. Rectal cancer (HCC)  -     CT Chest With Contrast; Future  -     CT Abdomen Pelvis With Contrast; Future  -     CBC & Differential; Future  -     Comprehensive Metabolic Panel; Future  -     CEA; Future  -     CBC  & Differential  -     Comprehensive Metabolic Panel  -     CEA        Pavel Dai is a pleasant 67 y.o. male who had a mass consistent in appearance with a tubulovillous adenoma located 6 to 7 cm from the anal verge. He had a transanal resection of the mass on 9/22 at the [facility].  Pathology revealed a moderately differentiated adenocarcinoma with superficial invasion of the muscularis propria.  Positive margins were noted peripherally.  There was LVI present on the specimen.       He received adjuvant Xeloda RT. Radiation was to a dose of 50.4 Gy/28 fractions and completed on 1/6/22.  He had a recent endoscopic evaluation at [facility] with a hyperplastic polyp identified. I have encouraged him to continue endoscopic surveillance with his team at [facility] as clinically indicated.     He has no clinical or radiographic evidence of disease today. I will check labwork, including a CEA. He sees Dr. Walker back in 3 months. I will see him back in 6 months.     Follow Up:   Return in about 6 months (around 7/11/2023).        Time:   I spent 35 minutes on this encounter today, 01/17/23. Activities that took place during this time include:   - preparing to see the patient  - obtaining and reviewing separately obtained history  - performing a medically appropriate examination and evaluation  - counseling and educating the patient  - ordering medications/tests/procedures  - communicating with other healthcare providers  - documenting clinical information in the health record  - coordinating care for this patient.     Sincerely,        Kandace Lucio MD  Radiation Oncology  [facility]    This document has been signed by Kandace Lucio MD on January 17, 2023 14:37 EST           NOTICE TO PATIENTS  At [facility], we believe that sharing information builds trust and better relationships. You are receiving this note because you recently visited [facility]. It is possible you will see [facility]  information before a provider has talked with you about it. This kind of information can be easy to misunderstand. To help you fully understand what it means for your health, we urge you to discuss this note with your provider.

## 2023-01-17 ENCOUNTER — TELEPHONE (OUTPATIENT)
Dept: RADIATION ONCOLOGY | Facility: HOSPITAL | Age: 68
End: 2023-01-17
Payer: OTHER GOVERNMENT

## 2023-01-17 NOTE — TELEPHONE ENCOUNTER
Per Dr. Lucio, contacted patient regarding labs.  Relayed message about recent labs being stable and CEA being within normal range.  Instructed patient to reach out if he had any questions or concerns.  Patient verbalized understanding.

## 2023-04-21 ENCOUNTER — LAB (OUTPATIENT)
Dept: LAB | Facility: HOSPITAL | Age: 68
End: 2023-04-21
Payer: OTHER GOVERNMENT

## 2023-04-21 DIAGNOSIS — C20 RECTAL CANCER: ICD-10-CM

## 2023-04-21 DIAGNOSIS — D50.0 IRON DEFICIENCY ANEMIA DUE TO CHRONIC BLOOD LOSS: ICD-10-CM

## 2023-04-21 LAB
ALBUMIN SERPL-MCNC: 3.8 G/DL (ref 3.5–5.2)
ALBUMIN/GLOB SERPL: 1 G/DL
ALP SERPL-CCNC: 65 U/L (ref 39–117)
ALT SERPL W P-5'-P-CCNC: 13 U/L (ref 1–41)
ANION GAP SERPL CALCULATED.3IONS-SCNC: 8 MMOL/L (ref 5–15)
AST SERPL-CCNC: 17 U/L (ref 1–40)
BASOPHILS # BLD AUTO: 0.05 10*3/MM3 (ref 0–0.2)
BASOPHILS NFR BLD AUTO: 0.7 % (ref 0–1.5)
BILIRUB SERPL-MCNC: 0.3 MG/DL (ref 0–1.2)
BUN SERPL-MCNC: 15 MG/DL (ref 8–23)
BUN/CREAT SERPL: 24.6 (ref 7–25)
CALCIUM SPEC-SCNC: 9.3 MG/DL (ref 8.6–10.5)
CHLORIDE SERPL-SCNC: 99 MMOL/L (ref 98–107)
CO2 SERPL-SCNC: 33 MMOL/L (ref 22–29)
CREAT SERPL-MCNC: 0.61 MG/DL (ref 0.76–1.27)
DEPRECATED RDW RBC AUTO: 43.8 FL (ref 37–54)
EGFRCR SERPLBLD CKD-EPI 2021: 105.9 ML/MIN/1.73
EOSINOPHIL # BLD AUTO: 0.26 10*3/MM3 (ref 0–0.4)
EOSINOPHIL NFR BLD AUTO: 3.4 % (ref 0.3–6.2)
ERYTHROCYTE [DISTWIDTH] IN BLOOD BY AUTOMATED COUNT: 13 % (ref 12.3–15.4)
FERRITIN SERPL-MCNC: 207.9 NG/ML (ref 30–400)
GLOBULIN UR ELPH-MCNC: 3.9 GM/DL
GLUCOSE SERPL-MCNC: 87 MG/DL (ref 65–99)
HCT VFR BLD AUTO: 40.8 % (ref 37.5–51)
HGB BLD-MCNC: 12.8 G/DL (ref 13–17.7)
IMM GRANULOCYTES # BLD AUTO: 0.03 10*3/MM3 (ref 0–0.05)
IMM GRANULOCYTES NFR BLD AUTO: 0.4 % (ref 0–0.5)
IRON 24H UR-MRATE: 77 MCG/DL (ref 59–158)
IRON SATN MFR SERPL: 24 % (ref 20–50)
LYMPHOCYTES # BLD AUTO: 0.55 10*3/MM3 (ref 0.7–3.1)
LYMPHOCYTES NFR BLD AUTO: 7.2 % (ref 19.6–45.3)
MCH RBC QN AUTO: 29.4 PG (ref 26.6–33)
MCHC RBC AUTO-ENTMCNC: 31.4 G/DL (ref 31.5–35.7)
MCV RBC AUTO: 93.8 FL (ref 79–97)
MONOCYTES # BLD AUTO: 0.52 10*3/MM3 (ref 0.1–0.9)
MONOCYTES NFR BLD AUTO: 6.8 % (ref 5–12)
NEUTROPHILS NFR BLD AUTO: 6.21 10*3/MM3 (ref 1.7–7)
NEUTROPHILS NFR BLD AUTO: 81.5 % (ref 42.7–76)
NRBC BLD AUTO-RTO: 0 /100 WBC (ref 0–0.2)
PLATELET # BLD AUTO: 334 10*3/MM3 (ref 140–450)
PMV BLD AUTO: 10.3 FL (ref 6–12)
POTASSIUM SERPL-SCNC: 4.2 MMOL/L (ref 3.5–5.2)
PROT SERPL-MCNC: 7.7 G/DL (ref 6–8.5)
RBC # BLD AUTO: 4.35 10*6/MM3 (ref 4.14–5.8)
SODIUM SERPL-SCNC: 140 MMOL/L (ref 136–145)
TIBC SERPL-MCNC: 317 MCG/DL (ref 298–536)
TRANSFERRIN SERPL-MCNC: 213 MG/DL (ref 200–360)
WBC NRBC COR # BLD: 7.62 10*3/MM3 (ref 3.4–10.8)

## 2023-04-21 PROCEDURE — 85025 COMPLETE CBC W/AUTO DIFF WBC: CPT

## 2023-04-21 PROCEDURE — 83540 ASSAY OF IRON: CPT

## 2023-04-21 PROCEDURE — 36415 COLL VENOUS BLD VENIPUNCTURE: CPT

## 2023-04-21 PROCEDURE — 80053 COMPREHEN METABOLIC PANEL: CPT

## 2023-04-21 PROCEDURE — 82728 ASSAY OF FERRITIN: CPT

## 2023-04-21 PROCEDURE — 84466 ASSAY OF TRANSFERRIN: CPT

## 2023-04-25 ENCOUNTER — OFFICE VISIT (OUTPATIENT)
Dept: ONCOLOGY | Facility: HOSPITAL | Age: 68
End: 2023-04-25
Payer: OTHER GOVERNMENT

## 2023-04-25 VITALS
RESPIRATION RATE: 20 BRPM | SYSTOLIC BLOOD PRESSURE: 127 MMHG | TEMPERATURE: 98.8 F | BODY MASS INDEX: 16.26 KG/M2 | DIASTOLIC BLOOD PRESSURE: 71 MMHG | HEART RATE: 88 BPM | OXYGEN SATURATION: 90 % | WEIGHT: 123.24 LBS

## 2023-04-25 DIAGNOSIS — C20 RECTAL CANCER: Primary | ICD-10-CM

## 2023-04-25 DIAGNOSIS — D50.0 IRON DEFICIENCY ANEMIA DUE TO CHRONIC BLOOD LOSS: ICD-10-CM

## 2023-04-25 PROCEDURE — G0463 HOSPITAL OUTPT CLINIC VISIT: HCPCS | Performed by: INTERNAL MEDICINE

## 2023-04-25 RX ORDER — FLUTICASONE PROPIONATE AND SALMETEROL 100; 50 UG/1; UG/1
POWDER RESPIRATORY (INHALATION) EVERY 12 HOURS SCHEDULED
COMMUNITY

## 2023-04-25 NOTE — ASSESSMENT & PLAN NOTE
Patient is doing well.  I see no evidence of disease recurrence by his history, physical examination, lab work or recent CT scans which were reviewed.  I will see him back in 6 months for continued surveillance with lab work prior including CEA tumor marker.  He will follow-up with radiation oncology as scheduled.

## 2023-04-25 NOTE — ASSESSMENT & PLAN NOTE
Lab work reviewed today demonstrating normal ferritin and iron profile.  Repeat next visit to ensure stability

## 2023-05-18 ENCOUNTER — TELEPHONE (OUTPATIENT)
Dept: PULMONOLOGY | Facility: CLINIC | Age: 68
End: 2023-05-18
Payer: OTHER GOVERNMENT

## 2023-05-19 NOTE — TELEPHONE ENCOUNTER
Called patient and informed him of what Dr. Schaefer said. Patient states he sees his pulmonologist next month and will follow-up with him in regards to the machine.

## 2023-07-24 ENCOUNTER — APPOINTMENT (OUTPATIENT)
Dept: GENERAL RADIOLOGY | Facility: HOSPITAL | Age: 68
DRG: 177 | End: 2023-07-24
Payer: OTHER GOVERNMENT

## 2023-07-24 ENCOUNTER — APPOINTMENT (OUTPATIENT)
Dept: CT IMAGING | Facility: HOSPITAL | Age: 68
DRG: 177 | End: 2023-07-24
Payer: OTHER GOVERNMENT

## 2023-07-24 ENCOUNTER — APPOINTMENT (OUTPATIENT)
Dept: CARDIOLOGY | Facility: HOSPITAL | Age: 68
DRG: 177 | End: 2023-07-24
Payer: OTHER GOVERNMENT

## 2023-07-24 ENCOUNTER — HOSPITAL ENCOUNTER (INPATIENT)
Facility: HOSPITAL | Age: 68
LOS: 4 days | Discharge: HOME | DRG: 177 | End: 2023-07-28
Attending: EMERGENCY MEDICINE | Admitting: INTERNAL MEDICINE
Payer: OTHER GOVERNMENT

## 2023-07-24 DIAGNOSIS — R77.8 ELEVATED TROPONIN: Primary | ICD-10-CM

## 2023-07-24 DIAGNOSIS — J44.1 COPD EXACERBATION: ICD-10-CM

## 2023-07-24 DIAGNOSIS — J96.22 ACUTE ON CHRONIC RESPIRATORY FAILURE WITH HYPOXIA AND HYPERCAPNIA: ICD-10-CM

## 2023-07-24 DIAGNOSIS — I50.9 ACUTE CONGESTIVE HEART FAILURE, UNSPECIFIED HEART FAILURE TYPE: ICD-10-CM

## 2023-07-24 DIAGNOSIS — J44.9 CHRONIC OBSTRUCTIVE PULMONARY DISEASE (COPD): ICD-10-CM

## 2023-07-24 DIAGNOSIS — J96.10 CHRONIC RESPIRATORY FAILURE: ICD-10-CM

## 2023-07-24 DIAGNOSIS — J96.21 ACUTE ON CHRONIC RESPIRATORY FAILURE WITH HYPOXIA AND HYPERCAPNIA: ICD-10-CM

## 2023-07-24 PROBLEM — J96.92 HYPERCAPNIC RESPIRATORY FAILURE: Status: ACTIVE | Noted: 2023-07-24

## 2023-07-24 LAB
ALBUMIN SERPL-MCNC: 3.7 G/DL (ref 3.5–5.2)
ALBUMIN/GLOB SERPL: 1.1 G/DL
ALP SERPL-CCNC: 85 U/L (ref 39–117)
ALT SERPL W P-5'-P-CCNC: 70 U/L (ref 1–41)
ANION GAP SERPL CALCULATED.3IONS-SCNC: 11.1 MMOL/L (ref 5–15)
ARTERIAL PATENCY WRIST A: ABNORMAL
AST SERPL-CCNC: 52 U/L (ref 1–40)
BACTERIA UR QL AUTO: ABNORMAL /HPF
BASE EXCESS BLDA CALC-SCNC: 0.2 MMOL/L (ref -2–2)
BASOPHILS # BLD AUTO: 0.01 10*3/MM3 (ref 0–0.2)
BASOPHILS NFR BLD AUTO: 0.1 % (ref 0–1.5)
BDY SITE: ABNORMAL
BILIRUB SERPL-MCNC: 0.2 MG/DL (ref 0–1.2)
BILIRUB UR QL STRIP: NEGATIVE
BUN SERPL-MCNC: 38 MG/DL (ref 8–23)
BUN/CREAT SERPL: 19.5 (ref 7–25)
CA-I BLDA-SCNC: 1.1 MMOL/L (ref 1.13–1.32)
CALCIUM SPEC-SCNC: 8.6 MG/DL (ref 8.6–10.5)
CHLORIDE BLDA-SCNC: 94 MMOL/L (ref 98–106)
CHLORIDE SERPL-SCNC: 94 MMOL/L (ref 98–107)
CLARITY UR: CLEAR
CO2 SERPL-SCNC: 33.9 MMOL/L (ref 22–29)
COHGB MFR BLD: 2.2 % (ref 0–1.5)
COLOR UR: YELLOW
CREAT SERPL-MCNC: 1.95 MG/DL (ref 0.76–1.27)
D-LACTATE SERPL-SCNC: 3.2 MMOL/L (ref 0.5–2)
D-LACTATE SERPL-SCNC: 3.8 MMOL/L (ref 0.5–2)
D-LACTATE SERPL-SCNC: 4.4 MMOL/L (ref 0.5–2)
DEPRECATED RDW RBC AUTO: 48 FL (ref 37–54)
EGFRCR SERPLBLD CKD-EPI 2021: 37.2 ML/MIN/1.73
EOSINOPHIL # BLD AUTO: 0.01 10*3/MM3 (ref 0–0.4)
EOSINOPHIL NFR BLD AUTO: 0.1 % (ref 0.3–6.2)
ERYTHROCYTE [DISTWIDTH] IN BLOOD BY AUTOMATED COUNT: 13.1 % (ref 12.3–15.4)
FHHB: 12.2 % (ref 0–5)
FLUAV AG NPH QL: NEGATIVE
FLUBV AG NPH QL IA: NEGATIVE
GAS FLOW AIRWAY: 10 LPM
GEN 5 2HR TROPONIN T REFLEX: 195 NG/L
GLOBULIN UR ELPH-MCNC: 3.5 GM/DL
GLUCOSE BLDA-MCNC: 133 MG/DL (ref 70–99)
GLUCOSE SERPL-MCNC: 139 MG/DL (ref 65–99)
GLUCOSE UR STRIP-MCNC: NEGATIVE MG/DL
HCO3 BLDA-SCNC: 30.5 MMOL/L (ref 22–26)
HCT VFR BLD AUTO: 42 % (ref 37.5–51)
HGB BLD-MCNC: 12.4 G/DL (ref 13–17.7)
HGB BLDA-MCNC: 13.7 G/DL (ref 13.8–16.4)
HGB UR QL STRIP.AUTO: ABNORMAL
HOLD SPECIMEN: NORMAL
HOLD SPECIMEN: NORMAL
HYALINE CASTS UR QL AUTO: ABNORMAL /LPF
IMM GRANULOCYTES # BLD AUTO: 0.03 10*3/MM3 (ref 0–0.05)
IMM GRANULOCYTES NFR BLD AUTO: 0.2 % (ref 0–0.5)
INHALED O2 CONCENTRATION: ABNORMAL %
KETONES UR QL STRIP: NEGATIVE
L PNEUMO1 AG UR QL IA: NEGATIVE
LACTATE BLDA-SCNC: 1.84 MMOL/L (ref 0.5–2)
LEUKOCYTE ESTERASE UR QL STRIP.AUTO: NEGATIVE
LYMPHOCYTES # BLD AUTO: 0.25 10*3/MM3 (ref 0.7–3.1)
LYMPHOCYTES NFR BLD AUTO: 2 % (ref 19.6–45.3)
MCH RBC QN AUTO: 29.9 PG (ref 26.6–33)
MCHC RBC AUTO-ENTMCNC: 29.5 G/DL (ref 31.5–35.7)
MCV RBC AUTO: 101.2 FL (ref 79–97)
METHGB BLD QL: 0.1 % (ref 0–1.5)
MODALITY: ABNORMAL
MONOCYTES # BLD AUTO: 1.08 10*3/MM3 (ref 0.1–0.9)
MONOCYTES NFR BLD AUTO: 8.6 % (ref 5–12)
NEUTROPHILS NFR BLD AUTO: 11.13 10*3/MM3 (ref 1.7–7)
NEUTROPHILS NFR BLD AUTO: 89 % (ref 42.7–76)
NITRITE UR QL STRIP: NEGATIVE
NOTE: ABNORMAL
NRBC BLD AUTO-RTO: 0 /100 WBC (ref 0–0.2)
NT-PROBNP SERPL-MCNC: ABNORMAL PG/ML (ref 0–900)
OXYHGB MFR BLDV: 85.5 % (ref 94–99)
PCO2 BLDA: 79.6 MM HG (ref 35–45)
PH BLDA: 7.2 PH UNITS (ref 7.35–7.45)
PH UR STRIP.AUTO: <=5 [PH] (ref 5–8)
PLAT MORPH BLD: NORMAL
PLATELET # BLD AUTO: 224 10*3/MM3 (ref 140–450)
PMV BLD AUTO: 10.6 FL (ref 6–12)
PO2 BLD: ABNORMAL MM[HG]
PO2 BLDA: 57.9 MM HG (ref 80–100)
POTASSIUM BLDA-SCNC: 4.83 MMOL/L (ref 3.5–5)
POTASSIUM SERPL-SCNC: 5.5 MMOL/L (ref 3.5–5.2)
PROCALCITONIN SERPL-MCNC: 0.15 NG/ML (ref 0–0.25)
PROT SERPL-MCNC: 7.2 G/DL (ref 6–8.5)
PROT UR QL STRIP: NEGATIVE
RBC # BLD AUTO: 4.15 10*6/MM3 (ref 4.14–5.8)
RBC # UR STRIP: ABNORMAL /HPF
RBC MORPH BLD: NORMAL
REF LAB TEST METHOD: ABNORMAL
S PNEUM AG SPEC QL LA: NEGATIVE
SAO2 % BLDCOA: 87.5 % (ref 95–99)
SARS-COV-2 RNA RESP QL NAA+PROBE: NOT DETECTED
SODIUM BLDA-SCNC: 139.2 MMOL/L (ref 136–146)
SODIUM SERPL-SCNC: 139 MMOL/L (ref 136–145)
SP GR UR STRIP: 1.02 (ref 1–1.03)
SQUAMOUS #/AREA URNS HPF: ABNORMAL /HPF
TROPONIN T DELTA: -9 NG/L
TROPONIN T SERPL HS-MCNC: 172 NG/L
TROPONIN T SERPL HS-MCNC: 204 NG/L
UROBILINOGEN UR QL STRIP: ABNORMAL
WBC # UR STRIP: ABNORMAL /HPF
WBC MORPH BLD: NORMAL
WBC NRBC COR # BLD: 12.51 10*3/MM3 (ref 3.4–10.8)
WHOLE BLOOD HOLD COAG: NORMAL
WHOLE BLOOD HOLD SPECIMEN: NORMAL

## 2023-07-24 PROCEDURE — 80053 COMPREHEN METABOLIC PANEL: CPT | Performed by: EMERGENCY MEDICINE

## 2023-07-24 PROCEDURE — 25010000002 METHYLPREDNISOLONE PER 125 MG

## 2023-07-24 PROCEDURE — 84484 ASSAY OF TROPONIN QUANT: CPT | Performed by: EMERGENCY MEDICINE

## 2023-07-24 PROCEDURE — 83605 ASSAY OF LACTIC ACID: CPT | Performed by: EMERGENCY MEDICINE

## 2023-07-24 PROCEDURE — 25010000002 FUROSEMIDE PER 20 MG

## 2023-07-24 PROCEDURE — 84145 PROCALCITONIN (PCT): CPT | Performed by: INTERNAL MEDICINE

## 2023-07-24 PROCEDURE — 94799 UNLISTED PULMONARY SVC/PX: CPT

## 2023-07-24 PROCEDURE — 94640 AIRWAY INHALATION TREATMENT: CPT

## 2023-07-24 PROCEDURE — 81001 URINALYSIS AUTO W/SCOPE: CPT | Performed by: INTERNAL MEDICINE

## 2023-07-24 PROCEDURE — 83036 HEMOGLOBIN GLYCOSYLATED A1C: CPT | Performed by: INTERNAL MEDICINE

## 2023-07-24 PROCEDURE — 99291 CRITICAL CARE FIRST HOUR: CPT | Performed by: INTERNAL MEDICINE

## 2023-07-24 PROCEDURE — 82375 ASSAY CARBOXYHB QUANT: CPT | Performed by: EMERGENCY MEDICINE

## 2023-07-24 PROCEDURE — 85007 BL SMEAR W/DIFF WBC COUNT: CPT | Performed by: EMERGENCY MEDICINE

## 2023-07-24 PROCEDURE — 84484 ASSAY OF TROPONIN QUANT: CPT | Performed by: INTERNAL MEDICINE

## 2023-07-24 PROCEDURE — 82805 BLOOD GASES W/O2 SATURATION: CPT | Performed by: EMERGENCY MEDICINE

## 2023-07-24 PROCEDURE — 87449 NOS EACH ORGANISM AG IA: CPT | Performed by: INTERNAL MEDICINE

## 2023-07-24 PROCEDURE — 99285 EMERGENCY DEPT VISIT HI MDM: CPT

## 2023-07-24 PROCEDURE — 83050 HGB METHEMOGLOBIN QUAN: CPT | Performed by: EMERGENCY MEDICINE

## 2023-07-24 PROCEDURE — 25010000002 ENOXAPARIN PER 10 MG: Performed by: INTERNAL MEDICINE

## 2023-07-24 PROCEDURE — 83880 ASSAY OF NATRIURETIC PEPTIDE: CPT | Performed by: EMERGENCY MEDICINE

## 2023-07-24 PROCEDURE — 93010 ELECTROCARDIOGRAM REPORT: CPT | Performed by: INTERNAL MEDICINE

## 2023-07-24 PROCEDURE — 99223 1ST HOSP IP/OBS HIGH 75: CPT | Performed by: INTERNAL MEDICINE

## 2023-07-24 PROCEDURE — 71260 CT THORAX DX C+: CPT

## 2023-07-24 PROCEDURE — 87635 SARS-COV-2 COVID-19 AMP PRB: CPT | Performed by: INTERNAL MEDICINE

## 2023-07-24 PROCEDURE — 93306 TTE W/DOPPLER COMPLETE: CPT

## 2023-07-24 PROCEDURE — 71045 X-RAY EXAM CHEST 1 VIEW: CPT

## 2023-07-24 PROCEDURE — 25510000001 IOPAMIDOL PER 1 ML: Performed by: EMERGENCY MEDICINE

## 2023-07-24 PROCEDURE — 25010000002 VANCOMYCIN 5 G RECONSTITUTED SOLUTION: Performed by: INTERNAL MEDICINE

## 2023-07-24 PROCEDURE — 87899 AGENT NOS ASSAY W/OPTIC: CPT | Performed by: INTERNAL MEDICINE

## 2023-07-24 PROCEDURE — 87040 BLOOD CULTURE FOR BACTERIA: CPT | Performed by: INTERNAL MEDICINE

## 2023-07-24 PROCEDURE — 93005 ELECTROCARDIOGRAM TRACING: CPT | Performed by: EMERGENCY MEDICINE

## 2023-07-24 PROCEDURE — 94660 CPAP INITIATION&MGMT: CPT

## 2023-07-24 PROCEDURE — 36600 WITHDRAWAL OF ARTERIAL BLOOD: CPT | Performed by: EMERGENCY MEDICINE

## 2023-07-24 PROCEDURE — 87804 INFLUENZA ASSAY W/OPTIC: CPT | Performed by: INTERNAL MEDICINE

## 2023-07-24 PROCEDURE — 0 CEFEPIME PER 500 MG: Performed by: INTERNAL MEDICINE

## 2023-07-24 PROCEDURE — 85025 COMPLETE CBC W/AUTO DIFF WBC: CPT | Performed by: EMERGENCY MEDICINE

## 2023-07-24 PROCEDURE — 93005 ELECTROCARDIOGRAM TRACING: CPT

## 2023-07-24 RX ORDER — ASPIRIN 81 MG/1
324 TABLET, CHEWABLE ORAL ONCE
Status: COMPLETED | OUTPATIENT
Start: 2023-07-24 | End: 2023-07-24

## 2023-07-24 RX ORDER — ARFORMOTEROL TARTRATE 15 UG/2ML
15 SOLUTION RESPIRATORY (INHALATION)
Status: DISCONTINUED | OUTPATIENT
Start: 2023-07-24 | End: 2023-07-28 | Stop reason: HOSPADM

## 2023-07-24 RX ORDER — ACETAMINOPHEN 325 MG/1
650 TABLET ORAL EVERY 4 HOURS PRN
Status: DISCONTINUED | OUTPATIENT
Start: 2023-07-24 | End: 2023-07-28 | Stop reason: HOSPADM

## 2023-07-24 RX ORDER — IPRATROPIUM BROMIDE AND ALBUTEROL SULFATE 2.5; .5 MG/3ML; MG/3ML
3 SOLUTION RESPIRATORY (INHALATION) ONCE
Status: COMPLETED | OUTPATIENT
Start: 2023-07-24 | End: 2023-07-24

## 2023-07-24 RX ORDER — BUDESONIDE 0.5 MG/2ML
0.5 INHALANT ORAL
Status: DISCONTINUED | OUTPATIENT
Start: 2023-07-24 | End: 2023-07-28 | Stop reason: HOSPADM

## 2023-07-24 RX ORDER — SODIUM CHLORIDE 0.9 % (FLUSH) 0.9 %
10 SYRINGE (ML) INJECTION AS NEEDED
Status: DISCONTINUED | OUTPATIENT
Start: 2023-07-24 | End: 2023-07-28 | Stop reason: HOSPADM

## 2023-07-24 RX ORDER — FUROSEMIDE 10 MG/ML
40 INJECTION INTRAMUSCULAR; INTRAVENOUS ONCE
Status: COMPLETED | OUTPATIENT
Start: 2023-07-24 | End: 2023-07-24

## 2023-07-24 RX ORDER — SODIUM CHLORIDE 9 MG/ML
75 INJECTION, SOLUTION INTRAVENOUS CONTINUOUS
Status: DISCONTINUED | OUTPATIENT
Start: 2023-07-24 | End: 2023-07-25

## 2023-07-24 RX ORDER — PREDNISONE 20 MG/1
40 TABLET ORAL
Status: DISCONTINUED | OUTPATIENT
Start: 2023-07-25 | End: 2023-07-28 | Stop reason: HOSPADM

## 2023-07-24 RX ORDER — SODIUM CHLORIDE 0.9 % (FLUSH) 0.9 %
10 SYRINGE (ML) INJECTION EVERY 12 HOURS SCHEDULED
Status: DISCONTINUED | OUTPATIENT
Start: 2023-07-24 | End: 2023-07-28 | Stop reason: HOSPADM

## 2023-07-24 RX ORDER — ONDANSETRON 2 MG/ML
4 INJECTION INTRAMUSCULAR; INTRAVENOUS EVERY 6 HOURS PRN
Status: DISCONTINUED | OUTPATIENT
Start: 2023-07-24 | End: 2023-07-28 | Stop reason: HOSPADM

## 2023-07-24 RX ORDER — SODIUM CHLORIDE 9 MG/ML
40 INJECTION, SOLUTION INTRAVENOUS AS NEEDED
Status: DISCONTINUED | OUTPATIENT
Start: 2023-07-24 | End: 2023-07-28 | Stop reason: HOSPADM

## 2023-07-24 RX ORDER — ENOXAPARIN SODIUM 100 MG/ML
40 INJECTION SUBCUTANEOUS NIGHTLY
Status: DISCONTINUED | OUTPATIENT
Start: 2023-07-24 | End: 2023-07-28 | Stop reason: HOSPADM

## 2023-07-24 RX ORDER — METHYLPREDNISOLONE SODIUM SUCCINATE 125 MG/2ML
125 INJECTION, POWDER, LYOPHILIZED, FOR SOLUTION INTRAMUSCULAR; INTRAVENOUS ONCE
Status: COMPLETED | OUTPATIENT
Start: 2023-07-24 | End: 2023-07-24

## 2023-07-24 RX ORDER — IPRATROPIUM BROMIDE AND ALBUTEROL SULFATE 2.5; .5 MG/3ML; MG/3ML
3 SOLUTION RESPIRATORY (INHALATION)
Status: DISCONTINUED | OUTPATIENT
Start: 2023-07-24 | End: 2023-07-28 | Stop reason: HOSPADM

## 2023-07-24 RX ADMIN — VANCOMYCIN HYDROCHLORIDE 1000 MG: 5 INJECTION, POWDER, LYOPHILIZED, FOR SOLUTION INTRAVENOUS at 14:28

## 2023-07-24 RX ADMIN — ENOXAPARIN SODIUM 40 MG: 100 INJECTION SUBCUTANEOUS at 22:58

## 2023-07-24 RX ADMIN — FUROSEMIDE 40 MG: 10 INJECTION, SOLUTION INTRAMUSCULAR; INTRAVENOUS at 12:05

## 2023-07-24 RX ADMIN — ASPIRIN 324 MG: 81 TABLET, CHEWABLE ORAL at 12:03

## 2023-07-24 RX ADMIN — METHYLPREDNISOLONE SODIUM SUCCINATE 125 MG: 125 INJECTION INTRAMUSCULAR; INTRAVENOUS at 11:19

## 2023-07-24 RX ADMIN — SODIUM CHLORIDE 500 ML: 9 INJECTION, SOLUTION INTRAVENOUS at 22:54

## 2023-07-24 RX ADMIN — IPRATROPIUM BROMIDE AND ALBUTEROL SULFATE 3 ML: .5; 3 SOLUTION RESPIRATORY (INHALATION) at 22:36

## 2023-07-24 RX ADMIN — BUDESONIDE 0.5 MG: 0.5 SUSPENSION RESPIRATORY (INHALATION) at 19:02

## 2023-07-24 RX ADMIN — IOPAMIDOL 100 ML: 755 INJECTION, SOLUTION INTRAVENOUS at 12:19

## 2023-07-24 RX ADMIN — SODIUM CHLORIDE 500 ML: 9 INJECTION, SOLUTION INTRAVENOUS at 11:18

## 2023-07-24 RX ADMIN — ARFORMOTEROL TARTRATE 15 MCG: 15 SOLUTION RESPIRATORY (INHALATION) at 19:02

## 2023-07-24 RX ADMIN — IPRATROPIUM BROMIDE AND ALBUTEROL SULFATE 3 ML: .5; 3 SOLUTION RESPIRATORY (INHALATION) at 16:16

## 2023-07-24 RX ADMIN — SODIUM CHLORIDE 75 ML/HR: 9 INJECTION, SOLUTION INTRAVENOUS at 22:59

## 2023-07-24 RX ADMIN — IPRATROPIUM BROMIDE AND ALBUTEROL SULFATE 3 ML: .5; 3 SOLUTION RESPIRATORY (INHALATION) at 18:43

## 2023-07-24 RX ADMIN — CEFEPIME 2000 MG: 2 INJECTION, POWDER, FOR SOLUTION INTRAVENOUS at 13:38

## 2023-07-24 RX ADMIN — IPRATROPIUM BROMIDE AND ALBUTEROL SULFATE 3 ML: .5; 3 SOLUTION RESPIRATORY (INHALATION) at 11:07

## 2023-07-24 RX ADMIN — Medication 10 ML: at 22:59

## 2023-07-24 NOTE — ED PROVIDER NOTES
Time: 10:50 AM EDT  Date of encounter:  7/24/2023  Independent Historian/Clinical History and Information was obtained by:   Patient    History is limited by: N/A    Chief Complaint: Chest pain/shortness of air      History of Present Illness:  Patient is a 68 y.o. year old male who presents to the emergency department for evaluation of chest pain/shortness of air that began 4 hours ago upon waking up.  Patient states he has a history of COPD and is on 3 L oxygen at home.  Patient states the chest pain does not radiate.  Patient denies recent fever.  Patient denies recent travel.    HPI    Patient Care Team  Primary Care Provider: Yoel Geller MD    Past Medical History:     No Known Allergies  Past Medical History:   Diagnosis Date    Anemia due to chemotherapy 12/13/2021    Cancer related pain 1/3/2022    CHF (congestive heart failure)     COPD (chronic obstructive pulmonary disease)     Coronary artery disease     Frequent urination     Hyperlipidemia     Hypertension     Iron deficiency anemia due to chronic blood loss 12/13/2021    Rectal cancer      Past Surgical History:   Procedure Laterality Date    COLONOSCOPY      HERNIA REPAIR      approximately 4 years ago     RECTAL SURGERY      SHOULDER SURGERY      joint loose, calcium particles removed from shoulder joint     Family History   Problem Relation Age of Onset    Heart attack Mother     Heart attack Sister     Heart attack Sister        Home Medications:  Prior to Admission medications    Medication Sig Start Date End Date Taking? Authorizing Provider   albuterol (PROVENTIL) (2.5 MG/3ML) 0.083% nebulizer solution Take 2.5 mg by nebulization Every 8 (Eight) Hours As Needed for Wheezing or Shortness of Air.    ProviderEstefania MD   aspirin 81 MG chewable tablet Chew 1 tablet Daily.    Estefania Quarles MD   atorvastatin (LIPITOR) 10 MG tablet Take 1 tablet by mouth Every Night.    Estefania Quarles MD   Fluticasone-Salmeterol (ADVAIR/WIXELA)  100-50 MCG/ACT DISKUS Every 12 (Twelve) Hours.    Estefania Quarles MD   guaiFENesin (MUCINEX) 600 MG 12 hr tablet Take 1 tablet by mouth 2 (Two) Times a Day As Needed for Cough or Congestion.    Estefania Quarles MD   ipratropium-albuterol (COMBIVENT RESPIMAT)  MCG/ACT inhaler Inhale 1 puff 4 (Four) Times a Day.    Estefania Quarles MD   loratadine (CLARITIN) 10 MG tablet Take 1 tablet by mouth Daily.    Estefania Quarles MD   metoprolol tartrate (LOPRESSOR) 50 MG tablet Take 1 tablet by mouth 2 (Two) Times a Day.    Estefania Quarles MD   multivitamin with minerals tablet tablet Take 1 tablet by mouth Daily.    Estefania Quarles MD   omeprazole (priLOSEC) 20 MG capsule Take 1 capsule by mouth Daily.    Estefania Quarles MD   roflumilast (DALIRESP) 500 MCG tablet tablet Take 1 tablet by mouth Daily.    Estefania Quarles MD        Social History:   Social History     Tobacco Use    Smoking status: Former     Types: Cigarettes     Start date: 11/3/1973     Quit date: 11/3/2014     Years since quittin.7    Smokeless tobacco: Never   Vaping Use    Vaping Use: Never used   Substance Use Topics    Alcohol use: Not Currently    Drug use: Never         Review of Systems:  Review of Systems   Constitutional:  Negative for chills and fever.   HENT:  Negative for congestion, rhinorrhea and sore throat.    Eyes:  Negative for pain and visual disturbance.   Respiratory:  Positive for shortness of breath. Negative for apnea and cough.    Cardiovascular:  Positive for chest pain. Negative for palpitations.   Gastrointestinal:  Negative for abdominal pain, diarrhea, nausea and vomiting.   Genitourinary:  Negative for difficulty urinating and dysuria.   Musculoskeletal:  Negative for joint swelling and myalgias.   Skin:  Negative for color change.   Neurological:  Negative for seizures and headaches.   Psychiatric/Behavioral: Negative.     All other systems reviewed and are negative.  "    Physical Exam:  /86 (BP Location: Right arm)   Pulse 112   Temp 98.7 °F (37.1 °C) (Oral)   Resp 16   Ht 186.7 cm (73.5\")   Wt 52.8 kg (116 lb 6.5 oz)   SpO2 93%   BMI 15.15 kg/m²     Physical Exam  Vitals and nursing note reviewed.   Constitutional:       General: He is not in acute distress.     Appearance: Normal appearance. He is not toxic-appearing.   HENT:      Head: Normocephalic and atraumatic.      Jaw: There is normal jaw occlusion.   Eyes:      General: Lids are normal.      Extraocular Movements: Extraocular movements intact.      Conjunctiva/sclera: Conjunctivae normal.      Pupils: Pupils are equal, round, and reactive to light.   Cardiovascular:      Rate and Rhythm: Normal rate and regular rhythm.      Pulses: Normal pulses.      Heart sounds: Normal heart sounds.   Pulmonary:      Effort: Tachypnea and respiratory distress present.      Breath sounds: Wheezing present.   Abdominal:      General: Abdomen is flat.      Palpations: Abdomen is soft.      Tenderness: There is no abdominal tenderness. There is no guarding or rebound.   Musculoskeletal:         General: Normal range of motion.      Cervical back: Normal range of motion and neck supple.      Right lower leg: No edema.      Left lower leg: No edema.   Skin:     General: Skin is warm and dry.   Neurological:      Mental Status: He is alert and oriented to person, place, and time. Mental status is at baseline.   Psychiatric:         Mood and Affect: Mood normal.                Procedures:  Procedures      Medical Decision Making:      Comorbidities that affect care:    COPD, colon cancer    External Notes reviewed:    Previous Clinic Note: 7/12/2023 for adenocarcinoma of the rectum      The following orders were placed and all results were independently analyzed by me:  Orders Placed This Encounter   Procedures    DME NIPPV - IPPV/BiPAP/CPAP    MRSA Screen, PCR (Inpatient) - Swab, Nares    S. Pneumo Ag Urine or CSF - Urine, " Urine, Clean Catch    Legionella Antigen, Urine - Urine, Urine, Clean Catch    COVID-19,CEPHEID/JOLEEN,COR/MAIKOL/PAD/RADHA/MAD IN-HOUSE(OR EMERGENT/ADD-ON),NP SWAB IN TRANSPORT MEDIA 3-4 HR TAT, RT-PCR - Swab, Nasopharynx    Influenza Antigen, Rapid - Swab, Nasopharynx    Blood Culture - Blood,    Blood Culture - Blood,    Respiratory Culture - Sputum, Cough    XR Chest 1 View    CT Chest With Contrast Diagnostic    Hurley Draw    Comprehensive Metabolic Panel    BNP    Single High Sensitivity Troponin T    CBC Auto Differential    ABG with Co-Ox and Electrolytes    Lactic Acid, Plasma    Scan Slide    High Sensitivity Troponin T 2Hr    STAT Lactic Acid, Reflex    Procalcitonin    Urinalysis With Microscopic If Indicated (No Culture) - Urine, Clean Catch    Comprehensive Metabolic Panel    CBC Auto Differential    Procalcitonin    Magnesium    Lipid Panel    Hemoglobin A1c    High Sensitivity Troponin T    High Sensitivity Troponin T    Urinalysis, Microscopic Only - Urine, Clean Catch    STAT Lactic Acid, Reflex    Vancomycin, Random    STAT Lactic Acid, Reflex    Basic Metabolic Panel    ABG with Co-Ox and Electrolytes    Vancomycin, Random    Diet: Regular/House Diet; Texture: Regular Texture (IDDSI 7); Fluid Consistency: Thin (IDDSI 0)    NPO Diet NPO Type: Strict NPO    Undress & Gown    Continuous Pulse Oximetry    Vital Signs    Vital Signs    Intake & Output    Weigh Patient    Oral Care    Telemetry - Maintain IV Access    Telemetry - Place Orders & Notify Provider of Results When Patient Experiences Acute Chest Pain, Dysrhythmia or Respiratory Distress    Daily Weights    Strict Intake & Output    Obtain Informed Consent    Code Status and Medical Interventions:    Inpatient Cardiology Consult    Inpatient Hospitalist Consult    Inpatient Cardiology Consult    Inpatient Pulmonology Consult    Inpatient Case Management  Consult    Inpatient Nutrition Consult    Inpatient RT Case Management Consult     Oxygen Therapy- Nasal Cannula; Titrate 1-6 LPM Per SpO2; 90 - 95%    NIPPV (CPAP or BIPAP)    RT to Initiate Bronchopulmonary Hygiene Protocol    Incentive Spirometry    Oscillating Positive Expiratory Pressure (OPEP)    MetaNeb    POC Glucose Once    ECG 12 Lead ED Triage Standing Order; SOA    SCANNED - TELEMETRY      SCANNED - TELEMETRY      ECG 12 Lead Rhythm Change    Adult Transthoracic Echo Complete W/ Cont if Necessary Per Protocol    Insert Peripheral IV    Insert Peripheral IV    Inpatient Admission    CBC & Differential    Green Top (Gel)    Lavender Top    Gold Top - SST    Light Blue Top       Medications Given in the Emergency Department:  Medications   sodium chloride 0.9 % flush 10 mL (has no administration in time range)   Pharmacy to Dose Cefepime (has no administration in time range)   Pharmacy to dose vancomycin (has no administration in time range)   sodium chloride 0.9 % flush 10 mL (10 mL Intravenous Given 7/25/23 0831)   sodium chloride 0.9 % flush 10 mL (has no administration in time range)   sodium chloride 0.9 % infusion 40 mL (has no administration in time range)   Enoxaparin Sodium (LOVENOX) syringe 40 mg (40 mg Subcutaneous Given 7/24/23 2258)   acetaminophen (TYLENOL) tablet 650 mg (has no administration in time range)   ondansetron (ZOFRAN) injection 4 mg (has no administration in time range)   predniSONE (DELTASONE) tablet 40 mg (40 mg Oral Given 7/25/23 0907)   arformoterol (BROVANA) nebulizer solution 15 mcg (15 mcg Nebulization Given 7/25/23 0748)   budesonide (PULMICORT) nebulizer solution 0.5 mg (0.5 mg Nebulization Given 7/25/23 0748)   ipratropium-albuterol (DUO-NEB) nebulizer solution 3 mL (3 mL Nebulization Given 7/25/23 1530)   cefepime (MAXIPIME) 2000 mg/100 mL 0.9% NS (mbp) (2,000 mg Intravenous New Bag 7/25/23 1637)   vancomycin 750 mg/250 mL 0.9% NS IVPB ([facility]) (750 mg Intravenous New Bag 7/25/23 0830)   metoprolol succinate XL (TOPROL-XL) 24 hr tablet 50 mg (has no  administration in time range)   aspirin chewable tablet 81 mg (81 mg Oral Given 7/25/23 1729)   atorvastatin (LIPITOR) tablet 10 mg (has no administration in time range)   dilTIAZem (CARDIZEM) bolus from bag 1 mg/mL 10 mg (0 mg Intravenous Hold 7/25/23 1729)   dilTIAZem (CARDIZEM) 125 mg in 125 mL sodium chloride  infusion (7.5 mg/hr Intravenous Rate/Dose Change 7/25/23 1810)   ipratropium-albuterol (DUO-NEB) nebulizer solution 3 mL (3 mL Nebulization Given 7/24/23 1107)   methylPREDNISolone sodium succinate (SOLU-Medrol) injection 125 mg (125 mg Intravenous Given 7/24/23 1119)   sodium chloride 0.9 % bolus 500 mL (0 mL Intravenous Stopped 7/24/23 1208)   furosemide (LASIX) injection 40 mg (40 mg Intravenous Given 7/24/23 1205)   aspirin chewable tablet 324 mg (324 mg Oral Given 7/24/23 1203)   iopamidol (ISOVUE-370) 76 % injection 100 mL (100 mL Intravenous Given 7/24/23 1219)   cefepime (MAXIPIME) 2000 mg/100 mL 0.9% NS (mbp) (0 mg Intravenous Stopped 7/24/23 1415)   vancomycin 1000 mg/250 mL 0.9% NS IVPB ([facility]) (0 mg Intravenous Stopped 7/24/23 1554)   sodium chloride 0.9 % bolus 500 mL (500 mL Intravenous New Bag 7/24/23 2254)   metoprolol tartrate (LOPRESSOR) tablet 25 mg (25 mg Oral Given 7/25/23 1729)   metoprolol tartrate (LOPRESSOR) injection 5 mg (5 mg Intravenous Given 7/25/23 1607)        ED Course:         Labs:    Lab Results (last 24 hours)       Procedure Component Value Units Date/Time    STAT Lactic Acid, Reflex [[id number]]  (Abnormal) Collected: 07/24/23 1942    Specimen: Blood Updated: 07/24/23 2050     Lactate 3.8 mmol/L     High Sensitivity Troponin T [[id number]]  (Abnormal) Collected: 07/24/23 1942    Specimen: Blood Updated: 07/24/23 2050     HS Troponin T 172 ng/L     Narrative:      High Sensitive Troponin T Reference Range:  <10.0 ng/L- Negative Female for AMI  <15.0 ng/L- Negative Male for AMI  >=10 - Abnormal Female indicating possible myocardial injury.  >=15 - Abnormal Male indicating  possible myocardial injury.   Clinicians would have to utilize clinical acumen, EKG, Troponin, and serial changes to determine if it is an Acute Myocardial Infarction or myocardial injury due to an underlying chronic condition.         STAT Lactic Acid, Reflex [[id number]]  (Normal) Collected: 07/25/23 0425    Specimen: Blood Updated: 07/25/23 0457     Lactate 1.8 mmol/L     Comprehensive Metabolic Panel [[id number]]  (Abnormal) Collected: 07/25/23 0425    Specimen: Blood Updated: 07/25/23 0507     Glucose 102 mg/dL      BUN 34 mg/dL      Creatinine 0.76 mg/dL      Sodium 141 mmol/L      Potassium 4.1 mmol/L      Chloride 97 mmol/L      CO2 34.2 mmol/L      Calcium 8.8 mg/dL      Total Protein 6.4 g/dL      Albumin 3.2 g/dL      ALT (SGPT) 76 U/L      AST (SGOT) 55 U/L      Alkaline Phosphatase 63 U/L      Total Bilirubin <0.2 mg/dL      Globulin 3.2 gm/dL      A/G Ratio 1.0 g/dL      BUN/Creatinine Ratio 44.7     Anion Gap 9.8 mmol/L      eGFR 99.1 mL/min/1.73     Narrative:      GFR Normal >60  Chronic Kidney Disease <60  Kidney Failure <15      CBC Auto Differential [[id number]]  (Abnormal) Collected: 07/25/23 0425    Specimen: Blood Updated: 07/25/23 0439     WBC 10.67 10*3/mm3      RBC 3.85 10*6/mm3      Hemoglobin 11.5 g/dL      Hematocrit 39.7 %      .1 fL      MCH 29.9 pg      MCHC 29.0 g/dL      RDW 12.9 %      RDW-SD 48.5 fl      MPV 10.5 fL      Platelets 154 10*3/mm3      Neutrophil % 83.8 %      Lymphocyte % 3.8 %      Monocyte % 11.7 %      Eosinophil % 0.0 %      Basophil % 0.1 %      Immature Grans % 0.6 %      Neutrophils, Absolute 8.94 10*3/mm3      Lymphocytes, Absolute 0.41 10*3/mm3      Monocytes, Absolute 1.25 10*3/mm3      Eosinophils, Absolute 0.00 10*3/mm3      Basophils, Absolute 0.01 10*3/mm3      Immature Grans, Absolute 0.06 10*3/mm3      nRBC 0.0 /100 WBC     Procalcitonin [[id number]]  (Normal) Collected: 07/25/23 0427    Specimen: Blood Updated: 07/25/23 0506     Procalcitonin 0.21  "ng/mL     Narrative:      As a Marker for Sepsis (Non-Neonates):    1. <0.5 ng/mL represents a low risk of severe sepsis and/or septic shock.  2. >2 ng/mL represents a high risk of severe sepsis and/or septic shock.    As a Marker for Lower Respiratory Tract Infections that require antibiotic therapy:    PCT on Admission    Antibiotic Therapy       6-12 Hrs later    >0.5                Strongly Recommended  >0.25 - <0.5        Recommended  0.1 - 0.25          Discouraged              Remeasure/reassess PCT  <0.1                Strongly Discouraged     Remeasure/reassess PCT    As 28 day mortality risk marker: \"Change in Procalcitonin Result\" (>80% or <=80%) if Day 0 (or Day 1) and Day 4 values are available. Refer to [email or web address]://[email or web address] [email or web address][email or web address]    Change in PCT <=80%  A decrease of PCT levels below or equal to 80% defines a positive change in PCT test result representing a higher risk for 28-day all-cause mortality of patients diagnosed with severe sepsis for septic shock.    Change in PCT >80%  A decrease of PCT levels of more than 80% defines a negative change in PCT result representing a lower risk for 28-day all-cause mortality of patients diagnosed with severe sepsis or septic shock.    This test is Prognostic not Diagnostic, if elevated correlate with clinical findings before administering antibiotic treatment.        Magnesium [[id number]]  (Normal) Collected: 07/25/23 0425    Specimen: Blood Updated: 07/25/23 0507     Magnesium 2.0 mg/dL     Lipid Panel [[id number]]  (Abnormal) Collected: 07/25/23 0425    Specimen: Blood Updated: 07/25/23 0506     Total Cholesterol 106 mg/dL      Triglycerides 80 mg/dL      HDL Cholesterol 62 mg/dL      LDL Cholesterol  28 mg/dL      VLDL Cholesterol 16 mg/dL      LDL/HDL Ratio 0.45    Narrative:      Cholesterol Reference Ranges  (U.S. Department of Health and Human Services ATP III Classifications)    Desirable          <200 mg/dL  Borderline High    200-239 " mg/dL  High Risk          >240 mg/dL      Triglyceride Reference Ranges  (U.S. Department of Health and Human Services ATP III Classifications)    Normal           <150 mg/dL  Borderline High  150-199 mg/dL  High             200-499 mg/dL  Very High        >500 mg/dL    HDL Reference Ranges  (U.S. Department of Health and Human Services ATP III Classifications)    Low     <40 mg/dl (major risk factor for CHD)  High    >60 mg/dl ('negative' risk factor for CHD)        LDL Reference Ranges  (U.S. Department of Health and Human Services ATP III Classifications)    Optimal          <100 mg/dL  Near Optimal     100-129 mg/dL  Borderline High  130-159 mg/dL  High             160-189 mg/dL  Very High        >189 mg/dL    High Sensitivity Troponin T [[id number]]  (Abnormal) Collected: 07/25/23 0425    Specimen: Blood Updated: 07/25/23 0513     HS Troponin T 166 ng/L     Narrative:      High Sensitive Troponin T Reference Range:  <10.0 ng/L- Negative Female for AMI  <15.0 ng/L- Negative Male for AMI  >=10 - Abnormal Female indicating possible myocardial injury.  >=15 - Abnormal Male indicating possible myocardial injury.   Clinicians would have to utilize clinical acumen, EKG, Troponin, and serial changes to determine if it is an Acute Myocardial Infarction or myocardial injury due to an underlying chronic condition.         Vancomycin, Random [[id number]]  (Abnormal) Collected: 07/25/23 0425    Specimen: Blood Updated: 07/25/23 0509     Vancomycin Random 4.10 mcg/mL     Narrative:      Therapeutic Ranges for Vancomycin    Vancomycin Random   5.0-40.0 mcg/mL  Vancomycin Trough   5.0-20.0 mcg/mL  Vancomycin Peak     20.0-40.0 mcg/mL    Respiratory Culture - Sputum, Cough [[id number]] Collected: 07/25/23 0508    Specimen: Sputum from Cough Updated: 07/25/23 0611     Gram Stain Many (4+) WBCs per low power field      Less than 10 Epithelial cells per low power field      Few (2+) Budding yeast      Few (2+) Gram positive cocci in  pairs    ABG with Co-Ox and Electrolytes [[id number]]  (Abnormal) Collected: 07/25/23 0817    Specimen: Arterial Blood Updated: 07/25/23 0856     pH, Arterial 7.407 pH units      pCO2, Arterial 59.4 mm Hg      pO2, Arterial 59.6 mm Hg      HCO3, Arterial 36.6 mmol/L      Base Excess, Arterial 9.9 mmol/L      O2 Saturation, Arterial 90.1 %      Hemoglobin, Blood Gas 11.9 g/dL      Carboxyhemoglobin 0.6 %      Methemoglobin 0.00 %      Oxyhemoglobin 89.6 %      FHHB 9.8 %      Pavel's Test Positive     Note --     Site Arterial: right radial     Modality Cannula - Nasal     FIO2 32 %      Flow Rate 3 lpm      Sodium, Arterial 139.8 mmol/L      Potassium, Arterial 3.97 mmol/L      Ionized Calcium, Arterial 1.12 mmol/L      Chloride, Arterial 97 mmol/L      Glucose, Arterial 94 mg/dL      Lactate, Arterial 1.16 mmol/L      PO2/FIO2 186    POC Glucose Once [[id number]]  (Abnormal) Collected: 07/25/23 1557    Specimen: Blood Updated: 07/25/23 1603     Glucose 108 mg/dL      Comment: Serial Number: [id number]:  [id number]                Imaging:    No Radiology Exams Resulted Within Past 24 Hours      Differential Diagnosis and Discussion:    Chest Pain:  Based on the patient's signs and symptoms, I considered aortic dissection, myocardial infaction, pulmonary embolism, cardiac tamponade, pericarditis, pneumothorax, musculoskeletal chest pain and other differential diagnosis as an etiology of the patient's chest pain.     All labs were reviewed and interpreted by me.  All X-rays impressions were independently interpreted by me.  EKG was interpreted by supervising attending.    MDM     Amount and/or Complexity of Data Reviewed  Clinical lab tests: reviewed  Tests in the radiology section of CPT®: reviewed  Tests in the medicine section of CPT®: reviewed  Decide to obtain previous medical records or to obtain history from someone other than the patient: yes       ABG shows patient is in respiratory acidosis.  We started  the patient on BiPAP which improved.  Patient had an elevated troponin of 204 with no prior to compare.  Patient also had an elevated BNP with JVD.  Patient chest x-ray showed no active disease.  The patient is actively having chest pain so we consulted with Dr. Sergei Barrett and he requested the patient be started on Lasix and admitted to the hospital.  Patient states he understood and agrees with the plan of care.      Patient Care Considerations:    None      Consultants/Shared Management Plan:    Consultant: I have discussed the case with Dr. Sergei Barrett who agrees to consult on the patient. He requested the patient be started on Lasix and admitted to the hospital.  Dr. Colon agrees to admit the patient.    Social Determinants of Health:    Patient is independent, reliable, and has access to care.       Disposition and Care Coordination:    Admit:   Through independent evaluation of the patient's history, physical, and imperical data, the patient meets criteria for observation/admission to the hospital.        Final diagnoses:   Elevated troponin   Acute congestive heart failure, unspecified heart failure type        ED Disposition       ED Disposition   Decision to Admit    Condition   --    Comment   Level of Care: Telemetry [5]   Diagnosis: Hypercapnic respiratory failure [8133664]   Isolate for COVID?: Yes [1]   Certification: I Certify That Inpatient Hospital Services Are Medically Necessary For Greater Than 2 Midnights                 This medical record created using voice recognition software.             Matthew Nixon PA-C  07/25/23 8621

## 2023-07-24 NOTE — CONSULTS
Pulmonary / Critical Care Consult Note      Patient Name: Pavel Dai  : 1955  MRN: [id number]  Primary Care Physician:  Yoel Geller MD  Referring Physician: No ref. provider found  Date of admission: 2023    Subjective   Subjective     Reason for Consult/ Chief Complaint: Acute on chronic hypercapnic respiratory failure, pneumonia    HPI:  Pavel Dai is a 68 y.o. male with history of rectal cancer, COPD on home oxygen and history of BiPAP in past, coronary artery disease, hyperlipidemia, hypertension, heart failure with preserved ejection fraction with EF of 61 to 65%, normal stress test in , presented to the hospital with worsening shortness of breath and fatigue.  In the ER, patient was borderline hypotensive, oxygen saturation was in 70s on 3 L oxygen, requiring BiPAP.  He had leukocytosis with WBC count of 12,000, CMP with serum creatinine of 1.95 mild transaminitis, NT proBNP 15,000, troponin 200, ABG showed pH of 7.2, PCO2 of 79.  Chest x-ray showed no acute abnormality.  CT scan of the chest showed prominent central pulmonary vasculature concerning for pulmonary hypertension, mucous plugging and peribronchial opacities and dependent consolidation.  He is on BiPAP 14/7 continuously, has mild increased work of breathing.  Pulmonary critical care service consulted for assistance with management of his acute issues.  He used to be a heavy smoker, smoking more than 2 packs a day for 48 years, quit smoking 7 years ago.  He has cough not able to clear secretions.  CT scan of the chest was reviewed with him    Review of Systems  General:  No Fatigue, No Fever.   HEENT: No dysphagia, No Visual Changes, no rhinorrhea  Respiratory:  + Productive cough,+Dyspnea, thick mucoid phlegm, No Pleuritic Pain, + wheezing, no hemoptysis, has difficulty with airway clearance  Cardiovascular: Denies chest pain, denies palpitations,+COELHO, No Chest Pressure  Gastrointestinal:  No Abdominal Pain, No  Nausea, No Vomiting, No Diarrhea  Genitourinary:  No Dysuria, No Frequency, No Hesitancy  Musculoskeletal: No muscle pain or swelling  Endocrine:  No Heat Intolerance, No Cold Intolerance  Hematologic:  No Bleeding, No Bruising  Psychiatric:  No Anxiety, No Depression  Neurologic: Weakness and intermittent confusion, no Dysarthria, No Headaches  Skin:  No Rash, No Open Wounds        Personal History     Past Medical History:   Diagnosis Date    Anemia due to chemotherapy 12/13/2021    Cancer related pain 1/3/2022    CHF (congestive heart failure)     COPD (chronic obstructive pulmonary disease)     Coronary artery disease     Frequent urination     Hyperlipidemia     Hypertension     Iron deficiency anemia due to chronic blood loss 12/13/2021    Rectal cancer        Past Surgical History:   Procedure Laterality Date    COLONOSCOPY      HERNIA REPAIR      approximately 4 years ago     RECTAL SURGERY      SHOULDER SURGERY      joint loose, calcium particles removed from shoulder joint       Family History: family history includes Heart attack in his mother, sister, and sister.     Social History:  reports that he quit smoking about 8 years ago. He started smoking about 49 years ago. He has never used smokeless tobacco. He reports that he does not currently use alcohol. He reports that he does not use drugs.    Home Medications:  Fluticasone-Salmeterol, albuterol, aspirin, atorvastatin, guaiFENesin, ipratropium-albuterol, loratadine, metoprolol tartrate, multivitamin with minerals, omeprazole, and roflumilast    Allergies:  No Known Allergies    Objective    Objective     Vitals:   Temp:  [99 °F (37.2 °C)] 99 °F (37.2 °C)  Heart Rate:  [105-138] 114  Resp:  [18-24] 21  BP: ()/(55-90) 103/56  Flow (L/min):  [2-5] 5    Physical Exam:  Vital Signs Reviewed   Cachectic appearing, in mild distress, on BiPAP, has increased work of breathing  HEENT:  PERRL, EOMI.  OP, nares clear, no sinus tenderness  Neck:  Supple, no  JVD, no thyromegaly  Lymph: no axillary, cervical, supraclavicular lymphadenopathy noted bilaterally  Chest: Poor aeration, bilateral scattered rhonchi, bilateral expiratory wheezing, no crackles, tympanic to percussion bilaterally, no work of breathing noted  CV: RRR, no MGR, pulses 2+, equal.  Abd:  Soft, NT, ND, + BS, no HSM  EXT:  no clubbing, no cyanosis, no edema, no joint tenderness  Neuro:  A&Ox3, CN grossly intact, no focal deficits, generalized weakness  Skin: No rashes or lesions noted      Result Review    Result Review:  I have personally reviewed the results from the time of this admission to 7/24/2023 17:34 EDT and agree with these findings:  [x]  Laboratory  [x]  Microbiology  [x]  Radiology  [x]  EKG/Telemetry   [x]  Cardiology/Vascular   []  Pathology  []  Old records  []  Other:  Most notable findings include:         Lab 07/24/23  1040 07/24/23  1034   WBC  --  12.51*   HEMOGLOBIN  --  12.4*   HEMATOCRIT  --  42.0   PLATELETS  --  224   SODIUM  --  139   SODIUM, ARTERIAL 139.2  --    POTASSIUM  --  5.5*   CHLORIDE  --  94*   CO2  --  33.9*   BUN  --  38*   CREATININE  --  1.95*   GLUCOSE  --  139*   GLUCOSE, ARTERIAL 133*  --    CALCIUM  --  8.6   TOTAL PROTEIN  --  7.2   ALBUMIN  --  3.7   GLOBULIN  --  3.5     XR Chest 1 View    Result Date: 7/24/2023  PROCEDURE: XR CHEST 1 VW  COMPARISON: [facility], , XR CHEST 1 VW, 8/07/2022, 23:43.  INDICATIONS: SOA Triage Protocol  FINDINGS:   The lungs are well-expanded. The heart and pulmonary vasculature are within normal limits. No pleural effusions are identified. There are no active appearing infiltrates.  There are chronic appearing changes in the lung fields.  Emphysema is present.  There is scoliosis.  IMPRESSION: No active disease.  ANNE CRUZ MD       Electronically Signed and Approved By: ANNE CRUZ MD on 7/24/2023 at 10:47              CT Chest With Contrast Diagnostic    Result Date: 7/24/2023  PROCEDURE: CT CHEST  W CONTRAST DIAGNOSTIC  COMPARISON:  07/11/2023 and prior INDICATIONS: Chest wall pain, nontraumatic, no prior imaging. short of breath, on bipap  TECHNIQUE: After obtaining the patient's consent, CT images were obtained with non-ionic intravenous contrast material.   PROTOCOL:   Pulmonary embolism imaging protocol performed    RADIATION:   DLP: 285.6mGy*cm   Automated exposure control was utilized to minimize radiation dose. CONTRAST: 100cc Isovue 370 I.V.  FINDINGS:  Pulmonary arteries:  There is an a excellent bolus for the evaluation of the pulmonary arterial system.  Main pulmonary arteries are prominent in size.  No filling defects concerning for pulmonary emboli noted  Mediastinum:  Central airways are patent.  Peribronchial wall thickening and endobronchial opacification noted peripherally particularly at the lung bases.  There is consolidation at the lung bases and patchy opacity and peribronchial opacities at the lung bases which have increased from the comparison.  Severe emphysematous changes are noted with an upper lung zone predominance  Lungs:  Central airways are patent.  Lungs are grossly clear..  Upper abdomen:  Limited imaging of the upper abdomen is unremarkable.  Bones and soft tissues:  No acute osseous abnormality.  Multilevel degenerative changes noted of the spine       Impression:   1. No evidence of pulmonary embolus 2. Prominence of the central pulmonary vascularity concerning for pulmonary hypertension 3. Mucous plugging peribronchial opacities and dependent consolidation which may represent atelectasis and/or pneumonia.  Findings are increased from the comparison     TERI MOELLER MD       Electronically Signed and Approved By: TERI MOELLER MD on 7/24/2023 at 13:00             CT Chest With Contrast Diagnostic    Result Date: 7/11/2023  PROCEDURE: CT CHEST W CONTRAST DIAGNOSTIC  COMPARISON:  [facility], CT, CT CHEST W CONTRAST DIAGNOSTIC, 1/04/2023, 11:59.   INDICATIONS: rectal cancer monitoring  TECHNIQUE: CT images were obtained with non-ionic intravenous contrast material.   PROTOCOL:   Standard imaging protocol performed    RADIATION:   DLP: 308mGy*cm   Automated exposure control was utilized to minimize radiation dose. CONTRAST: 50cc Isovue 370 I.V. LABS:   eGFR: 112ml/min/1.73m2  FINDINGS:  Lung window images reveal marked emphysema.  Mucus plugging is again seen in the left lower lobe, an endobronchial lesion could be obscured.  Subsegmental atelectasis at the left lung base is not significantly changed in comparison to 1/4/2023.  Mediastinal windows reveal no mediastinal, hilar, or axillary adenopathy.  Extensive coronary artery calcifications are again seen.  Degenerative spurring in the thoracic spine is stable.      Impression:   CT scan of the chest with IV contrast demonstrating emphysema.  Mucus plugging is again seen in the left lower lobe, endobronchial lesion could be obscured.  Subsegmental atelectasis at the left lung base is unchanged.     WYATT HERMAN MD       Electronically Signed and Approved By: WYATT HERMAN MD on 7/11/2023 at 14:03              Results for orders placed during the hospital encounter of 08/07/22    Adult Transthoracic Echo Limited W/ Cont if Necessary Per Protocol    Interpretation Summary  · Left ventricular ejection fraction appears to be 61 - 65%.  · The right ventricular cavity is dilated.  · The right atrial cavity is borderline dilated.    There were no apparent intracardiac masses, vegetations or thrombi.        Assessment & Plan   Assessment / Plan     Active Hospital Problems:  Active Hospital Problems    Diagnosis     **Hypercapnic respiratory failure          Impression:  COPD with acute exacerbation  Acute on chronic hypoxic and hypercapnic respiratory failure  Respiratory acidosis, acute  Chronic diastolic heart failure  Mucous plugging  Difficulty with airway clearance    Plan:  Currently on BiPAP 14/7, having  continuous BiPAP requirement  Blood pressure is labile and low.  Start Brovana, Pulmicort and DuoNeb.  Start airway clearance therapy with MetaNeb  Start IV cefepime.  Check MRSA PCR.  Was given vancomycin IV in the ER for  Continue Lovenox 40 mg subcu nightly  RT  consult  Check arterial blood gas in the morning.  Will likely need bronchoscopy.  Patient however is not stable for invasive procedures at this time with very tenuous respiratory status and borderline hypotension.  High probability of decompensation and needing endotracheal intubation.  Monitor hemodynamics and respiratory status very closely    Reviewed labs, imaging and provider notes  Discussed with bedside nurse and primary service.  Critical care time spent 32 mins excluding any procedures.     Electronically signed by Ted Petty MD, 7/24/2023, 17:34 EDT.

## 2023-07-24 NOTE — H&P
[facility]  Date: [facility]23   Patient Name: Pavel Dai  : 1955  MRN: [id number]  Primary Care Physician:  Yoel Geller MD  Date of admission: 2023    Subjective   Subjective     Chief Complaint: Shortness of breath and not feeling well    HPI:    Pavel Dai is a 68 y.o. male past medical history of rectal cancer, COPD on home oxygen, coronary artery disease, dyslipidemia, hypertension, and heart failure preserved ejection fraction with EF of 61 to 65% and normal stress test in  who presents with not feeling well and shortness of breath.    Patient states that has not been feeling well for a month.  Just weak and fatigued all over.  He states that however the last 2 to 3 days has become more short of breath not back, came acutely worse this morning.  He has had no known fevers.  Or chills.  As result of his symptoms he came to the ER for further evaluation.    In the emergency department the patient's vital signs are as follows temperature is 99, pulse is 112, respirate is 18, blood pressure 95/66, originally satting 72% on 3 L currently on BiPAP.  CBC shows white blood cell count of 12,000 hemoglobin 12.4.  CMP shows a creatinine of 1.95 which is up from his baseline 0.5-0 7.  Potassium is 5.5.  LFTs are slightly bumped AST of 52 and ALT of 70.  BNP is 15,000 with a troponin of 200.  ABG shows 7.2/79.  Lactate is 3.2.  Chest x-ray shows no abnormalities.  CTA of chest showed no evidence of PE, prominence of central pulmonary vasculature concern for pulmonary hypertension, mucous plug in the peribronchial opacities and dependent consolidation.  Patient admitted to hospital for hypercapnic respiratory failure most likely due to pneumonia and COPD exacerbation as well as heart failure.    All systems reviewed abnormalities noted above    Personal History     Past Medical History:  Rectal cancer  COPD on home oxygen  Chronic hypoxic respiratory  failure  Coronary disease  Dyslipidemia  Hypertension  Heart failure with preserved ejection fraction EF of 61 to 65% 2022  -- Stress test in 2022 showed no evidence of myocardial infarction or reversible ischemia    Past Surgical History:  Colonoscopy  Hernia repair  Rectal surgery  Shoulder surgery    Family History:   Coronary artery disease    Social History:   Former smoker  No alcohol    Home Medications:  Fluticasone-Salmeterol, albuterol, aspirin, atorvastatin, guaiFENesin, ipratropium-albuterol, loratadine, metoprolol tartrate, multivitamin with minerals, omeprazole, and roflumilast    Allergies:  No Known Allergies      Objective   Objective     Vitals:   Temp:  [99 °F (37.2 °C)] 99 °F (37.2 °C)  Heart Rate:  [106-114] 112  Resp:  [18-24] 18  BP: (90-97)/(55-69) 95/66  Flow (L/min):  [2-3] 3    Physical Exam    Constitutional: BiPAP in place conversant.   Eyes: Pupils equal, sclerae anicteric, no conjunctival injection   HENT: NCAT, mucous membranes moist   Neck: Supple, no thyromegaly, no lymphadenopathy, trachea midline   Respiratory: Decreased breath sounds with wheezing and rhonchi   Cardiovascular: Tachycardia no murmurs, rubs, or gallops, palpable pedal pulses bilaterally   Gastrointestinal: Positive bowel sounds, soft, nontender, nondistended   Musculoskeletal: No bilateral ankle edema, no clubbing or cyanosis to extremities   Psychiatric: Appropriate affect, cooperative   Neurologic: Oriented x 3, strength symmetric in all extremities, Cranial Nerves grossly intact to confrontation, speech clear   Skin: No rashes     Result Review    Result Review:  I have personally reviewed the results from the time of this admission to 7/24/2023 12:24 EDT and agree with these findings:  Troponin 204  BNP 13378    Assessment & Plan   Assessment / Plan     Assessment/Plan:   Acute hypercapnic respiratory failure requiring BiPAP  Acute COPD exacerbation  Acute on chronic hypoxic respiratory failure  Chronic  diastolic heart failure exacerbation with EF of 61 to 65% 2022  Elevated troponins due to demand versus ACS  Hyperkalemia  Elevated lactate  Acute kidney injury with baseline creatinine from 0.6-0.8 currently 1.95    Plan:  -- Admit to hospital service  -- Consult pulmonary/critical care to the fact that on BiPAP  -- Consult cardiology due to elevated troponins and heart failure  -- Continue BiPAP overnight and with naps  -- Status post 1 dose of Lasix in the emergency department  -- Patient's lactate increased from 3.2 up to 4.4 after getting a dose of Lasix.  After getting CT scan back it does not appear that he is in fluid overload.  Discussed with pulmonology I think he has cor pulmonale but does not think he is volume overloaded.  I will give him a small bolus.  And run him at a rate overnight due to his lactic acid.  -- Repeat echocardiogram  -- Consolidation seen on CT scan not seen on chest x-ray we will start treatment for pneumonia  -- Procalcitonin now and again in the morning  -- Cefepime and vancomycin  -- Strep and Legionella urine antigen  -- Respiratory culture and blood culture  --Acute COPD exacerbation  --Treat pneumonia  -- Brovana/Pulmicort/DuoNebs  -- Prednisone 40 mg  -- Elevated troponin  -- Continue trending troponins  -- Acute kidney injury  -- Avoid nephrotoxic agent  -- We will continue to diurese due to concern for prerenal            DVT prophylaxis:  Lovenox    CODE STATUS:     Full ocde     Admission Status:  I believe this patient meets admission status.    Electronically signed by Carlos Colon MD, 07/24/23, 12:24 PM EDT.

## 2023-07-24 NOTE — PROGRESS NOTES
"[facility] Clinical Pharmacy Services: Vancomycin Pharmacokinetic Initial Consult Note    Pavel Dai is a 68 y.o. male who is on day 1 of pharmacy to dose vancomycin for Pneumonia and Sepsis.    Consult Information  Consulting Provider: Dr. Colon  Planned Duration of Therapy: 7 days  Was Patient Receiving Prior to Admission/Consult?: No  Loading Dose Ordered or Given: 1000 mg on  at 1428  PK/PD Target: Dose by Levels  Other Antimicrobials: Cefepime    Imaging Reviewed?: Yes    Microbiology Data  MRSA PCR performed: 23; Result: Pending  Culture/Source:    blood cultures x 2: in process   respiratory culture: ordered   MRSA nasal PCR: ordered    Vitals/Labs  Ht: 172.7 cm (68\"); Wt: 55.5 kg (122 lb 5.7 oz)  Temp (24hrs), Av °F (37.2 °C), Min:99 °F (37.2 °C), Max:99 °F (37.2 °C)   Estimated Creatinine Clearance: 29.3 mL/min (A) (by C-G formula based on SCr of 1.95 mg/dL (H)).     Results from last 7 days   Lab Units 23  1034   CREATININE mg/dL 1.95*   WBC 10*3/mm3 12.51*     Assessment/Plan:    Vancomycin Dose: pulse dosing  Vanc Random ordered for  with AM labs    Pharmacy will follow patient's kidney function and will adjust doses and obtain levels as necessary. Thank you for involving pharmacy in this patient's care. Please contact pharmacy with any questions or concerns.                           Jacqueline Sharma  Clinical Pharmacist    "

## 2023-07-25 LAB
ALBUMIN SERPL-MCNC: 3.2 G/DL (ref 3.5–5.2)
ALBUMIN/GLOB SERPL: 1 G/DL
ALP SERPL-CCNC: 63 U/L (ref 39–117)
ALT SERPL W P-5'-P-CCNC: 76 U/L (ref 1–41)
ANION GAP SERPL CALCULATED.3IONS-SCNC: 9.8 MMOL/L (ref 5–15)
ARTERIAL PATENCY WRIST A: POSITIVE
AST SERPL-CCNC: 55 U/L (ref 1–40)
BASE EXCESS BLDA CALC-SCNC: 9.9 MMOL/L (ref -2–2)
BASOPHILS # BLD AUTO: 0.01 10*3/MM3 (ref 0–0.2)
BASOPHILS NFR BLD AUTO: 0.1 % (ref 0–1.5)
BDY SITE: ABNORMAL
BH CV ECHO MEAS - AO MAX PG: 7.2 MMHG
BH CV ECHO MEAS - AO V2 MAX: 134 CM/SEC
BH CV ECHO MEAS - EDV(CUBED): 24.4 ML
BH CV ECHO MEAS - EDV(MOD-SP2): 55.1 ML
BH CV ECHO MEAS - EDV(MOD-SP4): 53.1 ML
BH CV ECHO MEAS - EF(MOD-BP): 63.3 %
BH CV ECHO MEAS - EF(MOD-SP2): 65.2 %
BH CV ECHO MEAS - EF(MOD-SP4): 62.3 %
BH CV ECHO MEAS - ESV(CUBED): 6.9 ML
BH CV ECHO MEAS - ESV(MOD-SP2): 19.2 ML
BH CV ECHO MEAS - ESV(MOD-SP4): 20 ML
BH CV ECHO MEAS - FS: 34.5 %
BH CV ECHO MEAS - IVS/LVPW: 1.17 CM
BH CV ECHO MEAS - IVSD: 1.4 CM
BH CV ECHO MEAS - LA DIMENSION: 3 CM
BH CV ECHO MEAS - LAT PEAK E' VEL: 12.7 CM/SEC
BH CV ECHO MEAS - LV MASS(C)D: 118.7 GRAMS
BH CV ECHO MEAS - LV MAX PG: 5.9 MMHG
BH CV ECHO MEAS - LV V1 MAX: 121 CM/SEC
BH CV ECHO MEAS - LVIDD: 2.9 CM
BH CV ECHO MEAS - LVIDS: 1.9 CM
BH CV ECHO MEAS - LVPWD: 1.2 CM
BH CV ECHO MEAS - MED PEAK E' VEL: 7.5 CM/SEC
BH CV ECHO MEAS - MV A MAX VEL: 71.8 CM/SEC
BH CV ECHO MEAS - MV DEC SLOPE: 552 CM/SEC2
BH CV ECHO MEAS - MV DEC TIME: 170 MSEC
BH CV ECHO MEAS - MV E MAX VEL: 69.2 CM/SEC
BH CV ECHO MEAS - MV E/A: 0.96
BH CV ECHO MEAS - MV MAX PG: 2.9 MMHG
BH CV ECHO MEAS - MV MEAN PG: 1 MMHG
BH CV ECHO MEAS - MV P1/2T: 43.8 MSEC
BH CV ECHO MEAS - MV V2 VTI: 17 CM
BH CV ECHO MEAS - MVA(P1/2T): 5 CM2
BH CV ECHO MEAS - RAP SYSTOLE: 10 MMHG
BH CV ECHO MEAS - RVDD: 3.4 CM
BH CV ECHO MEAS - RVSP: 49 MMHG
BH CV ECHO MEAS - SV(MOD-SP2): 35.9 ML
BH CV ECHO MEAS - SV(MOD-SP4): 33.1 ML
BH CV ECHO MEAS - TR MAX PG: 38.9 MMHG
BH CV ECHO MEAS - TR MAX VEL: 312 CM/SEC
BH CV ECHO MEASUREMENTS AVERAGE E/E' RATIO: 6.85
BILIRUB SERPL-MCNC: <0.2 MG/DL (ref 0–1.2)
BUN SERPL-MCNC: 34 MG/DL (ref 8–23)
BUN/CREAT SERPL: 44.7 (ref 7–25)
CA-I BLDA-SCNC: 1.12 MMOL/L (ref 1.13–1.32)
CALCIUM SPEC-SCNC: 8.8 MG/DL (ref 8.6–10.5)
CHLORIDE BLDA-SCNC: 97 MMOL/L (ref 98–106)
CHLORIDE SERPL-SCNC: 97 MMOL/L (ref 98–107)
CHOLEST SERPL-MCNC: 106 MG/DL (ref 0–200)
CO2 SERPL-SCNC: 34.2 MMOL/L (ref 22–29)
COHGB MFR BLD: 0.6 % (ref 0–1.5)
CREAT SERPL-MCNC: 0.76 MG/DL (ref 0.76–1.27)
D-LACTATE SERPL-SCNC: 1.8 MMOL/L (ref 0.5–2)
DEPRECATED RDW RBC AUTO: 48.5 FL (ref 37–54)
EGFRCR SERPLBLD CKD-EPI 2021: 99.1 ML/MIN/1.73
EOSINOPHIL # BLD AUTO: 0 10*3/MM3 (ref 0–0.4)
EOSINOPHIL NFR BLD AUTO: 0 % (ref 0.3–6.2)
ERYTHROCYTE [DISTWIDTH] IN BLOOD BY AUTOMATED COUNT: 12.9 % (ref 12.3–15.4)
FHHB: 9.8 % (ref 0–5)
GAS FLOW AIRWAY: 3 LPM
GLOBULIN UR ELPH-MCNC: 3.2 GM/DL
GLUCOSE BLDA-MCNC: 94 MG/DL (ref 70–99)
GLUCOSE BLDC GLUCOMTR-MCNC: 108 MG/DL (ref 70–99)
GLUCOSE SERPL-MCNC: 102 MG/DL (ref 65–99)
HBA1C MFR BLD: 5.5 % (ref 4.8–5.6)
HCO3 BLDA-SCNC: 36.6 MMOL/L (ref 22–26)
HCT VFR BLD AUTO: 39.7 % (ref 37.5–51)
HDLC SERPL-MCNC: 62 MG/DL (ref 40–60)
HGB BLD-MCNC: 11.5 G/DL (ref 13–17.7)
HGB BLDA-MCNC: 11.9 G/DL (ref 13.8–16.4)
IMM GRANULOCYTES # BLD AUTO: 0.06 10*3/MM3 (ref 0–0.05)
IMM GRANULOCYTES NFR BLD AUTO: 0.6 % (ref 0–0.5)
INHALED O2 CONCENTRATION: 32 %
IVRT: 61 MSEC
LACTATE BLDA-SCNC: 1.16 MMOL/L (ref 0.5–2)
LDLC SERPL CALC-MCNC: 28 MG/DL (ref 0–100)
LDLC/HDLC SERPL: 0.45 {RATIO}
LEFT ATRIUM VOLUME INDEX: 10.6 ML/M2
LEFT ATRIUM VOLUME: 21.5 ML
LYMPHOCYTES # BLD AUTO: 0.41 10*3/MM3 (ref 0.7–3.1)
LYMPHOCYTES NFR BLD AUTO: 3.8 % (ref 19.6–45.3)
MAGNESIUM SERPL-MCNC: 2 MG/DL (ref 1.6–2.4)
MCH RBC QN AUTO: 29.9 PG (ref 26.6–33)
MCHC RBC AUTO-ENTMCNC: 29 G/DL (ref 31.5–35.7)
MCV RBC AUTO: 103.1 FL (ref 79–97)
METHGB BLD QL: 0 % (ref 0–1.5)
MODALITY: ABNORMAL
MONOCYTES # BLD AUTO: 1.25 10*3/MM3 (ref 0.1–0.9)
MONOCYTES NFR BLD AUTO: 11.7 % (ref 5–12)
NEUTROPHILS NFR BLD AUTO: 8.94 10*3/MM3 (ref 1.7–7)
NEUTROPHILS NFR BLD AUTO: 83.8 % (ref 42.7–76)
NOTE: ABNORMAL
NRBC BLD AUTO-RTO: 0 /100 WBC (ref 0–0.2)
OXYHGB MFR BLDV: 89.6 % (ref 94–99)
PCO2 BLDA: 59.4 MM HG (ref 35–45)
PH BLDA: 7.41 PH UNITS (ref 7.35–7.45)
PLATELET # BLD AUTO: 154 10*3/MM3 (ref 140–450)
PMV BLD AUTO: 10.5 FL (ref 6–12)
PO2 BLD: 186 MM[HG] (ref 0–500)
PO2 BLDA: 59.6 MM HG (ref 80–100)
POTASSIUM BLDA-SCNC: 3.97 MMOL/L (ref 3.5–5)
POTASSIUM SERPL-SCNC: 4.1 MMOL/L (ref 3.5–5.2)
PROCALCITONIN SERPL-MCNC: 0.21 NG/ML (ref 0–0.25)
PROT SERPL-MCNC: 6.4 G/DL (ref 6–8.5)
QT INTERVAL: 275 MS
RBC # BLD AUTO: 3.85 10*6/MM3 (ref 4.14–5.8)
SAO2 % BLDCOA: 90.1 % (ref 95–99)
SODIUM BLDA-SCNC: 139.8 MMOL/L (ref 136–146)
SODIUM SERPL-SCNC: 141 MMOL/L (ref 136–145)
TRIGL SERPL-MCNC: 80 MG/DL (ref 0–150)
TROPONIN T SERPL HS-MCNC: 166 NG/L
VANCOMYCIN SERPL-MCNC: 4.1 MCG/ML (ref 5–40)
VLDLC SERPL-MCNC: 16 MG/DL (ref 5–40)
WBC NRBC COR # BLD: 10.67 10*3/MM3 (ref 3.4–10.8)

## 2023-07-25 PROCEDURE — 93005 ELECTROCARDIOGRAM TRACING: CPT | Performed by: INTERNAL MEDICINE

## 2023-07-25 PROCEDURE — 83735 ASSAY OF MAGNESIUM: CPT | Performed by: INTERNAL MEDICINE

## 2023-07-25 PROCEDURE — 99233 SBSQ HOSP IP/OBS HIGH 50: CPT | Performed by: INTERNAL MEDICINE

## 2023-07-25 PROCEDURE — 25010000002 VANCOMYCIN 5 G RECONSTITUTED SOLUTION: Performed by: INTERNAL MEDICINE

## 2023-07-25 PROCEDURE — 87205 SMEAR GRAM STAIN: CPT | Performed by: INTERNAL MEDICINE

## 2023-07-25 PROCEDURE — 94799 UNLISTED PULMONARY SVC/PX: CPT

## 2023-07-25 PROCEDURE — 82375 ASSAY CARBOXYHB QUANT: CPT | Performed by: INTERNAL MEDICINE

## 2023-07-25 PROCEDURE — 0 CEFEPIME PER 500 MG: Performed by: INTERNAL MEDICINE

## 2023-07-25 PROCEDURE — 94660 CPAP INITIATION&MGMT: CPT

## 2023-07-25 PROCEDURE — 94761 N-INVAS EAR/PLS OXIMETRY MLT: CPT

## 2023-07-25 PROCEDURE — 25010000002 ENOXAPARIN PER 10 MG: Performed by: INTERNAL MEDICINE

## 2023-07-25 PROCEDURE — 87186 SC STD MICRODIL/AGAR DIL: CPT | Performed by: INTERNAL MEDICINE

## 2023-07-25 PROCEDURE — 80061 LIPID PANEL: CPT | Performed by: INTERNAL MEDICINE

## 2023-07-25 PROCEDURE — 80053 COMPREHEN METABOLIC PANEL: CPT | Performed by: INTERNAL MEDICINE

## 2023-07-25 PROCEDURE — 82805 BLOOD GASES W/O2 SATURATION: CPT | Performed by: INTERNAL MEDICINE

## 2023-07-25 PROCEDURE — 83050 HGB METHEMOGLOBIN QUAN: CPT | Performed by: INTERNAL MEDICINE

## 2023-07-25 PROCEDURE — 84484 ASSAY OF TROPONIN QUANT: CPT | Performed by: INTERNAL MEDICINE

## 2023-07-25 PROCEDURE — 36415 COLL VENOUS BLD VENIPUNCTURE: CPT | Performed by: EMERGENCY MEDICINE

## 2023-07-25 PROCEDURE — 84145 PROCALCITONIN (PCT): CPT | Performed by: INTERNAL MEDICINE

## 2023-07-25 PROCEDURE — 87070 CULTURE OTHR SPECIMN AEROBIC: CPT | Performed by: INTERNAL MEDICINE

## 2023-07-25 PROCEDURE — 82948 REAGENT STRIP/BLOOD GLUCOSE: CPT

## 2023-07-25 PROCEDURE — 63710000001 PREDNISONE PER 1 MG: Performed by: INTERNAL MEDICINE

## 2023-07-25 PROCEDURE — 83605 ASSAY OF LACTIC ACID: CPT | Performed by: EMERGENCY MEDICINE

## 2023-07-25 PROCEDURE — 36600 WITHDRAWAL OF ARTERIAL BLOOD: CPT | Performed by: INTERNAL MEDICINE

## 2023-07-25 PROCEDURE — 25010000002 FUROSEMIDE PER 20 MG: Performed by: NURSE PRACTITIONER

## 2023-07-25 PROCEDURE — 94664 DEMO&/EVAL PT USE INHALER: CPT

## 2023-07-25 PROCEDURE — 93010 ELECTROCARDIOGRAM REPORT: CPT | Performed by: INTERNAL MEDICINE

## 2023-07-25 PROCEDURE — 85025 COMPLETE CBC W/AUTO DIFF WBC: CPT | Performed by: INTERNAL MEDICINE

## 2023-07-25 PROCEDURE — 87077 CULTURE AEROBIC IDENTIFY: CPT | Performed by: INTERNAL MEDICINE

## 2023-07-25 PROCEDURE — 80202 ASSAY OF VANCOMYCIN: CPT | Performed by: INTERNAL MEDICINE

## 2023-07-25 RX ORDER — DILTIAZEM HCL IN NACL,ISO-OSM 125 MG/125
10 PLASTIC BAG, INJECTION (ML) INTRAVENOUS
Status: DISCONTINUED | OUTPATIENT
Start: 2023-07-25 | End: 2023-07-26

## 2023-07-25 RX ORDER — ASPIRIN 81 MG/1
81 TABLET, CHEWABLE ORAL DAILY
Status: DISCONTINUED | OUTPATIENT
Start: 2023-07-25 | End: 2023-07-28 | Stop reason: HOSPADM

## 2023-07-25 RX ORDER — METOPROLOL SUCCINATE 25 MG/1
25 TABLET, EXTENDED RELEASE ORAL EVERY 12 HOURS SCHEDULED
Status: DISCONTINUED | OUTPATIENT
Start: 2023-07-25 | End: 2023-07-25

## 2023-07-25 RX ORDER — METOPROLOL SUCCINATE 50 MG/1
50 TABLET, EXTENDED RELEASE ORAL EVERY 12 HOURS SCHEDULED
Status: DISCONTINUED | OUTPATIENT
Start: 2023-07-25 | End: 2023-07-28 | Stop reason: HOSPADM

## 2023-07-25 RX ORDER — METOPROLOL TARTRATE 5 MG/5ML
INJECTION INTRAVENOUS
Status: COMPLETED
Start: 2023-07-25 | End: 2023-07-25

## 2023-07-25 RX ORDER — ATORVASTATIN CALCIUM 10 MG/1
10 TABLET, FILM COATED ORAL NIGHTLY
Status: DISCONTINUED | OUTPATIENT
Start: 2023-07-25 | End: 2023-07-28 | Stop reason: HOSPADM

## 2023-07-25 RX ORDER — FUROSEMIDE 10 MG/ML
40 INJECTION INTRAMUSCULAR; INTRAVENOUS
Status: DISCONTINUED | OUTPATIENT
Start: 2023-07-25 | End: 2023-07-25

## 2023-07-25 RX ADMIN — FUROSEMIDE 40 MG: 10 INJECTION, SOLUTION INTRAMUSCULAR; INTRAVENOUS at 11:11

## 2023-07-25 RX ADMIN — ARFORMOTEROL TARTRATE 15 MCG: 15 SOLUTION RESPIRATORY (INHALATION) at 18:39

## 2023-07-25 RX ADMIN — METOPROLOL SUCCINATE 25 MG: 25 TABLET, EXTENDED RELEASE ORAL at 10:44

## 2023-07-25 RX ADMIN — IPRATROPIUM BROMIDE AND ALBUTEROL SULFATE 3 ML: .5; 3 SOLUTION RESPIRATORY (INHALATION) at 02:30

## 2023-07-25 RX ADMIN — METOPROLOL TARTRATE 5 MG: 1 INJECTION, SOLUTION INTRAVENOUS at 16:07

## 2023-07-25 RX ADMIN — ENOXAPARIN SODIUM 40 MG: 100 INJECTION SUBCUTANEOUS at 21:20

## 2023-07-25 RX ADMIN — IPRATROPIUM BROMIDE AND ALBUTEROL SULFATE 3 ML: .5; 3 SOLUTION RESPIRATORY (INHALATION) at 15:30

## 2023-07-25 RX ADMIN — VANCOMYCIN HYDROCHLORIDE 750 MG: 5 INJECTION, POWDER, LYOPHILIZED, FOR SOLUTION INTRAVENOUS at 08:30

## 2023-07-25 RX ADMIN — ASPIRIN 81 MG: 81 TABLET, CHEWABLE ORAL at 17:29

## 2023-07-25 RX ADMIN — Medication 10 ML: at 21:22

## 2023-07-25 RX ADMIN — ATORVASTATIN CALCIUM 10 MG: 10 TABLET, FILM COATED ORAL at 21:20

## 2023-07-25 RX ADMIN — CEFEPIME 2000 MG: 2 INJECTION, POWDER, FOR SOLUTION INTRAVENOUS at 16:37

## 2023-07-25 RX ADMIN — IPRATROPIUM BROMIDE AND ALBUTEROL SULFATE 3 ML: .5; 3 SOLUTION RESPIRATORY (INHALATION) at 22:46

## 2023-07-25 RX ADMIN — METOPROLOL TARTRATE 25 MG: 25 TABLET, FILM COATED ORAL at 17:29

## 2023-07-25 RX ADMIN — IPRATROPIUM BROMIDE AND ALBUTEROL SULFATE 3 ML: .5; 3 SOLUTION RESPIRATORY (INHALATION) at 07:48

## 2023-07-25 RX ADMIN — BUDESONIDE 0.5 MG: 0.5 SUSPENSION RESPIRATORY (INHALATION) at 07:48

## 2023-07-25 RX ADMIN — Medication 10 ML: at 08:31

## 2023-07-25 RX ADMIN — CEFEPIME 2000 MG: 2 INJECTION, POWDER, FOR SOLUTION INTRAVENOUS at 08:30

## 2023-07-25 RX ADMIN — DILTIAZEM HYDROCHLORIDE 5 MG/HR: 5 INJECTION, SOLUTION INTRAVENOUS at 16:34

## 2023-07-25 RX ADMIN — BUDESONIDE 0.5 MG: 0.5 SUSPENSION RESPIRATORY (INHALATION) at 18:39

## 2023-07-25 RX ADMIN — VANCOMYCIN HYDROCHLORIDE 750 MG: 5 INJECTION, POWDER, LYOPHILIZED, FOR SOLUTION INTRAVENOUS at 21:20

## 2023-07-25 RX ADMIN — PREDNISONE 40 MG: 20 TABLET ORAL at 09:07

## 2023-07-25 RX ADMIN — IPRATROPIUM BROMIDE AND ALBUTEROL SULFATE 3 ML: .5; 3 SOLUTION RESPIRATORY (INHALATION) at 11:22

## 2023-07-25 RX ADMIN — ARFORMOTEROL TARTRATE 15 MCG: 15 SOLUTION RESPIRATORY (INHALATION) at 07:48

## 2023-07-25 RX ADMIN — METOPROLOL SUCCINATE 50 MG: 50 TABLET, EXTENDED RELEASE ORAL at 21:20

## 2023-07-25 NOTE — CONSULTS
Date of service: 7/24/2023    Chart reviewed. Totally agree with the current management. He will be seen in the AM.

## 2023-07-25 NOTE — PROGRESS NOTES
Pulmonary / Critical Care Progress Note      Patient Name: Pavel Dai  : 1955  MRN: [id number]  Primary Care Physician:  Yoel Geller MD  Date of admission: 2023    Subjective   Subjective   Follow-up for acute on chronic hypercapnic respiratory failure, pneumonia    Over past 24 hours, transition to nasal cannula, continues on cefepime and vancomycin, remains on prednisone, received Lasix 40 mg IV x1.    No acute events overnight.  Wore NIPPV.    This morning,   Lying in bed on 3 L nasal cannula  ABG with compensated hypercapnia  Dyspnea improved  Continues to have difficulty with airway clearance  Reports occasional cough with scant white sputum  Remains weak and fatigued  No fever or chills  Denies chest pain or hemoptysis    Review of Systems  General:  + Fatigue, No Fever  HEENT: No dysphagia, No Visual Changes, no rhinorrhea  Respiratory:  + Productive cough,+Dyspnea, + phlegm, No Pleuritic Pain, + wheezing, no hemoptysis  Cardiovascular: Denies chest pain, denies palpitations, +COELHO, No Chest Pressure  Gastrointestinal:  No Abdominal Pain, No Nausea, No Vomiting, No Diarrhea  Genitourinary:  No Dysuria, No Frequency, No Hesitancy  Musculoskeletal: No muscle pain or swelling  Endocrine:  No Heat Intolerance, No Cold Intolerance  Hematologic:  No Bleeding, No Bruising  Psychiatric:  No Anxiety, No Depression  Neurologic:  No Confusion, no Dysarthria, No Headaches  Skin:  No Rash, No Open Wounds      Objective   Objective     Vitals:   Temp:  [97.5 °F (36.4 °C)-99 °F (37.2 °C)] 97.5 °F (36.4 °C)  Heart Rate:  [100-138] 109  Resp:  [18-24] 20  BP: ()/(55-90) 108/69  Flow (L/min):  [2-5] 5    Physical Exam   Vital Signs Reviewed   General: Cachectic appearing, male, NAD, lying in bed on 3 L nasal cannula   HEENT:  PERRL, EOMI.  OP, nares clear, no sinus tenderness  Neck:  Supple, no JVD, no thyromegaly  Lymph: no axillary, cervical, supraclavicular lymphadenopathy noted  bilaterally  Chest: Poor aeration, bilateral scattered rhonchi, bilateral expiratory wheezing, no crackles, tympanic to percussion bilaterally, mildly increased work of breathing noted  CV: RRR, no MGR, pulses 2+, equal.  Abd:  Soft, NT, ND, + BS, no HSM  EXT:  no clubbing, no cyanosis, no edema, no joint tenderness  Neuro:  A&Ox3, CN grossly intact, no focal deficits, generalized weakness  Skin: No rashes or lesions noted      Result Review    Result Review:  I have personally reviewed the results from the time of this admission to 7/25/2023 07:35 EDT and agree with these findings:  [x]  Laboratory  [x]  Microbiology  [x]  Radiology  [x]  EKG/Telemetry   [x]  Cardiology/Vascular   []  Pathology  []  Old records  []  Other:  Most notable findings include:   Lactate 4.4 --> 1.8  proBNP -15,765  ABG 7.2/79/57 --> 7.4/59/59    S pneumo/Legionella negative  Sputum culture NGTD    7/24 echo - EF 61 to 65%, grade 1 diastolic dysfunction, moderately elevated RVSP 45 to 55 mmHg        Lab 07/25/23  0425 07/24/23  1040 07/24/23  1034   WBC 10.67  --  12.51*   HEMOGLOBIN 11.5*  --  12.4*   HEMATOCRIT 39.7  --  42.0   PLATELETS 154  --  224   SODIUM 141  --  139   SODIUM, ARTERIAL  --  139.2  --    POTASSIUM 4.1  --  5.5*   CHLORIDE 97*  --  94*   CO2 34.2*  --  33.9*   BUN 34*  --  38*   CREATININE 0.76  --  1.95*   GLUCOSE 102*  --  139*   GLUCOSE, ARTERIAL  --  133*  --    CALCIUM 8.8  --  8.6   TOTAL PROTEIN 6.4  --  7.2   ALBUMIN 3.2*  --  3.7   GLOBULIN 3.2  --  3.5     CT Chest With Contrast Diagnostic    Result Date: 7/24/2023  PROCEDURE: CT CHEST W CONTRAST DIAGNOSTIC  COMPARISON:  07/11/2023 and prior INDICATIONS: Chest wall pain, nontraumatic, no prior imaging. short of breath, on bipap  TECHNIQUE: After obtaining the patient's consent, CT images were obtained with non-ionic intravenous contrast material.   PROTOCOL:   Pulmonary embolism imaging protocol performed    RADIATION:   DLP: 285.6mGy*cm   Automated  exposure control was utilized to minimize radiation dose. CONTRAST: 100cc Isovue 370 I.V.  FINDINGS:  Pulmonary arteries:  There is an a excellent bolus for the evaluation of the pulmonary arterial system.  Main pulmonary arteries are prominent in size.  No filling defects concerning for pulmonary emboli noted  Mediastinum:  Central airways are patent.  Peribronchial wall thickening and endobronchial opacification noted peripherally particularly at the lung bases.  There is consolidation at the lung bases and patchy opacity and peribronchial opacities at the lung bases which have increased from the comparison.  Severe emphysematous changes are noted with an upper lung zone predominance  Lungs:  Central airways are patent.  Lungs are grossly clear..  Upper abdomen:  Limited imaging of the upper abdomen is unremarkable.  Bones and soft tissues:  No acute osseous abnormality.  Multilevel degenerative changes noted of the spine       Impression:   1. No evidence of pulmonary embolus 2. Prominence of the central pulmonary vascularity concerning for pulmonary hypertension 3. Mucous plugging peribronchial opacities and dependent consolidation which may represent atelectasis and/or pneumonia.  Findings are increased from the comparison     TERI MOELLER MD       Electronically Signed and Approved By: TERI MOELLER MD on 7/24/2023 at 13:00             CT Chest With Contrast Diagnostic    Result Date: 7/11/2023  PROCEDURE: CT CHEST W CONTRAST DIAGNOSTIC  COMPARISON:  [facility], CT, CT CHEST W CONTRAST DIAGNOSTIC, 1/04/2023, 11:59.  INDICATIONS: rectal cancer monitoring  TECHNIQUE: CT images were obtained with non-ionic intravenous contrast material.   PROTOCOL:   Standard imaging protocol performed    RADIATION:   DLP: 308mGy*cm   Automated exposure control was utilized to minimize radiation dose. CONTRAST: 50cc Isovue 370 I.V. LABS:   eGFR: 112ml/min/1.73m2  FINDINGS:  Lung window images reveal marked  emphysema.  Mucus plugging is again seen in the left lower lobe, an endobronchial lesion could be obscured.  Subsegmental atelectasis at the left lung base is not significantly changed in comparison to 1/4/2023.  Mediastinal windows reveal no mediastinal, hilar, or axillary adenopathy.  Extensive coronary artery calcifications are again seen.  Degenerative spurring in the thoracic spine is stable.      Impression:   CT scan of the chest with IV contrast demonstrating emphysema.  Mucus plugging is again seen in the left lower lobe, endobronchial lesion could be obscured.  Subsegmental atelectasis at the left lung base is unchanged.     WYATT HERMAN MD       Electronically Signed and Approved By: WYATT HERMAN MD on 7/11/2023 at 14:03                Assessment & Plan   Assessment / Plan     Active Hospital Problems:  Active Hospital Problems    Diagnosis    • **Hypercapnic respiratory failure      Impression:   COPD with acute exacerbation  Acute on chronic hypoxic and hypercapnic respiratory failure requiring NIPPV  Chronic diastolic heart failure  Respiratory acidosis, acute  Elevated lactate  Chronic diastolic heart failure  Mucous plugging/difficulty with airway clearance  FABIAN    Plan:   -Currently on 3 L nasal cannula, continue to wean to maintain SPO2 greater than 90%  -Morning ABG reviewed revealing compensated hypercapnia  -Continue BiPAP 14/7 at night and days with naps  -RT  consulted, appreciate assistance  -CT chest reviewed revealing mucous plugging in left lower lobe  -Will take for bronchoscopy tomorrow with airway inspection, brushings, and bronchoalveolar lavage. I have discussed the risks of the procedure with the patient including pneumothorax, hemothorax, bleeding, hypoxia, required mechanical ventilation and death. The patient recognizes these findings, acknowledges these findings and is agreeable to the procedure.  -N.p.o. after midnight  -Continue vancomycin and cefepime, may  de-escalate pending cultures  -MRSA PCR pending  -Continue Brovana, Pulmicort and DuoNeb.  -Continue airway clearance therapy with MetaNeb  -Cardiology on board, appreciate assistance  -Echo reviewed revealing EF 61 to 65%, grade 1 diastolic dysfunction, RVSP moderately elevated 45 to 55 mmHg  -Encourage use of I-S/flutter valve  -Encourage mobilization, out of bed to chair    Mr. Dai will need noninvasive ventilation with Astral for acute on chronic respiratory failure secondary to COPD.  Bilevel is ruled out as treatment option because it cannot provide IVAPS AE or AVAPS AE mode, which is necessary to deliver targeted tidal volume and minute ventilation to assure adequate ventilation. NIV with Astral also provides back up rate, alarms and battery back up to ensure patient safety. 2023 GOLD guidelines state non invasive mechanical ventilation should be the first mode of ventilation used in COPD with respiratory failure to decrease hospitalization and improve survival. Failure to follow recommended guidelines will place patient at increased risk of rehospitalization, acute on chronic respiratory failure and mortality.     DVT prophylaxis:  Medical DVT prophylaxis orders are present.    CODE STATUS:   Level Of Support Discussed With: Patient  Code Status (Patient has no pulse and is not breathing): CPR (Attempt to Resuscitate)  Medical Interventions (Patient has pulse or is breathing): Full Support  Release to patient: Routine Release      I personally reviewed pertinent labs, imaging and provider notes. Discussed with bedside nurse and will discuss with primary service.     Electronically signed by JOSE Maloney, 07/25/23, 11:36 AM EDT.    This visit was performed by BOTH a physician and an APC. I personally evaluated and examined the patient. I performed all aspects of MDM as documented. , I have reviewed and confirmed the accuracy of the patient's history as documented in this note., and I have  reexamined the patient and the results are consistent with the previously documented exam. I have updated the documentation as necessary.     Electronically signed by Ted Petty MD, 07/25/23, 5:55 PM EDT.   Electronically signed by Ted Petty MD, 7/25/2023, 07:35 EDT.

## 2023-07-25 NOTE — CONSULTS
Date of service: 7/25/2023    Reason for consultation: CHF and elevated Troponin.                          Records:  Reviewed.    Assessment and plan:    Acute respiratory failure and acute exacerbation of COPD, improving.  Elevated Troponin T, mostly 2ry to demand ischemia due to the above.  CAD is still considered.  Acute diastolic CHF  Severe exacerbation of COPD  Acute renal injury.  Echocardiography: Pending  Totally agree with the current medical management.  Consider stress test vs cardiac catheterization based on his clinical course.       still considered.  Conservative management for now.  Acute diastolic CHF  Acute renal injury.  Echocardiography: Pending  Totally agree with the current medical management.  Consider stress test vs cardiac catheterization based on his clinical course.

## 2023-07-25 NOTE — CONSULTS
RT CM consulted to arrange home NIPPV for cachectic (BMI 15.15) patient with hx of chronic respiratory failure secondary to COPD.       Mr. Dai states he is usually seen by Dr. WILLI Myers for his COPD.  He is currently taking wixela for maintenance and combivent respimat for relief.  He has had an Astral ventilator in the past and was using and benefiting from it, but had to return the device the [employer] after his 6 months of financial assistance ran out. Mr. Dai states he does not want to use [employer] as DME provider again due to the amount of personal records the company was requesting to quailify him for financial assistance. He spoke with [employer] who states they would only require proof of income, not proof of his bills so he is agreeable to moving forward with [employer] for NIV.    Mr. Dai will need noninvasive ventilation with Astral for acute on chronic respiratory failure secondary to COPD.  Bilevel is ruled out as treatment option because it cannot provide IVAPS AE or AVAPS AE mode, which is necessary to deliver targeted tidal volume and minute ventilation to assure adequate ventilation. NIV with Astral also provides back up rate, alarms and battery back up to ensure patient safety. 2023 GOLD guidelines state non invasive mechanical ventilation should be the first mode of ventilation used in COPD with respiratory failure to decrease hospitalization and improve survival. Failure to follow recommended guidelines will place patient at increased risk of rehospitalization, acute on chronic respiratory failure and mortality.

## 2023-07-25 NOTE — PROGRESS NOTES
Respiratory Therapist Broncho-Pulmonary Hygiene Progress Note      Patient Name:  Pavel Dai  YOB: 1955    Pavel Dai meets the qualification for Level 2 of the Bronco-Pulmonary Hygiene Protocol. This was based on my daily patient assessment and includes review of chest x-ray results, cough ability and quality, oxygenation, secretions or risk for secretion development and patient mobility.     Broncho-Pulmonary Hygiene Assessment:    Level of Movement: Actively changing positions without assistance  Alert/ oriented/ cooperative    Breath Sounds: Diminished and/or coarse rhonchi    Cough: Strong, effective    Chest X-Ray: Mild consolidation and/or atelectasis.    Sputum Productions: Able to produce small to moderate amount, of moderately thick secretions    History and Physical: Home use of oxygen     SpO2 to Oxygen Need: greater than 90% on  greater than 6L, Vapotherm, oxygen mask greater than 40% or ventilator    Current SpO2 is: 94% on 3L nasal cannula    Based on this information, I have completed the following interventions: Aerobika with bronchodialtor medication or TID      Electronically signed by Tracey Henriquez RRT, 07/24/23, 8:43 PM EDT

## 2023-07-25 NOTE — PROGRESS NOTES
[facility]   Hospitalist Progress Note  Date: 2023  Patient Name: Pavel Dai  : 1955  MRN: [id number]  Date of admission: 2023      Subjective   Subjective     Chief Complaint:   Shortness of breath    Summary:   Pavel Dai is a 68 y.o. male past medical history of rectal cancer, COPD on home oxygen 3L, coronary artery disease, dyslipidemia, hypertension, and heart failure preserved ejection fraction with EF of 61 to 65% and normal stress test in  who presents with not feeling well and shortness of breath.     Patient states he has not been feeling well for a month.  Just weak and fatigued all over.  However, over the last 2 to 3 days has become more short of breath, became acutely worse morning of presentation, 2023.  He has had no known fevers.  Or chills.  As result of his symptoms he came to the ER for further evaluation.  In the emergency department patient was tachycardic at 112, respiratory rate of 18, initial blood pressure 95/66, initially saturating 72% on 3 L nasal cannula, transition to BiPAP.  Elevated white count.  Creatinine 1.95 up from his baseline of less than 1.  Potassium elevated at 5.5.  proBNP 15,000 with a troponin of 200.  ABG 7.2/79.  A lactate of 3.2.  CTA chest shows no pulmonary embolus, prominence of central pulmonary vasculature concern for pulmonary hypertension, mucous plug in the peribronchial opacities and dependent consolidation.  Patient was admitted to the hospital for hypercapnic respiratory failure    Interval Followup:   Troponins continue to downtrend, this morning patient on 3 L nasal cannula breathing comfortably.  Patient's lactate is also trended down to within normal limits.  In the past patient had astral ventilator, however due to cost no longer using.  Pulmonologist and cardiologist consulted on case.    Review of Systems   All systems were reviewed and negative except for: Improving shortness of breath    Objective    Objective     Vitals:   Temp:  [97.5 °F (36.4 °C)-99 °F (37.2 °C)] 98.7 °F (37.1 °C)  Heart Rate:  [] 151  Resp:  [16-24] 16  BP: ()/(56-84) 133/84  Flow (L/min):  [3-5.5] 3  Physical Exam    Constitutional: Awake, alert, no acute distress.  Sitting in bedside chair with nasal cannula in place   Eyes: Pupils equal, sclerae anicteric, no conjunctival injection   HENT: NCAT, mucous membranes moist   Neck: Supple, no thyromegaly, no lymphadenopathy, trachea midline   Respiratory: Poor aeration heard throughout, prolonged expiratory phase, no wheezing   Cardiovascular: Tachycardic with regular rhythm, no murmurs, rubs, or gallops, palpable pedal pulses bilaterally   Gastrointestinal: Positive bowel sounds, soft, nontender, nondistended   Musculoskeletal: No bilateral ankle edema, no clubbing or cyanosis to extremities   Psychiatric: Appropriate affect, cooperative   Neurologic: Oriented x 3, strength symmetric in all extremities, Cranial Nerves grossly intact to confrontation, speech clear   Skin: No rashes     Result Review    Result Review:  I have personally reviewed the results from 7/25/2023 and agree with these findings:  []  Laboratory  []  Microbiology  []  Radiology  []  EKG/Telemetry   []  Cardiology/Vascular   []  Pathology  []  Old records  []  Other:    Assessment & Plan   Assessment / Plan     Assessment/Plan:  Acute hypercapnic respiratory failure requiring BiPAP  Acute COPD exacerbation  Acute on chronic hypoxic respiratory failure  SVT  Chronic diastolic heart failure exacerbation with EF of 61 to 65% 2022  Elevated troponins due to demand versus ACS  Hyperkalemia  Elevated lactate  Acute kidney injury with baseline creatinine from 0.6-0.8 currently 1.95  Mucous plugging seen on CT    Plan:  Patient remains admitted to hospital for further care and management  Cardiology and pulmonology consulted, appreciate assistance  Continue BiPAP at night and with any naps  Troponins have trended  trended, suspecting most likely due to demand ischemia  Echocardiogram has been ordered, will follow-up  Lactate has trended down to normal, hold further IV fluids  Started on treatment for pneumonia given consolidation on CT scan  Given the severity of patient's presentation, will continue with broad-spectrum antibiotics  Therapeutic drug monitoring  Started on prednisone for COPD exacerbation, continue with breathing treatments  Function has improved, continue to monitor  Patient later in SVT with heart rates in the 160  Patient's beta-blocker had been decreased, will resume home dose  Patient was started on diltiazem per cardiology     Discussed plan with RN.    DVT prophylaxis:  Medical DVT prophylaxis orders are present.    CODE STATUS:   Level Of Support Discussed With: Patient  Code Status (Patient has no pulse and is not breathing): CPR (Attempt to Resuscitate)  Medical Interventions (Patient has pulse or is breathing): Full Support  Release to patient: Routine Release

## 2023-07-25 NOTE — SIGNIFICANT NOTE
07/25/23 1528   Plan   Plan GUILLERMO, RN met with patient, son and daughter in law at bedside.  Patient sitting in recliner, reports good family support.  Patient is able to provide own ADL's.  [facility]  GUILLERMO RT is following with possible home NIPPV through [facility].  Facesheet verified.  Will continue to follow patient for possible discharge needs.  No needs voiced at this time.

## 2023-07-25 NOTE — PLAN OF CARE
Goal Outcome Evaluation:  Plan of Care Reviewed With: patient        Progress: no change  Outcome Evaluation: Patient wore his BIPAP last night and tolerated it well.  He will continue to wear at night and as needed.  Patient is currently on a 3lpm nasal cannula which is his home oxygen.

## 2023-07-25 NOTE — PROGRESS NOTES
"[facility] Clinical Pharmacy Services: Vancomycin Monitoring Note    Pavel Dai is a 68 y.o. male who is on day  of pharmacy to dose vancomycin for Pneumonia and Sepsis.    Previous Vancomycin Dose:   1000 mg IV X 1 yesterday  Imaging Reviewed?: Yes  Updated Cultures and Sensitivities:    blood cx- ordered   MRSA PCR ordered   resp cx in process      Vitals/Labs  Ht: 186.7 cm (73.5\"); Wt: 52.8 kg (116 lb 6.5 oz)   Temp (24hrs), Av.4 °F (36.9 °C), Min:97.5 °F (36.4 °C), Max:99 °F (37.2 °C)   Estimated Creatinine Clearance: 71.4 mL/min (by C-G formula based on SCr of 0.76 mg/dL).       Results from last 7 days   Lab Units 23  0425 23  1034   VANCOMYCIN RM mcg/mL 4.10*  --    CREATININE mg/dL 0.76 1.95*   WBC 10*3/mm3 10.67 12.51*     Assessment/Plan    Current Vancomycin Dose:  750 mg IV every 12 hours; which provides the following predicted parameters:  AUC24,ss: 452 mg/L.hr  PAUC*: 70 %  Ctrough,ss: 14.8 mg/L  Pconc*: 17 %  Tox.: 10 %     Vanc Trough ordered for tomorrow.  We will continue to monitor patient changes and renal function     Thank you for involving pharmacy in this patient's care. Please contact pharmacy with any questions or concerns.    Yue Tolentino  Clinical Pharmacist    "

## 2023-07-25 NOTE — PLAN OF CARE
Goal Outcome Evaluation:                   Patient alert and able to make needs known. Maintaining O2 levels on 3 L NC with humidification. Currently visiting with family.

## 2023-07-26 ENCOUNTER — ANESTHESIA (OUTPATIENT)
Dept: GASTROENTEROLOGY | Facility: HOSPITAL | Age: 68
DRG: 177 | End: 2023-07-26
Payer: OTHER GOVERNMENT

## 2023-07-26 LAB
ACB CMPLX DNA BAL NAA+NON-PRB-NCNCRNG: NOT DETECTED
ANION GAP SERPL CALCULATED.3IONS-SCNC: 7.9 MMOL/L (ref 5–15)
BLACTX-M ISLT/SPM QL: NOT DETECTED
BLAIMP ISLT/SPM QL: NOT DETECTED
BLAKPC ISLT/SPM QL: NOT DETECTED
BLAOXA-48-LIKE ISLT/SPM QL: ABNORMAL
BLAVIM ISLT/SPM QL: NOT DETECTED
BUN SERPL-MCNC: 36 MG/DL (ref 8–23)
BUN/CREAT SERPL: 72 (ref 7–25)
C PNEUM DNA NPH QL NAA+NON-PROBE: NOT DETECTED
CALCIUM SPEC-SCNC: 8.6 MG/DL (ref 8.6–10.5)
CHLORIDE SERPL-SCNC: 97 MMOL/L (ref 98–107)
CO2 SERPL-SCNC: 34.1 MMOL/L (ref 22–29)
CREAT SERPL-MCNC: 0.5 MG/DL (ref 0.76–1.27)
E CLOAC COMP DNA BAL NAA+NON-PRB-NCNCRNG: NOT DETECTED
E COLI DNA BAL NAA+NON-PRB-NCNCRNG: NOT DETECTED
EGFRCR SERPLBLD CKD-EPI 2021: 112.5 ML/MIN/1.73
FLUAV SUBTYP SPEC NAA+PROBE: NOT DETECTED
FLUBV RNA ISLT QL NAA+PROBE: NOT DETECTED
GLUCOSE SERPL-MCNC: 85 MG/DL (ref 65–99)
GP B STREP DNA BAL NAA+NON-PRB-NCNCRNG: NOT DETECTED
HADV DNA SPEC NAA+PROBE: NOT DETECTED
HAEM INFLU DNA BAL NAA+NON-PRB-NCNCRNG: NOT DETECTED
HCOV RNA LOWER RESP QL NAA+NON-PROBE: NOT DETECTED
HMPV RNA NPH QL NAA+NON-PROBE: NOT DETECTED
HPIV RNA LOWER RESP QL NAA+NON-PROBE: NOT DETECTED
K AEROGENES DNA BAL NAA+NON-PRB-NCNCRNG: NOT DETECTED
K OXYTOCA DNA BAL NAA+NON-PRB-NCNCRNG: NOT DETECTED
K PNEU GRP DNA BAL NAA+NON-PRB-NCNCRNG: NOT DETECTED
L PNEUMO DNA LOWER RESP QL NAA+NON-PROBE: NOT DETECTED
LYMPHOCYTES NFR FLD MANUAL: 2 %
M CATARRHALIS DNA BAL NAA+NON-PRB-NCNCRNG: NOT DETECTED
M PNEUMO IGG SER IA-ACNC: NOT DETECTED
MECA+MECC ISLT/SPM QL: ABNORMAL
NDM GENE: NOT DETECTED
NEUTROPHILS NFR FLD MANUAL: 98 %
P AERUGINOSA DNA BAL NAA+NON-PRB-NCNCRNG: DETECTED
POTASSIUM SERPL-SCNC: 4.4 MMOL/L (ref 3.5–5.2)
PROTEUS SP DNA BAL NAA+NON-PRB-NCNCRNG: NOT DETECTED
RHINOVIRUS RNA SPEC NAA+PROBE: NOT DETECTED
RSV RNA NPH QL NAA+NON-PROBE: NOT DETECTED
S AUREUS DNA BAL NAA+NON-PRB-NCNCRNG: NOT DETECTED
S MARCESCENS DNA BAL NAA+NON-PRB-NCNCRNG: NOT DETECTED
S PNEUM DNA BAL NAA+NON-PRB-NCNCRNG: NOT DETECTED
S PYO DNA BAL NAA+NON-PRB-NCNCRNG: NOT DETECTED
SODIUM SERPL-SCNC: 139 MMOL/L (ref 136–145)
VANCOMYCIN SERPL-MCNC: 10.3 MCG/ML (ref 5–40)
VISUAL PRESENCE OF BLOOD: NORMAL

## 2023-07-26 PROCEDURE — 94799 UNLISTED PULMONARY SVC/PX: CPT

## 2023-07-26 PROCEDURE — 87102 FUNGUS ISOLATION CULTURE: CPT | Performed by: INTERNAL MEDICINE

## 2023-07-26 PROCEDURE — 87633 RESP VIRUS 12-25 TARGETS: CPT | Performed by: INTERNAL MEDICINE

## 2023-07-26 PROCEDURE — 25010000002 VANCOMYCIN 5 G RECONSTITUTED SOLUTION: Performed by: INTERNAL MEDICINE

## 2023-07-26 PROCEDURE — 88312 SPECIAL STAINS GROUP 1: CPT | Performed by: INTERNAL MEDICINE

## 2023-07-26 PROCEDURE — 99233 SBSQ HOSP IP/OBS HIGH 50: CPT | Performed by: INTERNAL MEDICINE

## 2023-07-26 PROCEDURE — 87186 SC STD MICRODIL/AGAR DIL: CPT | Performed by: INTERNAL MEDICINE

## 2023-07-26 PROCEDURE — 80202 ASSAY OF VANCOMYCIN: CPT | Performed by: INTERNAL MEDICINE

## 2023-07-26 PROCEDURE — 87071 CULTURE AEROBIC QUANT OTHER: CPT | Performed by: INTERNAL MEDICINE

## 2023-07-26 PROCEDURE — 25010000002 ENOXAPARIN PER 10 MG: Performed by: INTERNAL MEDICINE

## 2023-07-26 PROCEDURE — 87206 SMEAR FLUORESCENT/ACID STAI: CPT | Performed by: INTERNAL MEDICINE

## 2023-07-26 PROCEDURE — 88108 CYTOPATH CONCENTRATE TECH: CPT | Performed by: INTERNAL MEDICINE

## 2023-07-26 PROCEDURE — 87205 SMEAR GRAM STAIN: CPT | Performed by: INTERNAL MEDICINE

## 2023-07-26 PROCEDURE — 87077 CULTURE AEROBIC IDENTIFY: CPT | Performed by: INTERNAL MEDICINE

## 2023-07-26 PROCEDURE — 0BCB8ZZ EXTIRPATION OF MATTER FROM LEFT LOWER LOBE BRONCHUS, VIA NATURAL OR ARTIFICIAL OPENING ENDOSCOPIC: ICD-10-PCS | Performed by: INTERNAL MEDICINE

## 2023-07-26 PROCEDURE — 0BC18ZZ EXTIRPATION OF MATTER FROM TRACHEA, VIA NATURAL OR ARTIFICIAL OPENING ENDOSCOPIC: ICD-10-PCS | Performed by: INTERNAL MEDICINE

## 2023-07-26 PROCEDURE — 94761 N-INVAS EAR/PLS OXIMETRY MLT: CPT

## 2023-07-26 PROCEDURE — 0 CEFEPIME PER 500 MG: Performed by: INTERNAL MEDICINE

## 2023-07-26 PROCEDURE — 0BC68ZZ EXTIRPATION OF MATTER FROM RIGHT LOWER LOBE BRONCHUS, VIA NATURAL OR ARTIFICIAL OPENING ENDOSCOPIC: ICD-10-PCS | Performed by: INTERNAL MEDICINE

## 2023-07-26 PROCEDURE — 31624 DX BRONCHOSCOPE/LAVAGE: CPT | Performed by: INTERNAL MEDICINE

## 2023-07-26 PROCEDURE — 94664 DEMO&/EVAL PT USE INHALER: CPT

## 2023-07-26 PROCEDURE — 87116 MYCOBACTERIA CULTURE: CPT | Performed by: INTERNAL MEDICINE

## 2023-07-26 PROCEDURE — 89051 BODY FLUID CELL COUNT: CPT | Performed by: INTERNAL MEDICINE

## 2023-07-26 PROCEDURE — 0B9J8ZX DRAINAGE OF LEFT LOWER LUNG LOBE, VIA NATURAL OR ARTIFICIAL OPENING ENDOSCOPIC, DIAGNOSTIC: ICD-10-PCS | Performed by: INTERNAL MEDICINE

## 2023-07-26 PROCEDURE — 31645 BRNCHSC W/THER ASPIR 1ST: CPT | Performed by: INTERNAL MEDICINE

## 2023-07-26 PROCEDURE — 80048 BASIC METABOLIC PNL TOTAL CA: CPT | Performed by: INTERNAL MEDICINE

## 2023-07-26 PROCEDURE — 87070 CULTURE OTHR SPECIMN AEROBIC: CPT | Performed by: INTERNAL MEDICINE

## 2023-07-26 PROCEDURE — 25010000002 PROPOFOL 10 MG/ML EMULSION: Performed by: NURSE ANESTHETIST, CERTIFIED REGISTERED

## 2023-07-26 PROCEDURE — 5A09357 ASSISTANCE WITH RESPIRATORY VENTILATION, LESS THAN 24 CONSECUTIVE HOURS, CONTINUOUS POSITIVE AIRWAY PRESSURE: ICD-10-PCS | Performed by: INTERNAL MEDICINE

## 2023-07-26 PROCEDURE — 63710000001 PREDNISONE PER 1 MG: Performed by: INTERNAL MEDICINE

## 2023-07-26 RX ORDER — PROPOFOL 10 MG/ML
VIAL (ML) INTRAVENOUS AS NEEDED
Status: DISCONTINUED | OUTPATIENT
Start: 2023-07-26 | End: 2023-07-26 | Stop reason: SURG

## 2023-07-26 RX ORDER — LIDOCAINE HYDROCHLORIDE 40 MG/ML
INJECTION, SOLUTION RETROBULBAR; TOPICAL AS NEEDED
Status: DISCONTINUED | OUTPATIENT
Start: 2023-07-26 | End: 2023-07-26 | Stop reason: HOSPADM

## 2023-07-26 RX ORDER — LIDOCAINE HYDROCHLORIDE 20 MG/ML
INJECTION, SOLUTION EPIDURAL; INFILTRATION; INTRACAUDAL; PERINEURAL AS NEEDED
Status: DISCONTINUED | OUTPATIENT
Start: 2023-07-26 | End: 2023-07-26 | Stop reason: SURG

## 2023-07-26 RX ORDER — SODIUM CHLORIDE, SODIUM LACTATE, POTASSIUM CHLORIDE, CALCIUM CHLORIDE 600; 310; 30; 20 MG/100ML; MG/100ML; MG/100ML; MG/100ML
INJECTION, SOLUTION INTRAVENOUS CONTINUOUS PRN
Status: DISCONTINUED | OUTPATIENT
Start: 2023-07-26 | End: 2023-07-26 | Stop reason: SURG

## 2023-07-26 RX ADMIN — BUDESONIDE 0.5 MG: 0.5 SUSPENSION RESPIRATORY (INHALATION) at 18:42

## 2023-07-26 RX ADMIN — PROPOFOL 70 MG: 10 INJECTION, EMULSION INTRAVENOUS at 14:36

## 2023-07-26 RX ADMIN — IPRATROPIUM BROMIDE AND ALBUTEROL SULFATE 3 ML: .5; 3 SOLUTION RESPIRATORY (INHALATION) at 02:42

## 2023-07-26 RX ADMIN — ARFORMOTEROL TARTRATE 15 MCG: 15 SOLUTION RESPIRATORY (INHALATION) at 06:42

## 2023-07-26 RX ADMIN — PREDNISONE 40 MG: 20 TABLET ORAL at 08:19

## 2023-07-26 RX ADMIN — Medication 10 ML: at 08:20

## 2023-07-26 RX ADMIN — IPRATROPIUM BROMIDE AND ALBUTEROL SULFATE 3 ML: .5; 3 SOLUTION RESPIRATORY (INHALATION) at 18:42

## 2023-07-26 RX ADMIN — IPRATROPIUM BROMIDE AND ALBUTEROL SULFATE 3 ML: .5; 3 SOLUTION RESPIRATORY (INHALATION) at 06:41

## 2023-07-26 RX ADMIN — ATORVASTATIN CALCIUM 10 MG: 10 TABLET, FILM COATED ORAL at 20:59

## 2023-07-26 RX ADMIN — CEFEPIME 2000 MG: 2 INJECTION, POWDER, FOR SOLUTION INTRAVENOUS at 08:19

## 2023-07-26 RX ADMIN — SODIUM CHLORIDE, POTASSIUM CHLORIDE, SODIUM LACTATE AND CALCIUM CHLORIDE: 600; 310; 30; 20 INJECTION, SOLUTION INTRAVENOUS at 14:31

## 2023-07-26 RX ADMIN — METOPROLOL SUCCINATE 50 MG: 50 TABLET, EXTENDED RELEASE ORAL at 08:19

## 2023-07-26 RX ADMIN — IPRATROPIUM BROMIDE AND ALBUTEROL SULFATE 3 ML: .5; 3 SOLUTION RESPIRATORY (INHALATION) at 22:45

## 2023-07-26 RX ADMIN — VANCOMYCIN HYDROCHLORIDE 1000 MG: 5 INJECTION, POWDER, LYOPHILIZED, FOR SOLUTION INTRAVENOUS at 08:19

## 2023-07-26 RX ADMIN — LIDOCAINE HYDROCHLORIDE 100 MG: 20 INJECTION, SOLUTION EPIDURAL; INFILTRATION; INTRACAUDAL; PERINEURAL at 14:36

## 2023-07-26 RX ADMIN — ENOXAPARIN SODIUM 40 MG: 100 INJECTION SUBCUTANEOUS at 20:59

## 2023-07-26 RX ADMIN — IPRATROPIUM BROMIDE AND ALBUTEROL SULFATE 3 ML: .5; 3 SOLUTION RESPIRATORY (INHALATION) at 16:24

## 2023-07-26 RX ADMIN — PROPOFOL 150 MCG/KG/MIN: 10 INJECTION, EMULSION INTRAVENOUS at 14:36

## 2023-07-26 RX ADMIN — CEFEPIME 2000 MG: 2 INJECTION, POWDER, FOR SOLUTION INTRAVENOUS at 15:43

## 2023-07-26 RX ADMIN — ARFORMOTEROL TARTRATE 15 MCG: 15 SOLUTION RESPIRATORY (INHALATION) at 18:42

## 2023-07-26 RX ADMIN — Medication 10 ML: at 13:51

## 2023-07-26 RX ADMIN — VANCOMYCIN HYDROCHLORIDE 1000 MG: 5 INJECTION, POWDER, LYOPHILIZED, FOR SOLUTION INTRAVENOUS at 20:59

## 2023-07-26 RX ADMIN — ASPIRIN 81 MG: 81 TABLET, CHEWABLE ORAL at 08:19

## 2023-07-26 RX ADMIN — Medication 10 ML: at 20:59

## 2023-07-26 RX ADMIN — IPRATROPIUM BROMIDE AND ALBUTEROL SULFATE 3 ML: .5; 3 SOLUTION RESPIRATORY (INHALATION) at 12:10

## 2023-07-26 RX ADMIN — BUDESONIDE 0.5 MG: 0.5 SUSPENSION RESPIRATORY (INHALATION) at 06:42

## 2023-07-26 RX ADMIN — CEFEPIME 2000 MG: 2 INJECTION, POWDER, FOR SOLUTION INTRAVENOUS at 01:37

## 2023-07-26 RX ADMIN — METOPROLOL SUCCINATE 50 MG: 50 TABLET, EXTENDED RELEASE ORAL at 20:59

## 2023-07-26 NOTE — PLAN OF CARE
Goal Outcome Evaluation:   Patient wore the BiPAP last night with a continuous pulse oximeter attached. Patient wore the BiPAP with the settings of 14/7 and 30% FiO2. Patient is resting comfortably, awakens to voice and shows no signs of distress. Patient wears 3L NC when off of BiPAP.

## 2023-07-26 NOTE — CONSULTS
"Nutrition Services    Patient Name: Pavel Dai  YOB: 1955  MRN: [id number]  Admission date: 7/24/2023      CLINICAL NUTRITION ASSESSMENT      Reason for Assessment  Identified at risk by screening criteria, BMI, Physician consult     H&P:    Past Medical History:   Diagnosis Date    Anemia due to chemotherapy 12/13/2021    Cancer related pain 1/3/2022    CHF (congestive heart failure)     COPD (chronic obstructive pulmonary disease)     Coronary artery disease     Frequent urination     Hyperlipidemia     Hypertension     Iron deficiency anemia due to chronic blood loss 12/13/2021    Rectal cancer         Current Problems:   Active Hospital Problems    Diagnosis     **Hypercapnic respiratory failure     Acute on chronic respiratory failure with hypoxia and hypercapnia     COPD exacerbation         Nutrition/Diet History         Narrative     RD assessed pt 2/2 consult per admission screen and low BMI of 15.32. Pt is admitted d/t respiratory failure. PMH significant for rectal CA, COPD, CAD, and HF.     Per chart review, pt has lost significant wt x 2 wks. Pt is NPO today. Intake @ dinner yesterday was 75%.     RD visited pt. Pt is agreeable to ONS once diet resumes. RD to order when diet advances. NFPE performed, see findings below. Pt meets criteria for severe malnutrition.     RD will continue to monitor per protocol.        Anthropometrics        Current Height, Weight Height: 186.7 cm (73.5\")  Weight: 53.4 kg (117 lb 11.6 oz)   Current BMI Body mass index is 15.32 kg/m².       Weight Hx  Wt Readings from Last 30 Encounters:   07/26/23 0554 53.4 kg (117 lb 11.6 oz)   07/24/23 2041 52.8 kg (116 lb 6.5 oz)   07/24/23 1126 55.5 kg (122 lb 5.7 oz)   07/12/23 1040 57.1 kg (125 lb 14.1 oz)   04/25/23 1343 55.9 kg (123 lb 3.8 oz)   01/11/23 0954 55.6 kg (122 lb 9.2 oz)   10/27/22 1054 55.8 kg (123 lb 0.3 oz)   08/08/22 0237 56.6 kg (124 lb 12.5 oz)   08/08/22 0109 56.6 kg (124 lb 12.5 oz) "   08/07/22 2319 56.2 kg (124 lb)   05/26/22 1126 56.3 kg (124 lb 1.9 oz)   05/18/22 1305 56 kg (123 lb 7.3 oz)   04/27/22 1040 56 kg (123 lb 7.3 oz)   02/09/22 1126 58.1 kg (128 lb 1.4 oz)   01/31/22 1445 54.3 kg (119 lb 11.4 oz)   01/26/22 0842 55.4 kg (122 lb 2.2 oz)   01/05/22 1046 55.3 kg (121 lb 14.6 oz)   01/04/22 1043 55.2 kg (121 lb 11.1 oz)   01/03/22 1137 54 kg (119 lb 0.8 oz)   12/29/21 1051 55.1 kg (121 lb 7.6 oz)   12/28/21 1043 54.9 kg (121 lb 0.5 oz)   12/22/21 1033 57.1 kg (125 lb 14.1 oz)   12/21/21 1042 55.9 kg (123 lb 3.8 oz)   12/20/21 0928 55.3 kg (121 lb 14.6 oz)   12/16/21 1045 55.6 kg (122 lb 9.2 oz)   12/15/21 1048 55.6 kg (122 lb 9.2 oz)   12/13/21 0821 55 kg (121 lb 4.1 oz)   12/09/21 1046 56.9 kg (125 lb 7.1 oz)   12/07/21 1044 56.9 kg (125 lb 7.1 oz)   12/01/21 1031 57.5 kg (126 lb 12.2 oz)   11/30/21 1046 57 kg (125 lb 10.6 oz)   11/29/21 0918 57 kg (125 lb 10.6 oz)   11/24/21 1058 57.3 kg (126 lb 5.2 oz)   11/03/21 1440 56.5 kg (124 lb 9 oz)            Wt Change Observation -5% x 2 wks     Estimated/Assessed Needs       Energy Requirements 25-30 kcal/kg IBW   EST Needs (kcal/day) 8264-8169       Protein Requirements 1.0-1.2 g/kg   EST Daily Needs (g/day) 81-97       Fluid Requirements 25 ml/kg    Estimated Needs (mL/day) 2025     Labs/Medications         Pertinent Labs Reviewed.   Results from last 7 days   Lab Units 07/26/23  0456 07/25/23  0817 07/25/23  0425 07/24/23  1040 07/24/23  1034   SODIUM mmol/L 139  --  141  --  139   SODIUM, ARTERIAL mmol/L  --  139.8  --    < >  --    POTASSIUM mmol/L 4.4  --  4.1  --  5.5*   CHLORIDE mmol/L 97*  --  97*  --  94*   CO2 mmol/L 34.1*  --  34.2*  --  33.9*   BUN mg/dL 36*  --  34*  --  38*   CREATININE mg/dL 0.50*  --  0.76  --  1.95*   CALCIUM mg/dL 8.6  --  8.8  --  8.6   BILIRUBIN mg/dL  --   --  <0.2  --  0.2   ALK PHOS U/L  --   --  63  --  85   ALT (SGPT) U/L  --   --  76*  --  70*   AST (SGOT) U/L  --   --  55*  --  52*   GLUCOSE  mg/dL 85  --  102*  --  139*   GLUCOSE, ARTERIAL mg/dL  --  94  --    < >  --     < > = values in this interval not displayed.     Results from last 7 days   Lab Units 07/25/23  0425   MAGNESIUM mg/dL 2.0   HEMOGLOBIN g/dL 11.5*   HEMATOCRIT % 39.7   TRIGLYCERIDES mg/dL 80     COVID19   Date Value Ref Range Status   07/24/2023 Not Detected Not Detected - Ref. Range Final     Lab Results   Component Value Date    HGBA1C 5.50 07/24/2023         Pertinent Medications Reviewed.     Current Nutrition Orders & Evaluation of Intake       Oral Nutrition     Current PO Diet NPO Diet NPO Type: Strict NPO   Supplement No active supplement orders       Malnutrition Severity Assessment      Patient meets criteria for : Severe Malnutrition  Malnutrition Type (last 8 hours)       Malnutrition Severity Assessment       Row Name 07/26/23 1002       Malnutrition Severity Assessment    Malnutrition Type Chronic Disease - Related Malnutrition      Row Name 07/26/23 1002       Unintentional Weight Loss     Unintentional Weight Loss Findings Severe    Unintentional Weight Loss  Weight loss greater than 2% in one week      Row Name 07/26/23 1002       Muscle Loss    Loss of Muscle Mass Findings Severe    [place] Region Severe - deep hollowing/scooping, lack of muscle to touch, facial bones well defined    Clavicle Bone Region Severe - protruding prominent bone    Acromion Bone Region Severe - squared shoulders, bones, and acromion process protrusion prominent    Dorsal Hand Region Severe - prominent depression    Patellar Region Severe - prominent bone, square looking, very little muscle definition    Anterior Thigh Region Severe - line/depression along thigh, obviously thin    Posterior Calf Region Severe - thin with very little definition/firmness      Row Name 07/26/23 1002       Fat Loss    Subcutaneous Fat Loss Findings Severe    Orbital Region  Severe - pronounced hollowness/depression, dark circles, loose saggy skin    Upper Arm  Region Severe - mostly skin, very little space between folds, fingers touch    Thoracic & Lumbar Region Severe - ribs visible with prominent depressions, iliac crest very prominent      Row Name 07/26/23 1002       Criteria Met (Must meet criteria for severity in at least 2 of these categories: M Wasting, Fat Loss, Fluid, Secondary Signs, Wt. Status, Intake)    Patient meets criteria for  Severe Malnutrition                       Nutrition Diagnosis         Nutrition Dx Problem 1 Severe malnutrition related to decreased ability to consume sufficient energy as evidenced by unintended wt change., body composition changes., and NPO       Nutrition Intervention         When diet advances, order:    Boost Plus (alban)  TID   +1080 kcal, 42 g PRO/day     Medical Nutrition Therapy/Nutrition Education          Learner     Readiness Patient  Acceptance     Method     Response Explanation  Verbalizes understanding     Monitor/Evaluation        Monitor Per protocol, Weight, POC/GOC, Diet advancement       Nutrition Discharge Plan         To be determined       Electronically signed by:  Beverly Beltran RD  07/26/23 10:03 EDT

## 2023-07-26 NOTE — PLAN OF CARE
Goal Outcome Evaluation:      VSS. UOP. Bronch today; currently on 5L n/c humidified. No c/o pain. Karuna Schuster RN        Progress: no change             Alert and oriented x 3, normal mood and affect, no apparent risk to self or others.

## 2023-07-26 NOTE — ANESTHESIA PREPROCEDURE EVALUATION
Anesthesia Evaluation     Patient summary reviewed and Nursing notes reviewed   no history of anesthetic complications:   NPO Solid Status: > 8 hours  NPO Liquid Status: > 2 hours           Airway   Mallampati: II  TM distance: >3 FB  Neck ROM: full  No difficulty expected  Dental    (+) edentulous    Pulmonary    (+) a smoker Former, COPD,shortness of breath, decreased breath sounds (poor overall), rales (right)  Cardiovascular - normal exam  Exercise tolerance: poor (<4 METS)    PT is on anticoagulation therapy  Patient on routine beta blocker and Beta blocker given within 24 hours of surgery  Rhythm: regular  Rate: normal    (+) hypertension, CADCHF , hyperlipidemia      Neuro/Psych- negative ROS  GI/Hepatic/Renal/Endo    (+) GERD, GI bleeding lower     Musculoskeletal (-) negative ROS    Abdominal    Substance History - negative use     OB/GYN negative ob/gyn ROS         Other - negative ROS       ROS/Med Hx Other: PAT Nursing Notes unavailable.                 Anesthesia Plan    ASA 4     general and MAC     (Patient understands anesthesia not responsible for dental damage.    Elevated risk of respiratory failure explained. )  intravenous induction     Anesthetic plan, risks, benefits, and alternatives have been provided, discussed and informed consent has been obtained with: patient.    Use of blood products discussed with patient .    Plan discussed with CRNA.    CODE STATUS:    Level Of Support Discussed With: Patient  Code Status (Patient has no pulse and is not breathing): CPR (Attempt to Resuscitate)  Medical Interventions (Patient has pulse or is breathing): Full Support  Release to patient: Routine Release

## 2023-07-26 NOTE — PROGRESS NOTES
[facility]   Hospitalist Progress Note  Date: 2023  Patient Name: Pavel Dai  : 1955  MRN: [id number]  Date of admission: 2023      Subjective   Subjective     Chief Complaint:   Shortness of breath    Summary:   Pavel Dai is a 68 y.o. male past medical history of rectal cancer, COPD on home oxygen 3L, coronary artery disease, dyslipidemia, hypertension, and heart failure preserved ejection fraction with EF of 61 to 65% and normal stress test in  who presents with not feeling well and shortness of breath.     Patient states he has not been feeling well for a month.  Just weak and fatigued all over.  However, over the last 2 to 3 days has become more short of breath, became acutely worse morning of presentation, 2023.  He has had no known fevers.  Or chills.  As result of his symptoms he came to the ER for further evaluation.  In the emergency department patient was tachycardic at 112, respiratory rate of 18, initial blood pressure 95/66, initially saturating 72% on 3 L nasal cannula, transition to BiPAP.  Elevated white count.  Creatinine 1.95 up from his baseline of less than 1.  Potassium elevated at 5.5.  proBNP 15,000 with a troponin of 200.  ABG 7.2/79.  A lactate of 3.2.  CTA chest shows no pulmonary embolus, prominence of central pulmonary vasculature concern for pulmonary hypertension, mucous plug in the peribronchial opacities and dependent consolidation.  Patient was admitted to the hospital for hypercapnic respiratory failure    Interval Followup:   Patient's SVT treated with diltiazem drip yesterday, weaned off successfully.  Patient going for bronchoscopy today with pulmonologist.  No other acute issues overnight, breathing comfortably on nasal cannula this morning, 4 L    Review of Systems   All systems were reviewed and negative except for: Improving shortness of breath    Objective   Objective     Vitals:   Temp:  [97.2 °F (36.2 °C)-99.4 °F (37.4  °C)] 97.6 °F (36.4 °C)  Heart Rate:  [] 97  Resp:  [13-24] 13  BP: ()/(56-86) 122/75  Flow (L/min):  [3-5] 4  Physical Exam    Constitutional: Awake, alert, no acute distress.  Sitting in bedside chair with nasal cannula in place   Eyes: Pupils equal, sclerae anicteric, no conjunctival injection   HENT: NCAT, mucous membranes moist   Neck: Supple, no thyromegaly, no lymphadenopathy, trachea midline   Respiratory: Poor aeration heard throughout, prolonged expiratory phase, no wheezing   Cardiovascular: Tachycardic with regular rhythm, no murmurs, rubs, or gallops, palpable pedal pulses bilaterally   Gastrointestinal: Positive bowel sounds, soft, nontender, nondistended   Musculoskeletal: No bilateral ankle edema, no clubbing or cyanosis to extremities   Psychiatric: Appropriate affect, cooperative   Neurologic: Oriented x 3, strength symmetric in all extremities, Cranial Nerves grossly intact to confrontation, speech clear   Skin: No rashes     Result Review    Result Review:  I have personally reviewed the results from 7/26/2023 and agree with these findings:  []  Laboratory  []  Microbiology  []  Radiology  []  EKG/Telemetry   []  Cardiology/Vascular   []  Pathology  []  Old records  []  Other:    Assessment & Plan   Assessment / Plan     Assessment/Plan:  Acute hypercapnic respiratory failure requiring BiPAP  Acute COPD exacerbation  Acute on chronic hypoxic respiratory failure  SVT  Chronic diastolic heart failure exacerbation with EF of 61 to 65% 2022  Elevated troponins due to demand versus ACS  Hyperkalemia  Elevated lactate  Acute kidney injury with baseline creatinine from 0.6-0.8 currently 1.95  Mucous plugging seen on CT    Plan:  Patient remains admitted to hospital for further care and management  Cardiology and pulmonology consulted, appreciate assistance  Bronchoscopy to be performed today  Continue BiPAP at night and with any naps  Echocardiogram has been ordered, will follow-up  Started  on treatment for pneumonia given consolidation on CT scan  Given the severity of patient's presentation, will continue with broad-spectrum antibiotics  Therapeutic drug monitoring, plan to narrow antibiotics with results of bronchoscopy  Started on prednisone for COPD exacerbation, continue with breathing treatments  Now off diltiazem drip, heart rate controlled  Continuing home beta-blocker dose     Discussed plan with RN, pulmonologist    DVT prophylaxis:  Medical DVT prophylaxis orders are present.    CODE STATUS:   Level Of Support Discussed With: Patient  Code Status (Patient has no pulse and is not breathing): CPR (Attempt to Resuscitate)  Medical Interventions (Patient has pulse or is breathing): Full Support  Release to patient: Routine Release

## 2023-07-26 NOTE — PROGRESS NOTES
"[facility] Clinical Pharmacy Services: Vancomycin Monitoring Note    Pavel Dai is a 68 y.o. male who is on day 3/7 of pharmacy to dose vancomycin for Pneumonia and Sepsis.    Previous Vancomycin Dose:   750mg IV q12h  Imaging Reviewed?: Yes  Updated Cultures and Sensitivities:    blood cx- NGTD @ 24 hours   MRSA PCR ordered   resp cx in process      Vitals/Labs  Ht: 186.7 cm (73.5\"); Wt: 53.4 kg (117 lb 11.6 oz)   Temp (24hrs), Av.5 °F (36.9 °C), Min:97.2 °F (36.2 °C), Max:99.4 °F (37.4 °C)   Estimated Creatinine Clearance: 109.8 mL/min (A) (by C-G formula based on SCr of 0.5 mg/dL (L)).       Results from last 7 days   Lab Units 23  0456 23  0425 23  1034   VANCOMYCIN RM mcg/mL 10.30 4.10*  --    CREATININE mg/dL 0.50* 0.76 1.95*   WBC 10*3/mm3  --  10.67 12.51*     Assessment/Plan    Current Vancomycin Dose:  1000 mg IV every 12 hours; which provides the following predicted parameters:  AUC24,ss: 404 mg/L.hr  PAUC*: 52 %  Ctrough,ss: 11.9 mg/L  Pconc*: 13 %  Tox.: 7 %     Will need follow-up level in a few days.   We will continue to monitor patient changes and renal function     Thank you for involving pharmacy in this patient's care. Please contact pharmacy with any questions or concerns.    Jocy Chacon [facility]  Clinical Pharmacist  "

## 2023-07-26 NOTE — OP NOTE
Bronchoscopy Procedure Note    Procedure:  Bronchoscopy with mucous plug removal  Bronchoscopy with bronchoalveolar lavage left lower lobe  Bronchoscopy with bronchial washings tracheobronchial tree  Airway inspection    Pre-Operative Diagnosis: COPD with acute exacerbation, persistent bronchitis, mucous plugging, lower lung collapse bilaterally    Post-Operative Diagnosis: Severe mucous plugging, suctioned out in multiple attempts, very friable airway, chronic bronchitis    Indication:  Significant mucus plugging, chronic bronchitis, difficulty with airway clearance    Anesthesia: MAC anesthesia    Procedure Details: Patient was consented for the procedure with all risks and benefits of the procedure explained in detail. Patient was given the opportunity to ask questions and all concerns were answered.    The bronchoscope was inserted into the main airway via the mouth. An anatomical survey was done of the main airways and the subsegmental bronchus. The findings are reported below.  Significant mucus plugging was seen in trachea and bilateral lower lobe bronchial tubes, suctioned out in multiple attempts.  Airway was very friable and was wheezing with suctioning.  A bronchoalveolar lavage was performed using 60 mL aliquots of normal saline x2 instilled into the airways then aspirated back from left lower lobe of lung with 45 mL serosanguineous fluid in return.  Bronchial washing was obtained from entire tracheobronchial tree.    Findings:  Significant mucus plugging was seen in trachea and bilateral lower lobe bronchial tubes, suctioned out in multiple attempts    Airway was very friable and was wheezing with suctioning  Friable mucosa, easy bleeding with suction  Scattered purulent secretions    Estimated Blood Loss: Mild, Less than 2 cc    Specimens:  BAL left lower lobe  Bronchial washings tracheobronchial tree    Complications: None; patient tolerated the procedure well.    Disposition: To [facility], once  stable in recovery.    Patient tolerated the procedure well.      Electronically signed by Ted Petty MD, 7/26/2023, 14:47 EDT.

## 2023-07-26 NOTE — PROGRESS NOTES
Pulmonary / Critical Care Progress Note      Patient Name: Pavel Dai  : 1955  MRN: [id number]  Primary Care Physician:  Yoel Geller MD  Date of admission: 2023    Subjective   Subjective   Follow-up for acute on chronic hypercapnic respiratory failure, pneumonia.    Over past 24 hours, continues on cefepime and vancomycin, remains on prednisone and nebulizers.    No acute events overnight.  Did not wear NIPPV.    This morning,   Lying in bed on 3 L nasal cannula  O2 sats 94%  Continues to have difficulty with airway clearance  Feels like secretions are stuck in airway  Reports occasional cough with scant white sputum  Remains weak and fatigued  No fever or chills  Denies chest pain or hemoptysis    Review of Systems  General:  + Fatigue, No Fever  HEENT: No dysphagia, No Visual Changes, no rhinorrhea  Respiratory:  + Productive cough,+Dyspnea, + phlegm, No Pleuritic Pain, + wheezing, no hemoptysis  Cardiovascular: Denies chest pain, denies palpitations, +COELHO, No Chest Pressure  Gastrointestinal:  No Abdominal Pain, No Nausea, No Vomiting, No Diarrhea  Genitourinary:  No Dysuria, No Frequency, No Hesitancy  Musculoskeletal: No muscle pain or swelling  Endocrine:  No Heat Intolerance, No Cold Intolerance  Hematologic:  No Bleeding, No Bruising  Psychiatric:  No Anxiety, No Depression  Neurologic:  No Confusion, no Dysarthria, No Headaches  Skin:  No Rash, No Open Wounds    Objective   Objective     Vitals:   Temp:  [97.2 °F (36.2 °C)-99.4 °F (37.4 °C)] 98.3 °F (36.8 °C)  Heart Rate:  [] 95  Resp:  [15-24] 18  BP: ()/(62-86) 131/80  Flow (L/min):  [3-5.5] 3    Physical Exam   Vital Signs Reviewed   General: Cachectic appearing male, NAD, lying in bed on 5 L NC    HEENT:  PERRL, EOMI.  OP, nares clear, no sinus tenderness  Neck:  Supple, no JVD, no thyromegaly  Lymph: no axillary, cervical, supraclavicular lymphadenopathy noted bilaterally  Chest: Poor aeration, bilateral scattered  rhonchi, bilateral expiratory wheezing, no crackles, tympanic to percussion bilaterally, mildly increased work of breathing noted  CV: RRR, no MGR, pulses 2+, equal  Abd:  Soft, NT, ND, + BS, no HSM  EXT:  no clubbing, no cyanosis, no edema, no joint tenderness  Neuro:  A&Ox3, CN grossly intact, no focal deficits, generalized weakness  Skin: No rashes or lesions noted    Result Review    Result Review:  I have personally reviewed the results from the time of this admission to 7/26/2023 07:41 EDT and agree with these findings:  [x]  Laboratory  [x]  Microbiology  [x]  Radiology  [x]  EKG/Telemetry   [x]  Cardiology/Vascular   []  Pathology  []  Old records  []  Other:  Most notable findings include:   Lactate 4.4 --> 1.8  proBNP -15,765  ABG 7.2/79/57 --> 7.4/59/59    S pneumo/Legionella negative  Sputum culture NGTD    7/24 echo - EF 61 to 65%, grade 1 diastolic dysfunction, moderately elevated RVSP 45 to 55 mmHg        Lab 07/26/23  0456 07/25/23  0817 07/25/23  0425 07/24/23  1040 07/24/23  1034   WBC  --   --  10.67  --  12.51*   HEMOGLOBIN  --   --  11.5*  --  12.4*   HEMATOCRIT  --   --  39.7  --  42.0   PLATELETS  --   --  154  --  224   SODIUM 139  --  141  --  139   SODIUM, ARTERIAL  --  139.8  --  139.2  --    POTASSIUM 4.4  --  4.1  --  5.5*   CHLORIDE 97*  --  97*  --  94*   CO2 34.1*  --  34.2*  --  33.9*   BUN 36*  --  34*  --  38*   CREATININE 0.50*  --  0.76  --  1.95*   GLUCOSE 85  --  102*  --  139*   GLUCOSE, ARTERIAL  --  94  --  133*  --    CALCIUM 8.6  --  8.8  --  8.6   TOTAL PROTEIN  --   --  6.4  --  7.2   ALBUMIN  --   --  3.2*  --  3.7   GLOBULIN  --   --  3.2  --  3.5     Assessment & Plan   Assessment / Plan     Active Hospital Problems:  Active Hospital Problems    Diagnosis     **Hypercapnic respiratory failure     Acute on chronic respiratory failure with hypoxia and hypercapnia     COPD exacerbation      Impression:   Acute on chronic hypoxic hypercapnic respiratory failure requiring  NIPPV  COPD with acute exacerbation  Mucous plugging/difficulty with airway clearance  Acute exacerbation of diastolic heart failure: proBNP 15, 575  Respiratory acidosis, acute  Elevated lactate: improved  FABIAN  Tobacco abuse in remission  History of rectal cancer    Plan:   -On 5 L nasal cannula, continue to wean to maintain SPO2 greater than 90%.  -Continue BiPAP 14/7 at night and days with naps.  -He will need noninvasive ventilation with Astral for acute on chronic respiratory failure secondary to COPD.  Bilevel is ruled out as treatment option because it cannot provide IVAPS AE or AVAPS AE mode, which is necessary to deliver targeted tidal volume and minute ventilation to assure adequate ventilation. NIV with Astral also provides back up rate, alarms and battery back up to ensure patient safety. 2023 GOLD guidelines state non invasive mechanical ventilation should be the first mode of ventilation used in COPD with respiratory failure to decrease hospitalization and improve survival. Failure to follow recommended guidelines will place patient at increased risk of rehospitalization, acute on chronic respiratory failure and mortality.   -Appreciate RT  assistance in arranging NIV for home.  -CT chest reviewed revealing mucous plugging in left lower lobe  -Will take for bronchoscopy today with airway inspection, brushings, and bronchoalveolar lavage. I have discussed the risks of the procedure with the patient including pneumothorax, hemothorax, bleeding, hypoxia, required mechanical ventilation and death. The patient recognizes these findings, acknowledges these findings and is agreeable to the procedure.  -N.p.o.   -Cultures negative to date.  Continue vancomycin and cefepime, may de-escalate based off cultures.  -Continue prednisone 40 mg p.o. daily.  -Continue Brovana, Pulmicort and DuoNeb.  -Continue airway clearance therapy with MetaNeb.  -Continue bronchopulmonary hygiene.  Encourage I-S and  flutter valve.  -Cardiology on board, appreciate assistance.  -Echo reviewed revealing EF 61 to 65%, grade 1 diastolic dysfunction, RVSP moderately elevated 45 to 55 mmHg.  -Encourage mobilization, out of bed to chair.    DVT prophylaxis:  Medical DVT prophylaxis orders are present.    CODE STATUS:   Level Of Support Discussed With: Patient  Code Status (Patient has no pulse and is not breathing): CPR (Attempt to Resuscitate)  Medical Interventions (Patient has pulse or is breathing): Full Support  Release to patient: Routine Release    I personally reviewed pertinent labs, imaging and provider notes. Discussed with bedside nurse and will discuss with primary service.     Electronically signed by JOSE Rudolph, 07/26/23, 1:25 PM EDT.    This visit was performed by BOTH a physician and an APC. I personally evaluated and examined the patient. I performed all aspects of MDM as documented. , I have reviewed and confirmed the accuracy of the patient's history as documented in this note., and I have reexamined the patient and the results are consistent with the previously documented exam. I have updated the documentation as necessary.     Electronically signed by Ted Petty MD, 07/26/23, 2:14 PM EDT.   Electronically signed by Ted Petty MD, 7/26/2023, 07:41 EDT.

## 2023-07-26 NOTE — PLAN OF CARE
Goal Outcome Evaluation:  Plan of Care Reviewed With: patient        Progress: no change  Outcome Evaluation: Patient remains on 3lpm humidified nasal cannula with BIPAP at bedside. He did wear our V60 BIPAP last night and was compliant with usage. This unit will remain at bedside today and tonight for PRN/nightly use. Patient used MetaNeb this morning as well with his breathing treatments and tolerated great.

## 2023-07-26 NOTE — ANESTHESIA POSTPROCEDURE EVALUATION
Patient: Pavel Dai    Procedure Summary       Date: 07/26/23 Room / Location: [facility] ENDOSCOPY 3 / [facility] ENDOSCOPY    Anesthesia Start: 1431 Anesthesia Stop: 1500    Procedure: BRONCHOSCOPY WITH BAL AND WASHING (Bronchus) Diagnosis:       Acute on chronic respiratory failure with hypoxia and hypercapnia      COPD exacerbation      (Acute on chronic respiratory failure with hypoxia and hypercapnia [J96.21, J96.22])      (COPD exacerbation [J44.1])    Surgeons: Ted Petty MD Provider: Bertha Garcia MD    Anesthesia Type: general, MAC ASA Status: 4            Anesthesia Type: general, MAC    Vitals  Vitals Value Taken Time   /74 07/26/23 1505   Temp 36.5 °C (97.7 °F) 07/26/23 1500   Pulse 100 07/26/23 1505   Resp 20 07/26/23 1505   SpO2 92 % 07/26/23 1505           Post Anesthesia Care and Evaluation    Patient location during evaluation: bedside  Patient participation: complete - patient participated  Level of consciousness: awake  Pain management: adequate    Airway patency: patent  PONV Status: none  Cardiovascular status: acceptable and stable  Respiratory status: acceptable  Hydration status: acceptable    Comments: An Anesthesiologist personally participated in the most demanding procedures (including induction and emergence if applicable) in the anesthesia plan, monitored the course of anesthesia administration at frequent intervals and remained physically present and available for immediate diagnosis and treatment of emergencies.

## 2023-07-26 NOTE — PROGRESS NOTES
Date of service: 7/26/2023    Subjective: Breathing is better.  No chest pain, palpitations or lightheadedness.    Review of systems: Negative for headaches, vertigo, nausea, vomiting, abdominal pain, fever, chills, TIA or CVA-like symptoms.    Physical examination: He was in no pain respiratory distress  Vital signs: Reviewed  HEENT: Sclerae: Nonicteric.    Neck: No JVD or carotid bruit.   Chest: No wheezes or rales.  Decreased breath sounds all over.  Cardiac: RRR.  No murmurs or S3 gallop  Abdomen: Soft and nontender.  No megalies or masses.  Extremities: No edema, cyanosis or clubbing.   Pulses: Intact.  Neuro: Alert, oriented x3, no facial droop and speech was clear     Records: Reviewed     Assessment and plan:     Acute respiratory failure and acute exacerbation of COPD, improving.  Elevated Troponin T, mostly 2ry to demand ischemia due to the above.  CAD is still considered.  Acute diastolic CHF  Severe exacerbation of COPD  Acute renal injury.  Echocardiography: Pending  Totally agree with the current medical management.  Dobutamine nuclear tress test in the a.m.

## 2023-07-27 ENCOUNTER — APPOINTMENT (OUTPATIENT)
Dept: NUCLEAR MEDICINE | Facility: HOSPITAL | Age: 68
DRG: 177 | End: 2023-07-27
Payer: OTHER GOVERNMENT

## 2023-07-27 ENCOUNTER — APPOINTMENT (OUTPATIENT)
Dept: NUCLEAR MEDICINE | Facility: HOSPITAL | Age: 68
DRG: 177 | End: 2023-07-27
Payer: MEDICARE

## 2023-07-27 LAB
ANION GAP SERPL CALCULATED.3IONS-SCNC: 3.8 MMOL/L (ref 5–15)
BACTERIA SPEC RESP CULT: ABNORMAL
BACTERIA SPEC RESP CULT: ABNORMAL
BUN SERPL-MCNC: 27 MG/DL (ref 8–23)
BUN/CREAT SERPL: 50.9 (ref 7–25)
CALCIUM SPEC-SCNC: 8.7 MG/DL (ref 8.6–10.5)
CHLORIDE SERPL-SCNC: 101 MMOL/L (ref 98–107)
CO2 SERPL-SCNC: 39.2 MMOL/L (ref 22–29)
CREAT SERPL-MCNC: 0.53 MG/DL (ref 0.76–1.27)
DEPRECATED RDW RBC AUTO: 48.2 FL (ref 37–54)
EGFRCR SERPLBLD CKD-EPI 2021: 110.5 ML/MIN/1.73
ERYTHROCYTE [DISTWIDTH] IN BLOOD BY AUTOMATED COUNT: 13 % (ref 12.3–15.4)
GLUCOSE SERPL-MCNC: 94 MG/DL (ref 65–99)
GRAM STN SPEC: ABNORMAL
HCT VFR BLD AUTO: 38.2 % (ref 37.5–51)
HGB BLD-MCNC: 11.3 G/DL (ref 13–17.7)
MAGNESIUM SERPL-MCNC: 2 MG/DL (ref 1.6–2.4)
MCH RBC QN AUTO: 30 PG (ref 26.6–33)
MCHC RBC AUTO-ENTMCNC: 29.6 G/DL (ref 31.5–35.7)
MCV RBC AUTO: 101.3 FL (ref 79–97)
PLATELET # BLD AUTO: 210 10*3/MM3 (ref 140–450)
PMV BLD AUTO: 10.5 FL (ref 6–12)
POTASSIUM SERPL-SCNC: 4.1 MMOL/L (ref 3.5–5.2)
RBC # BLD AUTO: 3.77 10*6/MM3 (ref 4.14–5.8)
SODIUM SERPL-SCNC: 144 MMOL/L (ref 136–145)
WBC NRBC COR # BLD: 7.83 10*3/MM3 (ref 3.4–10.8)

## 2023-07-27 PROCEDURE — 93017 CV STRESS TEST TRACING ONLY: CPT

## 2023-07-27 PROCEDURE — 80048 BASIC METABOLIC PNL TOTAL CA: CPT | Performed by: INTERNAL MEDICINE

## 2023-07-27 PROCEDURE — 63710000001 PREDNISONE PER 1 MG: Performed by: INTERNAL MEDICINE

## 2023-07-27 PROCEDURE — 25010000002 VANCOMYCIN 5 G RECONSTITUTED SOLUTION: Performed by: INTERNAL MEDICINE

## 2023-07-27 PROCEDURE — 94799 UNLISTED PULMONARY SVC/PX: CPT

## 2023-07-27 PROCEDURE — 94761 N-INVAS EAR/PLS OXIMETRY MLT: CPT

## 2023-07-27 PROCEDURE — 25010000002 ATROPINE SULFATE 0.4 MG/ML SOLUTION: Performed by: SPECIALIST

## 2023-07-27 PROCEDURE — 78452 HT MUSCLE IMAGE SPECT MULT: CPT

## 2023-07-27 PROCEDURE — 83735 ASSAY OF MAGNESIUM: CPT | Performed by: INTERNAL MEDICINE

## 2023-07-27 PROCEDURE — 0 CEFEPIME PER 500 MG: Performed by: INTERNAL MEDICINE

## 2023-07-27 PROCEDURE — 85027 COMPLETE CBC AUTOMATED: CPT | Performed by: INTERNAL MEDICINE

## 2023-07-27 PROCEDURE — A9502 TC99M TETROFOSMIN: HCPCS | Performed by: INTERNAL MEDICINE

## 2023-07-27 PROCEDURE — 0 TECHNETIUM TETROFOSMIN KIT: Performed by: INTERNAL MEDICINE

## 2023-07-27 PROCEDURE — 99232 SBSQ HOSP IP/OBS MODERATE 35: CPT | Performed by: INTERNAL MEDICINE

## 2023-07-27 PROCEDURE — 99233 SBSQ HOSP IP/OBS HIGH 50: CPT | Performed by: INTERNAL MEDICINE

## 2023-07-27 PROCEDURE — 25010000002 ENOXAPARIN PER 10 MG: Performed by: INTERNAL MEDICINE

## 2023-07-27 PROCEDURE — 25010000002 DOBUTAMINE PER 250 MG: Performed by: SPECIALIST

## 2023-07-27 PROCEDURE — 94664 DEMO&/EVAL PT USE INHALER: CPT

## 2023-07-27 RX ORDER — TOBRAMYCIN INHALATION SOLUTION 300 MG/5ML
300 INHALANT RESPIRATORY (INHALATION)
Status: DISCONTINUED | OUTPATIENT
Start: 2023-07-27 | End: 2023-07-28 | Stop reason: HOSPADM

## 2023-07-27 RX ORDER — ATROPINE SULFATE 0.4 MG/ML
0.25 INJECTION, SOLUTION INTRAVENOUS ONCE
Status: COMPLETED | OUTPATIENT
Start: 2023-07-27 | End: 2023-07-27

## 2023-07-27 RX ORDER — DOBUTAMINE HYDROCHLORIDE 200 MG/100ML
10 INJECTION INTRAVENOUS
Status: DISCONTINUED | OUTPATIENT
Start: 2023-07-27 | End: 2023-07-28 | Stop reason: HOSPADM

## 2023-07-27 RX ADMIN — IPRATROPIUM BROMIDE AND ALBUTEROL SULFATE 3 ML: .5; 3 SOLUTION RESPIRATORY (INHALATION) at 22:56

## 2023-07-27 RX ADMIN — Medication 10 ML: at 08:46

## 2023-07-27 RX ADMIN — PREDNISONE 40 MG: 20 TABLET ORAL at 08:45

## 2023-07-27 RX ADMIN — ATROPINE SULFATE 0.25 MG: 0.4 INJECTION, SOLUTION INTRAVENOUS at 11:02

## 2023-07-27 RX ADMIN — CEFEPIME 2000 MG: 2 INJECTION, POWDER, FOR SOLUTION INTRAVENOUS at 16:31

## 2023-07-27 RX ADMIN — IPRATROPIUM BROMIDE AND ALBUTEROL SULFATE 3 ML: .5; 3 SOLUTION RESPIRATORY (INHALATION) at 03:03

## 2023-07-27 RX ADMIN — TETROFOSMIN 1 DOSE: 1.38 INJECTION, POWDER, LYOPHILIZED, FOR SOLUTION INTRAVENOUS at 09:31

## 2023-07-27 RX ADMIN — IPRATROPIUM BROMIDE AND ALBUTEROL SULFATE 3 ML: .5; 3 SOLUTION RESPIRATORY (INHALATION) at 19:33

## 2023-07-27 RX ADMIN — IPRATROPIUM BROMIDE AND ALBUTEROL SULFATE 3 ML: .5; 3 SOLUTION RESPIRATORY (INHALATION) at 07:45

## 2023-07-27 RX ADMIN — ARFORMOTEROL TARTRATE 15 MCG: 15 SOLUTION RESPIRATORY (INHALATION) at 19:33

## 2023-07-27 RX ADMIN — BUDESONIDE 0.5 MG: 0.5 SUSPENSION RESPIRATORY (INHALATION) at 19:33

## 2023-07-27 RX ADMIN — IPRATROPIUM BROMIDE AND ALBUTEROL SULFATE 3 ML: .5; 3 SOLUTION RESPIRATORY (INHALATION) at 16:54

## 2023-07-27 RX ADMIN — TETROFOSMIN 1 DOSE: 1.38 INJECTION, POWDER, LYOPHILIZED, FOR SOLUTION INTRAVENOUS at 11:03

## 2023-07-27 RX ADMIN — METOPROLOL SUCCINATE 50 MG: 50 TABLET, EXTENDED RELEASE ORAL at 13:01

## 2023-07-27 RX ADMIN — DOBUTAMINE HYDROCHLORIDE 10 MCG/KG/MIN: 200 INJECTION INTRAVENOUS at 10:55

## 2023-07-27 RX ADMIN — CEFEPIME 2000 MG: 2 INJECTION, POWDER, FOR SOLUTION INTRAVENOUS at 00:38

## 2023-07-27 RX ADMIN — ATORVASTATIN CALCIUM 10 MG: 10 TABLET, FILM COATED ORAL at 22:05

## 2023-07-27 RX ADMIN — Medication 10 ML: at 22:05

## 2023-07-27 RX ADMIN — ARFORMOTEROL TARTRATE 15 MCG: 15 SOLUTION RESPIRATORY (INHALATION) at 07:45

## 2023-07-27 RX ADMIN — VANCOMYCIN HYDROCHLORIDE 1000 MG: 5 INJECTION, POWDER, LYOPHILIZED, FOR SOLUTION INTRAVENOUS at 08:46

## 2023-07-27 RX ADMIN — ASPIRIN 81 MG: 81 TABLET, CHEWABLE ORAL at 08:45

## 2023-07-27 RX ADMIN — ENOXAPARIN SODIUM 40 MG: 100 INJECTION SUBCUTANEOUS at 22:04

## 2023-07-27 RX ADMIN — BUDESONIDE 0.5 MG: 0.5 SUSPENSION RESPIRATORY (INHALATION) at 07:45

## 2023-07-27 RX ADMIN — METOPROLOL SUCCINATE 50 MG: 50 TABLET, EXTENDED RELEASE ORAL at 22:05

## 2023-07-27 RX ADMIN — CEFEPIME 2000 MG: 2 INJECTION, POWDER, FOR SOLUTION INTRAVENOUS at 08:46

## 2023-07-27 NOTE — CONSULTS
Financial aid varun faxed to [facility].  Shelia with [facility] states RT can set patient up with Astral tomorrow morning.  The importance of use was discussed with patient and his son.

## 2023-07-27 NOTE — PLAN OF CARE
Goal Outcome Evaluation:  Plan of Care Reviewed With: patient        Progress: no change  Outcome Evaluation: Patient is currently on his nasal cannual of 3 lpm which is his home oxygen and he is tolerating well.  Patient did not wear the BIPAP last night and patient stated that he actually feels so much better today. Patient also stated that he feels like not wearing the BIPAP last night was a great choice for him and that he is doing better this morning because he did not wear the BIPAP last night.  Will continue to encourage patient to wear BIPAP at night and as needed.

## 2023-07-27 NOTE — PROGRESS NOTES
Pulmonary / Critical Care Progress Note      Patient Name: Pavel Dai  : 1955  MRN: [id number]  Primary Care Physician:  Yoel Geller MD  Date of admission: 2023    Subjective   Subjective   Follow-up for acute on chronic hypercapnic respiratory failure, pneumonia.    Over past 24 hours, continued on cefepime and vancomycin, remains on prednisone and nebulizers. Had bronch with mucus plug removal.     No acute events overnight.  Did not wear NIPPV. Had issues with mask.     This morning,   Lying in bed on 3 L nasal cannula  O2 sats 94%  Cough and phlegm improved.   Remains weak and fatigued  No fever or chills  Denies chest pain or hemoptysis  RT  on board.     Review of Systems  General:  + Fatigue, No Fever  HEENT: No dysphagia, No Visual Changes, no rhinorrhea  Respiratory:  + Productive cough,+Dyspnea, + phlegm, No Pleuritic Pain, + wheezing, no hemoptysis  Cardiovascular: Denies chest pain, denies palpitations, +COELHO, No Chest Pressure  Gastrointestinal:  No Abdominal Pain, No Nausea, No Vomiting, No Diarrhea  Genitourinary:  No Dysuria, No Frequency, No Hesitancy  Musculoskeletal: No muscle pain or swelling  Endocrine:  No Heat Intolerance, No Cold Intolerance  Neurologic:  No Confusion, no Dysarthria, No Headaches  Skin:  No Rash, No Open Wounds    Objective   Objective     Vitals:   Temp:  [97.6 °F (36.4 °C)-98.7 °F (37.1 °C)] 98.2 °F (36.8 °C)  Heart Rate:  [] 78  Resp:  [13-24] 20  BP: (107-140)/(7-89) 140/89  Flow (L/min):  [3-90] 4    Physical Exam   Vital Signs Reviewed   General: Cachectic appearing male, NAD, lying in bed on 5 L NC    HEENT:  PERRL, EOMI.  OP, nares clear, no sinus tenderness  Neck:  Supple, no JVD, no thyromegaly  Lymph: no axillary, cervical, supraclavicular lymphadenopathy noted bilaterally  Chest: Poor aeration, bilateral scattered rhonchi, bilateral expiratory wheezing, no crackles, tympanic to percussion bilaterally, mildly increased work  of breathing noted  CV: RRR, no MGR, pulses 2+, equal  Abd:  Soft, NT, ND, + BS, no HSM  EXT:  no clubbing, no cyanosis, no edema, no joint tenderness  Neuro:  A&Ox3, CN grossly intact, no focal deficits, generalized weakness  Skin: No rashes or lesions noted    Result Review    Result Review:  I have personally reviewed the results from the time of this admission to 7/27/2023 08:12 EDT and agree with these findings:  [x]  Laboratory  [x]  Microbiology  [x]  Radiology  [x]  EKG/Telemetry   [x]  Cardiology/Vascular   []  Pathology  []  Old records  []  Other:  Most notable findings include:   Lactate 4.4 --> 1.8  proBNP -15,765  ABG 7.2/79/57 --> 7.4/59/59    S pneumo/Legionella negative  Sputum culture NGTD    7/24 echo - EF 61 to 65%, grade 1 diastolic dysfunction, moderately elevated RVSP 45 to 55 mmHg        Lab 07/27/23  0502 07/26/23  0456 07/25/23  0817 07/25/23  0425 07/24/23  1040 07/24/23  1034   WBC 7.83  --   --  10.67  --  12.51*   HEMOGLOBIN 11.3*  --   --  11.5*  --  12.4*   HEMATOCRIT 38.2  --   --  39.7  --  42.0   PLATELETS 210  --   --  154  --  224   SODIUM 144 139  --  141  --  139   SODIUM, ARTERIAL  --   --  139.8  --  139.2  --    POTASSIUM 4.1 4.4  --  4.1  --  5.5*   CHLORIDE 101 97*  --  97*  --  94*   CO2 39.2* 34.1*  --  34.2*  --  33.9*   BUN 27* 36*  --  34*  --  38*   CREATININE 0.53* 0.50*  --  0.76  --  1.95*   GLUCOSE 94 85  --  102*  --  139*   GLUCOSE, ARTERIAL  --   --  94  --  133*  --    CALCIUM 8.7 8.6  --  8.8  --  8.6   TOTAL PROTEIN  --   --   --  6.4  --  7.2   ALBUMIN  --   --   --  3.2*  --  3.7   GLOBULIN  --   --   --  3.2  --  3.5     Assessment & Plan   Assessment / Plan     Active Hospital Problems:  Active Hospital Problems    Diagnosis     **Hypercapnic respiratory failure     Acute on chronic respiratory failure with hypoxia and hypercapnia     COPD exacerbation      Impression:   Acute on chronic hypoxic hypercapnic respiratory failure requiring NIPPV  COPD with  acute exacerbation  Mucous plugging/difficulty with airway clearance  Acute exacerbation of diastolic heart failure: proBNP 15, 575  Respiratory acidosis, acute  Elevated lactate: improved  FABIAN  Tobacco abuse in remission  History of rectal cancer    Plan:   -Continue to wean to maintain SPO2 greater than 90%.  -Continue BiPAP 14/7 at night and days with naps.  -He will need noninvasive ventilation with Astral for acute on chronic respiratory failure secondary to COPD.  Bilevel is ruled out as treatment option because it cannot provide IVAPS AE or AVAPS AE mode, which is necessary to deliver targeted tidal volume and minute ventilation to assure adequate ventilation. NIV with Astral also provides back up rate, alarms and battery back up to ensure patient safety. 2023 GOLD guidelines state non invasive mechanical ventilation should be the first mode of ventilation used in COPD with respiratory failure to decrease hospitalization and improve survival. Failure to follow recommended guidelines will place patient at increased risk of rehospitalization, acute on chronic respiratory failure and mortality.   -Appreciate RT  assistance in arranging NIV for home.  -CT chest reviewed revealing mucous plugging in left lower lobe  -S/p bronch and mucus plug removal, Growing Pseudomonas.   - Continue cefepime IV. DC vanc. Start tanya neb.   -Continue prednisone 40 mg p.o. daily.  -Continue Brovana, Pulmicort and DuoNeb.  -Continue airway clearance therapy with MetaNeb.  -Continue bronchopulmonary hygiene.  Encourage I-S and flutter valve.  -Cardiology on board, appreciate assistance.  -Echo reviewed revealing EF 61 to 65%, grade 1 diastolic dysfunction, RVSP moderately elevated 45 to 55 mmHg.  -Encourage mobilization, out of bed to chair.    DVT prophylaxis:  Medical DVT prophylaxis orders are present.    CODE STATUS:   Level Of Support Discussed With: Patient  Code Status (Patient has no pulse and is not breathing):  CPR (Attempt to Resuscitate)  Medical Interventions (Patient has pulse or is breathing): Full Support  Release to patient: Routine Release    I personally reviewed pertinent labs, imaging and provider notes. Discussed with bedside nurse and will discuss with primary service.       Electronically signed by Ted Petty MD, 7/27/2023, 08:12 EDT.

## 2023-07-27 NOTE — PROGRESS NOTES
[facility]   Hospitalist Progress Note  Date: 2023  Patient Name: Pavel Dai  : 1955  MRN: [id number]  Date of admission: 2023      Subjective   Subjective     Chief Complaint:   Shortness of breath    Summary:   Pavel Dai is a 68 y.o. male past medical history of rectal cancer, COPD on home oxygen 3L, coronary artery disease, dyslipidemia, hypertension, and heart failure preserved ejection fraction with EF of 61 to 65% and normal stress test in  who presents with not feeling well and shortness of breath.     Patient states he has not been feeling well for a month.  Just weak and fatigued all over.  However, over the last 2 to 3 days has become more short of breath, became acutely worse morning of presentation, 2023.  He has had no known fevers.  Or chills.  As result of his symptoms he came to the ER for further evaluation.  In the emergency department patient was tachycardic at 112, respiratory rate of 18, initial blood pressure 95/66, initially saturating 72% on 3 L nasal cannula, transition to BiPAP.  Elevated white count.  Creatinine 1.95 up from his baseline of less than 1.  Potassium elevated at 5.5.  proBNP 15,000 with a troponin of 200.  ABG 7.2/79.  A lactate of 3.2.  CTA chest shows no pulmonary embolus, prominence of central pulmonary vasculature concern for pulmonary hypertension, mucous plug in the peribronchial opacities and dependent consolidation.  Patient was admitted to the hospital for hypercapnic respiratory failure    Interval Followup:   Stress test to be performed today per cardiologist.  Patient underwent bronchoscopy on  with no adverse events.  Patient up on oxygen this morning, 5 L nasal cannula.  Patient denies any chest pain chest pressure shortness of breath at this time.    Review of Systems   All systems were reviewed and negative except for: Improving shortness of breath    Objective   Objective     Vitals:   Temp:  [97.6 °F (36.4  °C)-98.7 °F (37.1 °C)] 98.6 °F (37 °C)  Heart Rate:  [] 93  Resp:  [13-24] 18  BP: (107-156)/(7-89) 156/81  Flow (L/min):  [3-90] 4  Physical Exam    Constitutional: Awake, alert, no acute distress.  Sitting up in bed eating lunch   Eyes: Pupils equal, sclerae anicteric, no conjunctival injection   HENT: NCAT, mucous membranes moist   Neck: Supple, no thyromegaly, no lymphadenopathy, trachea midline   Respiratory: Poor aeration heard throughout, prolonged expiratory phase, no wheezing   Cardiovascular: Tachycardic with regular rhythm, no murmurs, rubs, or gallops, palpable pedal pulses bilaterally   Gastrointestinal: Positive bowel sounds, soft, nontender, nondistended   Musculoskeletal: No bilateral ankle edema, no clubbing or cyanosis to extremities   Psychiatric: Appropriate affect, cooperative   Neurologic: Oriented x 3, strength symmetric in all extremities, Cranial Nerves grossly intact to confrontation, speech clear   Skin: No rashes     Result Review    Result Review:  I have personally reviewed the results from 7/27/2023 and agree with these findings:  []  Laboratory  []  Microbiology  []  Radiology  []  EKG/Telemetry   []  Cardiology/Vascular   []  Pathology  []  Old records  []  Other:    Assessment & Plan   Assessment / Plan     Assessment/Plan:  Acute hypercapnic respiratory failure requiring BiPAP  Acute COPD exacerbation  Acute on chronic hypoxic respiratory failure  SVT  Chronic diastolic heart failure exacerbation with EF of 61 to 65% 2022  Elevated troponins due to demand versus ACS  Hyperkalemia  Elevated lactate  Acute kidney injury with baseline creatinine from 0.6-0.8 currently 1.95  Mucous plugging seen on CT    Plan:  Patient remains admitted to hospital for further care and management  Cardiology and pulmonology consulted, appreciate assistance  Bronchoscopy performed on 26th, follow-up samples collected  Stress test performed today, 27th, follow-up results  Continue BiPAP at night and  with any naps  Echocardiogram shows diastolic dysfunction, EF of 61 to 65%  Started on treatment for pneumonia given consolidation on CT scan  Continue on cefepime, vancomycin discontinued  Started on prednisone for COPD exacerbation, continue with breathing treatments  Continuing home beta-blocker dose     Discussed plan with RN, pulmonologist    DVT prophylaxis:  Medical DVT prophylaxis orders are present.    CODE STATUS:   Level Of Support Discussed With: Patient  Code Status (Patient has no pulse and is not breathing): CPR (Attempt to Resuscitate)  Medical Interventions (Patient has pulse or is breathing): Full Support  Release to patient: Routine Release

## 2023-07-27 NOTE — PLAN OF CARE
Goal Outcome Evaluation:      VSS. UOP. Stress test today. No c/o pain. Karuna Schuster RN        Progress: no change

## 2023-07-27 NOTE — PLAN OF CARE
Goal Outcome Evaluation:   Patient did not want to wear the BiPAP last night. Patient stated it dried out his mouth and nose to much. Patient is on 5L NC.

## 2023-07-28 ENCOUNTER — READMISSION MANAGEMENT (OUTPATIENT)
Dept: CALL CENTER | Facility: HOSPITAL | Age: 68
End: 2023-07-28
Payer: OTHER GOVERNMENT

## 2023-07-28 ENCOUNTER — NURSE TRIAGE (OUTPATIENT)
Dept: CALL CENTER | Facility: HOSPITAL | Age: 68
End: 2023-07-28
Payer: OTHER GOVERNMENT

## 2023-07-28 VITALS
OXYGEN SATURATION: 89 % | TEMPERATURE: 98.5 F | WEIGHT: 122.14 LBS | BODY MASS INDEX: 15.67 KG/M2 | RESPIRATION RATE: 18 BRPM | HEIGHT: 74 IN | HEART RATE: 93 BPM | SYSTOLIC BLOOD PRESSURE: 156 MMHG | DIASTOLIC BLOOD PRESSURE: 84 MMHG

## 2023-07-28 LAB
ANION GAP SERPL CALCULATED.3IONS-SCNC: 3.3 MMOL/L (ref 5–15)
BACTERIA SPEC AEROBE CULT: ABNORMAL
BACTERIA SPEC AEROBE CULT: ABNORMAL
BACTERIA SPEC RESP CULT: ABNORMAL
BACTERIA SPEC RESP CULT: ABNORMAL
BH CV IMMEDIATE POST RECOVERY TECH DATA SYMPTOMS: NORMAL
BH CV IMMEDIATE POST TECH DATA BLOOD PRESSURE: NORMAL MMHG
BH CV IMMEDIATE POST TECH DATA HEART RATE: 150 BPM
BH CV IMMEDIATE POST TECH DATA OXYGEN SATS: 94 %
BH CV NINE MINUTE RECOVERY TECH DATA BLOOD PRESSURE: NORMAL MMHG
BH CV NINE MINUTE RECOVERY TECH DATA HEART RATE: 110 BPM
BH CV NINE MINUTE RECOVERY TECH DATA OXYGEN SATURATION: 92 %
BH CV NINE MINUTE RECOVERY TECH DATA SYMPTOMS: NORMAL
BH CV REST NUCLEAR ISOTOPE DOSE: 9 MCI
BH CV SIX MINUTE RECOVERY TECH DATA BLOOD PRESSURE: NORMAL
BH CV SIX MINUTE RECOVERY TECH DATA HEART RATE: 121 BPM
BH CV SIX MINUTE RECOVERY TECH DATA OXYGEN SATURATION: 93 %
BH CV SIX MINUTE RECOVERY TECH DATA SYMPTOMS: NORMAL
BH CV STRESS BP STAGE 1: NORMAL
BH CV STRESS BP STAGE 2: NORMAL
BH CV STRESS BP STAGE 3: NORMAL
BH CV STRESS DOB - ATROPINE STAGE 3: 0.25
BH CV STRESS DOSE DOBUTAMINE STAGE 1: 10
BH CV STRESS DOSE DOBUTAMINE STAGE 2: 20
BH CV STRESS DOSE DOBUTAMINE STAGE 3: 30
BH CV STRESS DURATION MIN STAGE 1: 2
BH CV STRESS DURATION MIN STAGE 2: 2
BH CV STRESS DURATION MIN STAGE 3: 2
BH CV STRESS DURATION SEC STAGE 1: 0
BH CV STRESS DURATION SEC STAGE 2: 0
BH CV STRESS DURATION SEC STAGE 3: 0
BH CV STRESS HR STAGE 1: 78
BH CV STRESS HR STAGE 2: 83
BH CV STRESS HR STAGE 3: 133
BH CV STRESS NUCLEAR ISOTOPE DOSE: 33.1 MCI
BH CV STRESS O2 STAGE 1: 94
BH CV STRESS O2 STAGE 2: 94
BH CV STRESS O2 STAGE 3: 93
BH CV STRESS PROTOCOL 1: NORMAL
BH CV STRESS RATE STAGE 1: 16
BH CV STRESS RATE STAGE 2: 32
BH CV STRESS RATE STAGE 3: 48
BH CV STRESS RECOVERY BP: NORMAL MMHG
BH CV STRESS RECOVERY HR: 101 BPM
BH CV STRESS RECOVERY O2: 93 %
BH CV STRESS STAGE 1: 1
BH CV STRESS STAGE 2: 2
BH CV STRESS STAGE 3: 3
BH CV THREE MINUTE POST TECH DATA BLOOD PRESSURE: NORMAL MMHG
BH CV THREE MINUTE POST TECH DATA HEART RATE: 145 BPM
BH CV THREE MINUTE POST TECH DATA OXYGEN SATURATION: 94 %
BH CV THREE MINUTE RECOVERY TECH DATA SYMPTOM: NORMAL
BH CV TWELVE MINUTE RECOVERY TECH DATA BLOOD PRESSURE: NORMAL MMHG
BH CV TWELVE MINUTE RECOVERY TECH DATA HEART RATE: 101 BPM
BH CV TWELVE MINUTE RECOVERY TECH DATA OXYGEN SATURATION: 93 %
BUN SERPL-MCNC: 20 MG/DL (ref 8–23)
BUN/CREAT SERPL: 36.4 (ref 7–25)
CALCIUM SPEC-SCNC: 8.3 MG/DL (ref 8.6–10.5)
CHLORIDE SERPL-SCNC: 100 MMOL/L (ref 98–107)
CO2 SERPL-SCNC: 39.7 MMOL/L (ref 22–29)
CREAT SERPL-MCNC: 0.55 MG/DL (ref 0.76–1.27)
CYTO UR: NORMAL
DEPRECATED RDW RBC AUTO: 47.9 FL (ref 37–54)
EGFRCR SERPLBLD CKD-EPI 2021: 109.3 ML/MIN/1.73
ERYTHROCYTE [DISTWIDTH] IN BLOOD BY AUTOMATED COUNT: 13.2 % (ref 12.3–15.4)
GLUCOSE SERPL-MCNC: 91 MG/DL (ref 65–99)
GRAM STN SPEC: ABNORMAL
HCT VFR BLD AUTO: 37.8 % (ref 37.5–51)
HGB BLD-MCNC: 11.2 G/DL (ref 13–17.7)
LAB AP CASE REPORT: NORMAL
LAB AP CLINICAL INFORMATION: NORMAL
LAB AP SPECIAL STAINS: NORMAL
LV EF NUC BP: 69 %
MAGNESIUM SERPL-MCNC: 1.9 MG/DL (ref 1.6–2.4)
MAXIMAL PREDICTED HEART RATE: 154 BPM
MCH RBC QN AUTO: 29.8 PG (ref 26.6–33)
MCHC RBC AUTO-ENTMCNC: 29.6 G/DL (ref 31.5–35.7)
MCV RBC AUTO: 100.5 FL (ref 79–97)
PATH REPORT.FINAL DX SPEC: NORMAL
PATH REPORT.GROSS SPEC: NORMAL
PERCENT MAX PREDICTED HR: 101.3 %
PLATELET # BLD AUTO: 201 10*3/MM3 (ref 140–450)
PMV BLD AUTO: 9.8 FL (ref 6–12)
POTASSIUM SERPL-SCNC: 3.8 MMOL/L (ref 3.5–5.2)
RBC # BLD AUTO: 3.76 10*6/MM3 (ref 4.14–5.8)
SODIUM SERPL-SCNC: 143 MMOL/L (ref 136–145)
STRESS BASELINE BP: NORMAL MMHG
STRESS BASELINE HR: 76 BPM
STRESS O2 SAT REST: 93 %
STRESS PERCENT HR: 119 %
STRESS POST O2 SAT PEAK: 94 %
STRESS POST PEAK BP: NORMAL MMHG
STRESS POST PEAK HR: 156 BPM
STRESS TARGET HR: 131 BPM
WBC NRBC COR # BLD: 6.65 10*3/MM3 (ref 3.4–10.8)

## 2023-07-28 PROCEDURE — 94799 UNLISTED PULMONARY SVC/PX: CPT

## 2023-07-28 PROCEDURE — 63710000001 PREDNISONE PER 1 MG: Performed by: INTERNAL MEDICINE

## 2023-07-28 PROCEDURE — 83735 ASSAY OF MAGNESIUM: CPT | Performed by: INTERNAL MEDICINE

## 2023-07-28 PROCEDURE — 99233 SBSQ HOSP IP/OBS HIGH 50: CPT | Performed by: INTERNAL MEDICINE

## 2023-07-28 PROCEDURE — 80048 BASIC METABOLIC PNL TOTAL CA: CPT | Performed by: INTERNAL MEDICINE

## 2023-07-28 PROCEDURE — 0 CEFEPIME PER 500 MG: Performed by: INTERNAL MEDICINE

## 2023-07-28 PROCEDURE — 85027 COMPLETE CBC AUTOMATED: CPT | Performed by: INTERNAL MEDICINE

## 2023-07-28 PROCEDURE — 94761 N-INVAS EAR/PLS OXIMETRY MLT: CPT

## 2023-07-28 PROCEDURE — 94664 DEMO&/EVAL PT USE INHALER: CPT

## 2023-07-28 PROCEDURE — 99239 HOSP IP/OBS DSCHRG MGMT >30: CPT | Performed by: INTERNAL MEDICINE

## 2023-07-28 RX ORDER — PREDNISONE 20 MG/1
40 TABLET ORAL
Qty: 6 TABLET | Refills: 0 | Status: SHIPPED | OUTPATIENT
Start: 2023-07-29 | End: 2023-08-01

## 2023-07-28 RX ORDER — TOBRAMYCIN INHALATION SOLUTION 300 MG/5ML
300 INHALANT RESPIRATORY (INHALATION)
Qty: 300 ML | Refills: 5 | Status: SHIPPED | OUTPATIENT
Start: 2023-07-28 | End: 2023-08-11 | Stop reason: SDUPTHER

## 2023-07-28 RX ORDER — LEVOFLOXACIN 750 MG/1
750 TABLET ORAL DAILY
Qty: 14 TABLET | Refills: 0 | Status: SHIPPED | OUTPATIENT
Start: 2023-07-28 | End: 2023-08-11

## 2023-07-28 RX ORDER — METOPROLOL SUCCINATE 50 MG/1
50 TABLET, EXTENDED RELEASE ORAL EVERY 12 HOURS SCHEDULED
Qty: 60 TABLET | Refills: 0 | Status: SHIPPED | OUTPATIENT
Start: 2023-07-28 | End: 2023-08-27

## 2023-07-28 RX ADMIN — CEFEPIME 2000 MG: 2 INJECTION, POWDER, FOR SOLUTION INTRAVENOUS at 09:26

## 2023-07-28 RX ADMIN — METOPROLOL SUCCINATE 50 MG: 50 TABLET, EXTENDED RELEASE ORAL at 09:25

## 2023-07-28 RX ADMIN — CEFEPIME 2000 MG: 2 INJECTION, POWDER, FOR SOLUTION INTRAVENOUS at 00:08

## 2023-07-28 RX ADMIN — TOBRAMYCIN 300 MG: 300 SOLUTION RESPIRATORY (INHALATION) at 09:09

## 2023-07-28 RX ADMIN — Medication 10 ML: at 09:26

## 2023-07-28 RX ADMIN — IPRATROPIUM BROMIDE AND ALBUTEROL SULFATE 3 ML: .5; 3 SOLUTION RESPIRATORY (INHALATION) at 07:56

## 2023-07-28 RX ADMIN — ARFORMOTEROL TARTRATE 15 MCG: 15 SOLUTION RESPIRATORY (INHALATION) at 07:56

## 2023-07-28 RX ADMIN — IPRATROPIUM BROMIDE AND ALBUTEROL SULFATE 3 ML: .5; 3 SOLUTION RESPIRATORY (INHALATION) at 02:53

## 2023-07-28 RX ADMIN — PREDNISONE 40 MG: 20 TABLET ORAL at 09:25

## 2023-07-28 RX ADMIN — BUDESONIDE 0.5 MG: 0.5 SUSPENSION RESPIRATORY (INHALATION) at 07:56

## 2023-07-28 RX ADMIN — ASPIRIN 81 MG: 81 TABLET, CHEWABLE ORAL at 09:25

## 2023-07-28 NOTE — DISCHARGE INSTR - OTHER ORDERS
Your Rx for Tobramycin nebulizer solution has been sent to [facility] specialty pharmacy. The medication will be shipped to you from [facility] pharmacy. If you go to rehab prior to returning home, please call [facility] to let them know when to deliver your medication. Should you have a high copay, call [facility] reimbursement counselor to apply for assistance at [phone].

## 2023-07-28 NOTE — DISCHARGE SUMMARY
[facility]         HOSPITALIST  DISCHARGE SUMMARY    Patient Name: Pavel Dai  : 1955  MRN: [id number]    Date of Admission: 2023  Date of Discharge:  2023  Primary Care Physician: Yoel Geller MD    Consult:  Cardiologist  Pulmonologist    Active and Resolved Hospital Problems:  Acute hypercapnic respiratory failure requiring BiPAP  Acute COPD exacerbation  Pseudomonas on BAL  Acute on chronic hypoxic respiratory failure  SVT  Chronic diastolic heart failure exacerbation with EF of 61 to 65%   Elevated troponins due to demand   Hyperkalemia  Elevated lactate  Acute kidney injury with baseline creatinine from 0.6-0.8 currently 1.95  Mucous plugging seen on CT     Hospital Course     Hospital Course:  Pavel Dai is a 68 y.o. male past medical history of rectal cancer, COPD on home oxygen 3L, coronary artery disease, dyslipidemia, hypertension, and heart failure preserved ejection fraction with EF of 61 to 65% and normal stress test in  who presents with not feeling well and shortness of breath.     Patient states he has not been feeling well for a month.  Just weak and fatigued all over.  However, over the last 2 to 3 days has become more short of breath, became acutely worse morning of presentation, 2023.  He has had no known fevers.  Or chills.  As result of his symptoms he came to the ER for further evaluation.  In the emergency department patient was tachycardic at 112, respiratory rate of 18, initial blood pressure 95/66, initially saturating 72% on 3 L nasal cannula, transition to BiPAP.  Elevated white count.  Creatinine 1.95 up from his baseline of less than 1.  Potassium elevated at 5.5.  proBNP 15,000 with a troponin of 200.  ABG 7.2/79.  A lactate of 3.2.  CTA chest shows no pulmonary embolus, prominence of central pulmonary vasculature concern for pulmonary hypertension, mucous plug in the peribronchial opacities and dependent  consolidation.  Patient was admitted to the hospital for hypercapnic respiratory failure.  Patient underwent bronchoscopy with pulmonology with mucous plug removal.  BAL returned positive for Pseudomonas therefore discharged on 2 weeks of Levaquin.  Patient underwent stress test given significant bump in troponin on arrival, this returned with no concerning features.  Cardiologist request follow-up in 1 month.  Patient is discharging with noninvasive ventilation with astral for his acute on chronic respiratory failure secondary to COPD.  Patient seen on date of discharge, clinically and hemodynamically stable.  Patient provided concerning signs and symptoms prompting immediate medical attention, patient understanding and agreeable     Pavel Dai will need noninvasive ventilation with Astral for acute on chronic respiratory failure secondary to COPD.  Bilevel is ruled out as treatment option because it cannot provide IVAPS AE or AVAPS AE mode, which is necessary to deliver targeted tidal volume and minute ventilation to assure adequate ventilation. NIV with Astral also provides back up rate, alarms and battery back up to ensure patient safety. 2023 GOLD guidelines state non invasive mechanical ventilation should be the first mode of ventilation used in COPD with respiratory failure to decrease hospitalization and improve survival. Failure to follow recommended guidelines will place patient at increased risk of rehospitalization, acute on chronic respiratory failure and mortality.      DISCHARGE Follow Up Recommendations for labs and diagnostics:   Follow-up with primary care physician as soon as possible  Follow-up with cardiology 1 month  Follow-up with pulmonologist in 2 weeks.      Day of Discharge     Vital Signs:  Temp:  [98.1 °F (36.7 °C)-98.6 °F (37 °C)] 98.1 °F (36.7 °C)  Heart Rate:  [] 85  Resp:  [16-20] 18  BP: (119-156)/(71-86) 125/71  Flow (L/min):  [2-4] 3  Physical Exam:                Constitutional: Awake, alert, no acute distress.  Sitting up in bed, breathing comfortably on nasal cannula              Eyes: Pupils equal, sclerae anicteric, no conjunctival injection              HENT: NCAT, mucous membranes moist              Neck: Supple, no thyromegaly, no lymphadenopathy, trachea midline              Respiratory: Poor aeration heard throughout, prolonged expiratory phase, no wheezing              Cardiovascular: RRR, no murmurs, rubs, or gallops, palpable pedal pulses bilaterally              Gastrointestinal: Positive bowel sounds, soft, nontender, nondistended              Musculoskeletal: No bilateral ankle edema, no clubbing or cyanosis to extremities              Psychiatric: Appropriate affect, cooperative              Neurologic: Oriented x 3, strength symmetric in all extremities, Cranial Nerves grossly intact to confrontation, speech clear              Skin: No rashes           Discharge Details        Discharge Medications        New Medications        Instructions Start Date   levoFLOXacin 750 MG tablet  Commonly known as: Levaquin   750 mg, Oral, Daily      metoprolol succinate XL 50 MG 24 hr tablet  Commonly known as: TOPROL-XL   50 mg, Oral, Every 12 Hours Scheduled      predniSONE 20 MG tablet  Commonly known as: DELTASONE   40 mg, Oral, Daily With Breakfast   Start Date: July 29, 2023     tobramycin  MG/5ML nebulizer solution  Commonly known as: Rian   300 mg, Nebulization, 2 Times Daily - RT, Do nebulizer twice a day for 30 days and then stop for 30 days, continue cycle of 30 days on and 30 days off for 1 year.             Continue These Medications        Instructions Start Date   albuterol (2.5 MG/3ML) 0.083% nebulizer solution  Commonly known as: PROVENTIL   2.5 mg, Nebulization, Every 8 Hours PRN      aspirin 81 MG chewable tablet   81 mg, Oral, Daily      atorvastatin 10 MG tablet  Commonly known as: LIPITOR   10 mg, Oral, Nightly      Fluticasone-Salmeterol 100-50  MCG/ACT DISKUS  Commonly known as: ADVAIR/WIXELA   1 puff, Inhalation, 2 Times Daily - RT      guaiFENesin 600 MG 12 hr tablet  Commonly known as: MUCINEX   600 mg, Oral, 2 Times Daily PRN      ipratropium-albuterol  MCG/ACT inhaler  Commonly known as: COMBIVENT RESPIMAT   1 puff, Inhalation, 4 Times Daily - RT      loratadine 10 MG tablet  Commonly known as: CLARITIN   10 mg, Oral, Daily      multivitamin with minerals tablet tablet   1 tablet, Oral, Daily      omeprazole 20 MG capsule  Commonly known as: priLOSEC   20 mg, Oral, Daily      roflumilast 500 MCG tablet tablet  Commonly known as: DALIRESP   500 mcg, Oral, Daily             Stop These Medications      metoprolol tartrate 50 MG tablet  Commonly known as: LOPRESSOR              No Known Allergies    Discharge Disposition:  Home or Self Care    Diet:  Hospital:  Diet Order   Procedures    Diet: Regular/House Diet; Texture: Regular Texture (IDDSI 7); Fluid Consistency: Thin (IDDSI 0)       Discharge Activity:   Activity Instructions       Activity as Tolerated              CODE STATUS:  Code Status and Medical Interventions:   Ordered at: 07/24/23 1311     Level Of Support Discussed With:    Patient     Code Status (Patient has no pulse and is not breathing):    CPR (Attempt to Resuscitate)     Medical Interventions (Patient has pulse or is breathing):    Full Support     Release to patient:    Routine Release         Future Appointments   Date Time Provider Department Center   10/24/2023  1:45 PM Issac Walker MD [facility]C [address] [facility]   [facility]   1/18/2024  9:30 AM Enzo Regalado MD [facility] RO [facility] located within [facility]       Additional Instructions for the Follow-ups that You Need to Schedule       Discharge Follow-up with PCP   As directed       Currently Documented PCP:    Yoel Geller MD    PCP Phone Number:    [phone]     Follow Up Details: In less than one week        Discharge Follow-up with Specified  Provider: Cardiology; 1 Month   As directed      To: Cardiology   Follow Up: 1 Month        Discharge Follow-up with Specified Provider: Pulmonologist; 2 Weeks   As directed      To: Pulmonologist   Follow Up: 2 Weeks                Pertinent  and/or Most Recent Results     PROCEDURES:   Bronchoscopy Procedure Note     Procedure:  Bronchoscopy with mucous plug removal  Bronchoscopy with bronchoalveolar lavage left lower lobe  Bronchoscopy with bronchial washings tracheobronchial tree  Airway inspection     Pre-Operative Diagnosis: COPD with acute exacerbation, persistent bronchitis, mucous plugging, lower lung collapse bilaterally     Post-Operative Diagnosis: Severe mucous plugging, suctioned out in multiple attempts, very friable airway, chronic bronchitis     Indication:  Significant mucus plugging, chronic bronchitis, difficulty with airway clearance     Anesthesia: MAC anesthesia     Procedure Details: Patient was consented for the procedure with all risks and benefits of the procedure explained in detail. Patient was given the opportunity to ask questions and all concerns were answered.     The bronchoscope was inserted into the main airway via the mouth. An anatomical survey was done of the main airways and the subsegmental bronchus. The findings are reported below.  Significant mucus plugging was seen in trachea and bilateral lower lobe bronchial tubes, suctioned out in multiple attempts.  Airway was very friable and was wheezing with suctioning.  A bronchoalveolar lavage was performed using 60 mL aliquots of normal saline x2 instilled into the airways then aspirated back from left lower lobe of lung with 45 mL serosanguineous fluid in return.  Bronchial washing was obtained from entire tracheobronchial tree.     Findings:  Significant mucus plugging was seen in trachea and bilateral lower lobe bronchial tubes, suctioned out in multiple attempts    Airway was very friable and was wheezing with  suctioning  Friable mucosa, easy bleeding with suction  Scattered purulent secretions     Estimated Blood Loss: Mild, Less than 2 cc     Specimens:  BAL left lower lobe  Bronchial washings tracheobronchial tree     Complications: None; patient tolerated the procedure well.     Disposition: To [facility], once stable in recovery.     Patient tolerated the procedure well.        Electronically signed by Ted Petty MD, 7/26/2023, 14:47 EDT.                   LAB RESULTS:      Lab 07/28/23  0448 07/27/23  0502 07/25/23  0817 07/25/23  0425 07/24/23  1942 07/24/23  1427 07/24/23  1040 07/24/23  1035 07/24/23  1034   WBC 6.65 7.83  --  10.67  --   --   --   --  12.51*   HEMOGLOBIN 11.2* 11.3*  --  11.5*  --   --   --   --  12.4*   HEMATOCRIT 37.8 38.2  --  39.7  --   --   --   --  42.0   PLATELETS 201 210  --  154  --   --   --   --  224   NEUTROS ABS  --   --   --  8.94*  --   --   --   --  11.13*   IMMATURE GRANS (ABS)  --   --   --  0.06*  --   --   --   --  0.03   LYMPHS ABS  --   --   --  0.41*  --   --   --   --  0.25*   MONOS ABS  --   --   --  1.25*  --   --   --   --  1.08*   EOS ABS  --   --   --  0.00  --   --   --   --  0.01   .5* 101.3*  --  103.1*  --   --   --   --  101.2*   PROCALCITONIN  --   --   --  0.21  --   --   --   --  0.15   LACTATE  --   --   --  1.8 3.8* 4.4*  --    < >  --    LACTATE, ARTERIAL  --   --  1.16  --   --   --  1.84  --   --     < > = values in this interval not displayed.         Lab 07/28/23  0448 07/27/23  0502 07/26/23  0456 07/25/23  0817 07/25/23  0425 07/24/23  1040 07/24/23  1034   SODIUM 143 144 139  --  141  --  139   SODIUM, ARTERIAL  --   --   --  139.8  --  139.2  --    POTASSIUM 3.8 4.1 4.4  --  4.1  --  5.5*   CHLORIDE 100 101 97*  --  97*  --  94*   CO2 39.7* 39.2* 34.1*  --  34.2*  --  33.9*   ANION GAP 3.3* 3.8* 7.9  --  9.8  --  11.1   BUN 20 27* 36*  --  34*  --  38*   CREATININE 0.55* 0.53* 0.50*  --  0.76  --  1.95*   EGFR 109.3 110.5 112.5  --   99.1  --  37.2*   GLUCOSE 91 94 85  --  102*  --  139*   GLUCOSE, ARTERIAL  --   --   --  94  --  133*  --    CALCIUM 8.3* 8.7 8.6  --  8.8  --  8.6   IONIZED CALCIUM  --   --   --  1.12*  --  1.10*  --    MAGNESIUM 1.9 2.0  --   --  2.0  --   --    HEMOGLOBIN A1C  --   --   --   --   --   --  5.50         Lab 07/25/23  0425 07/24/23  1034   TOTAL PROTEIN 6.4 7.2   ALBUMIN 3.2* 3.7   GLOBULIN 3.2 3.5   ALT (SGPT) 76* 70*   AST (SGOT) 55* 52*   BILIRUBIN <0.2 0.2   ALK PHOS 63 85         Lab 07/25/23  0425 07/24/23  1942 07/24/23  1427 07/24/23  1034   PROBNP  --   --   --  15,765.0*   HSTROP T 166* 172* 195* 204*         Lab 07/25/23  0425   CHOLESTEROL 106   LDL CHOL 28   HDL CHOL 62*   TRIGLYCERIDES 80             Lab 07/25/23  0817 07/24/23  1040   PH, ARTERIAL 7.407 7.201*   PCO2, ARTERIAL 59.4* 79.6*   PO2 ART 59.6* 57.9*   O2 SATURATION ART 90.1* 87.5*   FIO2 32  --    HCO3 ART 36.6* 30.5*   BASE EXCESS ART 9.9* 0.2   CARBOXYHEMOGLOBIN 0.6 2.2*     Brief Urine Lab Results  (Last result in the past 365 days)        Color   Clarity   Blood   Leuk Est   Nitrite   Protein   CREAT   Urine HCG        07/24/23 1339 Yellow   Clear   Trace   Negative   Negative   Negative                 Microbiology Results (last 10 days)       Procedure Component Value - Date/Time    AFB Culture - Wash, Bronchus [[id number]] Collected: 07/26/23 1447    Lab Status: Preliminary result Specimen: Wash from Bronchus Updated: 07/27/23 1144     AFB Stain No acid fast bacilli seen on direct smear      No acid fast bacilli seen on concentrated smear    Respiratory Culture - Wash, Bronchus [[id number]]  (Abnormal) Collected: 07/26/23 1447    Lab Status: Preliminary result Specimen: Wash from Bronchus Updated: 07/27/23 1050     Respiratory Culture Rare Gram Negative Bacilli      No Normal Respiratory Magi     Gram Stain Few (2+) Gram negative bacilli      Moderate (3+) WBCs seen      Occasional Gram positive cocci in pairs    Narrative:       Refer to other Resp culture from same day for workup      AFB Culture - Lavage, Lung, Left Lower Lobe [[id number]] Collected: 07/26/23 1444    Lab Status: Preliminary result Specimen: Lavage from Lung, Left Lower Lobe Updated: 07/27/23 1143     AFB Stain No acid fast bacilli seen on direct smear      No acid fast bacilli seen on concentrated smear    BAL Culture, Quantitative - Lavage, Lung, Left Lower Lobe [[id number]]  (Abnormal) Collected: 07/26/23 1444    Lab Status: Preliminary result Specimen: Lavage from Lung, Left Lower Lobe Updated: 07/27/23 1048     BAL Culture 25,000 CFU/mL Gram Negative Bacilli      No Normal Respiratory Magi     Gram Stain Few (2+) WBCs seen      No organisms seen    Pneumonia Panel - Lavage, Lung, Left Lower Lobe [[id number]]  (Abnormal) Collected: 07/26/23 1444    Lab Status: Final result Specimen: Lavage from Lung, Left Lower Lobe Updated: 07/26/23 1756     Escherichia coli PCR Not Detected     Acinetobacter calcoaceticus-baumannii complex PCR Not Detected     Enterobacter cloacae PCR Not Detected     Klebsiella oxytoca PCR Not Detected     Klebsiella pneumoniae group PCR Not Detected     Klebsiella aerogenes PCR Not Detected     Moraxella catarrhalis PCR Not Detected     Proteus species PCR Not Detected     Pseudomonas aeroginosa PCR Detected     Comment: >=10^7 Bin copies/mL        Serratia marcescens PCR Not Detected     Staphylococcus aureus PCR Not Detected     Streptococcus pyogenes PCR Not Detected     Haemophilus influenzae PCR Not Detected     Streptococcus agalactiae PCR Not Detected     Streptococcus pneumoniae PCR Not Detected     Chlamydophila pneumoniae PCR Not Detected     Legionella pneumophilia PCR Not Detected     Mycoplasma pneumo by PCR Not Detected     ADENOVIRUS, PCR Not Detected     CTX-M Gene Not Detected     IMP Gene Not Detected     KPC Gene Not Detected     mecA/C and MREJ Gene N/A     NDM Gene Not Detected     OXA-48-like Gene N/A     VIM Gene Not Detected      Coronavirus Not Detected     Human Metapneumovirus Not Detected     Human Rhinovirus/Enterovirus Not Detected     Influenza A PCR Not Detected     Influenza B PCR Not Detected     RSV, PCR Not Detected     Parainfluenza virus PCR Not Detected    Respiratory Culture - Sputum, Cough [[id number]]  (Abnormal)  (Susceptibility) Collected: 07/25/23 0508    Lab Status: Final result Specimen: Sputum from Cough Updated: 07/27/23 0921     Respiratory Culture Heavy growth (4+) Pseudomonas aeruginosa      No Normal Respiratory Magi     Gram Stain Many (4+) WBCs per low power field      Less than 10 Epithelial cells per low power field      Few (2+) Budding yeast      Few (2+) Gram positive cocci in pairs    Susceptibility        Pseudomonas aeruginosa      PILAR      Cefepime Susceptible      Ceftazidime Susceptible      Ciprofloxacin Susceptible      Gentamicin Susceptible      Levofloxacin Susceptible      Piperacillin + Tazobactam Susceptible                           Blood Culture - Blood, Arm, Right [[id number]]  (Normal) Collected: 07/24/23 1427    Lab Status: Preliminary result Specimen: Blood from Arm, Right Updated: 07/27/23 1445     Blood Culture No growth at 3 days    Blood Culture - Blood, Arm, Left [[id number]]  (Normal) Collected: 07/24/23 1427    Lab Status: Preliminary result Specimen: Blood from Arm, Left Updated: 07/27/23 1445     Blood Culture No growth at 3 days    S. Pneumo Ag Urine or CSF - Urine, Urine, Clean Catch [[id number]]  (Normal) Collected: 07/24/23 1339    Lab Status: Final result Specimen: Urine, Clean Catch Updated: 07/24/23 1449     Strep Pneumo Ag Negative    Legionella Antigen, Urine - Urine, Urine, Clean Catch [[id number]]  (Normal) Collected: 07/24/23 1339    Lab Status: Final result Specimen: Urine, Clean Catch Updated: 07/24/23 1449     LEGIONELLA ANTIGEN, URINE Negative    COVID-19,CEPHEID/JOLEEN,COR/MAIKOL/PAD/RADHA/MAD IN-HOUSE(OR EMERGENT/ADD-ON),NP SWAB IN TRANSPORT MEDIA 3-4 HR TAT, RT-PCR  - Swab, Nasopharynx [[id number]]  (Normal) Collected: 07/24/23 1328    Lab Status: Final result Specimen: Swab from Nasopharynx Updated: 07/24/23 1412     COVID19 Not Detected    Narrative:      Fact sheet for providers: https://www.fda.gov/media/973569/download     Fact sheet for patients: https://www.fda.gov/media/769707/download  Fact sheet for providers: https://www.fda.gov/media/880479/download     Fact sheet for patients: https://www.fda.gov/media/407744/download    Influenza Antigen, Rapid - Swab, Nasopharynx [[id number]]  (Normal) Collected: 07/24/23 1328    Lab Status: Final result Specimen: Swab from Nasopharynx Updated: 07/24/23 1355     Influenza A Ag, EIA Negative     Influenza B Ag, EIA Negative            CT Chest With Contrast Diagnostic    Result Date: 7/24/2023  Impression:   1. No evidence of pulmonary embolus 2. Prominence of the central pulmonary vascularity concerning for pulmonary hypertension 3. Mucous plugging peribronchial opacities and dependent consolidation which may represent atelectasis and/or pneumonia.  Findings are increased from the comparison     TERI MOELLER MD       Electronically Signed and Approved By: TERI MOELLER MD on 7/24/2023 at 13:00             CT Chest With Contrast Diagnostic    Result Date: 7/11/2023  Impression:   CT scan of the chest with IV contrast demonstrating emphysema.  Mucus plugging is again seen in the left lower lobe, endobronchial lesion could be obscured.  Subsegmental atelectasis at the left lung base is unchanged.     WYATT HERMAN MD       Electronically Signed and Approved By: WYATT HERMAN MD on 7/11/2023 at 14:03             CT Abdomen Pelvis With Contrast    Result Date: 7/11/2023  Impression:   CT scan of the abdomen and pelvis with IV contrast demonstrating scattered hepatic cysts or hemangiomas, stable.      WYATT HERMAN MD       Electronically Signed and Approved By: WYATT HERMAN MD on 7/11/2023 at 14:24                       Results for orders placed during the hospital encounter of 23    Adult Transthoracic Echo Complete W/ Cont if Necessary Per Protocol    Interpretation Summary    Left ventricular ejection fraction appears to be 61 - 65%.    Left ventricular wall thickness is consistent with mild concentric hypertrophy.    Left ventricular diastolic function is consistent with (grade I) impaired relaxation.    The right ventricular cavity is mildly dilated.    The right atrial cavity is mild to moderately  dilated.    Estimated right ventricular systolic pressure from tricuspid regurgitation is moderately elevated (45-55 mmHg).    There were no apparent intracardiac masses, vegetations or thrombi.    Dobutamine Stress Test with Myocardial Perfusion SPECT (Multi Study)  Order# [id number]  Reading physician: Winnie Hogan MD Ordering physician: Winnie Hogan MD Study date: 23     Patient Information    Patient Name  Pavel Dai MRN  [id number] Legal Sex  Male  (Age)  1955 (68 y.o.)     Admission Information    Admission Date/Time Discharge Date/Time Room/Bed   23  10:27 AM  512/1     Interpretation Summary         Left ventricular ejection fraction is normal (Calculated EF = 69%).    The SPECT scan showed distal inferior and infero-apical perfusion defect, only in the resting images.  This was felt to be artifact.  The gated scan showed no segmental LV wall motion abnormalities..     1.  Significantly abnormal pretest ECG without ischemic changes during dobutamine stress test.  2.  Arrhythmias: None  3.  Normal LV systolic function  4.  No evidence of myocardial infarction or reversible ischemia.    Labs Pending at Discharge:  Pending Labs       Order Current Status    Fungus Culture - Lavage, Lung, Left Lower Lobe In process    Fungus Culture - Wash, Bronchus In process    Non-gynecologic Cytology In process    AFB Culture - Lavage, Lung, Left Lower Lobe Preliminary result    AFB  Culture - Wash, Bronchus Preliminary result    BAL Culture, Quantitative - Lavage, Lung, Left Lower Lobe Preliminary result    Blood Culture - Blood, Arm, Left Preliminary result    Blood Culture - Blood, Arm, Right Preliminary result    Respiratory Culture - Wash, Bronchus Preliminary result              Time spent on Discharge including face to face service:  37 minutes    Electronically signed by Ben Calixto MD, 07/28/23, 9:38 AM EDT.

## 2023-07-28 NOTE — PLAN OF CARE
Goal Outcome Evaluation:  Plan of Care Reviewed With: patient        Progress: no change       PATIENT REFUSED BIPAP TONIGHT, STILL DESAT TO 80% WITH MINIMAL EXERTION TO BSC, RECOVERS WITHIN 2-3 MIN. ON NC 3. NO ACUTE EVENTS

## 2023-07-28 NOTE — CONSULTS
"Nutrition Services    Patient Name: Pavel Dai  YOB: 1955  MRN: [id number]  Admission date: 7/24/2023      CLINICAL NUTRITION ASSESSMENT      Reason for Assessment  Follow-up protocol     H&P:    Past Medical History:   Diagnosis Date    Anemia due to chemotherapy 12/13/2021    Cancer related pain 1/3/2022    CHF (congestive heart failure)     COPD (chronic obstructive pulmonary disease)     Coronary artery disease     Frequent urination     Hyperlipidemia     Hypertension     Iron deficiency anemia due to chronic blood loss 12/13/2021    Rectal cancer         Current Problems:   Active Hospital Problems    Diagnosis     **Hypercapnic respiratory failure     Acute on chronic respiratory failure with hypoxia and hypercapnia     COPD exacerbation         Nutrition/Diet History         Narrative     Pt is admitted d/t respiratory failure. PMH significant for rectal CA, COPD, CAD, and HF. Pt mets criteria for severe malnutrition.     Nutrition follow up:    Pt has been NPO for endoscopy and stress test. When able to eat, pt has % intake. RD to order previously discussed ONS and will continue to monitor per protocol.      Anthropometrics        Current Height, Weight Height: 186.7 cm (73.5\")  Weight: 55.4 kg (122 lb 2.2 oz)   Current BMI Body mass index is 15.9 kg/m².       Weight Hx  Wt Readings from Last 30 Encounters:   07/28/23 0520 55.4 kg (122 lb 2.2 oz)   07/26/23 0554 53.4 kg (117 lb 11.6 oz)   07/24/23 2041 52.8 kg (116 lb 6.5 oz)   07/24/23 1126 55.5 kg (122 lb 5.7 oz)   07/12/23 1040 57.1 kg (125 lb 14.1 oz)   04/25/23 1343 55.9 kg (123 lb 3.8 oz)   01/11/23 0954 55.6 kg (122 lb 9.2 oz)   10/27/22 1054 55.8 kg (123 lb 0.3 oz)   08/08/22 0237 56.6 kg (124 lb 12.5 oz)   08/08/22 0109 56.6 kg (124 lb 12.5 oz)   08/07/22 2319 56.2 kg (124 lb)   05/26/22 1126 56.3 kg (124 lb 1.9 oz)   05/18/22 1305 56 kg (123 lb 7.3 oz)   04/27/22 1040 56 kg (123 lb 7.3 oz)   02/09/22 1126 58.1 kg (128 " lb 1.4 oz)   01/31/22 1445 54.3 kg (119 lb 11.4 oz)   01/26/22 0842 55.4 kg (122 lb 2.2 oz)   01/05/22 1046 55.3 kg (121 lb 14.6 oz)   01/04/22 1043 55.2 kg (121 lb 11.1 oz)   01/03/22 1137 54 kg (119 lb 0.8 oz)   12/29/21 1051 55.1 kg (121 lb 7.6 oz)   12/28/21 1043 54.9 kg (121 lb 0.5 oz)   12/22/21 1033 57.1 kg (125 lb 14.1 oz)   12/21/21 1042 55.9 kg (123 lb 3.8 oz)   12/20/21 0928 55.3 kg (121 lb 14.6 oz)   12/16/21 1045 55.6 kg (122 lb 9.2 oz)   12/15/21 1048 55.6 kg (122 lb 9.2 oz)   12/13/21 0821 55 kg (121 lb 4.1 oz)   12/09/21 1046 56.9 kg (125 lb 7.1 oz)   12/07/21 1044 56.9 kg (125 lb 7.1 oz)   12/01/21 1031 57.5 kg (126 lb 12.2 oz)   11/30/21 1046 57 kg (125 lb 10.6 oz)   11/29/21 0918 57 kg (125 lb 10.6 oz)   11/24/21 1058 57.3 kg (126 lb 5.2 oz)   11/03/21 1440 56.5 kg (124 lb 9 oz)            Wt Change Observation -5% x 2 wks; +5# x 2 days     Estimated/Assessed Needs       Energy Requirements 25-30 kcal/kg IBW   EST Needs (kcal/day) 8289-1397       Protein Requirements 1.0-1.2 g/kg   EST Daily Needs (g/day) 81-97       Fluid Requirements 25 ml/kg    Estimated Needs (mL/day) 2025     Labs/Medications         Pertinent Labs Reviewed.   Results from last 7 days   Lab Units 07/28/23  0448 07/27/23  0502 07/26/23  0456 07/25/23  0817 07/25/23  0425 07/24/23  1040 07/24/23  1034   SODIUM mmol/L 143 144 139  --  141  --  139   SODIUM, ARTERIAL   --   --   --    < >  --    < >  --    POTASSIUM mmol/L 3.8 4.1 4.4  --  4.1  --  5.5*   CHLORIDE mmol/L 100 101 97*  --  97*  --  94*   CO2 mmol/L 39.7* 39.2* 34.1*  --  34.2*  --  33.9*   BUN mg/dL 20 27* 36*  --  34*  --  38*   CREATININE mg/dL 0.55* 0.53* 0.50*  --  0.76  --  1.95*   CALCIUM mg/dL 8.3* 8.7 8.6  --  8.8  --  8.6   BILIRUBIN mg/dL  --   --   --   --  <0.2  --  0.2   ALK PHOS U/L  --   --   --   --  63  --  85   ALT (SGPT) U/L  --   --   --   --  76*  --  70*   AST (SGOT) U/L  --   --   --   --  55*  --  52*   GLUCOSE mg/dL 91 94 85  --  102*  --   139*   GLUCOSE, ARTERIAL   --   --   --    < >  --    < >  --     < > = values in this interval not displayed.       Results from last 7 days   Lab Units 07/28/23  0448 07/27/23  0502 07/25/23  0425   MAGNESIUM mg/dL 1.9 2.0 2.0   HEMOGLOBIN g/dL 11.2* 11.3* 11.5*   HEMATOCRIT % 37.8 38.2 39.7   TRIGLYCERIDES mg/dL  --   --  80       COVID19   Date Value Ref Range Status   07/24/2023 Not Detected Not Detected - Ref. Range Final     Lab Results   Component Value Date    HGBA1C 5.50 07/24/2023         Pertinent Medications Reviewed.     Current Nutrition Orders & Evaluation of Intake       Oral Nutrition     Current PO Diet Diet: Regular/House Diet; Texture: Regular Texture (IDDSI 7); Fluid Consistency: Thin (IDDSI 0)   Supplement Orders Placed This Encounter      Dietary Nutrition Supplements Boost Plus (Ensure Plus); chocolate       Malnutrition Severity Assessment      Patient meets criteria for : Severe Malnutrition           Nutrition Diagnosis         Nutrition Dx Problem 1 Severe malnutrition related to decreased ability to consume sufficient energy as evidenced by unintended wt change., body composition changes., and NPO       Nutrition Intervention         Boost Plus (alban)  TID   +1080 kcal, 42 g PRO/day     Medical Nutrition Therapy/Nutrition Education          Learner     Readiness Patient  Education not indicated at this time     Method     Response N/A  N/A     Monitor/Evaluation        Monitor Per protocol, PO intake, Supplement intake, Pertinent labs, Weight, POC/GOC       Nutrition Discharge Plan         Continue oral nutrition supplement daily when intake/appetite is poor       Electronically signed by:  Beverly Beltran RD  07/28/23 08:23 EDT

## 2023-07-28 NOTE — PROGRESS NOTES
Pulmonary / Critical Care Progress Note      Patient Name: Pavel Dai  : 1955  MRN: [id number]  Primary Care Physician:  Yoel Geller MD  Date of admission: 2023    Subjective   Subjective   Follow-up for acute on chronic hypercapnic respiratory failure, Pseudomonas pneumonia.     bronchoscopy for mucous plugging.  BAL pneumonia panel with Pseudomonas.  Cultures growing gram-negative bacilli.  Cytology pending.    Over past 24 hours, continues on cefepime,  prednisone and nebulizers.  Was started on tobramycin nebulizers.      No acute events overnight.  Did not wear NIPPV.     This morning,   Lying in bed on 3 L nasal cannula  O2 sats 96%  Decreased to 1 L  No chest pain  No fever or chills  Denies chest pain   Scant hemoptysis  Remains weak and fatigued    Review of Systems  General:  + Fatigue, No Fever  HEENT: No dysphagia, No Visual Changes, no rhinorrhea  Respiratory:  + Productive cough,+Dyspnea, + phlegm, No Pleuritic Pain, + wheezing, no hemoptysis  Cardiovascular: Denies chest pain, denies palpitations, +COELHO, No Chest Pressure  Gastrointestinal:  No Abdominal Pain, No Nausea, No Vomiting, No Diarrhea  Genitourinary:  No Dysuria, No Frequency, No Hesitancy  Musculoskeletal: No muscle pain or swelling  Endocrine:  No Heat Intolerance, No Cold Intolerance  Neurologic:  No Confusion, no Dysarthria, No Headaches  Skin:  No Rash, No Open Wounds    Objective   Objective     Vitals:   Temp:  [98.2 °F (36.8 °C)-98.6 °F (37 °C)] 98.2 °F (36.8 °C)  Heart Rate:  [] 79  Resp:  [18-20] 18  BP: (119-156)/(77-86) 119/77  Flow (L/min):  [3-4] 3    Physical Exam   Vital Signs Reviewed   General: Cachectic appearing male, NAD, lying in bed on 1 L NC    HEENT:  PERRL, EOMI.  OP, nares clear, no sinus tenderness  Neck:  Supple, no JVD, no thyromegaly  Lymph: no axillary, cervical, supraclavicular lymphadenopathy noted bilaterally  Chest: Poor aeration, bilateral scattered rhonchi, bilateral  expiratory wheezing, no crackles, tympanic to percussion bilaterally, mildly increased work of breathing noted  CV: RRR, no MGR, pulses 2+, equal  Abd:  Soft, NT, ND, + BS, no HSM  EXT:  no clubbing, no cyanosis, no edema, no joint tenderness  Neuro:  A&Ox3, CN grossly intact, no focal deficits, generalized weakness  Skin: No rashes or lesions noted    Result Review    Result Review:  I have personally reviewed the results from the time of this admission to 7/28/2023 07:27 EDT and agree with these findings:  [x]  Laboratory  [x]  Microbiology  [x]  Radiology  [x]  EKG/Telemetry   [x]  Cardiology/Vascular   []  Pathology  []  Old records  []  Other:  Most notable findings include:     -7/26 BAL pneumonia panel Pseudomonas  Cultures gram-negative bacilli  Cytology pending    -7/24 echo - EF 61 to 65%, grade 1 diastolic dysfunction, moderately elevated RVSP 45 to 55 mmHg        Lab 07/28/23  0448 07/27/23  0502 07/26/23  0456 07/25/23  0817 07/25/23  0425 07/24/23  1040 07/24/23  1034   WBC 6.65 7.83  --   --  10.67  --  12.51*   HEMOGLOBIN 11.2* 11.3*  --   --  11.5*  --  12.4*   HEMATOCRIT 37.8 38.2  --   --  39.7  --  42.0   PLATELETS 201 210  --   --  154  --  224   SODIUM 143 144 139  --  141  --  139   SODIUM, ARTERIAL  --   --   --  139.8  --  139.2  --    POTASSIUM 3.8 4.1 4.4  --  4.1  --  5.5*   CHLORIDE 100 101 97*  --  97*  --  94*   CO2 39.7* 39.2* 34.1*  --  34.2*  --  33.9*   BUN 20 27* 36*  --  34*  --  38*   CREATININE 0.55* 0.53* 0.50*  --  0.76  --  1.95*   GLUCOSE 91 94 85  --  102*  --  139*   GLUCOSE, ARTERIAL  --   --   --  94  --  133*  --    CALCIUM 8.3* 8.7 8.6  --  8.8  --  8.6   TOTAL PROTEIN  --   --   --   --  6.4  --  7.2   ALBUMIN  --   --   --   --  3.2*  --  3.7   GLOBULIN  --   --   --   --  3.2  --  3.5     Assessment & Plan   Assessment / Plan     Active Hospital Problems:  Active Hospital Problems    Diagnosis     **Hypercapnic respiratory failure     Acute on chronic respiratory  failure with hypoxia and hypercapnia     COPD exacerbation      Impression:   Acute on chronic hypoxic hypercapnic respiratory failure requiring NIPPV  Pseudomonas pneumonia  Mucous plugging/difficulty with airway clearance  Acute exacerbation of bronchiectasis: noted on CT 7/24/23  COPD with acute exacerbation  Acute exacerbation of diastolic heart failure: proBNP 15, 575  Respiratory acidosis, acute  Elevated lactate: improved  FABIAN  Tobacco abuse in remission  History of rectal cancer    Plan:   -Continue to wean to maintain SPO2 greater than 90%.  -Continue BiPAP 14/7 at night and days with naps.  -Appreciate RT  assistance in arranging NIV for home.  To be set up today.  -Continue cefepime for Pseudomonas pneumonia.  Can transition to Levaquin at discharge to complete 14 days of therapy.  -Continue tobramycin nebulizers.  Will consult RT  to arrange tobramycin nebulizers for home with 30-day cycle on an 30-day cycle of for the next year.  -Continue prednisone 40 mg p.o. daily to complete 7 days.  -Continue Brovana, Pulmicort and DuoNeb.  -Continue airway clearance therapy with MetaNeb.  -Continue bronchopulmonary hygiene.  Encourage I-S and flutter valve.  -Cardiology on board, appreciate assistance. S/p stress test.  -Encourage mobilization, out of bed to chair.  -Would benefit from rehab but patient does not want to go.    -From pulmonary standpoint patient should be stable for discharge in the next 24 hours.  -To have astral vent set up today.  -We will need to follow-up in our clinic in 2 weeks.    DVT prophylaxis:  Medical DVT prophylaxis orders are present.    CODE STATUS:   Level Of Support Discussed With: Patient  Code Status (Patient has no pulse and is not breathing): CPR (Attempt to Resuscitate)  Medical Interventions (Patient has pulse or is breathing): Full Support  Release to patient: Routine Release    I personally reviewed pertinent labs, imaging and provider notes.  Discussed with bedside nurse and will discuss with primary service.     Electronically signed by JOSE Rudolph, 07/28/23, 9:15 AM EDT.    This visit was performed by BOTH a physician and an APC. I personally evaluated and examined the patient. I performed all aspects of MDM as documented. , I have reviewed and confirmed the accuracy of the patient's history as documented in this note., and I have reexamined the patient and the results are consistent with the previously documented exam. I have updated the documentation as necessary.     Electronically signed by Ted Petty MD, 07/28/23, 3:29 PM EDT.       Electronically signed by Ted Petty MD, 7/28/2023, 07:27 EDT.

## 2023-07-28 NOTE — TELEPHONE ENCOUNTER
Reason for Disposition   Caller has medicine question, adult has minor symptoms, caller declines triage, AND triager answers question    Additional Information   Negative: [1] Intentional drug overdose AND [2] suicidal thoughts or ideas   Negative: Drug overdose and triager unable to answer question   Negative: Caller requesting a renewal or refill of a medicine patient is currently taking   Negative: Caller requesting information unrelated to medicine   Negative: Caller requesting information about COVID-19 Vaccine   Negative: Caller requesting information about Emergency Contraception   Negative: Caller requesting information about Combined Birth Control Pills   Negative: Caller requesting information about Progestin Birth Control Pills   Negative: Caller requesting information about Post-Op pain or medicines   Negative: Caller requesting a prescription antibiotic (such as Penicillin) for Strep throat and has a positive culture result   Negative: Caller requesting a prescription anti-viral med (such as Tamiflu) and has influenza (flu) symptoms   Negative: Immunization reaction suspected   Negative: Rash while taking a medicine or within 3 days of stopping it   Negative: [1] Asthma and [2] having symptoms of asthma (cough, wheezing, etc.)   Negative: [1] Symptom of illness (e.g., headache, abdominal pain, earache, vomiting) AND [2] more than mild   Negative: Breastfeeding questions about mother's medicines and diet   Negative: MORE THAN A DOUBLE DOSE of a prescription or over-the-counter (OTC) drug   Negative: [1] DOUBLE DOSE (an extra dose or lesser amount) of prescription drug AND [2] any symptoms (e.g., dizziness, nausea, pain, sleepiness)   Negative: [1] DOUBLE DOSE (an extra dose or lesser amount) of over-the-counter (OTC) drug AND [2] any symptoms (e.g., dizziness, nausea, pain, sleepiness)   Negative: Took another person's prescription drug   Negative: [1] DOUBLE DOSE (an extra dose or lesser amount) of  "prescription drug AND [2] NO symptoms  (Exception: A double dose of antibiotics.)   Negative: Diabetes drug error or overdose (e.g., took wrong type of insulin or took extra dose)   Negative: [1] Prescription not at pharmacy AND [2] was prescribed by PCP recently (Exception: Triager has access to EMR and prescription is recorded there. Go to Home Care and confirm for pharmacy.)   Negative: [1] Pharmacy calling with prescription question AND [2] triager unable to answer question   Negative: [1] Caller has URGENT medicine question about med that PCP or specialist prescribed AND [2] triager unable to answer question   Negative: Medicine patch causing local rash or itching   Negative: [1] Caller has medicine question about med NOT prescribed by PCP AND [2] triager unable to answer question (e.g., compatibility with other med, storage)   Negative: Prescription request for new medicine (not a refill)   Negative: [1] Caller has NON-URGENT medicine question about med that PCP prescribed AND [2] triager unable to answer question   Negative: Caller wants to use a complementary or alternative medicine   Negative: [1] Prescription prescribed recently is not at pharmacy AND [2] triager has access to patient's EMR AND [3] prescription is recorded in the EMR   Negative: [1] DOUBLE DOSE (an extra dose or lesser amount) of over-the-counter (OTC) drug AND [2] NO symptoms   Negative: [1] DOUBLE DOSE (an extra dose or lesser amount) of antibiotic drug AND [2] NO symptoms   Negative: Caller has medicine question only, adult not sick, AND triager answers question    Answer Assessment - Initial Assessment Questions  1. NAME of MEDICINE: \"What medicine(s) are you calling about?\"      He is not sure.  Nurse checked list and has 4 meds: Levaquin, metoprolol, Rian for nebulizer and prednisone  2. QUESTION: \"What is your question?\" (e.g., double dose of medicine, side effect)     Which pharmacy  3. PRESCRIBER: \"Who prescribed the medicine?\" " "Reason: if prescribed by specialist, call should be referred to that group.      Dr. Calixto  4. SYMPTOMS: \"Do you have any symptoms?\" If Yes, ask: \"What symptoms are you having?\"  \"How bad are the symptoms (e.g., mild, moderate, severe)      respiratory  5. PREGNANCY:  \"Is there any chance that you are pregnant?\" \"When was your last menstrual period?\"      na    Protocols used: Medication Question Call-ADULT-    "

## 2023-07-28 NOTE — TELEPHONE ENCOUNTER
Discussed meds with caller and asked him where did he tell them to send his meds?  He states he is not sure.  I asked does he want them sent to [facility] in [place]?  He decided to leave them at the [facility] for mail in.

## 2023-07-28 NOTE — PLAN OF CARE
Goal Outcome Evaluation:              Outcome Evaluation: Pt currently refusing BIPAP 14/7. Pt states he sleeps better without the mask on. Pt is resting on 3LNC and tolerating treatments with metaneb well. Will continue to monitor and encourage the use of BIPAP.

## 2023-07-28 NOTE — PLAN OF CARE
Goal Outcome Evaluation:  Plan of Care Reviewed With: patient           Outcome Evaluation: Patient refused Bipap last night. Patient is on 3 lpm NC tolerating well at this time. Respiratory Therapy will continue to monitor and make changes as needed.

## 2023-07-29 LAB
BACTERIA SPEC AEROBE CULT: NORMAL
BACTERIA SPEC AEROBE CULT: NORMAL
QT INTERVAL: 276 MS

## 2023-07-29 NOTE — OUTREACH NOTE
Prep Survey      Flowsheet Row Responses   [facility] facility patient discharged from? [facility]   Is LACE score < 7 ? No   Eligibility Readm Mgmt   Discharge diagnosis Hypercapnic respiratory failure   Does the patient have one of the following disease processes/diagnoses(primary or secondary)? COPD   Does the patient have Home health ordered? No   Is there a DME ordered? Yes   What DME was ordered? DME NIPPV - IPPV/BiPAP/CPAP   Prep survey completed? Yes            Aimee MCGUIRE - Registered Nurse          
yes/3/3

## 2023-08-01 ENCOUNTER — READMISSION MANAGEMENT (OUTPATIENT)
Dept: CALL CENTER | Facility: HOSPITAL | Age: 68
End: 2023-08-01
Payer: OTHER GOVERNMENT

## 2023-08-02 LAB
FUNGUS WND CULT: NORMAL
FUNGUS WND CULT: NORMAL
MYCOBACTERIUM SPEC CULT: NORMAL
MYCOBACTERIUM SPEC CULT: NORMAL
NIGHT BLUE STAIN TISS: NORMAL

## 2023-08-04 NOTE — PROGRESS NOTES
Primary Care Provider  Yoel Geller MD   Referring Provider  No ref. provider found    Patient Complaint  Follow-up, Cough, Shortness of Breath, Wheezing, and COPD      SUBJECTIVE    History of Presenting Illness  Pavel Dai is a pleasant 68 y.o. male patient of Dr. Petty's who presents to [facility] for hospital follow-up.  Patient is here with his son.  He has patient was in [facility] 724 through 7/28/2023.  He has a past medical history of rectal cancer, COPD and is on home O2 at 3 L.  He also has a diagnosis of coronary artery disease, dyslipidemia, hypertension, and heart failure preserved ejection fraction with EF 61 to 65%.  He presented to the emergency room with not feeling well and shortness of breath for over 1 month. In the emergency department patient was tachycardic at 112, respiratory rate of 18, initial blood pressure 95/66, initially saturating 72% on 3 L nasal cannula, transition to BiPAP.  Elevated white count.  Creatinine 1.95 up from his baseline of less than 1.  Potassium elevated at 5.5.  proBNP 15,000 with a troponin of 200.  ABG 7.2/79.  A lactate of 3.2.  CTA chest shows no pulmonary embolus, prominence of central pulmonary vasculature concern for pulmonary hypertension, mucous plug in the peribronchial opacities and dependent consolidation.  Patient was admitted to the hospital for hypercapnic respiratory failure.  Patient underwent bronchoscopy by Dr. Petty on 7/26/2023  with mucous plug removal.  BAL returned positive for Pseudomonas therefore discharged on 2 weeks of Levaquin.  However patient states he has not received his Levaquin.  Patient underwent stress test given significant bump in troponin on arrival, this returned with no concerning features.  Cardiologist request follow-up in 1 month.  Patient is discharging with noninvasive ventilation with astral for his acute on chronic respiratory failure secondary  to COPD.     Patient continues to be on albuterol nebulizer, Daliresp, Claritin,,Advair, Combivent.  In addition he was prescribed ANDREAS nebulizer 30 days on 30 days off.  However patient states he has not gotten this from the pharmacy and [facility] told by [facility] not to take it because it will make him worse.  Patient does have a Pseudomonas infection and treatment plan includes the Levaquin and tobramycin nebulizers. Patient and son both verbalize understanding.  Patient does have shortness of air at rest and with exertion.  He is currently on O2 at 3 to 4 L pulsed dose on Inogene machine.  He does have a concentrator at home which he uses and has it set on 3 L.  He does have his astral vent that he uses for sleep and he is benefiting from its use.  He continues to be under the care of of Dr. Das for his cancer care.  As well as continues cardiology Dr. Sergei Barrett for his CHF.  Patient states he does continue to cough up some thick mucus.  I will have occasional wheezing.  He is very proactive in using his Combivent and albuterol nebulizer.    Patient has been under the care of of pulmonologist Dr. Myers and states he recently had a pulmonary function test 2 months ago at his office.  Patient states the provider is retiring and he is transferring his care to Dr. Petty.      He denies having any headaches, chest pain, weight loss or hemoptysis. Denies fevers, chills and night sweats. Pavel Dai is able to perform ADLs with some difficulty due to exertional dyspnea and denies any swollen glands/lymph nodes in the head or neck.    I have personally reviewed the review of systems, past family, social, medical and surgical histories; and agree with their findings.    Review of Systems  Constitutional symptoms:  Denied complaints   Ear, nose, throat: Denied complaints  Cardiovascular:  Denied complaints  Respiratory: Shortness of air at rest and with exertion, cough productive,  wheeze  Gastrointestinal: Denied complaints  Musculoskeletal: Denied complaints    Family History   Problem Relation Age of Onset    Heart attack Mother     Heart attack Sister     Heart attack Sister         Social History     Socioeconomic History    Marital status:    Tobacco Use    Smoking status: Former     Types: Cigarettes     Start date: 11/3/1973     Quit date: 11/3/2014     Years since quittin.7    Smokeless tobacco: Never   Vaping Use    Vaping Use: Never used   Substance and Sexual Activity    Alcohol use: Not Currently    Drug use: Never    Sexual activity: Defer        Past Medical History:   Diagnosis Date    Anemia due to chemotherapy 2021    Cancer related pain 1/3/2022    CHF (congestive heart failure)     COPD (chronic obstructive pulmonary disease)     Coronary artery disease     Frequent urination     Hyperlipidemia     Hypertension     Iron deficiency anemia due to chronic blood loss 2021    Rectal cancer         Immunization History   Administered Date(s) Administered    COVID-19 (PFIZER) BIVALENT 12+YRS 11/15/2022    COVID-19 (PFIZER) Purple Cap Monovalent 2021    FLUAD TRI 65YR+ 10/30/2018, 10/08/2020    Fluad Quad 65+ 11/15/2022    Influenza Seasonal Injectable 10/30/2018, 10/08/2020       No Known Allergies       Current Outpatient Medications:     albuterol (PROVENTIL) (2.5 MG/3ML) 0.083% nebulizer solution, Take 2.5 mg by nebulization Every 8 (Eight) Hours As Needed for Wheezing or Shortness of Air., Disp: , Rfl:     aspirin 81 MG chewable tablet, Chew 1 tablet Daily., Disp: , Rfl:     atorvastatin (LIPITOR) 10 MG tablet, Take 1 tablet by mouth Every Night., Disp: , Rfl:     Fluticasone-Salmeterol (ADVAIR/WIXELA) 100-50 MCG/ACT DISKUS, Inhale 1 puff 2 (Two) Times a Day., Disp: , Rfl:     guaiFENesin (MUCINEX) 600 MG 12 hr tablet, Take 1 tablet by mouth 2 (Two) Times a Day As Needed for Cough or Congestion., Disp: , Rfl:     ipratropium-albuterol (COMBIVENT  "RESPIMAT)  MCG/ACT inhaler, Inhale 1 puff 4 (Four) Times a Day., Disp: , Rfl:     levoFLOXacin (Levaquin) 750 MG tablet, Take 1 tablet by mouth Daily for 14 days., Disp: 14 tablet, Rfl: 0    loratadine (CLARITIN) 10 MG tablet, Take 1 tablet by mouth Daily., Disp: , Rfl:     metoprolol succinate XL (TOPROL-XL) 50 MG 24 hr tablet, Take 1 tablet by mouth Every 12 (Twelve) Hours for 30 days., Disp: 60 tablet, Rfl: 0    multivitamin with minerals tablet tablet, Take 1 tablet by mouth Daily., Disp: , Rfl:     omeprazole (priLOSEC) 20 MG capsule, Take 1 capsule by mouth Daily., Disp: , Rfl:     roflumilast (DALIRESP) 500 MCG tablet tablet, Take 1 tablet by mouth Daily., Disp: , Rfl:     tobramycin PF (Rian) 300 MG/5ML nebulizer solution, Take 5 mL by nebulization 2 (Two) Times a Day for 30 days. Do nebulizer twice a day for 30 days and then stop for 30 days, continue cycle of 30 days on and 30 days off for 1 year., Disp: 300 mL, Rfl: 5           Vital Signs   /83 (BP Location: Right arm, Patient Position: Sitting, Cuff Size: Adult)   Pulse 120   Temp 98 øF (36.7 øC) (Temporal)   Resp 12   Ht 185.4 cm (73\")   Wt 56.2 kg (123 lb 12.8 oz)   SpO2 94% Comment: O2-3 LITERS  BMI 16.33 kg/mý       OBJECTIVE    Physical Exam  Vital Signs Reviewed   WDWN, Alert, NAD.    HEENT:  PERRL, EOMI.  OP, nares clear  Neck:  Supple, no JVD, no thyromegaly  Chest: Barrel chested, very diminished breath sounds bilateral lung fields, occasional inspiratory wheeze upper posterior, no work of breathing noted  CV: RRR, no MGR, pulses 2+, equal.  Abd:  Soft, NT, ND, + BS, no HSM  EXT:  no clubbing, no cyanosis, no edema  Neuro:  A&Ox3, CN grossly intact, no focal deficits.  Skin: No rashes or lesions noted    Results Review  I have personally reviewed the prior office notes, hospital records, labs, and diagnostics.    CT Chest With Contrast Diagnostic [NTX020] (Order [id number])  Order  Status: Final result     Appointment " Information    PACS Images     Radiology Images  Study Result    Narrative & Impression   PROCEDURE:  CT CHEST W CONTRAST DIAGNOSTIC     COMPARISON:  07/11/2023 and prior  INDICATIONS:  Chest wall pain, nontraumatic, no prior imaging. short of breath, on bipap     TECHNIQUE:    After obtaining the patient's consent, CT images were obtained with non-ionic   intravenous contrast material.       PROTOCOL:     Pulmonary embolism imaging protocol performed                 RADIATION:      DLP: 285.6mGy*cm               Automated exposure control was utilized to minimize radiation dose.   CONTRAST:      100cc Isovue 370 I.V.     FINDINGS:          Pulmonary arteries:  There is an a excellent bolus for the evaluation of the pulmonary arterial   system.  Main pulmonary arteries are prominent in size.  No filling defects concerning for   pulmonary emboli noted     Mediastinum:  Central airways are patent.  Peribronchial wall thickening and endobronchial   opacification noted peripherally particularly at the lung bases.  There is consolidation at the   lung bases and patchy opacity and peribronchial opacities at the lung bases which have increased   from the comparison.  Severe emphysematous changes are noted with an upper lung zone predominance     Lungs:  Central airways are patent.  Lungs are grossly clear..     Upper abdomen:  Limited imaging of the upper abdomen is unremarkable.     Bones and soft tissues:  No acute osseous abnormality.  Multilevel degenerative changes noted of   the spine        IMPRESSION:                 1. No evidence of pulmonary embolus  2. Prominence of the central pulmonary vascularity concerning for pulmonary hypertension  3. Mucous plugging peribronchial opacities and dependent consolidation which may represent   atelectasis and/or pneumonia.  Findings are increased from the comparison            TERI MOELLER MD         Electronically Signed and Approved By: TERI MOELLER MD on 7/24/2023  at 13:00     Non-gynecologic Cytology: [id number]  Order: [id number]  Status: Final result       Visible to patient: Yes (not seen)       Next appt: 08/11/2023 at 09:00 AM in Pulmonology (JOSE Hammer)       Dx: COPD exacerbation; Acute on chronic r...    Specimen Information: A: Lung, Left Lower Lobe; Lavage    B: Bronchus; Lavage   0 Result Notes      Component    Case Report   Medical Cytology Report                           Case: [id number]                                   Authorizing Provider:  Ted Petty MD         Collected:           07/26/2023 02:44 PM           Ordering Location:     [facility] Received:            07/27/2023 10:28 AM                                  SUITES                                                                       Pathologist:           Lisa Gonzalez DO                                                       Specimens:   1) - Lung, Left Lower Lobe, LLL BAL                                                                  2) - Bronchus, WASHING                                                                    Final Diagnosis   1. Lung, left lower lobe, BAL:               - Negative for malignant cells  - GMS stain, negative for fungal forms and Pneumocystis        2. Lung, NOS, bronchial washing:               - Negative for malignant cells  - GMS stain, negative for fungal forms and Pneumocystis      Electronically signed by Lisa Gonzalez DO on 7/28/2023 at 1015   Clinical Information    Pre-Operative Diagnosis: COPD with acute exacerbation, persistent bronchitis, mucus plugging, lower lung collapse bilaterally.     Post-Operative Diagnosis: Severe mucus plugging, suctioned out in multiple attempts, very friable airway, chronic bronchitis.     Check for pneumocystis and fungus.   Gross Description    1. Lung, Left Lower Lobe.  BAL, left lower lobe       25 cc of hazy, slightly mucoid, blood-tinged fluid received (1 cytospin prep and GMS-P/F  prepared).     2. Bronchus.  Bronchial Washings       67 cc of bloody, slightly mucoid fluid received (1 cytospin prep and GMS-P/F prepared).      Microscopic Description    Microscopic examination performed.   Special Stains    Comment:  A special stain for GMS was performed to aid in the detection of fungal forms.  Controls are appropriate.      Resulting Agency [facility]              Specimen Collected: 07/26/23 14:44 EDT Last Resulted: 07/28/23 10:15 EDT                 Fungus Culture - Lavage, Lung, Left Lower Lobe  Order: [id number]  Status: Preliminary result       Visible to patient: No (not released)       Next appt: 08/11/2023 at 09:00 AM [facility]       Dx: COPD exacerbation; Acute on chronic r...    Specimen Information: Lung, Left Lower Lobe; Lavage   0 Result Notes  Fungus Culture No fungus isolated at 1 week           Resulting Agency: [facility]           Specimen Collected: 07/26/23 14:44 EDT Last Resulted: 08/02/23 15:31 EDT           AFB Culture - Lavage, Lung, Left Lower Lobe  Order: [id number]  Status: Preliminary result       Visible to patient: No (not released)       Next appt: 08/11/2023 at 09:00 AM [facility]       Dx: COPD exacerbation; Acute on chronic r...    Specimen Information: Lung, Left Lower Lobe; Lavage   0 Result Notes  AFB Culture No AFB isolated at 1 week               AFB Stain No acid fast bacilli seen on direct smear      No acid fast bacilli seen on concentrated smear              Resulting Agency: [facility]           Specimen Collected: 07/26/23 14:44 EDT Last Resulted: 08/02/23 15:31 EDT              Contains abnormal data BAL Culture, Quantitative - Lavage, Lung, Left Lower Lobe  Order: [id number]  Status: Final result       Visible to patient: Yes (not seen)       Next appt: 08/11/2023 at 09:00 AM in Pulmonology (Cecilia Colin, APRN)       Dx: COPD exacerbation; Acute on chronic r...    Specimen Information: Lung, Left  Lower Lobe; Lavage   0 Result Notes  BAL Culture   Lab   25,000 CFU/mL Pseudomonas aeruginosa Abnormal   [facility]      No Normal Respiratory Magi Abnormal   [facility]               Gram Stain  Lab   Few (2+) WBCs seen  [facility]      No organisms seen [facility]              Susceptibility     Pseudomonas aeruginosa    PILAR    Cefepime <=1 ug/ml Susceptible    Ceftazidime 2 ug/ml Susceptible    Ciprofloxacin <=0.25 ug/ml Susceptible    Gentamicin <=1 ug/ml Susceptible    Levofloxacin 0.5 ug/ml Susceptible    Piperacillin + Tazobactam <=4 ug/ml Susceptible              Linear View         Specimen Collected: 07/26/23 14:44 EDT Last Resulted: 07/28/23 10:43 EDT                Contains abnormal data Pneumonia Panel - Lavage, Lung, Left Lower Lobe  Order: [id number]  Status: Final result       Visible to patient: Yes (not seen)       Next appt: 08/11/2023 at 09:00 AM in Pulmonology (JOSE Hammer)       Dx: COPD exacerbation; Acute on chronic r...    Specimen Information: Lung, Left Lower Lobe; Lavage   0 Result Notes      Component  Ref Range & Units    Escherichia coli PCR  Not Detected Not Detected   Acinetobacter calcoaceticus-baumannii complex PCR  Not Detected Not Detected   Enterobacter cloacae PCR  Not Detected Not Detected   Klebsiella oxytoca PCR  Not Detected Not Detected   Klebsiella pneumoniae group PCR  Not Detected Not Detected   Klebsiella aerogenes PCR  Not Detected Not Detected   Moraxella catarrhalis PCR  Not Detected Not Detected   Proteus species PCR  Not Detected Not Detected   Pseudomonas aeroginosa PCR  Not Detected Detected Abnormal    Comment: >=10^7 Bin copies/mL   Serratia marcescens PCR  Not Detected Not Detected   Staphylococcus aureus PCR  Not Detected Not Detected   Streptococcus pyogenes PCR  Not Detected Not Detected   Haemophilus influenzae PCR  Not Detected Not Detected   Streptococcus agalactiae PCR  Not Detected Not Detected   Streptococcus pneumoniae PCR  Not Detected Not  Detected   Chlamydophila pneumoniae PCR  Not Detected Not Detected   Legionella pneumophilia PCR  Not Detected Not Detected   Mycoplasma pneumo by PCR  Not Detected Not Detected   ADENOVIRUS, PCR  Not Detected Not Detected   CTX-M Gene  Not Detected, N/A Not Detected   IMP Gene  Not Detected, N/A Not Detected   KPC Gene  Not Detected, N/A Not Detected   mecA/C and MREJ Gene  Not Detected, N/A N/A   NDM Gene  Not Detected, N/A Not Detected   OXA-48-like Gene  Not Detected, N/A N/A   VIM Gene  Not Detected, N/A Not Detected   Coronavirus  Not Detected Not Detected   Human Metapneumovirus  Not Detected Not Detected   Human Rhinovirus/Enterovirus  Not Detected Not Detected   Influenza A PCR  Not Detected Not Detected   Influenza B PCR  Not Detected Not Detected   RSV, PCR  Not Detected Not Detected   Parainfluenza virus PCR  Not Detected Not Detected   Resulting Agency [facility]              Specimen Collected: 07/26/23 14:44 EDT Last Resulted: 07/26/23 17:56 EDT           Bronch Wash/BAL Differential - Lavage, Lung, Left Lower Lobe  Order: [id number]  Status: Final result       Visible to patient: Yes (not seen)       Next appt: 08/11/2023 at 09:00 AM in Pulmonology (JOSE Hammer)       Dx: COPD exacerbation; Acute on chronic r...    Specimen Information: Lung, Left Lower Lobe; Lavage   0 Result Notes      Component  Ref Range & Units 9 d ago   Visual Presence of Blood    Lymphocytes, Fluid  % 2   Neutrophils, Fluid  % 98   Resulting Agency [facility]           Narrative  Performed by: [facility]  Normal Adults (Nonsmokers)    Alveolar Macrophages       >85%  Lymphocytes              10-15%  Neutrophils              <or=3%  Eosinophils              <or=1%  Bronchial Lining Cells   <or=5%      Clinical Interpretations for BAL    Eosinophils >or=25%       Eosinophilic Pneumoniae  Lymphocytes >or=50%       Consider Drug Rxn,Acute HP  Bloody lavage             Diffuse Alveolar Hemorrhage  Other Findings             Specific Dx or Non-diagnostic      Specimen Collected: 07/26/23 14:44 EDT Last Resulted: 07/26/23 16:09 EDT           Fungus Culture - Wash, Bronchus  Order: [id number]  Status: Preliminary result       Visible to patient: No (not released)       Next appt: 08/11/2023 at 09:00 AM in Pulmonology (Cecilia Shilpior, [facility])       Dx: COPD exacerbation; Acute on chronic r...    Specimen Information: Bronchus; Wash   0 Result Notes  Fungus Culture No fungus isolated at 1 week           Resulting Agency: [facility]           Specimen Collected: 07/26/23 14:47 EDT Last Resulted: 08/02/23 15:45 EDT         AFB Culture - Wash, Bronchus  Order: [id number]  Status: Preliminary result       Visible to patient: No (not released)       Next appt: 08/11/2023 at 09:00 AM in Pulmonology (Cecilia Shilpior, [facility])       Dx: COPD exacerbation; Acute on chronic r...    Specimen Information: Bronchus; Wash   0 Result Notes  AFB Culture No AFB isolated at 1 week               AFB Stain No acid fast bacilli seen on direct smear      No acid fast bacilli seen on concentrated smear              Resulting Agency: [facility]           Specimen Collected: 07/26/23 14:47 EDT Last Resulted: 08/02/23 15:45 EDT           Respiratory Culture - Wash, Bronchus  Order: [id number]  Status: Final result       Visible to patient: Yes (not seen)       Next appt: 08/11/2023 at 09:00 AM in Pulmonology (Cecilia Shilpior, [facility])       Dx: COPD exacerbation; Acute on chronic r...    Specimen Information: Bronchus; Wash   0 Result Notes  Respiratory Culture   Lab   Rare Pseudomonas aeruginosa Abnormal   [facility]      No Normal Respiratory Magi Abnormal   [facility]          Refer to other Resp culture from same day for workup             Gram Stain  Lab   Few (2+) Gram negative bacilli [facility]      Moderate (3+) WBCs seen [facility]      Occasional Gram positive cocci in pairs [facility]                    Specimen Collected: 07/26/23 14:47 EDT Last Resulted: 07/28/23 09:47  EDT                 AFB Culture - Wash, Bronchus  Order: [id number]  Status: Preliminary result       Visible to patient: No (not released)       Next appt: 10/24/2023 at 01:45 PM in Oncology (Issac Walker MD)       Dx: COPD exacerbation; Acute on chronic r...    Specimen Information: Bronchus; Wash   0 Result Notes  AFB Culture No AFB isolated at 2 weeks               AFB Stain No acid fast bacilli seen on direct smear      No acid fast bacilli seen on concentrated smear              Resulting Agency: [facility]           Specimen Collected: 07/26/23 14:47 EDT Last Resulted: 08/09/23 15:45 EDT           Fungus Culture - Wash, Bronchus  Order: [id number]  Status: Preliminary result       Visible to patient: No (not released)       Next appt: 10/24/2023 at 01:45 PM in Oncology (Issac Walker MD)       Dx: COPD exacerbation; Acute on chronic r...    Specimen Information: Bronchus; Wash   0 Result Notes  Fungus Culture No fungus isolated at 2 weeks           Resulting Agency: [facility]           Specimen Collected: 07/26/23 14:47 EDT Last Resulted: 08/09/23 15:45 EDT           AFB Culture - Lavage, Lung, Left Lower Lobe  Order: [id number]  Status: Preliminary result       Visible to patient: No (not released)       Next appt: 10/24/2023 at 01:45 PM in Oncology (Issac Walker MD)       Dx: COPD exacerbation; Acute on chronic r...    Specimen Information: Lung, Left Lower Lobe; Lavage   0 Result Notes  AFB Culture No AFB isolated at 2 weeks               AFB Stain No acid fast bacilli seen on direct smear      No acid fast bacilli seen on concentrated smear              Resulting Agency: [facility]           Specimen Collected: 07/26/23 14:44 EDT Last Resulted: 08/09/23 15:31 EDT             ASSESSMENT         Patient Active Problem List   Diagnosis    Rectal bleeding    Acute blood loss anemia    COPD (chronic obstructive pulmonary disease)    CAD (coronary artery disease)    Essential hypertension    History  of ischemic stroke    Body mass index (BMI) 19.9 or less, adult    Encounter for immunization    Ex-smoker    Hyperlipidemia    Hypoxemia    Hypertensive heart disease without congestive heart failure    Oxygen dependent    Postnasal drip    Shortness of breath    Rectal cancer    Iron deficiency anemia due to chronic blood loss    Cancer related pain    Gastroesophageal reflux disease    COPD exacerbation    Severe malnutrition    Acute on chronic respiratory failure with hypoxia and hypercapnia    Hypercapnic respiratory failure       Encounter Diagnoses   Name Primary?    Bronchiectasis with acute exacerbation Yes    Acute on chronic respiratory failure with hypoxia and hypercapnia     Chronic obstructive pulmonary disease with acute exacerbation     Pseudomonas aeruginosa infection       PLAN  -Continue tobramycin nebulizers 30 days on and 30 days off for 1 year.  Prescription sent to pharmacy  -Continue to maintain SPO2 greater than 90%.  -Continue Astral vent at night and days with naps.  -Continue Advair/Wixela maintenance inhaler  -Continue albuterol nebulizer/Combivent as needed for shortness of air or wheeze  -Complete Levaquin for 14 days for Pseudomonas infection  -We will reach out to Dr. Myers's office to get a pulmonary function test that patient states he had 2 months ago.  -Repeat a CBC at this time    Diagnoses and all orders for this visit:    1. Bronchiectasis with acute exacerbation (Primary)  -     levoFLOXacin (Levaquin) 750 MG tablet; Take 1 tablet by mouth Daily for 14 days.  Dispense: 14 tablet; Refill: 0  -     CBC & Differential; Future    2. Acute on chronic respiratory failure with hypoxia and hypercapnia  -     levoFLOXacin (Levaquin) 750 MG tablet; Take 1 tablet by mouth Daily for 14 days.  Dispense: 14 tablet; Refill: 0  -     CBC & Differential; Future    3. Chronic obstructive pulmonary disease with acute exacerbation  -     levoFLOXacin (Levaquin) 750 MG tablet; Take 1 tablet by  mouth Daily for 14 days.  Dispense: 14 tablet; Refill: 0  -     CBC & Differential; Future    4. Pseudomonas aeruginosa infection  -     levoFLOXacin (Levaquin) 750 MG tablet; Take 1 tablet by mouth Daily for 14 days.  Dispense: 14 tablet; Refill: 0  -     CBC & Differential; Future    Other orders  -     tobramycin PF (Rian) 300 MG/5ML nebulizer solution; Take 5 mL by nebulization 2 (Two) Times a Day for 30 days. Do nebulizer twice a day for 30 days and then stop for 30 days, continue cycle of 30 days on and 30 days off for 1 year.  Dispense: 300 mL; Refill: 5           Smoking status:former  Vaccination status:up to date with COVID and flu vaccines  Medications personally reviewed    Follow Up  Return in about 4 weeks (around 9/8/2023).    Patient was given instructions and counseling regarding his condition or for health maintenance advice. Please see specific information pulled into the AVS if appropriate.

## 2023-08-11 ENCOUNTER — OFFICE VISIT (OUTPATIENT)
Dept: PULMONOLOGY | Facility: CLINIC | Age: 68
End: 2023-08-11
Payer: MEDICARE

## 2023-08-11 VITALS
SYSTOLIC BLOOD PRESSURE: 157 MMHG | WEIGHT: 123.8 LBS | OXYGEN SATURATION: 94 % | HEIGHT: 73 IN | HEART RATE: 120 BPM | DIASTOLIC BLOOD PRESSURE: 83 MMHG | TEMPERATURE: 98 F | RESPIRATION RATE: 12 BRPM | BODY MASS INDEX: 16.41 KG/M2

## 2023-08-11 DIAGNOSIS — A49.8 PSEUDOMONAS AERUGINOSA INFECTION: ICD-10-CM

## 2023-08-11 DIAGNOSIS — J44.1 CHRONIC OBSTRUCTIVE PULMONARY DISEASE WITH ACUTE EXACERBATION: ICD-10-CM

## 2023-08-11 DIAGNOSIS — J47.1 BRONCHIECTASIS WITH ACUTE EXACERBATION: Primary | ICD-10-CM

## 2023-08-11 DIAGNOSIS — J96.21 ACUTE ON CHRONIC RESPIRATORY FAILURE WITH HYPOXIA AND HYPERCAPNIA: ICD-10-CM

## 2023-08-11 DIAGNOSIS — J96.22 ACUTE ON CHRONIC RESPIRATORY FAILURE WITH HYPOXIA AND HYPERCAPNIA: ICD-10-CM

## 2023-08-11 RX ORDER — TOBRAMYCIN INHALATION SOLUTION 300 MG/5ML
300 INHALANT RESPIRATORY (INHALATION)
Qty: 300 ML | Refills: 5 | Status: SHIPPED | OUTPATIENT
Start: 2023-08-11 | End: 2023-09-10

## 2023-08-11 RX ORDER — TOBRAMYCIN INHALATION SOLUTION 300 MG/5ML
300 INHALANT RESPIRATORY (INHALATION)
Qty: 300 ML | Refills: 5 | Status: CANCELLED | OUTPATIENT
Start: 2023-08-11 | End: 2023-09-10

## 2023-08-11 RX ORDER — LEVOFLOXACIN 750 MG/1
750 TABLET ORAL DAILY
Qty: 14 TABLET | Refills: 0 | Status: SHIPPED | OUTPATIENT
Start: 2023-08-11 | End: 2023-08-25

## 2023-08-15 ENCOUNTER — LAB (OUTPATIENT)
Dept: LAB | Facility: HOSPITAL | Age: 68
End: 2023-08-15
Payer: MEDICARE

## 2023-08-15 DIAGNOSIS — J96.22 ACUTE ON CHRONIC RESPIRATORY FAILURE WITH HYPOXIA AND HYPERCAPNIA: ICD-10-CM

## 2023-08-15 DIAGNOSIS — J44.1 CHRONIC OBSTRUCTIVE PULMONARY DISEASE WITH ACUTE EXACERBATION: ICD-10-CM

## 2023-08-15 DIAGNOSIS — A49.8 PSEUDOMONAS AERUGINOSA INFECTION: Primary | ICD-10-CM

## 2023-08-15 DIAGNOSIS — J96.21 ACUTE ON CHRONIC RESPIRATORY FAILURE WITH HYPOXIA AND HYPERCAPNIA: ICD-10-CM

## 2023-08-15 DIAGNOSIS — J47.1 BRONCHIECTASIS WITH ACUTE EXACERBATION: ICD-10-CM

## 2023-08-15 DIAGNOSIS — A49.8 PSEUDOMONAS AERUGINOSA INFECTION: ICD-10-CM

## 2023-08-15 LAB
BASOPHILS # BLD AUTO: 0.04 10*3/MM3 (ref 0–0.2)
BASOPHILS NFR BLD AUTO: 0.8 % (ref 0–1.5)
DEPRECATED RDW RBC AUTO: 37.3 FL (ref 37–54)
EOSINOPHIL # BLD AUTO: 0.18 10*3/MM3 (ref 0–0.4)
EOSINOPHIL NFR BLD AUTO: 3.4 % (ref 0.3–6.2)
ERYTHROCYTE [DISTWIDTH] IN BLOOD BY AUTOMATED COUNT: 11.4 % (ref 12.3–15.4)
HCT VFR BLD AUTO: 37.2 % (ref 37.5–51)
HGB BLD-MCNC: 12.2 G/DL (ref 13–17.7)
IMM GRANULOCYTES # BLD AUTO: 0.03 10*3/MM3 (ref 0–0.05)
IMM GRANULOCYTES NFR BLD AUTO: 0.6 % (ref 0–0.5)
LYMPHOCYTES # BLD AUTO: 0.37 10*3/MM3 (ref 0.7–3.1)
LYMPHOCYTES NFR BLD AUTO: 7 % (ref 19.6–45.3)
MCH RBC QN AUTO: 29.8 PG (ref 26.6–33)
MCHC RBC AUTO-ENTMCNC: 32.8 G/DL (ref 31.5–35.7)
MCV RBC AUTO: 91 FL (ref 79–97)
MONOCYTES # BLD AUTO: 0.37 10*3/MM3 (ref 0.1–0.9)
MONOCYTES NFR BLD AUTO: 7 % (ref 5–12)
NEUTROPHILS NFR BLD AUTO: 4.3 10*3/MM3 (ref 1.7–7)
NEUTROPHILS NFR BLD AUTO: 81.2 % (ref 42.7–76)
NRBC BLD AUTO-RTO: 0 /100 WBC (ref 0–0.2)
PLATELET # BLD AUTO: 236 10*3/MM3 (ref 140–450)
PMV BLD AUTO: 10.3 FL (ref 6–12)
RBC # BLD AUTO: 4.09 10*6/MM3 (ref 4.14–5.8)
WBC NRBC COR # BLD: 5.29 10*3/MM3 (ref 3.4–10.8)

## 2023-08-15 PROCEDURE — 85025 COMPLETE CBC W/AUTO DIFF WBC: CPT

## 2023-08-15 PROCEDURE — 36415 COLL VENOUS BLD VENIPUNCTURE: CPT

## 2023-08-15 RX ORDER — TOBRAMYCIN INHALATION SOLUTION 300 MG/5ML
300 INHALANT RESPIRATORY (INHALATION)
Qty: 300 ML | Refills: 5 | Status: SHIPPED | OUTPATIENT
Start: 2023-08-15 | End: 2024-02-11

## 2023-08-16 ENCOUNTER — TELEPHONE (OUTPATIENT)
Dept: PULMONOLOGY | Facility: CLINIC | Age: 68
End: 2023-08-16
Payer: OTHER GOVERNMENT

## 2023-08-16 NOTE — TELEPHONE ENCOUNTER
[facility]  [facility] stating the [facility] was declined. The appeal was declined as well. They are faxing over a list of requirement for the tobramycin to be approved. If not approved tobramycin would need to be changed to an alternative medication. Please advise.

## 2023-08-23 ENCOUNTER — TELEPHONE (OUTPATIENT)
Dept: PULMONOLOGY | Facility: CLINIC | Age: 68
End: 2023-08-23
Payer: OTHER GOVERNMENT

## 2023-08-23 LAB
FUNGUS WND CULT: NORMAL
FUNGUS WND CULT: NORMAL

## 2023-08-23 NOTE — TELEPHONE ENCOUNTER
Spoke with patient regarding the [facility] [employer] patient assistance program for tobramycin inhalation solution.  Notified patient the [facility] denied coverage and this foundation may be an option for cost coverage.  Patient agreeable to call and speak with [facility].  Shared phone number [phone].  Advised patient, RN would call back Friday, 08/25/2023 to touch base regarding progress.  Patient is agreeable.

## 2023-08-23 NOTE — TELEPHONE ENCOUNTER
Denial forwarded to Dr. Petty for review.  Will apply to foundation for consideration of Tobramycin.

## 2023-08-23 NOTE — TELEPHONE ENCOUNTER
[facility] [place] has denied the prescription request for Tobramycin inhalation solution.  The document outlining their reason for denial is attached in media.  I will submit to a foundation for consideration but if that fails, is there an alternative you wish to try?  Thank you!

## 2023-08-30 LAB
MYCOBACTERIUM SPEC CULT: NORMAL
MYCOBACTERIUM SPEC CULT: NORMAL
NIGHT BLUE STAIN TISS: NORMAL

## 2023-09-06 LAB
MYCOBACTERIUM SPEC CULT: NORMAL
MYCOBACTERIUM SPEC CULT: NORMAL
NIGHT BLUE STAIN TISS: NORMAL

## 2023-09-07 NOTE — PROGRESS NOTES
Primary Care Provider  Yoel Geller MD   Referring Provider  No ref. provider found    Patient Complaint  Follow-up, Shortness of Breath, Wheezing, and Cough      SUBJECTIVE    History of Presenting Illness  Pavel Dai is a pleasant 68 y.o. male patient of Dr. Petty's who presents to [facility] for one month followup.  Patient is here with his son.  I last saw the patient 8/11/2023. He has a past medical history of rectal cancer, COPD and is on home O2 at 3 L. He also has a diagnosis of coronary artery disease, dyslipidemia, hypertension, and heart failure preserved ejection fraction with EF 61 to 65%.Patient continues to be on albuterol nebulizer, Daliresp, Claritin,,Advair, Combivent.  In addition he was He is currently on O2 at 3 to 4 L pulsed dose on Inogene machine. He does have a concentrator at home which he uses and has it set on 3 L. He does have his astral vent that he uses for sleep and he is benefiting from its use. He continues to be under the care of of Dr. Walker for his cancer care. As well as continues cardiology Dr. Sergei Barrett for his CHF.  He was prescribed ANDREAS nebulizer 30 days on 30 days off.    Patient had previously been under the care of of pulmonologist Dr. Myers and states he had a pulmonary function test a few months ago at his office.  Report has been scanned into the chart.  Patient states the provider is retiring and he is transferring his care to Dr. Petty.  Patient has requested to get his tobramycin nebulizer treatments through the [facility] however this was denied.  Patient is interested in getting tobramycin through another source per nurse navigator.  Patient does have shortness of air with exertion and at rest.  He does have coughing up with some thick mucus at times with occasional wheezing. He denies having any headaches, chest pain, weight loss or hemoptysis. Denies fevers, chills and night sweats. Pavel Dai  is able to perform ADLs without difficulties and denies any swollen glands/lymph nodes in the head or neck.    I have personally reviewed the review of systems, past family, social, medical and surgical histories; and agree with their findings.    Review of Systems  Constitutional symptoms:  Denied complaints   Ear, nose, throat: Denied complaints  Cardiovascular:  Denied complaints  Respiratory: Shortness of air, cough, wheeze  Gastrointestinal: Denied complaints  Musculoskeletal: Denied complaints    Family History   Problem Relation Age of Onset    Heart attack Mother     Heart attack Sister     Heart attack Sister         Social History     Socioeconomic History    Marital status:    Tobacco Use    Smoking status: Former     Types: Cigarettes     Start date: 11/3/1973     Quit date: 11/3/2014     Years since quittin.8    Smokeless tobacco: Never   Vaping Use    Vaping Use: Never used   Substance and Sexual Activity    Alcohol use: Not Currently    Drug use: Never    Sexual activity: Defer        Past Medical History:   Diagnosis Date    Anemia due to chemotherapy 2021    Cancer related pain 1/3/2022    CHF (congestive heart failure)     COPD (chronic obstructive pulmonary disease)     Coronary artery disease     Frequent urination     Hyperlipidemia     Hypertension     Iron deficiency anemia due to chronic blood loss 2021    Rectal cancer         Immunization History   Administered Date(s) Administered    COVID-19 (PFIZER) BIVALENT 12+YRS 11/15/2022    COVID-19 (PFIZER) Purple Cap Monovalent 2021    FLUAD TRI 65YR+ 10/30/2018, 10/08/2020    Fluad Quad 65+ 11/15/2022    Influenza Seasonal Injectable 10/30/2018, 10/08/2020       No Known Allergies       Current Outpatient Medications:     albuterol (PROVENTIL) (2.5 MG/3ML) 0.083% nebulizer solution, Take 2.5 mg by nebulization Every 8 (Eight) Hours As Needed for Wheezing or Shortness of Air., Disp: , Rfl:     aspirin 81 MG chewable  "tablet, Chew 1 tablet Daily., Disp: , Rfl:     atorvastatin (LIPITOR) 10 MG tablet, Take 1 tablet by mouth Every Night., Disp: , Rfl:     Fluticasone-Salmeterol (ADVAIR/WIXELA) 100-50 MCG/ACT DISKUS, Inhale 1 puff 2 (Two) Times a Day., Disp: , Rfl:     guaiFENesin (MUCINEX) 600 MG 12 hr tablet, Take 1 tablet by mouth 2 (Two) Times a Day As Needed for Cough or Congestion., Disp: , Rfl:     ipratropium-albuterol (COMBIVENT RESPIMAT)  MCG/ACT inhaler, Inhale 1 puff 4 (Four) Times a Day., Disp: , Rfl:     loratadine (CLARITIN) 10 MG tablet, Take 1 tablet by mouth Daily., Disp: , Rfl:     multivitamin with minerals tablet tablet, Take 1 tablet by mouth Daily., Disp: , Rfl:     omeprazole (priLOSEC) 20 MG capsule, Take 1 capsule by mouth Daily., Disp: , Rfl:     roflumilast (DALIRESP) 500 MCG tablet tablet, Take 1 tablet by mouth Daily., Disp: , Rfl:     metoprolol succinate XL (TOPROL-XL) 50 MG 24 hr tablet, Take 1 tablet by mouth Every 12 (Twelve) Hours for 30 days., Disp: 60 tablet, Rfl: 0    tobramycin PF (Rian) 300 MG/5ML nebulizer solution, Take 5 mL by nebulization 2 (Two) Times a Day for 180 days. Do nebulizer twice a day for 30 days and then stop for 30 days, continue cycle of 30 days on and 30 days off for 1 year. (Patient not taking: Reported on 9/8/2023), Disp: 300 mL, Rfl: 5           Vital Signs   /79 (BP Location: Right arm, Patient Position: Sitting, Cuff Size: Adult)   Pulse 91   Temp 98.2 °F (36.8 °C) (Temporal)   Resp 18   Ht 190.5 cm (75\")   Wt 55.8 kg (123 lb)   SpO2 94% Comment: pulse dose 3liters  BMI 15.37 kg/m²       OBJECTIVE    Physical Exam  Vital Signs Reviewed   WDWN, Alert, NAD.    HEENT:  PERRL, EOMI.  OP, nares clear  Neck:  Supple, no JVD, no thyromegaly  Chest: Diminished breath sounds bilateral lung fields with occasional rhonchi, no work of breathing noted  CV: RRR, no MGR, pulses 2+, equal.  Abd:  Soft, NT, ND, + BS, no HSM  EXT:  no clubbing, no cyanosis, no " edema  Neuro:  A&Ox3, CN grossly intact, no focal deficits.  Skin: No rashes or lesions noted    Results Review  I have personally reviewed the prior office notes, hospital records, labs, and diagnostics.           File Link    Scan on 5/23/2023 by [employer], [place]: PFT RESULTS FROM [facility]        Key Information    Document ID File Type Document Type Description   [id number] Image PULMONARY - SCAN PFT RESULTS FROM [facility]     Import Information    Attached At Date Time User Dept   Encounter Level 5/23/2023  [employer], [facility]      Encounter    Scanned Document on 5/23/23 with [employer], [facility]     ASSESSMENT         Patient Active Problem List   Diagnosis    Rectal bleeding    Acute blood loss anemia    COPD (chronic obstructive pulmonary disease)    CAD (coronary artery disease)    Essential hypertension    History of ischemic stroke    Body mass index (BMI) 19.9 or less, adult    Encounter for immunization    Ex-smoker    Hyperlipidemia    Hypoxemia    Hypertensive heart disease without congestive heart failure    Oxygen dependent    Postnasal drip    Shortness of breath    Rectal cancer    Iron deficiency anemia due to chronic blood loss    Cancer related pain    Gastroesophageal reflux disease    COPD exacerbation    Severe malnutrition    Acute on chronic respiratory failure with hypoxia and hypercapnia    Hypercapnic respiratory failure       Encounter Diagnoses   Name Primary?    Pseudomonas aeruginosa infection Yes    Bronchiectasis with acute exacerbation     Chronic obstructive pulmonary disease with acute exacerbation     Acute on chronic respiratory failure with hypoxia and hypercapnia       PLAN  -Continue to maintain SPO2 greater than 90%.  -Continue Astral vent at night and days with naps.  -Continue Advair/Wixela maintenance inhaler  -Continue albuterol nebulizer/Combivent as needed for shortness of air or wheeze  -We will contact nurse navigator to see  about getting patient set up for tobramycin nebulizer treatment    Diagnoses and all orders for this visit:    1. Pseudomonas aeruginosa infection (Primary)    2. Bronchiectasis with acute exacerbation    3. Chronic obstructive pulmonary disease with acute exacerbation    4. Acute on chronic respiratory failure with hypoxia and hypercapnia           Smoking status:former  Vaccination status:up to date with COVID and flu, will need annual flu vaccine this fall  Medications personally reviewed    Follow Up  Return today (on 9/8/2023) for Dr. Santos.    Patient was given instructions and counseling regarding his condition or for health maintenance advice. Please see specific information pulled into the AVS if appropriate.

## 2023-09-08 ENCOUNTER — OFFICE VISIT (OUTPATIENT)
Dept: PULMONOLOGY | Facility: CLINIC | Age: 68
End: 2023-09-08
Payer: MEDICARE

## 2023-09-08 VITALS
SYSTOLIC BLOOD PRESSURE: 125 MMHG | HEART RATE: 91 BPM | RESPIRATION RATE: 18 BRPM | DIASTOLIC BLOOD PRESSURE: 79 MMHG | OXYGEN SATURATION: 94 % | TEMPERATURE: 98.2 F | WEIGHT: 123 LBS | BODY MASS INDEX: 15.29 KG/M2 | HEIGHT: 75 IN

## 2023-09-08 DIAGNOSIS — J96.22 ACUTE ON CHRONIC RESPIRATORY FAILURE WITH HYPOXIA AND HYPERCAPNIA: ICD-10-CM

## 2023-09-08 DIAGNOSIS — J44.1 CHRONIC OBSTRUCTIVE PULMONARY DISEASE WITH ACUTE EXACERBATION: ICD-10-CM

## 2023-09-08 DIAGNOSIS — J96.21 ACUTE ON CHRONIC RESPIRATORY FAILURE WITH HYPOXIA AND HYPERCAPNIA: ICD-10-CM

## 2023-09-08 DIAGNOSIS — J47.1 BRONCHIECTASIS WITH ACUTE EXACERBATION: ICD-10-CM

## 2023-09-08 DIAGNOSIS — A49.8 PSEUDOMONAS AERUGINOSA INFECTION: Primary | ICD-10-CM

## 2023-09-08 PROCEDURE — 1159F MED LIST DOCD IN RCRD: CPT | Performed by: NURSE PRACTITIONER

## 2023-09-08 PROCEDURE — 3078F DIAST BP <80 MM HG: CPT | Performed by: NURSE PRACTITIONER

## 2023-09-08 PROCEDURE — 99214 OFFICE O/P EST MOD 30 MIN: CPT | Performed by: NURSE PRACTITIONER

## 2023-09-08 PROCEDURE — 3074F SYST BP LT 130 MM HG: CPT | Performed by: NURSE PRACTITIONER

## 2023-09-08 PROCEDURE — 1160F RVW MEDS BY RX/DR IN RCRD: CPT | Performed by: NURSE PRACTITIONER

## 2023-09-15 ENCOUNTER — TELEPHONE (OUTPATIENT)
Dept: PULMONOLOGY | Facility: CLINIC | Age: 68
End: 2023-09-15
Payer: MEDICARE

## 2023-09-15 NOTE — TELEPHONE ENCOUNTER
Spoke with patient about calling [employer] patient assistance program and re-initiating his request for tobramycin inhalation solution.  [phone].  Patient is agreeable.

## 2023-10-16 ENCOUNTER — TELEPHONE (OUTPATIENT)
Dept: SOCIAL WORK | Facility: HOSPITAL | Age: 68
End: 2023-10-16
Payer: MEDICARE

## 2023-10-16 NOTE — TELEPHONE ENCOUNTER
RT GUILLERMO received voice mail from Mr. Dai stating he was upset with billing practices of his DME provider.  I returned the phone call and explained that I do not work for his DME, [employer], and offered to phone [employer] to request they contact the patient. Mr. Dai declined the offer and stated he would call himself.

## 2023-10-27 PROBLEM — J96.20 RESPIRATORY FAILURE, ACUTE AND CHRONIC: Status: ACTIVE | Noted: 2023-01-01

## 2023-10-27 NOTE — ED PROVIDER NOTES
Time: 6:07 PM EDT  Date of encounter:  10/27/2023  Independent Historian/Clinical History and Information was obtained by:   Patient    History is limited by: N/A    Chief Complaint: Short of breath      History of Present Illness:  Patient is a 68 y.o. year old male who presents to the emergency department for evaluation of shortness of breath.  Patient normally uses a CPAP machine but states there is an issue with his equipment.  Patient reports his breathing got worse starting last night.  Denies fever.    HPI    Patient Care Team  Primary Care Provider: Yoel Geller MD    Past Medical History:     No Known Allergies  Past Medical History:   Diagnosis Date    Anemia due to chemotherapy 12/13/2021    Cancer related pain 01/03/2022    CHF (congestive heart failure)     COPD (chronic obstructive pulmonary disease)     Coronary artery disease     Frequent urination     Hyperlipidemia     Hypertension     Iron deficiency anemia due to chronic blood loss 12/13/2021    Rectal cancer      Past Surgical History:   Procedure Laterality Date    BRONCHOSCOPY N/A 7/26/2023    Procedure: BRONCHOSCOPY WITH BAL AND WASHING;  Surgeon: Ted Petty MD;  Location: [facility] ENDOSCOPY;  Service: Pulmonary;  Laterality: N/A;  MUCUS PLUGGING    COLONOSCOPY      HERNIA REPAIR      approximately 4 years ago     RECTAL SURGERY      SHOULDER SURGERY      joint loose, calcium particles removed from shoulder joint     Family History   Problem Relation Age of Onset    Heart attack Mother     Heart attack Sister     Heart attack Sister        Home Medications:  Prior to Admission medications    Medication Sig Start Date End Date Taking? Authorizing Provider   albuterol (PROVENTIL) (2.5 MG/3ML) 0.083% nebulizer solution Take 2.5 mg by nebulization Every 8 (Eight) Hours As Needed for Wheezing or Shortness of Air.    Provider, MD Estefania   aspirin 81 MG chewable tablet Chew 1 tablet Daily.    Provider, MD Estefania   atorvastatin  "(LIPITOR) 10 MG tablet Take 1 tablet by mouth Every Night.    Estefania Quarles MD   Fluticasone-Salmeterol (ADVAIR/WIXELA) 100-50 MCG/ACT DISKUS Inhale 1 puff 2 (Two) Times a Day.    Estefania Quarles MD   guaiFENesin (MUCINEX) 600 MG 12 hr tablet Take 1 tablet by mouth 2 (Two) Times a Day As Needed for Cough or Congestion.    Estefania Quarles MD   ipratropium-albuterol (COMBIVENT RESPIMAT)  MCG/ACT inhaler Inhale 1 puff 4 (Four) Times a Day.    Estefania Quarles MD   loratadine (CLARITIN) 10 MG tablet Take 1 tablet by mouth Daily.    Estefania Quarles MD   metoprolol succinate XL (TOPROL-XL) 50 MG 24 hr tablet Take 1 tablet by mouth Every 12 (Twelve) Hours for 30 days. 23  Ben Calixto MD   multivitamin with minerals tablet tablet Take 1 tablet by mouth Daily.    Estefania Quarles MD   omeprazole (priLOSEC) 20 MG capsule Take 1 capsule by mouth Daily.    Estefania Quarles MD   roflumilast (DALIRESP) 500 MCG tablet tablet Take 1 tablet by mouth Daily.    Estefania Quarles MD   tobramycin PF (Rian) 300 MG/5ML nebulizer solution Take 5 mL by nebulization 2 (Two) Times a Day for 180 days. Do nebulizer twice a day for 30 days and then stop for 30 days, continue cycle of 30 days on and 30 days off for 1 year.  Patient not taking: Reported on 2023 8/15/23 2/11/24  Cecilia Shaw APRN        Social History:   Social History     Tobacco Use    Smoking status: Former     Types: Cigarettes     Start date: 11/3/1973     Quit date: 11/3/2014     Years since quittin.9    Smokeless tobacco: Never   Vaping Use    Vaping Use: Never used   Substance Use Topics    Alcohol use: Not Currently    Drug use: Never         Review of Systems:  Review of Systems   Respiratory:  Positive for shortness of breath.         Physical Exam:  /73 (BP Location: Right arm, Patient Position: Sitting)   Pulse 106   Temp 98.2 °F (36.8 °C)   Resp 22   Ht 185.4 cm (73\")   Wt 50.4 kg " (111 lb 1.8 oz)   SpO2 95%   BMI 14.66 kg/m²     Physical Exam  Vitals and nursing note reviewed.   Constitutional:       General: He is not in acute distress.  HENT:      Head: Normocephalic.   Cardiovascular:      Rate and Rhythm: Normal rate and regular rhythm.      Heart sounds: Normal heart sounds.   Pulmonary:      Effort: Tachypnea and accessory muscle usage present. No respiratory distress.      Breath sounds: Examination of the right-upper field reveals decreased breath sounds. Examination of the left-upper field reveals decreased breath sounds. Examination of the right-middle field reveals decreased breath sounds. Examination of the left-middle field reveals decreased breath sounds. Examination of the right-lower field reveals decreased breath sounds. Examination of the left-lower field reveals decreased breath sounds.   Abdominal:      General: Abdomen is flat.      Palpations: Abdomen is soft.      Tenderness: There is no abdominal tenderness.   Musculoskeletal:         General: Normal range of motion.      Cervical back: Normal range of motion.   Skin:     General: Skin is warm and dry.   Neurological:      Mental Status: He is alert and oriented to person, place, and time. Mental status is at baseline.   Psychiatric:         Mood and Affect: Mood normal.                  Procedures:  Procedures      Medical Decision Making:      Comorbidities that affect care:    COPD    External Notes reviewed:    Previous Clinic Note: Office follow-up for COPD exacerbation on 9/8/2023      The following orders were placed and all results were independently analyzed by me:  Orders Placed This Encounter   Procedures    Respiratory Panel PCR w/COVID-19(SARS-CoV-2) ZULEIKA/GREGORY/MAIKOL/PAD/COR/MAD/ALPESH In-House, NP Swab in UTM/VTM, 3-4 HR TAT - Swab, Nasopharynx    S. Pneumo Ag Urine or CSF - Urine, Urine, Clean Catch    Legionella Antigen, Urine - Urine, Urine, Clean Catch    Respiratory Culture - Sputum, Cough    Fungus  Culture - Lavage, Lung, Left Lower Lobe    AFB Culture - Lavage, Lung, Left Lower Lobe    BAL Culture, Quantitative - Lavage, Lung, Left Lower Lobe    Pneumonia Panel - Lavage, Lung, Left Lower Lobe    Fungus Culture - Wash, Bronchus    AFB Culture - Wash, Bronchus    Respiratory Culture - Wash, Bronchus    XR Chest 1 View    CT Abdomen Pelvis With Contrast    CT Chest With Contrast Diagnostic    US Abdomen Limited    XR Chest 1 View    [place] Draw    Comprehensive Metabolic Panel    BNP    Single High Sensitivity Troponin T    CBC Auto Differential    High Sensitivity Troponin T 2Hr    Blood Gas, Arterial -With Co-Ox Panel: Yes    CBC (No Diff)    Comprehensive Metabolic Panel    Magnesium    Phosphorus    Hepatitis Panel, Acute    Acetaminophen Level    Protime-INR    aPTT    High Sensitivity Troponin T    Procalcitonin    Magnesium    Phosphorus    Comprehensive Metabolic Panel    ABG with Co-Ox and Electrolytes    CK    CBC Auto Differential    BNP    CBC Auto Differential    CBC Auto Differential    Bronch Wash/BAL Differential - Lavage, Lung, Left Lower Lobe    Ambulatory Referral to Pulmonary/COPD Clinic    Diet: Regular/House Diet; Texture: Soft to Chew (NDD 3); Soft to Chew: Chopped Meat; Fluid Consistency: Thin (IDDSI 0)    Undress & Gown    Vital Signs    Vital Signs    Telemetry - Place Orders & Notify Provider of Results When Patient Experiences Acute Chest Pain, Dysrhythmia or Respiratory Distress    Notify Provider (With Default Parameters)    Activity - Ad Jenny    Intake & Output    Weigh Patient    Oral Care    Saline Lock & Maintain IV Access    Bladder Scan    Vital Signs    Pulse Oximetry, Continuous    Up As Tolerated    Advance Diet As Tolerated -    Code Status and Medical Interventions:    Inpatient Hospitalist Consult    Inpatient Pulmonology Consult    Inpatient Nutrition Consult    Inpatient RT Case Management Consult    Dietary Nutrition Supplements Boost Plus (Ensure Plus); chocolate     OT Consult: Eval & Treat    PT Consult: Eval & Treat Functional Mobility Below Baseline    Oxygen Therapy- Nasal Cannula; Titrate 1-6 LPM Per SpO2; 90 - 95%    Oxygen Therapy- Nasal Cannula; Titrate 1-6 LPM Per SpO2; 90 - 95%    RT to Initiate Bronchodilator Protocol    RT to Initiate Bronchopulmonary Hygiene Protocol    Incentive Spirometry    Oscillating Positive Expiratory Pressure (OPEP)    Respiratory communication    NIPPV (CPAP or BIPAP)    Chest Physiotherapy (PD & P)    MetaNeb    Respiratory communication    ECG 12 Lead Tachycardia    ECG 12 Lead Other; matching    Insert Peripheral IV    Insert Peripheral IV    Inpatient Admission    CBC & Differential    Green Top (Gel)    Lavender Top    Gold Top - SST    Light Blue Top    Extra Tubes    Gold Top - SST    CBC & Differential       Medications Given in the Emergency Department:  Medications   sodium chloride 0.9 % flush 10 mL (has no administration in time range)   aspirin chewable tablet 81 mg (81 mg Oral Given 10/31/23 1051)   atorvastatin (LIPITOR) tablet 10 mg (10 mg Oral Given 10/30/23 2050)   cetirizine (zyrTEC) tablet 5 mg (5 mg Oral Given 10/31/23 1052)   pantoprazole (PROTONIX) EC tablet 40 mg (40 mg Oral Given 10/31/23 0544)   roflumilast (DALIRESP) tablet 500 mcg (500 mcg Oral Given 10/31/23 1051)   nitroglycerin (NITROSTAT) SL tablet 0.4 mg (has no administration in time range)   sodium chloride 0.9 % flush 10 mL (10 mL Intravenous Given 10/31/23 1053)   sodium chloride 0.9 % flush 10 mL (has no administration in time range)   sodium chloride 0.9 % infusion 40 mL (has no administration in time range)   Enoxaparin Sodium (LOVENOX) syringe 40 mg (40 mg Subcutaneous Given 10/31/23 1052)   predniSONE (DELTASONE) tablet 40 mg (40 mg Oral Given 10/31/23 1051)   arformoterol (BROVANA) nebulizer solution 15 mcg (15 mcg Nebulization Given 10/31/23 1833)   budesonide (PULMICORT) nebulizer solution 0.5 mg (0.5 mg Nebulization Given 10/31/23 1833)    melatonin tablet 5 mg (has no administration in time range)   aluminum-magnesium hydroxide-simethicone (MAALOX MAX) 400-400-40 MG/5ML suspension 15 mL (has no administration in time range)   nicotine (NICODERM CQ) 21 MG/24HR patch 1 patch (has no administration in time range)   hydrOXYzine (ATARAX) tablet 25 mg (has no administration in time range)   ondansetron (ZOFRAN) injection 4 mg (has no administration in time range)   polyethylene glycol (MIRALAX) packet 17 g (has no administration in time range)   acetaminophen (TYLENOL) tablet 650 mg (650 mg Oral Given 10/30/23 1212)   Diclofenac Sodium (VOLTAREN) 1 % gel 2 g (has no administration in time range)   Lidocaine 4 % 1 patch (has no administration in time range)   prochlorperazine (COMPAZINE) injection 5 mg (has no administration in time range)   albuterol (PROVENTIL) nebulizer solution 0.083% 2.5 mg/3mL ( Nebulization Return to Cabinet 10/31/23 0040)   guaiFENesin (MUCINEX) 12 hr tablet 1,200 mg (1,200 mg Oral Given 10/31/23 1052)   levalbuterol (XOPENEX) nebulizer solution 1.25 mg (1.25 mg Nebulization Given 10/31/23 1833)   sodium chloride nasal spray 2 spray (2 sprays Each Nare Not Given 10/31/23 1758)   Pharmacy to Dose Cefepime (has no administration in time range)   cefepime (MAXIPIME) 2000 mg/100 mL 0.9% NS (mbp) (2,000 mg Intravenous New Bag 10/31/23 1304)   tobramycin PF (ANDREAS) nebulizer solution 300 mg (300 mg Nebulization Given 10/31/23 1834)   sennosides-docusate (PERICOLACE) 8.6-50 MG per tablet 2 tablet (2 tablets Oral Not Given 10/31/23 1053)     And   bisacodyl (DULCOLAX) EC tablet 5 mg (has no administration in time range)     And   bisacodyl (DULCOLAX) suppository 10 mg (has no administration in time range)   lactated ringers infusion (100 mL/hr Intravenous New Bag 10/30/23 1812)   metoprolol succinate XL (TOPROL-XL) 24 hr tablet 50 mg (50 mg Oral Given 10/31/23 0812)   methylPREDNISolone sodium succinate (SOLU-Medrol) injection 125 mg (125  mg Intravenous Given 10/27/23 1928)   AZITHROMYCIN 500 MG/250 ML 0.9% NS IVPB (vial-mate) (500 mg Intravenous New Bag 10/29/23 4504)   iopamidol (ISOVUE-370) 76 % injection 100 mL (100 mL Intravenous Given 10/28/23 0942)   potassium & sodium phosphates (PHOS-NAK) oral packet (1 packet Oral Given 10/29/23 9724)   magnesium sulfate in D5W 1g/100mL (PREMIX) (2 g Intravenous New Bag 10/30/23 0848)   magnesium sulfate 2g/50 mL (PREMIX) infusion (2 g Intravenous New Bag 10/31/23 1051)        ED Course:         Labs:    Lab Results (last 24 hours)       Procedure Component Value Units Date/Time    CBC & Differential [[id number]]  (Abnormal) Collected: 10/31/23 0455    Specimen: Blood Updated: 10/31/23 0547    Narrative:      The following orders were created for panel order CBC & Differential.  Procedure                               Abnormality         Status                     ---------                               -----------         ------                     CBC Auto Differential[[id number]]        Abnormal            Final result                 Please view results for these tests on the individual orders.    Magnesium [[id number]]  (Normal) Collected: 10/31/23 0455    Specimen: Blood Updated: 10/31/23 0632     Magnesium 1.8 mg/dL     Phosphorus [[id number]]  (Normal) Collected: 10/31/23 0455    Specimen: Blood Updated: 10/31/23 0637     Phosphorus 3.3 mg/dL     Comprehensive Metabolic Panel [[id number]]  (Abnormal) Collected: 10/31/23 0455    Specimen: Blood Updated: 10/31/23 0632     Glucose 90 mg/dL      BUN 24 mg/dL      Creatinine 0.64 mg/dL      Sodium 144 mmol/L      Potassium 4.2 mmol/L      Chloride 100 mmol/L      CO2 42.5 mmol/L      Calcium 9.4 mg/dL      Total Protein 6.3 g/dL      Albumin 3.2 g/dL      ALT (SGPT) 318 U/L      AST (SGOT) 45 U/L      Alkaline Phosphatase 93 U/L      Total Bilirubin 0.2 mg/dL      Globulin 3.1 gm/dL      A/G Ratio 1.0 g/dL      BUN/Creatinine Ratio 37.5     Anion Gap 1.5  mmol/L      eGFR 104.4 mL/min/1.73     Narrative:      GFR Normal >60  Chronic Kidney Disease <60  Kidney Failure <15      CBC Auto Differential [[id number]]  (Abnormal) Collected: 10/31/23 0455    Specimen: Blood Updated: 10/31/23 0547     WBC 7.67 10*3/mm3      RBC 3.84 10*6/mm3      Hemoglobin 11.1 g/dL      Hematocrit 38.9 %      .3 fL      MCH 28.9 pg      MCHC 28.5 g/dL      RDW 12.1 %      RDW-SD 44.9 fl      MPV 10.8 fL      Platelets 231 10*3/mm3      Neutrophil % 78.3 %      Lymphocyte % 7.3 %      Monocyte % 12.5 %      Eosinophil % 1.3 %      Basophil % 0.1 %      Immature Grans % 0.5 %      Neutrophils, Absolute 6.00 10*3/mm3      Lymphocytes, Absolute 0.56 10*3/mm3      Monocytes, Absolute 0.96 10*3/mm3      Eosinophils, Absolute 0.10 10*3/mm3      Basophils, Absolute 0.01 10*3/mm3      Immature Grans, Absolute 0.04 10*3/mm3      nRBC 0.0 /100 WBC     Fungus Culture - Lavage, Lung, Left Lower Lobe [[id number]] Collected: 10/31/23 0947    Specimen: Lavage from Lung, Left Lower Lobe Updated: 10/31/23 1021    AFB Culture - Lavage, Lung, Left Lower Lobe [[id number]] Collected: 10/31/23 0947    Specimen: Lavage from Lung, Left Lower Lobe Updated: 10/31/23 1443     AFB Stain No acid fast bacilli seen on direct smear    BAL Culture, Quantitative - Lavage, Lung, Left Lower Lobe [[id number]] Collected: 10/31/23 0947    Specimen: Lavage from Lung, Left Lower Lobe Updated: 10/31/23 1141     Gram Stain No organisms seen      Rare (1+) WBCs seen    Pneumonia Panel - Lavage, Lung, Left Lower Lobe [[id number]]  (Abnormal) Collected: 10/31/23 0947    Specimen: Lavage from Lung, Left Lower Lobe Updated: 10/31/23 1140     Escherichia coli PCR Not Detected     Acinetobacter calcoaceticus-baumannii complex PCR Not Detected     Enterobacter cloacae PCR Not Detected     Klebsiella oxytoca PCR Not Detected     Klebsiella pneumoniae group PCR Not Detected     Klebsiella aerogenes PCR Not Detected     Moraxella catarrhalis  PCR Not Detected     Proteus species PCR Not Detected     Pseudomonas aeroginosa PCR Detected     Comment: 10^5 Bin copies/mL        Serratia marcescens PCR Not Detected     Staphylococcus aureus PCR Not Detected     Streptococcus pyogenes PCR Not Detected     Haemophilus influenzae PCR Not Detected     Streptococcus agalactiae PCR Not Detected     Streptococcus pneumoniae PCR Not Detected     Chlamydophila pneumoniae PCR Not Detected     Legionella pneumophilia PCR Not Detected     Mycoplasma pneumo by PCR Not Detected     ADENOVIRUS, PCR Not Detected     CTX-M Gene Not Detected     IMP Gene Not Detected     KPC Gene Not Detected     mecA/C and MREJ Gene N/A     NDM Gene Not Detected     OXA-48-like Gene N/A     VIM Gene Not Detected     Coronavirus Not Detected     Human Metapneumovirus Not Detected     Human Rhinovirus/Enterovirus Not Detected     Influenza A PCR Not Detected     Influenza B PCR Not Detected     RSV, PCR Not Detected     Parainfluenza virus PCR Not Detected    Bronch Wash/BAL Differential - Lavage, Lung, Left Lower Lobe [[id number]] Collected: 10/31/23 0947    Specimen: Lavage from Lung, Left Lower Lobe Updated: 10/31/23 1124     Visual Presence of Blood Present     Lymphocytes, Fluid 19 %      Neutrophils, Fluid 72 %      Macrophage, Fluid 8 %      Bronchial Lining Cells 1 %     Narrative:      Normal Adults (Nonsmokers)    Alveolar Macrophages       >85%  Lymphocytes              10-15%  Neutrophils              <or=3%  Eosinophils              <or=1%  Bronchial Lining Cells   <or=5%      Clinical Interpretations for BAL    Eosinophils >or=25%       Eosinophilic Pneumoniae  Lymphocytes >or=50%       Consider Drug Rxn,Acute HP  Bloody lavage             Diffuse Alveolar Hemorrhage  Other Findings            Specific Dx or Non-diagnostic    Fungus Culture - Wash, Bronchus [[id number]] Collected: 10/31/23 0950    Specimen: Wash from Bronchus Updated: 10/31/23 1022    AFB Culture - Wash, Bronchus  [[id number]] Collected: 10/31/23 0950    Specimen: Wash from Bronchus Updated: 10/31/23 1446     AFB Stain No acid fast bacilli seen on direct smear    Respiratory Culture - Wash, Bronchus [[id number]] Collected: 10/31/23 0950    Specimen: Wash from Bronchus Updated: 10/31/23 1145     Gram Stain Few (2+) WBCs seen      No organisms seen             Imaging:    No Radiology Exams Resulted Within Past 24 Hours      Differential Diagnosis and Discussion:    Dyspnea: Differential diagnosis includes but is not limited to metabolic acidosis, neurological disorders, psychogenic, asthma, pneumothorax, upper airway obstruction, COPD, pneumonia, noncardiogenic pulmonary edema, interstitial lung disease, anemia, congestive heart failure, and pulmonary embolism    All labs were reviewed and interpreted by me.  All X-rays impressions were independently interpreted by me.  EKG was interpreted by me.    MDM     Amount and/or Complexity of Data Reviewed  Decide to obtain previous medical records or to obtain history from someone other than the patient: yes         Critical Care Note: Total Critical Care time of 40 minutes. Total critical care time documented does not include time spent on separately billed procedures for services of nurses or physician assistants. I personally saw and examined the patient. I have reviewed all diagnostic interpretations and treatment plans as written. I was present for the key portions of any procedures performed and the inclusive time noted in any critical care statement. Critical care time includes patient management by me, time spent at the patients bedside,  time to review lab and imaging results, discussing patient care, documentation in the medical record, and time spent with family or caregiver.    Patient Care Considerations:    CT CHEST: I considered ordering a CT scan of the chest, however patient's presentation and clinical exam are consistent with COPD exacerbation      Consultants/Shared  Management Plan:    Hospitalist: I have discussed the case with Dr. Calvert who agrees to accept the patient for admission.    Social Determinants of Health:    Patient is independent, reliable, and has access to care.       Disposition and Care Coordination:    Admit:   Through independent evaluation of the patient's history, physical, and imperical data, the patient meets criteria for observation/admission to the hospital.        Final diagnoses:   COPD exacerbation        ED Disposition       ED Disposition   Decision to Admit    Condition   --    Comment   Level of Care: Telemetry [5]   Diagnosis: Respiratory failure, acute and chronic [641332]   Certification: I Certify That Inpatient Hospital Services Are Medically Necessary For Greater Than 2 Midnights                 This medical record created using voice recognition software.             Obie Blandon DO  10/31/23 2970

## 2023-10-27 NOTE — H&P
[facility] HISTORY AND PHYSICAL  Date: 10/27/2023   Patient Name: Pavel Dai  : 1955  MRN: [id number]  Primary Care Physician:  Yoel Geller MD  Date of admission: 10/27/2023    Subjective   Subjective     Chief Complaint: Shortness of breath    HPI:    Pavel Dai is a 68 y.o. male with past medical history significant for ectal cancer, COPD on home oxygen 3L, coronary artery disease, dyslipidemia, hypertension, and heart failure preserved ejection fraction with EF of 61 to 65% and normal stress test in  presented for shortness of breath.    Patient had recent admission on 2023 for the same issue.  He normally uses a CPAP machine at home.  However, said that there was an issue with his equipment and so he was unable to use it.  He endorses his shortness of breath continued to get worse today.  This prompted him to the ED.  He denies fever, chills, no chest pain, no abdominal pain, no dysuria.      Personal History     Past Medical History:  Past Medical History:   Diagnosis Date    Anemia due to chemotherapy 2021    Cancer related pain 1/3/2022    CHF (congestive heart failure)     COPD (chronic obstructive pulmonary disease)     Coronary artery disease     Frequent urination     Hyperlipidemia     Hypertension     Iron deficiency anemia due to chronic blood loss 2021    Rectal cancer            Past Surgical History:  Past Surgical History:   Procedure Laterality Date    BRONCHOSCOPY N/A 2023    Procedure: BRONCHOSCOPY WITH BAL AND WASHING;  Surgeon: Ted Petty MD;  Location: [facility] ENDOSCOPY;  Service: Pulmonary;  Laterality: N/A;  MUCUS PLUGGING    COLONOSCOPY      HERNIA REPAIR      approximately 4 years ago     RECTAL SURGERY      SHOULDER SURGERY      joint loose, calcium particles removed from shoulder joint       Family History:   Family History   Problem Relation Age of Onset    Heart attack Mother     Heart attack Sister     Heart  attack Sister        Social History:   Social History     Socioeconomic History    Marital status:    Tobacco Use    Smoking status: Former     Types: Cigarettes     Start date: 11/3/1973     Quit date: 11/3/2014     Years since quittin.9    Smokeless tobacco: Never   Vaping Use    Vaping Use: Never used   Substance and Sexual Activity    Alcohol use: Not Currently    Drug use: Never    Sexual activity: Defer       Home Medications:  Fluticasone-Salmeterol, albuterol, aspirin, atorvastatin, guaiFENesin, ipratropium-albuterol, loratadine, metoprolol succinate XL, multivitamin with minerals, omeprazole, roflumilast, and tobramycin PF    Allergies:  No Known Allergies    Review of Systems   All systems were reviewed and negative except for indicated in HPI    Objective   Objective     Vitals:   Temp:  [98.7 °F (37.1 °C)] 98.7 °F (37.1 °C)  Heart Rate:  [93] 93  Resp:  [18] 18  BP: (102)/(64) 102/64  Flow (L/min):  [3] 3    Physical Exam    Constitutional: Awake, alert, no acute distress   Eyes: Pupils equal, sclerae anicteric, no conjunctival injection   HENT: NCAT, mucous membranes moist   Neck: Supple, no thyromegaly, no lymphadenopathy, trachea midline   Respiratory: Wheezing on lungs bilaterally.  No crackles.   Cardiovascular: RRR, no murmurs, rubs, or gallops, palpable pedal pulses bilaterally   Gastrointestinal: Positive bowel sounds, soft, nontender, nondistended   Musculoskeletal: No bilateral ankle edema, no clubbing or cyanosis to extremities   Psychiatric: Appropriate affect, cooperative   Neurologic: Oriented x 3, strength symmetric in all extremities, Cranial Nerves grossly intact to confrontation, speech clear   Skin: No rashes     Result Review    Result Review:  I have personally reviewed the results from the time of this admission to 10/27/2023 18:58 EDT and agree with these findings:  [x]  Laboratory  [x]  Microbiology  [x]  Radiology  [x]  EKG/Telemetry   []  Cardiology/Vascular   []   Pathology  []  Old records  []  Other:    Laboratory Studies:  Recent Results (from the past 24 hour(s))   Comprehensive Metabolic Panel    Collection Time: 10/27/23  4:52 PM    Specimen: Blood   Result Value Ref Range    Glucose 99 65 - 99 mg/dL    BUN 55 (H) 8 - 23 mg/dL    Creatinine 1.55 (H) 0.76 - 1.27 mg/dL    Sodium 144 136 - 145 mmol/L    Potassium 4.5 3.5 - 5.2 mmol/L    Chloride 100 98 - 107 mmol/L    CO2 37.6 (H) 22.0 - 29.0 mmol/L    Calcium 9.1 8.6 - 10.5 mg/dL    Total Protein 7.1 6.0 - 8.5 g/dL    Albumin 3.8 3.5 - 5.2 g/dL    ALT (SGPT) 1,264 (H) 1 - 41 U/L    AST (SGOT) 782 (H) 1 - 40 U/L    Alkaline Phosphatase 123 (H) 39 - 117 U/L    Total Bilirubin <0.2 0.0 - 1.2 mg/dL    Globulin 3.3 gm/dL    A/G Ratio 1.2 g/dL    BUN/Creatinine Ratio 35.5 (H) 7.0 - 25.0    Anion Gap 6.4 5.0 - 15.0 mmol/L    eGFR 49.1 (L) >60.0 mL/min/1.73   BNP    Collection Time: 10/27/23  4:52 PM    Specimen: Blood   Result Value Ref Range    proBNP 8,771.0 (H) 0.0 - 900.0 pg/mL   Single High Sensitivity Troponin T    Collection Time: 10/27/23  4:52 PM    Specimen: Blood   Result Value Ref Range    HS Troponin T 144 (C) <15 ng/L   Green Top (Gel)    Collection Time: 10/27/23  4:52 PM   Result Value Ref Range    Extra Tube Hold for add-ons.    Lavender Top    Collection Time: 10/27/23  4:52 PM   Result Value Ref Range    Extra Tube hold for add-on    Gold Top - SST    Collection Time: 10/27/23  4:52 PM   Result Value Ref Range    Extra Tube Hold for add-ons.    Light Blue Top    Collection Time: 10/27/23  4:52 PM   Result Value Ref Range    Extra Tube Hold for add-ons.    CBC Auto Differential    Collection Time: 10/27/23  4:52 PM    Specimen: Blood   Result Value Ref Range    WBC 10.18 3.40 - 10.80 10*3/mm3    RBC 4.11 (L) 4.14 - 5.80 10*6/mm3    Hemoglobin 11.8 (L) 13.0 - 17.7 g/dL    Hematocrit 40.6 37.5 - 51.0 %    MCV 98.8 (H) 79.0 - 97.0 fL    MCH 28.7 26.6 - 33.0 pg    MCHC 29.1 (L) 31.5 - 35.7 g/dL    RDW 12.1 (L) 12.3  - 15.4 %    RDW-SD 44.2 37.0 - 54.0 fl    MPV 10.7 6.0 - 12.0 fL    Platelets 232 140 - 450 10*3/mm3    Neutrophil % 88.3 (H) 42.7 - 76.0 %    Lymphocyte % 3.4 (L) 19.6 - 45.3 %    Monocyte % 7.8 5.0 - 12.0 %    Eosinophil % 0.1 (L) 0.3 - 6.2 %    Basophil % 0.2 0.0 - 1.5 %    Immature Grans % 0.2 0.0 - 0.5 %    Neutrophils, Absolute 8.99 (H) 1.70 - 7.00 10*3/mm3    Lymphocytes, Absolute 0.35 (L) 0.70 - 3.10 10*3/mm3    Monocytes, Absolute 0.79 0.10 - 0.90 10*3/mm3    Eosinophils, Absolute 0.01 0.00 - 0.40 10*3/mm3    Basophils, Absolute 0.02 0.00 - 0.20 10*3/mm3    Immature Grans, Absolute 0.02 0.00 - 0.05 10*3/mm3    nRBC 0.0 0.0 - 0.2 /100 WBC   Blood Gas, Arterial -With Co-Ox Panel: Yes    Collection Time: 10/27/23  6:25 PM    Specimen: Arterial Blood   Result Value Ref Range    pH, Arterial 7.250 (C) 7.350 - 7.450 pH units    pCO2, Arterial 94.2 (C) 35.0 - 45.0 mm Hg    pO2, Arterial 60.0 (L) 80.0 - 100.0 mm Hg    HCO3, Arterial 40.4 (H) 22.0 - 26.0 mmol/L    Base Excess, Arterial 9.6 (H) -2.0 - 2.0 mmol/L    O2 Saturation, Arterial 87.3 (L) 95.0 - 99.0 %    Hemoglobin, Blood Gas 12.8 (L) 13.8 - 16.4 g/dL    Carboxyhemoglobin 1.3 0 - 1.5 %    Methemoglobin 0.30 0.00 - 1.50 %    Oxyhemoglobin 85.9 (L) 94 - 99 %    FHHB 12.5 (H) 0.0 - 5.0 %    Site Arterial: right radial     Modality Cannula - Nasal     FIO2 32 %    Flow Rate 3 lpm    PO2/FIO2 188 0 - 500    Pavel's Test Positive        Imaging:  XR Chest 1 View    Result Date: 10/27/2023  Narrative: PROCEDURE: XR CHEST 1 VW  COMPARISON: [facility], CT, CT CHEST W CONTRAST DIAGNOSTIC, 7/24/2023, 12:14.  INDICATIONS: SOA; COPD  FINDINGS:  Hyperexpanded lungs, flattened diaphragms and diffusely increased lung lucency in keeping with known advanced emphysema.  Heart size normal.  Aortic atherosclerotic disease.  Areas of linear subsegmental scarring/atelectasis bilaterally similar to prior chest CT.  Negative for infiltrate, large effusion or  "pneumothorax.  No edema.  Multilevel degenerative changes in the thoracic spine with slight dextrocurvature.  Few benign lung granulomas.      Impression:   Advanced emphysema with areas of parenchymal scarring, without convincing active process.       BRENDEN GARCIA MD       Electronically Signed and Approved By: BRENDEN GARCIA MD on 10/27/2023 at 17:01              EKG: (my review): pending    Assessment & Plan   Assessment / Plan       -I have discussed the case with the ED physician.  I have talked to the patient about his condition.  I have reviewed his Stress test, labs, imaging, and record.  -Stress test on 7/26/2023:  \"  Left ventricular ejection fraction is normal (Calculated EF = 69%).    The SPECT scan showed distal inferior and infero-apical perfusion defect, only in the resting images.  This was felt to be artifact.  The gated scan showed no segmental LV wall motion abnormalities\"    -Acute on chronic hypoxic hypercapnic respiratory failure, from COPD exacerbation: Patient typically use 2 to 3 L O2 at baseline.  He has some issues with his CPAP machine at home and was unable to use it.  I will put him on BiPAP for now.  I also started him on scheduled DuoNebs, prn albuterol, steroid, and azithromycin.  Continue to wean oxygen as tolerated.  -Consider consulting pulm for his severe COPD.  He might benefit from endobronchial valve.    -NSTEMI: Likely from the above.  He does not have a chest pain.  Recent stress test was okay.  I will hold off on heparin GTT for now.  Continue home aspirin and statin.  Continue to trend EKG and troponin.    -FABIAN: Giving fluids.  Continue to trend creatinine.    -Transaminases: Appears to be hepatocellular injury rather than cholestatic pattern.  I will check for acute hep panel and Tylenol level for now.  Continue to trend CMP.  Consider RUQ ultrasound outpatient.    Resume home medication as appropriate.  DVT prophylaxis with Lovenox.    CODE STATUS:    Level Of " Support Discussed With: Patient  Code Status (Patient has no pulse and is not breathing): CPR (Attempt to Resuscitate)  Medical Interventions (Patient has pulse or is breathing): Full Support      Admission Status:  I believe this patient meets admission status.    Electronically signed by Enrike Galindo MD, 10/27/23, 6:58 PM EDT.

## 2023-10-28 NOTE — PROGRESS NOTES
[facility]   Hospitalist Progress Note    Date of admission: 10/27/2023  Patient Name: Pavel Dai  1955  Date: 10/28/2023      Subjective     Chief Complaint   Patient presents with    Shortness of Breath    Cough       Interval Followup: still soa, on bipap, but states feeling better compared to admission. Denies cp/pressure/palpitations.  Denies smoking.  No alcohol use.    Objective     Vitals:   Temp:  [97.9 °F (36.6 °C)-98.7 °F (37.1 °C)] 98.1 °F (36.7 °C)  Heart Rate:  [] 91  Resp:  [17-24] 22  BP: (101-129)/(56-81) 125/81  Flow (L/min):  [3] 3    Physical Exam  Tire,d thin / frail / ill appearing in bed  Dyspneic/tachypneic, on bipap, accessory muscle use, poor aeration throughout, exp wheezing  Elev rate reg rhythm, no le pitting edema, dry mucosa  Abd soft nt nd  Aox3    Result Review:  Vital signs, labs and recent relevant imaging reviewed.        acetaminophen    albuterol    aluminum-magnesium hydroxide-simethicone    senna-docusate sodium **AND** [DISCONTINUED] polyethylene glycol **AND** bisacodyl **AND** bisacodyl    Diclofenac Sodium    hydrOXYzine    Lidocaine    melatonin    nicotine    nitroglycerin    ondansetron    polyethylene glycol    prochlorperazine    sodium chloride    sodium chloride    sodium chloride    arformoterol, 15 mcg, Nebulization, BID - RT  aspirin, 81 mg, Oral, Daily  atorvastatin, 10 mg, Oral, Nightly  azithromycin, 500 mg, Intravenous, Q24H  budesonide, 0.5 mg, Nebulization, BID - RT  cefTRIAXone, 1,000 mg, Intravenous, Q24H  cetirizine, 5 mg, Oral, Daily  enoxaparin, 40 mg, Subcutaneous, Daily  guaiFENesin, 1,200 mg, Oral, Q12H  ipratropium-albuterol, 3 mL, Nebulization, 4x Daily - RT  pantoprazole, 40 mg, Oral, Q AM  predniSONE, 40 mg, Oral, Daily With Breakfast  roflumilast, 500 mcg, Oral, Daily  senna-docusate sodium, 1 tablet, Oral, Nightly  sodium chloride, 10 mL, Intravenous, Q12H        CT Abdomen Pelvis With Contrast    Result Date:  10/28/2023    1. Stable low-density foci in the liver favored to represent small cysts 2. Small bowel containing right inguinal hernia measuring 1.7 cm transverse     Kiran Daugherty M.D.       Electronically Signed and Approved By: Kiran Daugherty M.D. on 10/28/2023 at 10:38             CT Chest With Contrast Diagnostic    Result Date: 10/28/2023    1. No evidence of pulmonary embolism 2. Severe emphysematous changes 3. Moderate chronic consolidation in the left lower lobe suspected to represent atelectasis and or scarring.     Kiran Daugherty M.D.       Electronically Signed and Approved By: Kiran Daugherty M.D. on 10/28/2023 at 10:18             XR Chest 1 View    Result Date: 10/27/2023    Advanced emphysema with areas of parenchymal scarring, without convincing active process.       BRENDEN GARCIA MD       Electronically Signed and Approved By: BRENDEN GARCIA MD on 10/27/2023 at 17:01              Assessment / Plan     Summary: 68 y.o. with history of COPD on 3 L home oxygen, MART on CPAP, CHF who presented with worsening shortness of air found to have acute hypoxic hypercapnic respiratory failure requiring continuous BiPAP.  Also with FABIAN and significantly elevated transaminitis on admission. Patient had issues with his home CPAP machine not functioning correctly.  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    Assessment/Plan (clinically significant if listed here)  Acute hypoxic hypercapnic respiratory failure  COPD, with severe emphysema, with exacerbation, 3L NC baseline   LLL PNA and residual scarring  FABIAN  Transaminitis, question ischemic hepatitis/congestive hepatopathy secondary to above  NSTEMI, suspect type II in setting of above  Diastolic CHF with questionable exacerbation (EF 61% per 7/24/2023 echo)  Hx of mucous plugging and pseudomonas pna  MART on CPAP outpatient with malfunctioning unit  History of rectal  cancer with prior surgical resection and chemotherapy reportedly cleared  Severe malnutrition, question copd related cachexia    Continue on continuous BiPAP, RT  consult will need to have functioning unit for outpatient  Continue steroids, scheduled nebulizers, respiratory hygiene, mucinex, dalirsep  Will change to xopenex nebs given prior SVT issues last hospitalization.   Dobutamine stress test 7/2023 - abnl ecg but no ischemic changes during test or acute ischemia noted.  Dr Hogan evaluating at that time  Check CT PE and will add on abdomen his pelvis as well to evaluate transaminitis.  Patient denies alcohol use.  Question ischemic hepatitis/congestive hepatopathy although appears dry on exam currently.  Check acute hep panel as well.  Repeat LFTs today are improving continue to monitor.  Tylenol level negative.  Supposed to be on tobramycin appears to have issues with prior auth. Did complete course of levaquin in July for tx of this.   Pulmonology consult initial case discussed appreciate assistance  10/28 ceftriaxone and azithromycin, check infectious studies  Continue steroids for copd ex, gi ppx   Bnp elevated,  but appears dry, defer additional iv fluids and monitor, creatinine improving today  Monitor for retention, check pvr   Cont home aspirin/statin  Pt/ot  Nutrition consult for bmi 14.6, suspect component of copd related cachexia  Check a.m. CBC, BMP, magnesium, phosphorus, lfts   Continue hospital monitoring and treatment at current level of care - does not need upgraded at this time but if worsening respiratory status/increasing bipap requirements may need to upgrade tot he icu   Discuss with pulmonology      DVT prophylaxis:  Medical DVT prophylaxis orders are present.    Level Of Support Discussed With: Patient  Code Status (Patient has no pulse and is not breathing): CPR (Attempt to Resuscitate)  Medical Interventions (Patient has pulse or is breathing): Full Support    Patient  is critically ill due to ahhrf requiring continuous bipap, nikki, transaminitis, pna and additional issues as above.  I have spent 31 minutes of critical care time reviewing documentation, pertinent labs, imaging studies, examining the patient, modifying care plan, and discussing patient's condition and care plan with the patient, nursing and pulmonology.      CBC          8/15/2023    10:42 10/27/2023    16:52 10/28/2023    06:00   CBC   WBC 5.29  10.18  5.38    RBC 4.09  4.11  3.80    Hemoglobin 12.2  11.8  11.0    Hematocrit 37.2  40.6  38.2    MCV 91.0  98.8  100.5    MCH 29.8  28.7  28.9    MCHC 32.8  29.1  28.8    RDW 11.4  12.1  12.2    Platelets 236  232  205        CMP          7/28/2023    04:48 10/27/2023    16:52 10/28/2023    06:00 10/28/2023    11:48   CMP   Glucose 91  99  133  126    BUN 20  55  49     Creatinine 0.55  1.55  1.04     EGFR 109.3  49.1  79.2     Sodium 143  144  143  143.0    Potassium 3.8  4.5  4.2     Chloride 100  100  100     Calcium 8.3  9.1  9.0     Total Protein  7.1  6.6     Albumin  3.8  3.3     Globulin  3.3  3.3     Total Bilirubin  <0.2  <0.2     Alkaline Phosphatase  123  104     AST (SGOT)  782  367     ALT (SGPT)  1,264  885     Albumin/Globulin Ratio  1.2  1.0     BUN/Creatinine Ratio 36.4  35.5  47.1     Anion Gap 3.3  6.4  4.1

## 2023-10-28 NOTE — PLAN OF CARE
Goal Outcome Evaluation:   Patient is alert and oriented x 3. On bipap when sleeping. 3L NC when off bipap. VSS. Sinus Tach. Waiting on Urine specimen. No complaints of pain or SOA. Will continue plan of care.

## 2023-10-28 NOTE — CONSULTS
"Nutrition Services    Patient Name: Pavel Dai  YOB: 1955  MRN: [id number]  Admission date: 10/27/2023      CLINICAL NUTRITION ASSESSMENT      Reason for Assessment  BMI     H&P:    Past Medical History:   Diagnosis Date    Anemia due to chemotherapy 12/13/2021    Cancer related pain 1/3/2022    CHF (congestive heart failure)     COPD (chronic obstructive pulmonary disease)     Coronary artery disease     Frequent urination     Hyperlipidemia     Hypertension     Iron deficiency anemia due to chronic blood loss 12/13/2021    Rectal cancer         Current Problems:   Active Hospital Problems    Diagnosis     **Respiratory failure, acute and chronic         Nutrition/Diet History         Narrative     Patient admitted with acute respiratory failure.  Low Na+ diet, consuming % of meals.  Patient states he has a good appetite.  Reviewed catabolic nature of disease state and need fo increased kcal/pro to maintain weight.  Reviewed high kca/high pro recommendations.      NFPE performed, findings consistent with criteria for severe malnutrition.  Patient agreeable to oral nutrition supplements while admitted.  Reviewed recommendations for continued supplement use on discharge.         Anthropometrics        Current Height, Weight Height: 185.4 cm (73\")  Weight: 50.4 kg (111 lb 1.8 oz)   Current BMI Body mass index is 14.66 kg/m².       Weight Hx  Wt Readings from Last 30 Encounters:   10/27/23 2130 50.4 kg (111 lb 1.8 oz)   10/27/23 1614 52 kg (114 lb 10.2 oz)   09/08/23 0945 55.8 kg (123 lb)   08/11/23 0848 56.2 kg (123 lb 12.8 oz)   07/28/23 0520 55.4 kg (122 lb 2.2 oz)   07/26/23 0554 53.4 kg (117 lb 11.6 oz)   07/24/23 2041 52.8 kg (116 lb 6.5 oz)   07/24/23 1126 55.5 kg (122 lb 5.7 oz)   07/12/23 1040 57.1 kg (125 lb 14.1 oz)   04/25/23 1343 55.9 kg (123 lb 3.8 oz)   01/11/23 0954 55.6 kg (122 lb 9.2 oz)   10/27/22 1054 55.8 kg (123 lb 0.3 oz)   08/08/22 0237 56.6 kg (124 lb 12.5 oz) "   08/08/22 0109 56.6 kg (124 lb 12.5 oz)   08/07/22 2319 56.2 kg (124 lb)   05/26/22 1126 56.3 kg (124 lb 1.9 oz)   05/18/22 1305 56 kg (123 lb 7.3 oz)   04/27/22 1040 56 kg (123 lb 7.3 oz)   02/09/22 1126 58.1 kg (128 lb 1.4 oz)   01/31/22 1445 54.3 kg (119 lb 11.4 oz)   01/26/22 0842 55.4 kg (122 lb 2.2 oz)   01/05/22 1046 55.3 kg (121 lb 14.6 oz)   01/04/22 1043 55.2 kg (121 lb 11.1 oz)   01/03/22 1137 54 kg (119 lb 0.8 oz)   12/29/21 1051 55.1 kg (121 lb 7.6 oz)   12/28/21 1043 54.9 kg (121 lb 0.5 oz)   12/22/21 1033 57.1 kg (125 lb 14.1 oz)   12/21/21 1042 55.9 kg (123 lb 3.8 oz)   12/20/21 0928 55.3 kg (121 lb 14.6 oz)   12/16/21 1045 55.6 kg (122 lb 9.2 oz)   12/15/21 1048 55.6 kg (122 lb 9.2 oz)   12/13/21 0821 55 kg (121 lb 4.1 oz)   12/09/21 1046 56.9 kg (125 lb 7.1 oz)   12/07/21 1044 56.9 kg (125 lb 7.1 oz)   12/01/21 1031 57.5 kg (126 lb 12.2 oz)   11/30/21 1046 57 kg (125 lb 10.6 oz)            Wt Change Observation -9% x 3 months      Estimated/Assessed Needs       Energy Requirements 35-40 kcal/kg   EST Needs (kcal/day) 1337-6278 kcal        Protein Requirements 1.5 g/kg    EST Daily Needs (g/day) 76 g       Fluid Requirements 30-35 ml/kg     Estimated Needs (mL/day) 7865-4211 ml      Labs/Medications         Pertinent Labs Reviewed.   Results from last 7 days   Lab Units 10/28/23  1148 10/28/23  0600 10/27/23  1652   SODIUM mmol/L  --  143 144   SODIUM, ARTERIAL mmol/L 143.0  --   --    POTASSIUM mmol/L  --  4.2 4.5   CHLORIDE mmol/L  --  100 100   CO2 mmol/L  --  38.9* 37.6*   BUN mg/dL  --  49* 55*   CREATININE mg/dL  --  1.04 1.55*   CALCIUM mg/dL  --  9.0 9.1   BILIRUBIN mg/dL  --  <0.2 <0.2   ALK PHOS U/L  --  104 123*   ALT (SGPT) U/L  --  885* 1,264*   AST (SGOT) U/L  --  367* 782*   GLUCOSE mg/dL  --  133* 99   GLUCOSE, ARTERIAL mg/dL 126*  --   --      Results from last 7 days   Lab Units 10/28/23  0600   MAGNESIUM mg/dL 2.4   PHOSPHORUS mg/dL 2.0*   HEMOGLOBIN g/dL 11.0*   HEMATOCRIT %  38.2     COVID19   Date Value Ref Range Status   10/28/2023 Not Detected Not Detected - Ref. Range Final     Lab Results   Component Value Date    HGBA1C 5.50 07/24/2023         Pertinent Medications Reviewed.     Current Nutrition Orders & Evaluation of Intake       Oral Nutrition     Current PO Diet Diet: Cardiac Diets; Healthy Heart (2-3 Na+); Texture: Regular Texture (IDDSI 7); Fluid Consistency: Thin (IDDSI 0)   Supplement No active supplement orders       Malnutrition Severity Assessment      Patient meets criteria for : Severe Malnutrition  Malnutrition Type (last 8 hours)       Malnutrition Severity Assessment       Row Name 10/28/23 1448       Malnutrition Severity Assessment    Malnutrition Type Chronic Disease - Related Malnutrition      Row Name 10/28/23 1448       Unintentional Weight Loss     Unintentional Weight Loss Findings Severe    Unintentional Weight Loss  Weight loss greater than 7.5% in three months      Row Name 10/28/23 1448       Muscle Loss    Loss of Muscle Mass Findings Severe    [place] Region Severe - deep hollowing/scooping, lack of muscle to touch, facial bones well defined    Clavicle Bone Region Severe - protruding prominent bone    Acromion Bone Region Severe - squared shoulders, bones, and acromion process protrusion prominent    Scapular Bone Region Severe - prominent bones, depressions easily visible between ribs, scapula, spine, shoulders    Patellar Region Severe - prominent bone, square looking, very little muscle definition    Anterior Thigh Region Severe - line/depression along thigh, obviously thin    Posterior Calf Region Severe - thin with very little definition/firmness      Row Name 10/28/23 1448       Fat Loss    Subcutaneous Fat Loss Findings Severe    Orbital Region  Severe - pronounced hollowness/depression, dark circles, loose saggy skin    Upper Arm Region Severe - mostly skin, very little space between folds, fingers touch    Thoracic & Lumbar Region Severe -  ribs visible with prominent depressions, iliac crest very prominent      Row Name 10/28/23 1448       Criteria Met (Must meet criteria for severity in at least 2 of these categories: M Wasting, Fat Loss, Fluid, Secondary Signs, Wt. Status, Intake)    Patient meets criteria for  Severe Malnutrition                       Nutrition Diagnosis         Nutrition Dx Problem 1 Severe malnutrition related to increased nutrient needs due to catabolic disease as evidenced by unintended wt change. and body composition changes.       Nutrition Intervention         Boost Plus TID   +1080 kcal, 42 g pro per day      Medical Nutrition Therapy/Nutrition Education          Learner     Readiness Patient  Acceptance     Method     Response Explanation  Verbalizes understanding     Monitor/Evaluation        Monitor Per protocol, PO intake, Supplement intake, Pertinent labs, Weight       Nutrition Discharge Plan         High kcal/High pro diet, ONS on discharge       Electronically signed by:  Pam Rowell RD  10/28/23 14:50 EDT

## 2023-10-28 NOTE — PLAN OF CARE
Goal Outcome Evaluation:  Plan of Care Reviewed With: patient        Progress: no change          Alert and oriented X4. Slept well throughout the night with bipap in place. Vitals stable. Denies pain at this time. No overnight concerns or events to report

## 2023-10-28 NOTE — PLAN OF CARE
Goal Outcome Evaluation:  Plan of Care Reviewed With: patient        Progress: no change  Outcome Evaluation: Pt has been on and off bipap all day. Pt is currently on bipap 12/6 30% FIO2. When not on bipap pt is on a 3L nasal cannula. Will continue to monitor throughout shift.

## 2023-10-28 NOTE — CONSULTS
Pulmonary / Critical Care Consult Note      Patient Name: Pavel Dai  : 1955  MRN: [id number]  Primary Care Physician:  Yoel Geller MD  Referring Physician: Gabriele David MD  Date of admission: 10/27/2023    Subjective   Subjective     Reason for Consult/ Chief Complaint:   COPD exacerbation, hypercarbic respiratory failure    HPI:  Pavel Dai is a 68 y.o. male with history of COPD, chronic hypoxic respiratory failure on 3 L nasal cannula at baseline, history of colon cancer, CAD, presented to the ED for shortness of breath.  Patient reports having issues with his CPAP machine at home.  He reports shortness of breath and dyspnea on exertion.  In the ED his ABG showed respiratory acidosis pH 7.2 5/94/60 on 3 L nasal cannula.  Troponin 144.  proBNP 8771.  Creatinine 1.55.  Patient was also found to have transaminitis with AST 72 and ALT 1264.  Respiratory viral panel negative.  He was initiated on antibiotics and admitted for further care.  This morning patient reports feeling better.  He tolerated BiPAP 12 over 632% FiO2.  Repeat ABG showed much improvement of his respiratory acidosis pH 7.3 70/58 on 32% FiO2.  His transaminitis is improving.  He denies fever, chills, chest pain, worsening shortness of breath at this time    Review of Systems  Constitutional symptoms:  Denied complaints   Ear, nose, throat: Denied complaints  Cardiovascular:  Denied complaints  Respiratory: +Shortness of breath; Denied complaints  Gastrointestinal: Denied complaints  Musculoskeletal: Denied complaints  Genitourinary: Denied complaints  Allergy / Immunology: Denied complaints  Hematologic: Denied complaints  Neurologic: Denied complaints  Skin: Denied complaints  Endocrine: Denied complaints  Psychiatric: Denied complaints      Personal History     Past Medical History:   Diagnosis Date    Anemia due to chemotherapy 2021    Cancer related pain 1/3/2022    CHF (congestive heart failure)     COPD  (chronic obstructive pulmonary disease)     Coronary artery disease     Frequent urination     Hyperlipidemia     Hypertension     Iron deficiency anemia due to chronic blood loss 12/13/2021    Rectal cancer        Past Surgical History:   Procedure Laterality Date    BRONCHOSCOPY N/A 7/26/2023    Procedure: BRONCHOSCOPY WITH BAL AND WASHING;  Surgeon: Ted Petty MD;  Location: [facility] END[facility]COPY;  Service: Pulmonary;  Laterality: N/A;  MUCUS PLUGGING    COLONOSCOPY      HERNIA REPAIR      approximately 4 years ago     RECTAL SURGERY      SHOULDER SURGERY      joint loose, calcium particles removed from shoulder joint       Family History: family history includes Heart attack in his mother, sister, and sister. Otherwise pertinent FHx was reviewed and not pertinent to current issue.    Social History:  reports that he quit smoking about 8 years ago. He started smoking about 50 years ago. He has never used smokeless tobacco. He reports that he does not currently use alcohol. He reports that he does not use drugs.    Home Medications:  Fluticasone-Salmeterol, albuterol, aspirin, atorvastatin, guaiFENesin, ipratropium-albuterol, loratadine, metoprolol succinate XL, multivitamin with minerals, omeprazole, roflumilast, and tobramycin PF    Allergies:  No Known Allergies    Objective    Objective     Vitals:   Temp:  [97.9 °F (36.6 °C)-98.7 °F (37.1 °C)] 98.1 °F (36.7 °C)  Heart Rate:  [] 91  Resp:  [17-24] 22  BP: (101-129)/(56-81) 125/81  Flow (L/min):  [3] 3    Physical Exam:  Vital Signs Reviewed   General:  WDWN, Alert, NAD.  Chronically ill appearing male, lying in bed  HEENT:  PERRL, EOMI.  OP, nares clear, no sinus tenderness  Neck:  Supple, no JVD, no thyromegaly  Lymph: no axillary, cervical, supraclavicular lymphadenopathy noted bilaterally  Chest: Diminished to auscultation bilaterally, no work of breathing noted on BiPAP  CV: RRR, no MGR, pulses 2+, equal.  Abd:  Soft, NT, ND, + BS  EXT:  no clubbing,  no cyanosis, no edema, no joint tenderness  Neuro:  A&Ox3, CN grossly intact, no focal deficits.  Skin: No rashes or lesions noted      Result Review    Result Review:  I have personally reviewed the results from the time of this admission to 10/28/2023 15:02 EDT and agree with these findings:  []  Laboratory  []  Microbiology  []  Radiology  []  EKG/Telemetry   []  Cardiology/Vascular   []  Pathology  []  Old records  []  Other:    Most notable findings include:   -     Assessment & Plan   Assessment / Plan     Active Hospital Problems:  Active Hospital Problems    Diagnosis     **Respiratory failure, acute and chronic        Impression:  COPD exacerbation  Respiratory acidosis with CO2 narcosis  Elevated proBNP  NSTEMI likely type II  Transaminitis  History of COPD  History of chronic hypoxic respiratory on 3 L nasal cannula at baseline  History of CAD  History of heart failure  History of colon cancer  History of Pseudomonas pneumonia    Plan:  -Tolerating BiPAP 12/6 with 32% FiO2  -Continue to use his during naps and at night.  Can transition to nasal cannula.  Of note patient uses 3 to 4 L at baseline  -CT PE no PE severe COPD noted.  There is chronic consolidation of left lower lobe noted as well.  -Repeat ABG much improved on BiPAP.  Continue for now  -There is elevated proBNP and NSTEMI, check echo.  -Continue to trend LFTs.  Unclear etiology at this time.  Tylenol level negative.  Hepatitis panel negative.  We will check abdominal ultrasound.  -Continue nebs and bronchopulmonary hygiene  -Continue azithromycin and ceftriaxone for total 5 days  -Continue Solu-Medrol and transition to oral prednisone in the next day or so  -Continue Daliresp    DVT prophylaxis:  Medical DVT prophylaxis orders are present.     Code Status and Medical Interventions:   Ordered at: 10/27/23 9208     Level Of Support Discussed With:    Patient     Code Status (Patient has no pulse and is not breathing):    CPR (Attempt to  Resuscitate)     Medical Interventions (Patient has pulse or is breathing):    Full Support        Labs, imaging, notes, and medications personally reviewed.    Thank you for involving me in the care of this patient.    Electronically signed by Carmen Santos MD, 10/28/23, 3:02 PM EDT.

## 2023-10-29 NOTE — PLAN OF CARE
Goal Outcome Evaluation:   Patient is wearing bipap of 12/6 at this time and has been on unit since 22:09. Patient is in no distress and resting comfortably. We will continue to monitor.

## 2023-10-29 NOTE — PLAN OF CARE
Goal Outcome Evaluation:  Plan of Care Reviewed With: patient   Patient is alert and oriented x 4. NSR. VSS. Bipap while sleeping. Bronchoscopy possibly planned for tomorrow. NPO after midnight. Will continue plan of care.     Progress: improving

## 2023-10-29 NOTE — PLAN OF CARE
Problem: Adult Inpatient Plan of Care  Goal: Absence of Hospital-Acquired Illness or Injury  Intervention: Identify and Manage Fall Risk  Recent Flowsheet Documentation  Taken 10/29/2023 0350 by Zuleika Odom LPN  Safety Promotion/Fall Prevention: safety round/check completed  Taken 10/29/2023 0149 by Zuleika Odom LPN  Safety Promotion/Fall Prevention: safety round/check completed  Taken 10/28/2023 2355 by Zuleika Odom LPN  Safety Promotion/Fall Prevention: safety round/check completed  Taken 10/28/2023 2300 by Zuleika Odom LPN  Safety Promotion/Fall Prevention: safety round/check completed  Taken 10/28/2023 1939 by Zuleika Odom LPN  Safety Promotion/Fall Prevention: safety round/check completed  Intervention: Prevent Skin Injury  Recent Flowsheet Documentation  Taken 10/28/2023 2300 by Zuleika Odom LPN  Body Position: position changed independently  Taken 10/28/2023 1939 by Zuleika Odom LPN  Body Position: position changed independently  Intervention: Prevent and Manage VTE (Venous Thromboembolism) Risk  Recent Flowsheet Documentation  Taken 10/28/2023 2300 by Zuleika Odom LPN  Activity Management: activity encouraged  Range of Motion: active ROM (range of motion) encouraged  Intervention: Prevent Infection  Recent Flowsheet Documentation  Taken 10/28/2023 2300 by Zuleika Odom LPN  Infection Prevention:   hand hygiene promoted   rest/sleep promoted  Goal: Optimal Comfort and Wellbeing  Intervention: Monitor Pain and Promote Comfort  Recent Flowsheet Documentation  Taken 10/28/2023 2300 by Zuleika Odom LPN  Pain Management Interventions:   see MAR   relaxation techniques promoted   quiet environment facilitated  Intervention: Provide Person-Centered Care  Recent Flowsheet Documentation  Taken 10/28/2023 2300 by Zuleiak Odom LPN  Trust Relationship/Rapport:   care explained   choices provided   Goal Outcome Evaluation:      Pt A&O, no c/o pain, VSS, pt stabe throughout the  shift. Pt resting in bed, is able to make needs known, call light in reach, will continue current POC

## 2023-10-29 NOTE — PROGRESS NOTES
Respiratory Therapist Broncho-Pulmonary Hygiene Progress Note      Patient Name:  Pavel Dai  YOB: 1955    Pavel Dai meets the qualification for Level 2 of the Bronco-Pulmonary Hygiene Protocol. This was based on my daily patient assessment and includes review of chest x-ray results, cough ability and quality, oxygenation, secretions or risk for secretion development and patient mobility.     Broncho-Pulmonary Hygiene Assessment:    Level of Movement: Actively changing positions without assistance  Alert/ oriented/ cooperative    Breath Sounds: Diminished and/or coarse rhonchi    Cough: Strong, effective    Chest X-Ray: Possible signs of consolidation and/or atelectasis or clear.     Sputum Productions: None or small amount of thin or watery secretions with effective cough    History and Physical: Home use of oxygen  and Chronic condition    SpO2 to Oxygen Need: greater than 92% on room air or  less than 3L nasal canula    Current SpO2 is: 94% on 3L home O2    Based on this information, I have completed the following interventions: Aerobika with bronchodialtor medication or TID      Electronically signed by Kristi Dickens RRT, 10/29/23, 8:08 AM EDT.       Map Statment: See Attach Map for Complete Details

## 2023-10-29 NOTE — PROGRESS NOTES
Pulmonary / Critical Care Progress Note      Patient Name: Pavel Dai  : 1955  MRN: [id number]  Primary Care Physician:  Yoel Geller MD  Date of admission: 10/27/2023    Subjective   Subjective   Follow-up for COPD exacerbation, hypercarbic respiratory failure    No acute events overnight.  Wore BiPAP    This morning,  On 3.5 L nasal cannula  Lying in bed  Dyspnea improving  Feels better after few days on BiPAP  Denies chest pain chest tightness  Denies cough  No fever or chills    Review of Systems  General: Fatigue, otherwise denied complaints  HEENT: Denied complaints  Respiratory: Dyspnea, otherwise denied complaints  Cardiovascular: Denied complaints  GI: Denied complaints  : Denied complaints  MSK: Generalized weakness, otherwise denied complaints    Objective   Objective     Vitals:   Temp:  [97.3 °F (36.3 °C)-98.4 °F (36.9 °C)] 98.2 °F (36.8 °C)  Heart Rate:  [] 100  Resp:  [18-27] 20  BP: (129-159)/(75-85) 159/85  Flow (L/min):  [3] 3  Physical Exam   Vital Signs Reviewed   General:  Alert, NAD.  Niccoli ill-appearing male, lying in bed  HEENT:  PERRL, EOMI.    Neck:  No JVD, no thyromegaly  Lymph: no axillary, cervical, supraclavicular lymphadenopathy noted bilaterally  Chest: Diminished to auscultation bilaterally, no work of breathing noted on 3.5 L nasal cannula  CV: RRR, no M/G/R, pulses 2+  Abd:  Soft, NT, ND, +BS  EXT:  no clubbing, no cyanosis, no edema  Neuro:  A&Ox3, CN grossly intact, no focal deficits.  Skin: No rashes or lesions noted      Result Review    Result Review:  I have personally reviewed the results from the time of this admission to 10/29/2023 12:26 EDT and agree with these findings:  [x]  Laboratory  []  Microbiology  [x]  Radiology  []  EKG/Telemetry   []  Cardiology/Vascular   []  Pathology  []  Old records  []  Other:  Most notable findings include:      echo 61 to 65% grade 1 diastolic dysfunction estimated RVSP 45 to 55 mmHg        Lab  10/29/23  0500 10/28/23  1148 10/28/23  0600 10/27/23  1652   WBC 7.21  --  5.38 10.18   HEMOGLOBIN 10.7*  --  11.0* 11.8*   HEMATOCRIT 36.0*  --  38.2 40.6   PLATELETS 200  --  205 232   SODIUM 144  --  143 144   SODIUM, ARTERIAL  --  143.0  --   --    POTASSIUM 3.9  --  4.2 4.5   CHLORIDE 101  --  100 100   CO2 41.1*  --  38.9* 37.6*   BUN 37*  --  49* 55*   CREATININE 0.72*  --  1.04 1.55*   GLUCOSE 111*  --  133* 99   GLUCOSE, ARTERIAL  --  126*  --   --    CALCIUM 9.1  --  9.0 9.1   PHOSPHORUS 2.1*  --  2.0*  --    TOTAL PROTEIN 6.3  --  6.6 7.1   ALBUMIN 3.3*  --  3.3* 3.8   GLOBULIN 3.0  --  3.3 3.3       Assessment & Plan   Assessment / Plan     Active Hospital Problems:  Active Hospital Problems    Diagnosis     **Respiratory failure, acute and chronic      Impression:  COPD exacerbation  Respiratory acidosis with CO2 narcosis  Elevated proBNP  NSTEMI likely type II  Transaminitis  History of COPD  History of chronic hypoxic respiratory on 3 L nasal cannula at baseline  History of CAD  History of heart failure  History of colon cancer  History of Pseudomonas pneumonia     Plan:  -On 3.5 L nasal cannula.  3 L is patient's baseline.  Continue to wean to maintain SPO2 88 to 92%   -Continue to use his during naps and at night.  BiPAP 12/6 with 32% FiO2  -Continue azithromycin for COPD exacerbation for 3 days and cefepime for Pseudomonas pneumonia for 7 days  -Continue to trend LFTs.  Abdominal ultrasound reviewed  -Continue Brovana, Pulmicort, Daliresp.   -Transitioned to prednisone 40 daily  -Continue bronchodilators and bronchopulmonary protocols.  Encourage I-S  -PT/OT on board.  Appreciate assistance  -Encourage mobilization.  Out of bed to chair  -Consulted RT  for home respiratory needs  -We will consider bronchoscopy if patient continues to have secretions with airway clearance impairment.  Keep patient n.p.o. after midnight for now    DVT prophylaxis:  Medical DVT prophylaxis orders are  present.    CODE STATUS:   Level Of Support Discussed With: Patient  Code Status (Patient has no pulse and is not breathing): CPR (Attempt to Resuscitate)  Medical Interventions (Patient has pulse or is breathing): Full Support      Electronically signed by JOSE Dickey, 10/29/23, 12:26 PM EDT.  This patient was seen by both a physician and a NP. I, Carmen Santos MD, spent >50% of time in accordance with split shared billing. This included personally reviewing all pertinent labs, imaging, microbiology and documentation. Also discussing the case with the patient and any available family, the admitting physician and any available ancillary staff.   Electronically signed by Carmen Santos MD, 10/29/23, 2:06 PM EDT.

## 2023-10-29 NOTE — PLAN OF CARE
Goal Outcome Evaluation:  Plan of Care Reviewed With: patient           Outcome Evaluation: Patient wore bipap 12/6 30% all night. Came off this morning for breakfast and placed on home O2 of 3L nasal cannula.

## 2023-10-29 NOTE — PROGRESS NOTES
[facility]   Hospitalist Progress Note    Date of admission: 10/27/2023  Patient Name: Pavel Dai  1955  Date: 10/29/2023      Subjective     Chief Complaint   Patient presents with    Shortness of Breath    Cough       Interval Followup: Slightly short of air.  His cough that he feels it is getting on his chest unable to reduce.  Occasionally gets some yellow sputum out       Objective     Vitals:   Temp:  [97.3 °F (36.3 °C)-98.4 °F (36.9 °C)] 98.2 °F (36.8 °C)  Heart Rate:  [] 103  Resp:  [18-27] 18  BP: (129-159)/(75-85) 159/85  Flow (L/min):  [3] 3    Physical Exam  Tired thin frail ill-appearing appears weak  Conversational dyspnea using accessory muscles, diminished in bases, dry cough during exam  Elev rate reg rhythm, no le pitting edema, dry mucosa  Abd soft nt nd  Aox3    Result Review:  Vital signs, labs and recent relevant imaging reviewed.        acetaminophen    albuterol    aluminum-magnesium hydroxide-simethicone    senna-docusate sodium **AND** [DISCONTINUED] polyethylene glycol **AND** bisacodyl **AND** bisacodyl    Diclofenac Sodium    hydrOXYzine    Lidocaine    melatonin    nicotine    nitroglycerin    ondansetron    polyethylene glycol    prochlorperazine    sodium chloride    sodium chloride    sodium chloride    arformoterol, 15 mcg, Nebulization, BID - RT  aspirin, 81 mg, Oral, Daily  atorvastatin, 10 mg, Oral, Nightly  azithromycin, 500 mg, Intravenous, Q24H  budesonide, 0.5 mg, Nebulization, BID - RT  cefTRIAXone, 1,000 mg, Intravenous, Q24H  cetirizine, 5 mg, Oral, Daily  enoxaparin, 40 mg, Subcutaneous, Daily  guaiFENesin, 1,200 mg, Oral, Q12H  levalbuterol, 1.25 mg, Nebulization, 4x Daily - RT  pantoprazole, 40 mg, Oral, Q AM  potassium & sodium phosphates, 1 packet, Oral, 4x Daily With Meals & Nightly  predniSONE, 40 mg, Oral, Daily With Breakfast  roflumilast, 500 mcg, Oral, Daily  senna-docusate sodium, 1 tablet, Oral, Nightly  sodium chloride, 10 mL,  Intravenous, Q12H  sodium chloride, 4 mL, Nebulization, BID - RT  sodium chloride, 2 spray, Each Nare, 4x Daily - RT        US Abdomen Limited    Result Date: 10/29/2023    1. Evidence for mild perinephric edema adjacent to the lower pole of the right kidney.  This finding is nonspecific.  There is no hydronephrosis.  Recommend correlation with urine analysis. 2. Otherwise unremarkable right upper quadrant ultrasound.       JOSEPH STROUD MD       ELECTRONICALLY SIGNED AND APPROVED BY: JOSEPH STROUD MD ON 10/29/2023 AT 9:20             CT Abdomen Pelvis With Contrast    Result Date: 10/28/2023    1. Stable low-density foci in the liver favored to represent small cysts 2. Small bowel containing right inguinal hernia measuring 1.7 cm transverse     Kiran Daugherty M.D.       Electronically Signed and Approved By: Kiran Daugherty M.D. on 10/28/2023 at 10:38             CT Chest With Contrast Diagnostic    Result Date: 10/28/2023    1. No evidence of pulmonary embolism 2. Severe emphysematous changes 3. Moderate chronic consolidation in the left lower lobe suspected to represent atelectasis and or scarring.     Kiran Daugherty M.D.       Electronically Signed and Approved By: Kiran Daugherty M.D. on 10/28/2023 at 10:18             XR Chest 1 View    Result Date: 10/27/2023    Advanced emphysema with areas of parenchymal scarring, without convincing active process.       BRENDEN GARCIA MD       Electronically Signed and Approved By: BRENDEN GARCIA MD on 10/27/2023 at 17:01              Assessment / Plan     Summary: 68 y.o. with history of COPD on 3 L home oxygen, [facility] on CPAP, CHF who presented with worsening shortness of air found to have acute hypoxic hypercapnic respiratory failure requiring continuous BiPAP.  Also with FABIAN and significantly elevated transaminitis on admission. Patient had issues with his home CPAP machine not functioning correctly.  Patient also has a history of Pseudomonas pneumoniato be on tobramycin  nebulizations outpatient but has not been able to initiate these.    Assessment/Plan (clinically significant if listed here)  Acute hypoxic hypercapnic respiratory failure  COPD, with severe emphysema, with exacerbation, 3L NC baseline   Pseudomonas PNA in LLL, with hx of Pseudomonas PNA  FABIAN suspect secondary to sepsis and prerenal/low bp  Transaminitis, question ischemic hepatitis/congestive hepatopathy secondary to above  NSTEMI, suspect type II in setting of above  Diastolic CHF (EF 61% per 7/24/2023 echo)  Hx of mucous plugging and pseudomonas pna  [facility] on CPAP outpatient with malfunctioning unit  History of rectal cancer with prior surgical resection and chemotherapy reportedly cleared  Severe malnutrition, question copd related cachexia    Continues to alternate between BiPAP and oxygen, adding additional saline nebs/humidified oxygen in addition to mucus clearing, n.p.o. after midnight may need bronchoscopy tomorrow discussed with pulmonology had prior issues with mucous plugging.    RT  consult will need to have functioning unit for outpatient  Ct pe negative for clot, severe emphysematous changes, left lower lobe consolidation partially chronic with scarring  Change abx to 10/29 iv cefepime, pseudomonas on sputum, question incompletely cleared prior infection vs recurrent   Need to reassess regarding tobramycin nebs, unable to get approved as outpatient  Requiring 4 L nasal cannula when off of BiPAP, partially as a mouth breather/has nasal congestion noted additional saline rinse  Repeat bnp decreased to 2000 without diuretics, appears dry on exam still, monitor, possible low dose lasix in the next few days.    Dobutamine stress test 7/2023 - abnl ecg but no ischemic changes during test or acute ischemia noted.  Dr Hogan evaluating at that time  Continue steroids, scheduled nebulizers, respiratory hygiene, mucinex, dalirsep  xopenex nebs given prior SVT issues last hospitalization.   LFTs  improving, suspect ischemic hepatopathy initially, us abd not concerning, continue supporitve care  Pt/ot  Nutrition consult for bmi 14.6, suspect component of copd related cachexia  Check a.m. CBC, BMP, magnesium, phosphorus, lfts   Continue hospital monitoring and treatment at current level of care - does not need upgraded at this time but if worsening respiratory status/increasing bipap requirements may need to upgrade tot he icu   Discussed with pulmonology      DVT prophylaxis:  Medical DVT prophylaxis orders are present.    Level Of Support Discussed With: Patient  Code Status (Patient has no pulse and is not breathing): CPR (Attempt to Resuscitate)  Medical Interventions (Patient has pulse or is breathing): Full Support    CBC          10/27/2023    16:52 10/28/2023    06:00 10/29/2023    05:00   CBC   WBC 10.18  5.38  7.21    RBC 4.11  3.80  3.68    Hemoglobin 11.8  11.0  10.7    Hematocrit 40.6  38.2  36.0    MCV 98.8  100.5  97.8    MCH 28.7  28.9  29.1    MCHC 29.1  28.8  29.7    RDW 12.1  12.2  12.3    Platelets 232  205  200        CMP          10/27/2023    16:52 10/28/2023    06:00 10/28/2023    11:48 10/29/2023    05:00   CMP   Glucose 99  133  126  111    BUN 55  49   37    Creatinine 1.55  1.04   0.72    EGFR 49.1  79.2   100.8    Sodium 144  143  143.0  144    Potassium 4.5  4.2   3.9    Chloride 100  100   101    Calcium 9.1  9.0   9.1    Total Protein 7.1  6.6   6.3    Albumin 3.8  3.3   3.3    Globulin 3.3  3.3   3.0    Total Bilirubin <0.2  <0.2   0.2    Alkaline Phosphatase 123  104   91    AST (SGOT) 782  367   154    ALT (SGPT) 1,264  885   589    Albumin/Globulin Ratio 1.2  1.0   1.1    BUN/Creatinine Ratio 35.5  47.1   51.4    Anion Gap 6.4  4.1   1.9

## 2023-10-30 NOTE — PROGRESS NOTES
Pulmonary / Critical Care Progress Note      Patient Name: Pavel Dai  : 1955  MRN: [id number]  Primary Care Physician:  Yoel Geller MD  Date of admission: 10/27/2023    Subjective   Subjective   Follow-up for COPD exacerbation, hypercarbic respiratory failure    No acute events overnight.  Wore BiPAP    This morning,  On 8 L nasal cannula  Lying in bed  Dyspnea improving  Feels well  Denies chest pain chest tightness  Cough with difficulty clearing secretions  No fever or chills    Review of Systems  General: Fatigue, otherwise denied complaints  HEENT: Denied complaints  Respiratory: Dyspnea, otherwise denied complaints  Cardiovascular: Denied complaints  GI: Denied complaints  : Denied complaints  MSK: Generalized weakness, otherwise denied complaints    Objective   Objective     Vitals:   Temp:  [97.3 °F (36.3 °C)-98.2 °F (36.8 °C)] 97.5 °F (36.4 °C)  Heart Rate:  [] 131  Resp:  [16-24] 18  BP: (140-159)/(77-91) 148/91  Flow (L/min):  [3-8] 8  Physical Exam   Vital Signs Reviewed   General:  Alert, NAD.  Niccoli ill-appearing male, lying in bed  HEENT:  PERRL, EOMI.    Neck:  No JVD, no thyromegaly  Lymph: no axillary, cervical, supraclavicular lymphadenopathy noted bilaterally  Chest: Diminished to auscultation bilaterally, no work of breathing noted on 3.5 L nasal cannula  CV: RRR, no M/G/R, pulses 2+  Abd:  Soft, NT, ND, +BS  EXT:  no clubbing, no cyanosis, no edema  Neuro:  A&Ox3, CN grossly intact, no focal deficits.  Skin: No rashes or lesions noted      Result Review    Result Review:  I have personally reviewed the results from the time of this admission to 10/30/2023 07:18 EDT and agree with these findings:  [x]  Laboratory  []  Microbiology  [x]  Radiology  []  EKG/Telemetry   []  Cardiology/Vascular   [x]  Pathology  []  Old records  []  Other:  Most notable findings include:      echo 61 to 65% grade 1 diastolic dysfunction estimated RVSP 45 to 55 mmHg        Lab  10/30/23  0429 10/29/23  0500 10/28/23  1148 10/28/23  0600 10/27/23  1652   WBC 7.66 7.21  --  5.38 10.18   HEMOGLOBIN 10.7* 10.7*  --  11.0* 11.8*   HEMATOCRIT 36.8* 36.0*  --  38.2 40.6   PLATELETS 232 200  --  205 232   SODIUM 146* 144  --  143 144   SODIUM, ARTERIAL  --   --  143.0  --   --    POTASSIUM 4.0 3.9  --  4.2 4.5   CHLORIDE 100 101  --  100 100   CO2 42.0* 41.1*  --  38.9* 37.6*   BUN 30* 37*  --  49* 55*   CREATININE 0.72* 0.72*  --  1.04 1.55*   GLUCOSE 101* 111*  --  133* 99   GLUCOSE, ARTERIAL  --   --  126*  --   --    CALCIUM 9.2 9.1  --  9.0 9.1   PHOSPHORUS 2.5 2.1*  --  2.0*  --    TOTAL PROTEIN 6.3 6.3  --  6.6 7.1   ALBUMIN 3.3* 3.3*  --  3.3* 3.8   GLOBULIN 3.0 3.0  --  3.3 3.3       Assessment & Plan   Assessment / Plan     Active Hospital Problems:  Active Hospital Problems    Diagnosis     **Respiratory failure, acute and chronic      Impression:  COPD exacerbation  Respiratory acidosis with CO2 narcosis  Elevated proBNP  NSTEMI likely type II  Transaminitis  Hyponatremia, clinically insignificant  Hypomagnesemia  History of COPD  History of chronic hypoxic respiratory on 3 L nasal cannula at baseline  History of CAD  History of heart failure  History of colon cancer  History of Pseudomonas pneumonia     Plan:  -On 3.5 L nasal cannula.  3 L is patient's baseline.  Continue to wean to maintain SPO2 88 to 92%   -Continue to use his during naps and at night.  BiPAP 12/6 with 32% FiO2  -10/30 CXR reviewed  -Continue azithromycin for COPD exacerbation for 3 days and cefepime for Pseudomonas pneumonia for 14 days  -Wrote for tobramycin (ANDREAS) nebulizers  -Continue Brovana, Pulmicort, Daliresp.   -Continue prednisone 40 daily for 5 days  -Continue bronchodilators and bronchopulmonary protocols.  Encourage I-S  -Wrote for chest physiotherapy 2 times daily  -Continue to monitor renal panel and electrolytes.  Replace electrolytes necessary  -Replaced magnesium intravenously  -PT/OT on board.   Appreciate assistance  -Encourage mobilization.  Out of bed to chair  -Consulted RT  for home respiratory needs  -Will take for bronchoscopy with airway inspection, brushings, biopsies, bronchoalveolar lavage. I have discussed the risks of the procedure with the patient including pneumothorax, hemothorax, bleeding, hypoxia, required mechanical ventilation and death. The patient recognizes these findings, acknowledges these findings and is agreeable to the procedure.     Mr Dai has history of daily productive cough for greater than six months and bronchiectasis noted on chest CT dated 7/24/2023.  Pt continues to have issues with airway clearance and has tried and failed  deep breathing and cough with IS, OPEP with aerobika, manual and percussor CPT .  Mr. Dai will need HFCWO with chest vest in the home to aid in mobilization of secretions.        DVT prophylaxis:  Medical DVT prophylaxis orders are present.    CODE STATUS:   Level Of Support Discussed With: Patient  Code Status (Patient has no pulse and is not breathing): CPR (Attempt to Resuscitate)  Medical Interventions (Patient has pulse or is breathing): Full Support      Labs, imaging, microbiology, notes and medications personally reviewed  Discussed with primary    I, Dr. Nish Schaefer, have spent more than 50% of the total time managing the patient in this encounter today.  This included personally reviewing all pertinent labs, imaging, microbiology and documentation. Also discussing the case with the patient and any available family, the admitting physician and any available ancillary staff.    Electronically signed by JOSE Dickey, 10/30/23, 11:51 AM EDT.  Electronically signed by Nish Schaefer MD, 10/30/23, 3:55 PM EDT.

## 2023-10-30 NOTE — PLAN OF CARE
Goal Outcome Evaluation:  Plan of Care Reviewed With: patient           Outcome Evaluation: Pt presents with impairments in balance, endurance/activity tolerance, and strength affecting functional mobility. Pt will benefit from inpatient PT services and placement in a rehabilitation facility upon discharge.      Anticipated Discharge Disposition (PT): sub acute care setting

## 2023-10-30 NOTE — PLAN OF CARE
"Goal Outcome Evaluation:         Patient took off bipap, complained that he couldn't breath. Breathing treatment relieved him \"a little bit\". RT placed patient on a high flow nasal cannula 8L for patient comfort... will wean off as tolerated.               "

## 2023-10-30 NOTE — PLAN OF CARE
Goal Outcome Evaluation:      Patient is wearing bipap of 12/6 32% at this time. Patient is in no distress and resting comfortably. We will continue to monitor.

## 2023-10-30 NOTE — PLAN OF CARE
Goal Outcome Evaluation:  Plan of Care Reviewed With: patient        Progress: no change (First session for evaluation)  Outcome Evaluation: Patient presents with limitations of balance and endurance/activity tolerance which impede his ability to perform ADL and transfers as prior.  The skills of a therapist will be required to safely and effectively implement treatment plan to restore maximal level of function.      Anticipated Discharge Disposition (OT): sub acute care setting

## 2023-10-30 NOTE — THERAPY EVALUATION
Patient Name: Pavel Dai  : 1955    MRN: [id number]                              Today's Date: 10/30/2023       Admit Date: 10/27/2023    Visit Dx:     ICD-10-CM ICD-9-CM   1. COPD exacerbation  J44.1 491.21   2. Decreased activities of daily living (ADL)  Z78.9 V49.89     Patient Active Problem List   Diagnosis    Rectal bleeding    Acute blood loss anemia    COPD (chronic obstructive pulmonary disease)    CAD (coronary artery disease)    Essential hypertension    History of ischemic stroke    Body mass index (BMI) 19.9 or less, adult    Encounter for immunization    Ex-smoker    Hyperlipidemia    Hypoxemia    Hypertensive heart disease without congestive heart failure    Oxygen dependent    Postnasal drip    Shortness of breath    Rectal cancer    Iron deficiency anemia due to chronic blood loss    Cancer related pain    Gastroesophageal reflux disease    COPD exacerbation    Severe malnutrition    Acute on chronic respiratory failure with hypoxia and hypercapnia    Hypercapnic respiratory failure    Respiratory failure, acute and chronic     Past Medical History:   Diagnosis Date    Anemia due to chemotherapy 2021    Cancer related pain 1/3/2022    CHF (congestive heart failure)     COPD (chronic obstructive pulmonary disease)     Coronary artery disease     Frequent urination     Hyperlipidemia     Hypertension     Iron deficiency anemia due to chronic blood loss 2021    Rectal cancer      Past Surgical History:   Procedure Laterality Date    BRONCHOSCOPY N/A 2023    Procedure: BRONCHOSCOPY WITH BAL AND WASHING;  Surgeon: Ted Petty MD;  Location: [facility] ENDOSCOPY;  Service: Pulmonary;  Laterality: N/A;  MUCUS PLUGGING    COLONOSCOPY      HERNIA REPAIR      approximately 4 years ago     RECTAL SURGERY      SHOULDER SURGERY      joint loose, calcium particles removed from shoulder joint      General Information       Row Name 10/30/23 1049          OT Time and Intention     Document Type evaluation  -AV     Mode of Treatment individual therapy;occupational therapy  -AV       Row Name 10/30/23 1049          General Information    Patient Profile Reviewed yes  -AV     Prior Level of Function independent:;ADL's;cooking;all household mobility;transfer  Sits to shower (walk-in shower with seat).  Stands at sink to groom.  Standard commode.  Ambulates without assistive device.  3 L continuous home oxygen.  -AV     Existing Precautions/Restrictions fall;oxygen therapy device and L/min  Soft/chopped meats-thin liquids  -AV     Barriers to Rehab none identified  -AV       Row Name 10/30/23 1049          Occupational Profile    Reason for Services/Referral (Occupational Profile) Patient is a 68 year old male admitted to [facility] on October 27, 2023 with shortness of air.  He is currently on fourth floor/8 L high flow oxygen.  O2 sats 99% at rest-desats 84% with minimal activity in bed.   OT consulted due to recent decline in ADL/transfer independence.  No previous OT services for current condition.  -AV       Row Name 10/30/23 1049          Living Environment    People in Home alone  -AV       Row Name 10/30/23 1049          Home Main Entrance    Number of Stairs, Main Entrance five  -AV       Row Name 10/30/23 1049          Stairs Within Home, Primary    Number of Stairs, Within Home, Primary none  -AV       Row Name 10/30/23 1049          Cognition    Orientation Status (Cognition) --  Patient is alert, pleasant and cooperative- able to retain information and follow commands.  -AV       Row Name 10/30/23 1049          Safety Issues, Functional Mobility    Impairments Affecting Function (Mobility) balance;endurance/activity tolerance;strength  -AV               User Key  (r) = Recorded By, (t) = Taken By, (c) = Cosigned By      Initials Name Provider Type    AV Jesus Justin, OT Occupational Therapist                     Mobility/ADL's       Row Name 10/30/23 1051          Bed  "Mobility    Bed Mobility supine-sit  -AV     Supine-Sit [place] (Bed Mobility) independent  -AV       Row Name 10/30/23 1051          Transfers    Comment, (Transfers) CGA/min assist  -AV       Row Name 10/30/23 1051          Activities of Daily Living    BADL Assessment/Intervention --  Independent feeding.  Mod assist grooming with set up in bed.  Mod-max assist bathing/dressing \" it wears me out\".  Independent use of urinal in high Fowlers.  Assist for further toilet hygiene following bowel movement using BSC.  -AV               User Key  (r) = Recorded By, (t) = Taken By, (c) = Cosigned By      Initials Name Provider Type    Jesus Martin OT Occupational Therapist                   Obj/Interventions       Row Name 10/30/23 1052          Sensory Assessment (Somatosensory)    Sensory Assessment (Somatosensory) UE sensation intact  -AV       Row Name 10/30/23 1052          Vision Assessment/Intervention    Visual Impairment/Limitations WFL  -AV     Vision Assessment Comment Glasses for TV and reading  -AV       Row Name 10/30/23 1052          Range of Motion Comprehensive    General Range of Motion bilateral upper extremity ROM WFL  -AV     Comment, General Range of Motion AROM  -AV       Row Name 10/30/23 1052          Strength Comprehensive (MMT)    Comment, General Manual Muscle Testing (MMT) Assessment 4+/5 bilateral upper extremities  -AV       Row Name 10/30/23 1052          Motor Skills    Motor Skills coordination;functional endurance  -AV     Coordination WFL  Right dominant  -AV     Functional Endurance Poor plus  -AV       Row Name 10/30/23 1052          Balance    Comment, Balance CGA/min assist  -AV               User Key  (r) = Recorded By, (t) = Taken By, (c) = Cosigned By      Initials Name Provider Type    Jesus Martin OT Occupational Therapist                   Goals/Plan       Row Name 10/30/23 1053          Transfer Goal 1 (OT)    Activity/Assistive Device (Transfer Goal 1, OT) " transfers, all;walker, rolling  -AV     [place] Level/Cues Needed (Transfer Goal 1, OT) modified independence  -AV     Time Frame (Transfer Goal 1, OT) long term goal (LTG);10 days  -AV       Row Name 10/30/23 1053          Bathing Goal 1 (OT)    Activity/Device (Bathing Goal 1, OT) bathing skills, all;shower chair  -AV     [place] Level/Cues Needed (Bathing Goal 1, OT) modified independence  -AV     Time Frame (Bathing Goal 1, OT) long term goal (LTG);10 days  -AV       Row Name 10/30/23 1053          Dressing Goal 1 (OT)    Activity/Device (Dressing Goal 1, OT) dressing skills, all  -AV     [place]/Cues Needed (Dressing Goal 1, OT) modified independence  -AV     Time Frame (Dressing Goal 1, OT) long term goal (LTG);10 days  -AV       Row Name 10/30/23 1053          Toileting Goal 1 (OT)    Activity/Device (Toileting Goal 1, OT) toileting skills, all;commode, 3-in-1  -AV     [place] Level/Cues Needed (Toileting Goal 1, OT) modified independence  -AV     Time Frame (Toileting Goal 1, OT) long term goal (LTG);10 days  -AV       Row Name 10/30/23 1053          Grooming Goal 1 (OT)    Activity/Device (Grooming Goal 1, OT) grooming skills, all  -AV     [place] (Grooming Goal 1, OT) modified independence  Partial stand at sink  -AV     Time Frame (Grooming Goal 1, OT) long term goal (LTG);10 days  -AV       Row Name 10/30/23 1053          Problem Specific Goal 1 (OT)    Problem Specific Goal 1 (OT) Patient will demonstrate fair endurance/activity tolerance needed to support ADLs.  -AV     Time Frame (Problem Specific Goal 1, OT) long term goal (LTG);10 days  -AV       Row Name 10/30/23 1053          Therapy Assessment/Plan (OT)    Planned Therapy Interventions (OT) activity tolerance training;BADL retraining;functional balance retraining;IADL retraining;occupation/activity based interventions;patient/caregiver education/training;transfer/mobility retraining;ROM/therapeutic exercise  -AV                User Key  (r) = Recorded By, (t) = Taken By, (c) = Cosigned By      Initials Name Provider Type    AV Jesus Justin, OT Occupational Therapist                   Clinical Impression       Row Name 10/30/23 1052          Pain Assessment    Additional Documentation Pain Scale: FACES Pre/Post-Treatment (Group)  -AV       Row Name 10/30/23 1052          Pain Scale: FACES Pre/Post-Treatment    Pain: FACES Scale, Pretreatment 0-->no hurt  -AV     Posttreatment Pain Rating 0-->no hurt  -AV       Row Name 10/30/23 1052          Plan of Care Review    Plan of Care Reviewed With patient  -AV     Progress no change  First session for evaluation  -AV     Outcome Evaluation Patient presents with limitations of balance and endurance/activity tolerance which impede his ability to perform ADL and transfers as prior.  The skills of a therapist will be required to safely and effectively implement treatment plan to restore maximal level of function.  -AV       Row Name 10/30/23 1052          Therapy Assessment/Plan (OT)    Patient/Family Therapy Goal Statement (OT) Regain independence  -AV     Rehab Potential (OT) good, to achieve stated therapy goals  -AV     Criteria for Skilled Therapeutic Interventions Met (OT) yes;meets criteria;skilled treatment is necessary  -AV     Therapy Frequency (OT) 5 times/wk  -AV       Row Name 10/30/23 1052          Therapy Plan Review/Discharge Plan (OT)    Equipment Needs Upon Discharge (OT) walker, rolling;commode chair  -AV     Anticipated Discharge Disposition (OT) sub acute care setting  -AV       Row Name 10/30/23 1052          Vital Signs    O2 Delivery Pre Treatment hi-flow  8  -AV     O2 Delivery Intra Treatment hi-flow  8  -AV     O2 Delivery Post Treatment hi-flow  8  -AV       Row Name 10/30/23 1052          Positioning and Restraints    Pre-Treatment Position in bed  -AV     Post Treatment Position bed  High Fowlers  -AV     In Bed call light within reach;encouraged to call for  assist  -AV               User Key  (r) = Recorded By, (t) = Taken By, (c) = Cosigned By      Initials Name Provider Type    Jesus Martin OT Occupational Therapist                   Outcome Measures       Row Name 10/30/23 1054          How much help from another is currently needed...    Putting on and taking off regular lower body clothing? 2  -AV     Bathing (including washing, rinsing, and drying) 2  -AV     Toileting (which includes using toilet bed pan or urinal) 2  -AV     Putting on and taking off regular upper body clothing 2  -AV     Taking care of personal grooming (such as brushing teeth) 2  -AV     Eating meals 4  -AV     AM-PAC 6 Clicks Score (OT) 14  -AV       Row Name 10/30/23 1054          Functional Assessment    Outcome Measure Options AM-PAC 6 Clicks Daily Activity (OT);Optimal Instrument  -AV       Row Name 10/30/23 1054          Optimal Instrument    Optimal Instrument Optimal - 3  -AV     Bending/Stooping 2  -AV     Standing 2  -AV     Reaching 1  -AV     From the list, choose the 3 activities you would most like to be able to do without any difficulty Bending/stooping;Standing;Reaching  -AV     Total Score Optimal - 3 5  -AV               User Key  (r) = Recorded By, (t) = Taken By, (c) = Cosigned By      Initials Name Provider Type    Jesus Martin OT Occupational Therapist                    Occupational Therapy Education       Title: PT OT SLP Therapies (In Progress)       Topic: Occupational Therapy (In Progress)       Point: ADL training (Done)       Description:   Instruct learner(s) on proper safety adaptation and remediation techniques during self care or transfers.   Instruct in proper use of assistive devices.                  Learning Progress Summary             Patient Acceptance, E, VU by AV at 10/30/2023 1055                         Point: Home exercise program (Not Started)       Description:   Instruct learner(s) on appropriate technique for monitoring, assisting  and/or progressing therapeutic exercises/activities.                  Learner Progress:  Not documented in this visit.              Point: Precautions (Done)       Description:   Instruct learner(s) on prescribed precautions during self-care and functional transfers.                  Learning Progress Summary             Patient Acceptance, MARLEN, VU by RONN at 10/30/2023 6173                         Point: Body mechanics (Not Started)       Description:   Instruct learner(s) on proper positioning and spine alignment during self-care, functional mobility activities and/or exercises.                  Learner Progress:  Not documented in this visit.                              User Key       Initials Effective Dates Name Provider Type Discipline    RONN 06/16/21 -  Jesus Justin, OT Occupational Therapist OT                  OT Recommendation and Plan  Planned Therapy Interventions (OT): activity tolerance training, BADL retraining, functional balance retraining, IADL retraining, occupation/activity based interventions, patient/caregiver education/training, transfer/mobility retraining, ROM/therapeutic exercise  Therapy Frequency (OT): 5 times/wk  Plan of Care Review  Plan of Care Reviewed With: patient  Progress: no change (First session for evaluation)  Outcome Evaluation: Patient presents with limitations of balance and endurance/activity tolerance which impede his ability to perform ADL and transfers as prior.  The skills of a therapist will be required to safely and effectively implement treatment plan to restore maximal level of function.     Time Calculation:   Evaluation Complexity (OT)  Review Occupational Profile/Medical/Therapy History Complexity: expanded/moderate complexity  Assessment, Occupational Performance/Identification of Deficit Complexity: 1-3 performance deficits  Clinical Decision Making Complexity (OT): problem focused assessment/low complexity  Overall Complexity of Evaluation (OT): low  complexity     Time Calculation- OT       Row Name 10/30/23 1056             Time Calculation- OT    OT Received On 10/30/23  -AV      OT Goal Re-Cert Due Date 11/08/23  -AV         Untimed Charges    OT Eval/Re-eval Minutes 32  -AV         Total Minutes    Untimed Charges Total Minutes 32  -AV       Total Minutes 32  -AV                User Key  (r) = Recorded By, (t) = Taken By, (c) = Cosigned By      Initials Name Provider Type    AV Jesus Justin OT Occupational Therapist                  Therapy Charges for Today       Code Description Service Date Service Provider Modifiers Qty    [id number] HC OT EVAL LOW COMPLEXITY 3 10/30/2023 Jesus Justin OT GO 1                 Jesus Justin OT  10/30/2023

## 2023-10-30 NOTE — CONSULTS
"Patient is well known to RT CM from previous admissions.       Patient has previously been on Astral with [employer] but returned the device after having differences with the company over billing practices. Mr. Dai was set up with NIV through [facility] on his last admission.  Patient did recently phone RT CM 10/16 with complaints concerning copays through [facility].  [facility] was contacted today.  Patient does have a copay despite completing financial hardship application. [facility] reps states patient desires even lower copay; she is following up to see if anything else can be done to ease patient's copay.  In the meantime, patient does still have the device in his home.     Patient currently on pulse dose POC at home at 3-4L; recommend outpatient 6 min walk test to check patient's tolerance of pulse dose.      Patient was last bronched on 07/26/23 with findings of \"Significant mucus plugging was seen in trachea and bilateral lower lobe bronchial tubes, suctioned out in multiple attempts.  Airway was very friable and was wheezing with suctioning.\" Additionally, Mr. Dai was prescribed Tobramycin nebulizer treatments for pseudomonas pneumonia last admission, but per notes at pulmonary follow up 8/11 patient was told by \" [facility] not to take it because it will make him worse\".  Per [facility] website, nebulized Tobramycin is nonformulary; nebulized amikacin is listed as their  formulary alternative, but it does require a PA from [facility] doctor and critieria states \"Patient has been treated with at least 6 months of guideline-based therapy without converting sputum cultures to negative (?6-month final cultures remain positive).  Patient's only current maintenance medication is Wixela. Patient may benefit from triple therapy. Susi is on [facility] formulary but \"Initial prescription is by or in consult with a [facility] pulmonologist or designated expert\".   RT CM can send request for service if needed. Spiriva " respimat can be added without PA.    Chest vest is recommended for airway clearance; order and request for service can be sent to [facility] provider, [facility].     Mr Dai has history of daily productive cough for greater than six months and bronchiectasis noted on chest CT dated 7/24/2023.  Pt continues to have issues with airway clearance and has tried and failed  deep breathing and cough with IS, OPEP with aerobika, manual and percussor CPT .  Mr. Dai will need HFCWO with chest vest in the home to aid in mobilization of secretions.

## 2023-10-30 NOTE — CONSULTS
RT GUILLERMO met with patient and his son at the bedside to discuss home respiratory medications and equipment.      Patient and son state that his home Astral is functioning properly, it was his home oxygen equipment that was not working.  Son states he has already contacted [employer] to have his concentrator looked at.  Patient states he is compliant with nightly use of Astral.  Due to acute on chronic hypercapnia noted on admission, I have requested last download from patient's device to confirm adherence and proper settings.      Application completed for financial assistance from [employer] for Tobramycin since [facility] has denied request.      Orders obtained for chest vest and request for service will be sent to [facility].      Susi will be instructed for maintenance therapy tomorrow.  Order will need to be [facility] to [facility] with supporting documentation of need.  I will fax this closer to discharge.  [employer] application can also be completed for Breztri to use as backup plan should [facility] deny coverage for this medication.

## 2023-10-30 NOTE — PLAN OF CARE
Goal Outcome Evaluation:  Plan of Care Reviewed With: patient      Bronch changed to tomorrow 10/31, pt will be NPO at 0000 tonight. LR infusing at 100 ml/hr. Cefepime infusing as ordered. Sinus tach on monitor. 7L HFNC. Bipap PRN and he wore for some of day. Up x1 assist in room. No further events this shift, will cont POC

## 2023-10-30 NOTE — PROGRESS NOTES
[facility]   Hospitalist Progress Note    Date of admission: 10/27/2023  Patient Name: Pavel Dai  1955  Date: 10/30/2023      Subjective     Chief Complaint   Patient presents with    Shortness of Breath    Cough       Interval Followup: Having episodes of acutely worsening hypoxia sometimes associated with breathing treatments feels like he is having cough/phlegm that he is unable to produce.  Has been alternating between BiPAP and oxygen.  Discussed chest vest and additional respiratory hygiene with patient swelling.  Discussed bronchoscopy tomorrow with the patient he is agreeable as this helps him notably before       Objective     Vitals:   Temp:  [97.3 °F (36.3 °C)-98.3 °F (36.8 °C)] 98.3 °F (36.8 °C)  Heart Rate:  [] 107  Resp:  [16-24] 20  BP: (133-153)/(75-91) 142/82  Flow (L/min):  [5-8] 7    Physical Exam  Tired thin frail ill-appearing appears weak/chronically ill  Conversational dyspnea using accessory muscles, diminished in bases, dry cough during exam, on 8 L nasal cannula  Elev rate reg rhythm, no le pitting edema, dry mucosa  Abd soft nt nd  Aox3    Result Review:  Vital signs, labs and recent relevant imaging reviewed.        acetaminophen    albuterol    aluminum-magnesium hydroxide-simethicone    senna-docusate sodium **AND** [DISCONTINUED] polyethylene glycol **AND** bisacodyl **AND** bisacodyl    Diclofenac Sodium    hydrOXYzine    Lidocaine    melatonin    nicotine    nitroglycerin    ondansetron    Pharmacy to Dose Cefepime    polyethylene glycol    prochlorperazine    sodium chloride    sodium chloride    sodium chloride    arformoterol, 15 mcg, Nebulization, BID - RT  aspirin, 81 mg, Oral, Daily  atorvastatin, 10 mg, Oral, Nightly  budesonide, 0.5 mg, Nebulization, BID - RT  cefepime, 2,000 mg, Intravenous, Q8H  cetirizine, 5 mg, Oral, Daily  enoxaparin, 40 mg, Subcutaneous, Daily  guaiFENesin, 1,200 mg, Oral, Q12H  levalbuterol, 1.25 mg, Nebulization, 4x Daily -  RT  metoprolol succinate XL, 25 mg, Oral, Q12H  pantoprazole, 40 mg, Oral, Q AM  predniSONE, 40 mg, Oral, Daily With Breakfast  roflumilast, 500 mcg, Oral, Daily  senna-docusate sodium, 2 tablet, Oral, BID  sodium chloride, 10 mL, Intravenous, Q12H  sodium chloride, 2 spray, Each Nare, 4x Daily - RT  tobramycin PF, 300 mg, Nebulization, Q12H - RT        XR Chest 1 View    Result Date: 10/30/2023     1. Small left pleural effusion with overlying consolidation either due to pneumonia or atelectasis.  Correlate clinically.  2. Advanced pulmonary emphysematous change.        WHITNEY ARCEO MD       Electronically Signed and Approved By: WHITNEY ARCEO MD on 10/30/2023 at 9:50             US Abdomen Limited    Result Date: 10/29/2023    1. Evidence for mild perinephric edema adjacent to the lower pole of the right kidney.  This finding is nonspecific.  There is no hydronephrosis.  Recommend correlation with urine analysis. 2. Otherwise unremarkable right upper quadrant ultrasound.       JOSEPH STROUD MD       ELECTRONICALLY SIGNED AND APPROVED BY: JOSEPH STROUD MD ON 10/29/2023 AT 9:20             CT Abdomen Pelvis With Contrast    Result Date: 10/28/2023    1. Stable low-density foci in the liver favored to represent small cysts 2. Small bowel containing right inguinal hernia measuring 1.7 cm transverse     Kiran Daugherty M.D.       Electronically Signed and Approved By: Kiran Daugherty M.D. on 10/28/2023 at 10:38             CT Chest With Contrast Diagnostic    Result Date: 10/28/2023    1. No evidence of pulmonary embolism 2. Severe emphysematous changes 3. Moderate chronic consolidation in the left lower lobe suspected to represent atelectasis and or scarring.     Kiran Daugherty M.D.       Electronically Signed and Approved By: Kiran Daugherty M.D. on 10/28/2023 at 10:18             XR Chest 1 View    Result Date: 10/27/2023    Advanced emphysema with areas of parenchymal scarring, without convincing active process.        BRENDEN GARCIA MD       Electronically Signed and Approved By: BRENDEN GARCIA MD on 10/27/2023 at 17:01              Assessment / Plan     Summary: 68 y.o. with history of COPD (advanced, fev1~12% on outpt testing) on 3 L home oxygen, [facility] on CPAP, CHF who presented with worsening shortness of air found to have acute hypoxic hypercapnic respiratory failure requiring continuous BiPAP.  Also with FABIAN and significantly elevated transaminitis on admission. Patient had issues with his home CPAP machine not functioning correctly.  Patient also has a history of Pseudomonas pneumonia who was supposed to be on tobramycin nebulizations outpatient but has not been able to initiate these/they have not approved by insurance.  Patient with Pseudomonas pneumonia here.  Pending bronchoscopy 10/31 to further evaluate. .    Assessment/Plan (clinically significant if listed here)  Acute hypoxic hypercapnic respiratory failure  COPD, 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%, with exacerbation, 3L NC baseline   Pseudomonas PNA in LLL, with hx of Pseudomonas PNA  FABIAN suspect secondary to sepsis and prerenal/low bp  Transaminitis, question ischemic hepatitis/congestive hepatopathy secondary to above  NSTEMI, suspect type II in setting of above  Diastolic CHF (EF 61% per 7/24/2023 echo)  Hypernatremia, suspect dehydration  Hx of mucous plugging and pseudomonas pna  MART on CPAP outpatient with malfunctioning unit  History of rectal cancer with prior surgical resection and chemotherapy reportedly cleared  Severe malnutrition, question copd related cachexia    Continues to alternate between BiPAP and oxygen, humidified oxygen, trying chest vest, needs more aggressive BPH/airway clearance measures, suspect ultimately will need bronchoscopy regardless   n.p.o. after midnight plan for bronchoscopy tomorrow  Continue steroids, scheduled nebulizers, respiratory hygiene, mucinex, dalirsep  xopenex nebs given prior SVT issues last hospitalization.   RT Case  manager consult will need to have functioning unit for outpatient and possibly will also need home chest vest  Ct pe negative for clot, severe emphysematous changes, left lower lobe consolidation partially chronic with scarring  10/29 IV cefepime for Pseudomonas, 14-day course, placed on tobramycin nebs while here was unable to obtain outpatient given approval issues  Continue supplemental oxygen when on BiPAP baseline is 3 L still currently higher value here  BNP here has improved without any diuretics appears dry on exam actually think benefit from some fluids especially with limited p.o. intake, give LR today, having hypernatremia from dehydration as suspect  Dobutamine stress test 7/2023 - abnl ecg but no ischemic changes during test or acute ischemia noted.  Dr Hogan evaluating at that time  Continue on metoprolol succinate twice daily  LFTs improving, suspect ischemic hepatopathy initially, us abd not concerning, continue supporitve care and monitor   Pt/ot  Nutrition consult for bmi 14.6, suspect component of copd related cachexia  Check a.m. CBC, BMP, magnesium, phosphorus, lfts   Continue hospital monitoring and treatment at current level of care - does not need upgraded at this time but if worsening respiratory status/increasing bipap requirements may need to upgrade tot he icu   Discussed with pulmonology    Will be several days at least before stable for discharge.  May need to have additional goals of care discussions pending continued response to respiratory treatment given advanced copd.  Would benefit from rehab at discharge if willing      DVT prophylaxis:  Medical DVT prophylaxis orders are present.    Level Of Support Discussed With: Patient  Code Status (Patient has no pulse and is not breathing): CPR (Attempt to Resuscitate)  Medical Interventions (Patient has pulse or is breathing): Full Support    Patient is critically ill due to ahhrf, requiring continuous bipap, mucous plugging,  Pseudomonas pneumonia and multiple issues as noted above.  I have spent 31 minutes of critical care time reviewing documentation, pertinent labs, imaging studies, examining the patient, modifying care plan, and discussing patient's condition and care plan with the patient, nursing and pulmonology, .    CBC          10/28/2023    06:00 10/29/2023    05:00 10/30/2023    04:29   CBC   WBC 5.38  7.21  7.66    RBC 3.80  3.68  3.72    Hemoglobin 11.0  10.7  10.7    Hematocrit 38.2  36.0  36.8    .5  97.8  98.9    MCH 28.9  29.1  28.8    MCHC 28.8  29.7  29.1    RDW 12.2  12.3  12.3    Platelets 205  200  232        CMP          10/28/2023    06:00 10/28/2023    11:48 10/29/2023    05:00 10/30/2023    04:29   CMP   Glucose 133  126  111  101    BUN 49   37  30    Creatinine 1.04   0.72  0.72    EGFR 79.2   100.8  100.8    Sodium 143  143.0  144  146    Potassium 4.2   3.9  4.0    Chloride 100   101  100    Calcium 9.0   9.1  9.2    Total Protein 6.6   6.3  6.3    Albumin 3.3   3.3  3.3    Globulin 3.3   3.0  3.0    Total Bilirubin <0.2   0.2  0.2    Alkaline Phosphatase 104   91  91    AST (SGOT) 367   154  81    ALT (SGPT) 885   589  446    Albumin/Globulin Ratio 1.0   1.1  1.1    BUN/Creatinine Ratio 47.1   51.4  41.7    Anion Gap 4.1   1.9  4.0

## 2023-10-30 NOTE — THERAPY EVALUATION
Acute Care - Physical Therapy Initial Evaluation  DANYA Matos     Patient Name: Pavel Dai  : 1955  MRN: [id number]  Today's Date: 10/30/2023      Visit Dx:     ICD-10-CM ICD-9-CM   1. COPD exacerbation  J44.1 491.21   2. Decreased activities of daily living (ADL)  Z78.9 V49.89   3. Difficulty walking  R26.2 719.7     Patient Active Problem List   Diagnosis    Rectal bleeding    Acute blood loss anemia    COPD (chronic obstructive pulmonary disease)    CAD (coronary artery disease)    Essential hypertension    History of ischemic stroke    Body mass index (BMI) 19.9 or less, adult    Encounter for immunization    Ex-smoker    Hyperlipidemia    Hypoxemia    Hypertensive heart disease without congestive heart failure    Oxygen dependent    Postnasal drip    Shortness of breath    Rectal cancer    Iron deficiency anemia due to chronic blood loss    Cancer related pain    Gastroesophageal reflux disease    COPD exacerbation    Severe malnutrition    Acute on chronic respiratory failure with hypoxia and hypercapnia    Hypercapnic respiratory failure    Respiratory failure, acute and chronic     Past Medical History:   Diagnosis Date    Anemia due to chemotherapy 2021    Cancer related pain 1/3/2022    CHF (congestive heart failure)     COPD (chronic obstructive pulmonary disease)     Coronary artery disease     Frequent urination     Hyperlipidemia     Hypertension     Iron deficiency anemia due to chronic blood loss 2021    Rectal cancer      Past Surgical History:   Procedure Laterality Date    BRONCHOSCOPY N/A 2023    Procedure: BRONCHOSCOPY WITH BAL AND WASHING;  Surgeon: Ted Petty MD;  Location: [facility] ENDOSCOPY;  Service: Pulmonary;  Laterality: N/A;  MUCUS PLUGGING    COLONOSCOPY      HERNIA REPAIR      approximately 4 years ago     RECTAL SURGERY      SHOULDER SURGERY      joint loose, calcium particles removed from shoulder joint     PT Assessment (last 12 hours)       PT  Evaluation and Treatment       Row Name 10/30/23 1100          Physical Therapy Time and Intention    Subjective Information no complaints  -DP     Document Type evaluation  -DP     Mode of Treatment individual therapy;physical therapy  -DP     Patient Effort good  -DP     Symptoms Noted During/After Treatment fatigue;significant change in vital signs  SpO2 92-93 for entirety of session. SpO2 dropped to 87 after session with pt resting in bed and was 90 upon PT exit.  -DP     Comment PT had no complaints with standing or ambulation besides feeling weakness in BLEs.  -DP       Row Name 10/30/23 1100          General Information    Patient Profile Reviewed yes  -DP     Patient Observations alert;cooperative;agree to therapy  -DP     Prior Level of Function independent:;gait;transfer;bed mobility  -DP     Equipment Currently Used at Home oxygen  3L O2  -DP     Existing Precautions/Restrictions fall;oxygen therapy device and L/min  -DP     Barriers to Rehab none identified  -DP     Comment, General Information Pt ambulates with no assistive device and is independent with most ADLs. He reports assistance from his son and sister with home management activities and a few self-care ADLs.  -DP       Row Name 10/30/23 1100          Living Environment    Current Living Arrangements home  -DP     Home Accessibility stairs to enter home;wheelchair accessible  Ramp at back door  -DP     People in Home alone  -DP     Primary Care Provided by self  -DP       Row Name 10/30/23 1100          Home Main Entrance    Number of Stairs, Main Entrance five  -DP       Row Name 10/30/23 1100          Stairs Within Home, Primary    Number of Stairs, Within Home, Primary none  -DP       Row Name 10/30/23 1100          Home Use of Assistive/Adaptive Equipment    Equipment Currently Used at Home oxygen  -DP       Row Name 10/30/23 1100          Cognition    Orientation Status (Cognition) oriented x 4  -DP     Personal Safety Interventions gait  belt;nonskid shoes/slippers when out of bed  -DP       Row Name 10/30/23 1100          Range of Motion Comprehensive    General Range of Motion bilateral lower extremity ROM WFL  -DP       Row Name 10/30/23 1100          Strength (Manual Muscle Testing)    Strength (Manual Muscle Testing) bilateral lower extremities;strength is WFL  Strength 4/5 measured in supine  -DP       Row Name 10/30/23 1100          Bed Mobility    Bed Mobility supine-sit  -DP     Supine-Sit [place] (Bed Mobility) independent  -DP       Row Name 10/30/23 1100          Transfers    Transfers sit-stand transfer;stand-sit transfer  -DP       Row Name 10/30/23 1100          Sit-Stand Transfer    Sit-Stand [place] (Transfers) contact guard  -DP     Assistive Device (Sit-Stand Transfers) walker, front-wheeled  -DP       Row Name 10/30/23 1100          Stand-Sit Transfer    Stand-Sit [place] (Transfers) minimum assist (75% patient effort)  -DP     Assistive Device (Stand-Sit Transfers) walker, front-wheeled  -DP       Row Name 10/30/23 1100          Gait/Stairs (Locomotion)    Gait/Stairs Locomotion gait/ambulation assistive device  -DP     [place] Level (Gait) contact guard  -DP     Assistive Device (Gait) walker, front-wheeled  -DP     Patient was able to Ambulate yes  -DP     Distance in Feet (Gait) 8  Also completed 2' in sidestepping  -DP       Row Name 10/30/23 1100          Safety Issues, Functional Mobility    Impairments Affecting Function (Mobility) balance;endurance/activity tolerance;strength  -DP       Row Name 10/30/23 1100          Balance    Balance Assessment standing dynamic balance  -DP     Dynamic Standing Balance contact guard  -DP     Position/Device Used, Standing Balance walker, front-wheeled  -DP       Row Name 10/30/23 1100          Plan of Care Review    Plan of Care Reviewed With patient  -DP     Outcome Evaluation Pt presents with impairments in balance, endurance/activity tolerance, and strength  affecting functional mobility. Pt will benefit from inpatient PT services and placement in a rehabilitation facility upon discharge.  -DP       Row Name 10/30/23 1100          Positioning and Restraints    Pre-Treatment Position in bed  -DP     Post Treatment Position bed  -DP     In Bed call light within reach;encouraged to call for assist  Exit alarm not in place upon entry  -DP       Row Name 10/30/23 1100          Therapy Assessment/Plan (PT)    Rehab Potential (PT) good, to achieve stated therapy goals  -DP     Criteria for Skilled Interventions Met (PT) yes;meets criteria;skilled treatment is necessary  -DP     Therapy Frequency (PT) daily  -DP     Predicted Duration of Therapy Intervention (PT) 10 days  -DP     Problem List (PT) problems related to;balance;mobility;strength  -DP     Activity Limitations Related to Problem List (PT) unable to ambulate safely;unable to transfer safely  -DP       Row Name 10/30/23 1100          PT Evaluation Complexity    History, PT Evaluation Complexity 1-2 personal factors and/or comorbidities  -DP     Examination of Body Systems (PT Eval Complexity) total of 4 or more elements  -DP     Clinical Presentation (PT Evaluation Complexity) stable  -DP     Clinical Decision Making (PT Evaluation Complexity) low complexity  -DP     Overall Complexity (PT Evaluation Complexity) low complexity  -DP       Row Name 10/30/23 1100          Therapy Plan Review/Discharge Plan (PT)    Therapy Plan Review (PT) evaluation/treatment results reviewed;care plan/treatment goals reviewed;patient  -DP       Row Name 10/30/23 1100          Physical Therapy Goals    Transfer Goal Selection (PT) transfer, PT goal 1  -DP     Gait Training Goal Selection (PT) gait training, PT goal 1  -DP       Row Name 10/30/23 1100          Transfer Goal 1 (PT)    Activity/Assistive Device (Transfer Goal 1, PT) sit-to-stand/stand-to-sit  -DP     [place] Level/Cues Needed (Transfer Goal 1, PT) supervision required   -DP     Time Frame (Transfer Goal 1, PT) 10 days  -DP       Row Name 10/30/23 1100          Gait Training Goal 1 (PT)    Activity/Assistive Device (Gait Training Goal 1, PT) gait (walking locomotion);assistive device use;improve balance and speed;increase endurance/gait distance  -DP     [place] Level (Gait Training Goal 1, PT) supervision required  -DP     Distance (Gait Training Goal 1, PT) 200  -DP     Time Frame (Gait Training Goal 1, PT) 10 days  -DP               User Key  (r) = Recorded By, (t) = Taken By, (c) = Cosigned By      Initials Name Provider Type    Baldev Rose, PT Physical Therapist                      PT Recommendation and Plan  Anticipated Discharge Disposition (PT): sub acute care setting  Planned Therapy Interventions (PT): balance training, bed mobility training, gait training, strengthening, transfer training  Therapy Frequency (PT): daily  Plan of Care Reviewed With: patient  Outcome Evaluation: Pt presents with impairments in balance, endurance/activity tolerance, and strength affecting functional mobility. Pt will benefit from inpatient PT services and placement in a rehabilitation facility upon discharge.   Outcome Measures       Row Name 10/30/23 1200             How much help from another person do you currently need...    Turning from your back to your side while in flat bed without using bedrails? 4  -DP      Moving from lying on back to sitting on the side of a flat bed without bedrails? 4  -DP      Moving to and from a bed to a chair (including a wheelchair)? 3  -DP      Standing up from a chair using your arms (e.g., wheelchair, bedside chair)? 3  -DP      Climbing 3-5 steps with a railing? 3  -DP      To walk in hospital room? 3  -DP      AM-PAC 6 Clicks Score (PT) 20  -DP      Highest level of mobility 6 --> Walked 10 steps or more  -DP                User Key  (r) = Recorded By, (t) = Taken By, (c) = Cosigned By      Initials Name Provider Type    Baldev Rose  A, PT Physical Therapist                     Time Calculation:    PT Charges       Row Name 10/30/23 1301             Time Calculation    PT Received On 10/30/23  -DP      PT Goal Re-Cert Due Date 11/08/23  -DP         Untimed Charges    PT Eval/Re-eval Minutes 60  -DP         Total Minutes    Untimed Charges Total Minutes 60  -DP       Total Minutes 60  -DP                User Key  (r) = Recorded By, (t) = Taken By, (c) = Cosigned By      Initials Name Provider Type    Baldev Rose, PT Physical Therapist                      PT G-Codes  Outcome Measure Options: AM-PAC 6 Clicks Daily Activity (OT), Optimal Instrument  AM-PAC 6 Clicks Score (PT): 20  AM-PAC 6 Clicks Score (OT): 14    Baldev Wheeler PT  10/30/2023

## 2023-10-30 NOTE — H&P (VIEW-ONLY)
Pulmonary / Critical Care Progress Note      Patient Name: Pavel Dai  : 1955  MRN: [id number]  Primary Care Physician:  Yoel Geller MD  Date of admission: 10/27/2023    Subjective   Subjective   Follow-up for COPD exacerbation, hypercarbic respiratory failure    No acute events overnight.  Wore BiPAP    This morning,  On 8 L nasal cannula  Lying in bed  Dyspnea improving  Feels well  Denies chest pain chest tightness  Cough with difficulty clearing secretions  No fever or chills    Review of Systems  General: Fatigue, otherwise denied complaints  HEENT: Denied complaints  Respiratory: Dyspnea, otherwise denied complaints  Cardiovascular: Denied complaints  GI: Denied complaints  : Denied complaints  MSK: Generalized weakness, otherwise denied complaints    Objective   Objective     Vitals:   Temp:  [97.3 °F (36.3 °C)-98.2 °F (36.8 °C)] 97.5 °F (36.4 °C)  Heart Rate:  [] 131  Resp:  [16-24] 18  BP: (140-159)/(77-91) 148/91  Flow (L/min):  [3-8] 8  Physical Exam   Vital Signs Reviewed   General:  Alert, NAD.  Niccoli ill-appearing male, lying in bed  HEENT:  PERRL, EOMI.    Neck:  No JVD, no thyromegaly  Lymph: no axillary, cervical, supraclavicular lymphadenopathy noted bilaterally  Chest: Diminished to auscultation bilaterally, no work of breathing noted on 3.5 L nasal cannula  CV: RRR, no M/G/R, pulses 2+  Abd:  Soft, NT, ND, +BS  EXT:  no clubbing, no cyanosis, no edema  Neuro:  A&Ox3, CN grossly intact, no focal deficits.  Skin: No rashes or lesions noted      Result Review    Result Review:  I have personally reviewed the results from the time of this admission to 10/30/2023 07:18 EDT and agree with these findings:  [x]  Laboratory  []  Microbiology  [x]  Radiology  []  EKG/Telemetry   []  Cardiology/Vascular   [x]  Pathology  []  Old records  []  Other:  Most notable findings include:      echo 61 to 65% grade 1 diastolic dysfunction estimated RVSP 45 to 55 mmHg        Lab  10/30/23  0429 10/29/23  0500 10/28/23  1148 10/28/23  0600 10/27/23  1652   WBC 7.66 7.21  --  5.38 10.18   HEMOGLOBIN 10.7* 10.7*  --  11.0* 11.8*   HEMATOCRIT 36.8* 36.0*  --  38.2 40.6   PLATELETS 232 200  --  205 232   SODIUM 146* 144  --  143 144   SODIUM, ARTERIAL  --   --  143.0  --   --    POTASSIUM 4.0 3.9  --  4.2 4.5   CHLORIDE 100 101  --  100 100   CO2 42.0* 41.1*  --  38.9* 37.6*   BUN 30* 37*  --  49* 55*   CREATININE 0.72* 0.72*  --  1.04 1.55*   GLUCOSE 101* 111*  --  133* 99   GLUCOSE, ARTERIAL  --   --  126*  --   --    CALCIUM 9.2 9.1  --  9.0 9.1   PHOSPHORUS 2.5 2.1*  --  2.0*  --    TOTAL PROTEIN 6.3 6.3  --  6.6 7.1   ALBUMIN 3.3* 3.3*  --  3.3* 3.8   GLOBULIN 3.0 3.0  --  3.3 3.3       Assessment & Plan   Assessment / Plan     Active Hospital Problems:  Active Hospital Problems    Diagnosis     **Respiratory failure, acute and chronic      Impression:  COPD exacerbation  Respiratory acidosis with CO2 narcosis  Elevated proBNP  NSTEMI likely type II  Transaminitis  Hyponatremia, clinically insignificant  Hypomagnesemia  History of COPD  History of chronic hypoxic respiratory on 3 L nasal cannula at baseline  History of CAD  History of heart failure  History of colon cancer  History of Pseudomonas pneumonia     Plan:  -On 3.5 L nasal cannula.  3 L is patient's baseline.  Continue to wean to maintain SPO2 88 to 92%   -Continue to use his during naps and at night.  BiPAP 12/6 with 32% FiO2  -10/30 CXR reviewed  -Continue azithromycin for COPD exacerbation for 3 days and cefepime for Pseudomonas pneumonia for 14 days  -Wrote for tobramycin (ANDREAS) nebulizers  -Continue Brovana, Pulmicort, Daliresp.   -Continue prednisone 40 daily for 5 days  -Continue bronchodilators and bronchopulmonary protocols.  Encourage I-S  -Wrote for chest physiotherapy 2 times daily  -Continue to monitor renal panel and electrolytes.  Replace electrolytes necessary  -Replaced magnesium intravenously  -PT/OT on board.   Appreciate assistance  -Encourage mobilization.  Out of bed to chair  -Consulted RT  for home respiratory needs  -Will take for bronchoscopy with airway inspection, brushings, biopsies, bronchoalveolar lavage. I have discussed the risks of the procedure with the patient including pneumothorax, hemothorax, bleeding, hypoxia, required mechanical ventilation and death. The patient recognizes these findings, acknowledges these findings and is agreeable to the procedure.     Mr Dai has history of daily productive cough for greater than six months and bronchiectasis noted on chest CT dated 7/24/2023.  Pt continues to have issues with airway clearance and has tried and failed  deep breathing and cough with IS, OPEP with aerobika, manual and percussor CPT .  Mr. Dai will need HFCWO with chest vest in the home to aid in mobilization of secretions.        DVT prophylaxis:  Medical DVT prophylaxis orders are present.    CODE STATUS:   Level Of Support Discussed With: Patient  Code Status (Patient has no pulse and is not breathing): CPR (Attempt to Resuscitate)  Medical Interventions (Patient has pulse or is breathing): Full Support      Labs, imaging, microbiology, notes and medications personally reviewed  Discussed with primary    I, Dr. Nish Schaefer, have spent more than 50% of the total time managing the patient in this encounter today.  This included personally reviewing all pertinent labs, imaging, microbiology and documentation. Also discussing the case with the patient and any available family, the admitting physician and any available ancillary staff.    Electronically signed by JOSE Dickey, 10/30/23, 11:51 AM EDT.  Electronically signed by Nish Schaefer MD, 10/30/23, 3:55 PM EDT.

## 2023-10-30 NOTE — PLAN OF CARE
Goal Outcome Evaluation:           Progress: no change  Outcome Evaluation: Patient experienced shortness of breath this AM. Now resting on 6 L high flow nasal cannula. Patient tolerated metaneb well and breathing has improved.

## 2023-10-31 ENCOUNTER — ANESTHESIA (OUTPATIENT)
Dept: GASTROENTEROLOGY | Facility: HOSPITAL | Age: 68
End: 2023-10-31
Payer: OTHER GOVERNMENT

## 2023-10-31 ENCOUNTER — ANESTHESIA EVENT (OUTPATIENT)
Dept: GASTROENTEROLOGY | Facility: HOSPITAL | Age: 68
End: 2023-10-31
Payer: OTHER GOVERNMENT

## 2023-10-31 PROBLEM — T17.998A MUCUS PLUG IN RESPIRATORY TRACT: Status: ACTIVE | Noted: 2023-10-27

## 2023-10-31 PROCEDURE — 25010000002 PROPOFOL 10 MG/ML EMULSION: Performed by: NURSE ANESTHETIST, CERTIFIED REGISTERED

## 2023-10-31 RX ORDER — PROPOFOL 10 MG/ML
VIAL (ML) INTRAVENOUS AS NEEDED
Status: DISCONTINUED | OUTPATIENT
Start: 2023-10-31 | End: 2023-10-31 | Stop reason: SURG

## 2023-10-31 RX ORDER — LIDOCAINE HYDROCHLORIDE 20 MG/ML
INJECTION, SOLUTION EPIDURAL; INFILTRATION; INTRACAUDAL; PERINEURAL AS NEEDED
Status: DISCONTINUED | OUTPATIENT
Start: 2023-10-31 | End: 2023-10-31 | Stop reason: SURG

## 2023-10-31 RX ADMIN — LIDOCAINE HYDROCHLORIDE 100 MG: 20 INJECTION, SOLUTION EPIDURAL; INFILTRATION; INTRACAUDAL; PERINEURAL at 09:36

## 2023-10-31 RX ADMIN — PROPOFOL 100 MCG/KG/MIN: 10 INJECTION, EMULSION INTRAVENOUS at 09:36

## 2023-10-31 RX ADMIN — PROPOFOL 50 MG: 10 INJECTION, EMULSION INTRAVENOUS at 09:36

## 2023-10-31 NOTE — PLAN OF CARE
Goal Outcome Evaluation:      Patient had bronchoscopy this morning. Tolerated well. His oxygen demand decreased to 3L. His family brought in his home NIPPPV unit. No complaint of pain.

## 2023-10-31 NOTE — INTERVAL H&P NOTE
H&P reviewed. The patient was examined and there are no changes to the H&P.      Electronically signed by Nish Schaefer MD, 10/31/23, 9:30 AM EDT.

## 2023-10-31 NOTE — PROGRESS NOTES
Pulmonary / Critical Care Progress Note      Patient Name: Pavel Dai  : 1955  MRN: [id number]  Primary Care Physician:  Yoel Geller MD  Date of admission: 10/27/2023    Subjective   Subjective   Follow-up for COPD exacerbation, hypercarbic respiratory failure    No acute events overnight.  Wore BiPAP    This morning,  No changes this morning.  Still requiring 7 to 8 L of oxygen  Shortness of breath with little activity.  Slightly better  Cough unchanged.  Dry hacking cough and trouble bringing up secretions.  Feels like secretions are stuck in airways  Weak and fatigue  No chest pain or hemoptysis  No fever or chills    Review of Systems  General: Fatigue, otherwise denied complaints  HEENT: Denied complaints  Respiratory: Dyspnea, otherwise denied complaints  Cardiovascular: Denied complaints  GI: Denied complaints  : Denied complaints  MSK: Generalized weakness, otherwise denied complaints    Objective   Objective     Vitals:   Temp:  [97.3 °F (36.3 °C)-98.3 °F (36.8 °C)] 97.4 °F (36.3 °C)  Heart Rate:  [] 87  Resp:  [10-24] 16  BP: (129-142)/(68-82) 129/68  Flow (L/min):  [6-8] 7    Physical Exam   Vital Signs Reviewed   General:  Alert, NAD.  Chronically ill-appearing male, lying in bed, cachectic  HEENT:  PERRL, EOMI.    Chest: Diminished to auscultation bilaterally, rhonchi at bases bilaterally, barrel chested, tympanic to percussion bilaterally, pursed lip breathing noted   CV: RRR, no M/G/R, pulses 2+  Abd:  Soft, NT, ND, +BS  EXT:  no clubbing, no cyanosis, no edema, muscle wasting noted all 4 extremities  Neuro:  A&Ox3, CN grossly intact, no focal deficits.  Skin: No rashes or lesions noted      Result Review    Result Review:  I have personally reviewed the results from the time of this admission to 10/31/2023 07:32 EDT and agree with these findings:  [x]  Laboratory  []  Microbiology  [x]  Radiology  []  EKG/Telemetry   []  Cardiology/Vascular   [x]  Pathology  []  Old  records  []  Other:  Most notable findings include:     7/24 echo 61 to 65% grade 1 diastolic dysfunction estimated RVSP 45 to 55 mmHg        Lab 10/31/23  0455 10/30/23  0429 10/29/23  0500 10/28/23  1148 10/28/23  0600 10/27/23  1652   WBC 7.67 7.66 7.21  --  5.38 10.18   HEMOGLOBIN 11.1* 10.7* 10.7*  --  11.0* 11.8*   HEMATOCRIT 38.9 36.8* 36.0*  --  38.2 40.6   PLATELETS 231 232 200  --  205 232   SODIUM 144 146* 144  --  143 144   SODIUM, ARTERIAL  --   --   --  143.0  --   --    POTASSIUM 4.2 4.0 3.9  --  4.2 4.5   CHLORIDE 100 100 101  --  100 100   CO2 42.5* 42.0* 41.1*  --  38.9* 37.6*   BUN 24* 30* 37*  --  49* 55*   CREATININE 0.64* 0.72* 0.72*  --  1.04 1.55*   GLUCOSE 90 101* 111*  --  133* 99   GLUCOSE, ARTERIAL  --   --   --  126*  --   --    CALCIUM 9.4 9.2 9.1  --  9.0 9.1   PHOSPHORUS 3.3 2.5 2.1*  --  2.0*  --    TOTAL PROTEIN 6.3 6.3 6.3  --  6.6 7.1   ALBUMIN 3.2* 3.3* 3.3*  --  3.3* 3.8   GLOBULIN 3.1 3.0 3.0  --  3.3 3.3           Assessment & Plan   Assessment / Plan     Active Hospital Problems:  Active Hospital Problems    Diagnosis     **Respiratory failure, acute and chronic     Mucus plug in respiratory tract      Impression:  COPD exacerbation  Pseudomonas pneumonia, recurrent  Acute on chronic hypercapnic and hypoxemic respiratory failure..  Wears 3 L of oxygen at baseline  Respiratory acidosis with CO2 narcosis  Elevated proBNP  NSTEMI likely type II  Transaminitis  Hyponatremia, clinically insignificant  Hypomagnesemia  History of COPD  History of CAD  History of heart failure  History of colon cancer     Plan:  -On 8 L of oxygen continue to wean to maintain SPO2 88 to 92%   -Continue BiPAP nightly with naps on current settings  -Complete azithromycin  -Continue ANDREAS nebs and complete 14 days of antibiotics for Pseudomonas pneumonia.  We will try to set up ANDREAS nebs as outpatient cycling off/on a 30-day intervals.  -14 days of cefepime for Pseudomonas pneumonia.  Can de-escalate to  Levaquin at discharge to complete therapy  -Continue nebulizers and bronchopulmonary hygiene  -Continue Daliresp  -Continue prednisone 40 daily for 5 days  -Continue bronchodilators and bronchopulmonary protocols.  Encourage I-S.  Added chest vest  -Consulted RT  to arrange chest vest  -Continue to monitor renal panel and electrolytes.  Replace electrolytes necessary  -PT/OT on board.  Appreciate assistance  -Encourage mobilization.  Out of bed to chair  -Agreeable today for bronchoscopy with airway inspection, brushings, biopsies, bronchoalveolar lavage. I have discussed the risks of the procedure with the patient including pneumothorax, hemothorax, bleeding, hypoxia, required mechanical ventilation and death. The patient recognizes these findings, acknowledges these findings and is agreeable to the procedure.     Mr Dai has history of daily productive cough for greater than six months and bronchiectasis noted on chest CT dated 7/24/2023.  Pt continues to have issues with airway clearance and has tried and failed  deep breathing and cough with IS, OPEP with aerobika, manual and percussor CPT .  Mr. Dai will need HFCWO with chest vest in the home to aid in mobilization of secretions.        DVT prophylaxis:  Medical DVT prophylaxis orders are present.    CODE STATUS:   Level Of Support Discussed With: Patient  Code Status (Patient has no pulse and is not breathing): CPR (Attempt to Resuscitate)  Medical Interventions (Patient has pulse or is breathing): Full Support      Labs, imaging, microbiology, notes and medications personally reviewed  Discussed with primary    Electronically signed by Nish Schaefer MD, 10/31/23, 9:38 AM EDT.

## 2023-10-31 NOTE — ANESTHESIA POSTPROCEDURE EVALUATION
Patient: Pavel Dai    Procedure Summary       Date: 10/31/23 Room / Location: [facility] ENDOSCOPY 3 / [facility] ENDOSCOPY    Anesthesia Start: 0931 Anesthesia Stop: 1001    Procedure: BRONCHOSCOPY WITH BAL AND WASHINGS (Bronchus) Diagnosis:       Mucus plug in respiratory tract      (Mucus plug in respiratory tract [T17.998A])    Surgeons: Nish Schaefer MD Provider: Janette Chris CRNA    Anesthesia Type: general, MAC ASA Status: 4            Anesthesia Type: general, MAC    Vitals  Vitals Value Taken Time   /64 10/31/23 1015   Temp 37 °C (98.6 °F) 10/31/23 1011   Pulse 87 10/31/23 1017   Resp 14 10/31/23 1011   SpO2 95 % 10/31/23 1017   Vitals shown include unfiled device data.        Anesthesia Post Evaluation

## 2023-10-31 NOTE — CASE MANAGEMENT/SOCIAL WORK
Patient's son was able to bring patient's home Astral to hospital. The device has over 700 hours of usage logged.  Average Vt is 600, average Ve is 9.0.     [facility].  Patient and son understand that device cannot be set up until [facility] has [facility] to set up.     Breztri [facility].  COPD clinic follow up referral made.    I will continue to follow.

## 2023-10-31 NOTE — PLAN OF CARE
Goal Outcome Evaluation:  Plan of Care Reviewed With: patient        Progress: improving  Outcome Evaluation: PATIENT COMPLIANT WITH BIPAP LAST NIGHT. REMOVED THIS AM AFTER NEB TREATMENTS.

## 2023-10-31 NOTE — PLAN OF CARE
Goal Outcome Evaluation:   Patient wore Bipap at bedtime and has tolerated well. Was able to wean from 7L high flow nasal cannula to 3-4L regular nasal cannula.

## 2023-10-31 NOTE — PROGRESS NOTES
[facility]   Hospitalist Progress Note    Date of admission: 10/27/2023  Patient Name: Pavel Dia  1955  Date: 10/31/2023      Subjective     Chief Complaint   Patient presents with    Shortness of Breath    Cough     Hospital course:  Pavel Dai is a 68 y.o. with history of COPD medical history of COPD (fev1~12% on outpt testing) on 3 L home oxygen, MART on CPAP, CHF who presented with worsening shortness of breath found to have acute hypoxic hypercapnic respiratory failure requiring continuous BiPAP.  Also with FABIAN and significantly elevated transaminitis on admission. Patient had issues with his home CPAP machine not functioning correctly.  Patient also has a history of Pseudomonas pneumonia who was supposed to be on tobramycin nebulizations outpatient but has not been able to initiate these/they have not approved by insurance.  Pseudomonas positive here, started on cefepime.  Bronchoscopy completed on 10/31/2023    Interval Followup:   Patient seen following bronchoscopy, feeling better so far.  Patient currently on 3 L nasal cannula we will closely monitor.  Bronchoscopy revealed thick mucus plugging, cleared.      Objective     Vitals:   Temp:  [97.4 °F (36.3 °C)-98.6 °F (37 °C)] 97.7 °F (36.5 °C)  Heart Rate:  [] 102  Resp:  [10-24] 20  BP: (118-154)/(66-95) 132/75  Flow (L/min):  [3-7] 3    Physical Exam  Tired thin frail ill-appearing appears weak/chronically ill  3 L nasal cannula, diminished breath sounds heard throughout, increased work of breathing  Elev rate reg rhythm, no le pitting edema, dry mucosa  Abd soft nt nd  Aox3    Result Review:  Vital signs, labs and recent relevant imaging reviewed.        acetaminophen    albuterol    aluminum-magnesium hydroxide-simethicone    senna-docusate sodium **AND** [DISCONTINUED] polyethylene glycol **AND** bisacodyl **AND** bisacodyl    Diclofenac Sodium    hydrOXYzine    Lidocaine    melatonin    nicotine    nitroglycerin     ondansetron    Pharmacy to Dose Cefepime    polyethylene glycol    prochlorperazine    sodium chloride    sodium chloride    sodium chloride    arformoterol, 15 mcg, Nebulization, BID - RT  aspirin, 81 mg, Oral, Daily  atorvastatin, 10 mg, Oral, Nightly  budesonide, 0.5 mg, Nebulization, BID - RT  cefepime, 2,000 mg, Intravenous, Q8H  cetirizine, 5 mg, Oral, Daily  enoxaparin, 40 mg, Subcutaneous, Daily  guaiFENesin, 1,200 mg, Oral, Q12H  levalbuterol, 1.25 mg, Nebulization, 4x Daily - RT  metoprolol succinate XL, 50 mg, Oral, Q12H  pantoprazole, 40 mg, Oral, Q AM  predniSONE, 40 mg, Oral, Daily With Breakfast  roflumilast, 500 mcg, Oral, Daily  senna-docusate sodium, 2 tablet, Oral, BID  sodium chloride, 10 mL, Intravenous, Q12H  sodium chloride, 2 spray, Each Nare, 4x Daily - RT  tobramycin PF, 300 mg, Nebulization, Q12H - RT        XR Chest 1 View    Result Date: 10/30/2023     1. Small left pleural effusion with overlying consolidation either due to pneumonia or atelectasis.  Correlate clinically.  2. Advanced pulmonary emphysematous change.        WHITNEY ARCEO MD       Electronically Signed and Approved By: WHITNEY ARCEO MD on 10/30/2023 at 9:50             US Abdomen Limited    Result Date: 10/29/2023    1. Evidence for mild perinephric edema adjacent to the lower pole of the right kidney.  This finding is nonspecific.  There is no hydronephrosis.  Recommend correlation with urine analysis. 2. Otherwise unremarkable right upper quadrant ultrasound.       JOSEPH STROUD MD       ELECTRONICALLY SIGNED AND APPROVED BY: JOSEPH STROUD MD ON 10/29/2023 AT 9:20             CT Abdomen Pelvis With Contrast    Result Date: 10/28/2023    1. Stable low-density foci in the liver favored to represent small cysts 2. Small bowel containing right inguinal hernia measuring 1.7 cm transverse     Kiran Daugherty M.D.       Electronically Signed and Approved By: Kiran Daugherty M.D. on 10/28/2023 at 10:38             CT Chest With  Contrast Diagnostic    Result Date: 10/28/2023    1. No evidence of pulmonary embolism 2. Severe emphysematous changes 3. Moderate chronic consolidation in the left lower lobe suspected to represent atelectasis and or scarring.     Kiran Daugherty M.D.       Electronically Signed and Approved By: Kiran Daugherty M.D. on 10/28/2023 at 10:18             XR Chest 1 View    Result Date: 10/27/2023    Advanced emphysema with areas of parenchymal scarring, without convincing active process.       BRENDEN GARCIA MD       Electronically Signed and Approved By: BRENDEN GARCIA MD on 10/27/2023 at 17:01              Assessment / Plan   Assessment/Plan (clinically significant if listed here)  Acute hypoxic hypercapnic respiratory failure  COPD, 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%, with exacerbation, 3L NC baseline   Pseudomonas PNA in LLL, with hx of Pseudomonas PNA  FABIAN suspect secondary to sepsis and prerenal/low bp  Transaminitis, question ischemic hepatitis/congestive hepatopathy secondary to above  NSTEMI, suspect type II in setting of above  Diastolic CHF (EF 61% per 7/24/2023 echo)  Hypernatremia, suspect dehydration  Hx of mucous plugging and pseudomonas pna  MART on CPAP outpatient with malfunctioning unit  History of rectal cancer with prior surgical resection and chemotherapy reportedly cleared  Severe malnutrition, question copd related cachexia    Plan:  Patient remains admitted to hospital for further care and management  Pulmonologist consulted, appreciate assistance  Bronchoscopy completed today, thick mucus plugging  Patient remains on cefepime at this time, Pseudomonas on respiratory culture  Continue with BiPAP as needed  Working towards chest vest at home  Continues on scheduled and as needed breathing treatments  Will complete course of steroids tomorrow morning  Xopenex nebs given prior history of SVT  Working towards ANDREAS nebs at home  Follow results of bronchoscopy performed today  Continue current inpatient  monitoring in the hospital, treatment and current level.  Does not need upgraded at this time.  However will need to monitor closely following bronchoscopy today, patient may need increasing BiPAP requirements.    Discussed with pulmonology    DVT prophylaxis:  Medical DVT prophylaxis orders are present.    Level Of Support Discussed With: Patient  Code Status (Patient has no pulse and is not breathing): CPR (Attempt to Resuscitate)  Medical Interventions (Patient has pulse or is breathing): Full Support      CBC          10/29/2023    05:00 10/30/2023    04:29 10/31/2023    04:55   CBC   WBC 7.21  7.66  7.67    RBC 3.68  3.72  3.84    Hemoglobin 10.7  10.7  11.1    Hematocrit 36.0  36.8  38.9    MCV 97.8  98.9  101.3    MCH 29.1  28.8  28.9    MCHC 29.7  29.1  28.5    RDW 12.3  12.3  12.1    Platelets 200  232  231        CMP          10/29/2023    05:00 10/30/2023    04:29 10/31/2023    04:55   CMP   Glucose 111  101  90    BUN 37  30  24    Creatinine 0.72  0.72  0.64    EGFR 100.8  100.8  104.4    Sodium 144  146  144    Potassium 3.9  4.0  4.2    Chloride 101  100  100    Calcium 9.1  9.2  9.4    Total Protein 6.3  6.3  6.3    Albumin 3.3  3.3  3.2    Globulin 3.0  3.0  3.1    Total Bilirubin 0.2  0.2  0.2    Alkaline Phosphatase 91  91  93    AST (SGOT) 154  81  45    ALT (SGPT) 589  446  318    Albumin/Globulin Ratio 1.1  1.1  1.0    BUN/Creatinine Ratio 51.4  41.7  37.5    Anion Gap 1.9  4.0  1.5

## 2023-10-31 NOTE — OP NOTE
Procedure:  Bronchoscopy with clearance of airways, bronchoalveolar lavage, bronchial washings     Pre-Operative Diagnosis: Mucous plugging     Post-Operative Diagnosis: Same     Timeout performed     Anesthesia: MAC anesthesia     Procedure Details: The patient was consented for the procedure with all risk and benefit of the procedure explained in detail.  She was given the opportunity to ask questions and all concerns were answered. The bronchoscope was inserted into the main airway via the oral cavity. An anatomical survey was done of the main airways and the subsegmental bronchus.      Findings: The vocal cords were normal in appearance and movement with abduction and adduction without difficulty. The trachea was normal in caliber and had no mucosal abnormalities. The left tracheobronchial tree appeared anatomically normal with no mucosal abnormalities. The right tracheobronchial tree appeared anatomically normal with no mucosal abnormalities.  There were copious amounts of thick viscous purulent secretions obstructing both the left and right mainstem bronchus as well as the right upper, right lower, left lower lobe bronchi.  These were suctioned and removed after instillation of saline aliquots.  A bronchoalveolar lavage was performed using 2x60 mL aliquots of normal saline  instilled into the left lower lobe bronchus then aspirated back. There was 30 mL cloudy turbid fluid in return.  Bronchial washings were collected.     Findings:  Dense mucous plugging throughout left and right tracheobronchial tree with thick viscous purulent secretions     Estimated Blood Loss: 0mL     Specimens:  Bronchoalveolar lavage left lower lobe bronchus  Bronchial washings     Complications: None; patient tolerated the procedure well.     Disposition: Stable to return to floor.  Follow-up test results.     Patient tolerated the procedure well.    Electronically signed by Nish Schaefer MD, 10/31/23, 9:49 AM EDT.

## 2023-10-31 NOTE — ANESTHESIA PREPROCEDURE EVALUATION
Anesthesia Evaluation     Patient summary reviewed and Nursing notes reviewed   no history of anesthetic complications:                Airway   Mallampati: II  TM distance: >3 FB  Neck ROM: full  No difficulty expected  Dental    (+) edentulous    Pulmonary    (+) a smoker Former, COPD,home oxygen (3LNC continuous, currently 5LNC), shortness of breath (mildly labored currently, pt states at his baseline), decreased breath sounds (poor overall), wheezes  Cardiovascular - normal exam  Exercise tolerance: poor (<4 METS)    PT is on anticoagulation therapy  Patient on routine beta blocker and Beta blocker given within 24 hours of surgery  Rhythm: regular  Rate: normal    (+) hypertension, CAD, CHF , hyperlipidemia      Neuro/Psych- negative ROS  GI/Hepatic/Renal/Endo    (+) GERD, GI bleeding lower     Musculoskeletal (-) negative ROS    Abdominal    Substance History - negative use     OB/GYN negative ob/gyn ROS         Other - negative ROS       ROS/Med Hx Other: No changes in health since bronchoscopy 7/23                Anesthesia Plan    ASA 4     general and MAC     (Elevated risk of respiratory failure explained. )  intravenous induction     Anesthetic plan, risks, benefits, and alternatives have been provided, discussed and informed consent has been obtained with: patient.  Pre-procedure education provided  Plan discussed with CRNA.    CODE STATUS:    Level Of Support Discussed With: Patient  Code Status (Patient has no pulse and is not breathing): CPR (Attempt to Resuscitate)  Medical Interventions (Patient has pulse or is breathing): Full Support

## 2023-11-01 NOTE — PROGRESS NOTES
Respiratory Therapist Walking Oximetry Progress Note      Patient Name:  Pavel Dai  YOB: 1955  Date of Procedure: 11/01/23              ROOM AIR BASELINE   SpO2% 87-90% on 3LPM   Heart Rate 100     EXERCISE ON ROOM AIR SpO2% EXERCISE ON O2 @ 3LPM SpO2%   1 MINUTE  1 MINUTE 82%   2 MINUTES  2 MINUTES 82% 4LPM   3 MINUTES  3 MINUTES 99% 5LPM   4 MINUTES  4 MINUTES 98% 5LPM   5 MINUTES  5 MINUTES 91% 5LPM   6 MINUTES  6 MINUTES 94% 5LPM              SpO2% Post Exercise  94% 5LPM   HR Post Exercise  101   Time to Recovery  3 minutes on 3LPM     Comments: Patient instructed on 6 minute walk test, patient stated he could not walk at this time due to shortness of breath and O2 sat decreasing and was unable to take O2 off due to low O2 sat and shortness of breath. Patient was agreeable to bedside exercises leg lifts, after 30 seconds of exercise sat decreased to 82% on 3LPM with 1 minute recovery time,  O2 increased to 4LPM and patient resumed leg lifts again desat to 82% on 4LPM with 1 minute recovery time, O2 increased to 5LPM and patient was able to resume leg lifts and patient was able to maintain O2 sat 91% or greater during remainder of exercises. Patient did require frequent rest breaks during this time due to fatigue. Patient returned to bed and O2 decreased to 3LPM baseline and patient O2 sat was 93% after 3 minutes.          Electronically signed by Hermes Moeller CRT, 11/01/23, 8:52 AM EDT.

## 2023-11-01 NOTE — CONSULTS
Purpose of the visit was to evaluate for: goals of care/advanced care planning and hospice referral/discussion. Spoke with RN, patient, family, and HCS and discussed palliative care, goals of care, care options, resuscitation status, [facility], advanced care planning, clarify code status, and determination of decision maker.      Assessment: Collaborated with the RN. Palliative care was consulted for goals of care.  He has stated he wanted to have hospice services as he is tired of all the medical issues he is having.  He llives at home by  himself and is having issues with getting help at home. He knows he cannot live by himself at this point.   He is alert and oriented x 4. He has 02 via NC and becomes very SOA with any activity. His son is at bedside and very tearful listening to his father discuss his medical issues and his wishes for his medical care. Mr Dai Is very frail looking. He looks much older than his age.    He has ask me to explain hospice services as best I could. He has ask for a hospice referral.   We discussed code status and the completion of a living will.   He has requested a DNR/DNI status and has completd a living will declaring his son Osorio as the only HCS. Copies were given to the patient, son Osorio and one to medical records.     Recommendations/Plan: [facility] referral.    Tasks Completed: Livingwill, Code Status clarification, and Emotional Support.    Palliative care to follow and support as needed.     Bekah TILLMAN RN, BSN  Palliative Care

## 2023-11-01 NOTE — PLAN OF CARE
Goal Outcome Evaluation:   Patient seen by palliative care this shift. Hospice discussed, his code status changed to no cpr/no intubation. He was put on bipap for naps, off for meals. C/O headache this am, tylenol given.

## 2023-11-01 NOTE — PLAN OF CARE
Goal Outcome Evaluation:  Plan of Care Reviewed With: patient        Progress: no change  Outcome Evaluation: Pt attempted to wear home unit last night but unsuccessful and desatted to low 80's. Placed pt on hospital V60 for the remainder of the night. V60 is now on standby at bedside. Pt on a 3L nasal cannula resting comfortably at this time.

## 2023-11-01 NOTE — CASE MANAGEMENT/SOCIAL WORK
RT CM received message about patient desaturating on home unit last night. Patient was sleeping on hospital unit. Hospice referral has been made. If patient decides not to discharge with Hospice services, min PS and min EPAP can be raised to match current hospital bipap settings.

## 2023-11-01 NOTE — PROGRESS NOTES
[facility]   Hospitalist Progress Note    Date of admission: 10/27/2023  Patient Name: Pavel Dai  1955  Date: 11/1/2023      Subjective     Chief Complaint   Patient presents with    Shortness of Breath    Cough     Hospital course:  Pavel Dai is a 68 y.o. with history of COPD medical history of COPD (fev1~12% on outpt testing) on 3 L home oxygen, MART on CPAP, CHF who presented with worsening shortness of breath found to have acute hypoxic hypercapnic respiratory failure requiring continuous BiPAP.  Also with FABIAN and significantly elevated transaminitis on admission. Patient had issues with his home CPAP machine not functioning correctly.  Patient also has a history of Pseudomonas pneumonia who was supposed to be on tobramycin nebulizations outpatient but has not been able to initiate these/they have not approved by insurance.  Pseudomonas positive here, started on cefepime.  Bronchoscopy completed on 10/31/2023    Interval Followup:   Overnight patient could not tolerate his home BiPAP, where the hospital is feeling better.  This morning patient continues to state he is done with this he is tired of being this sick.  Open the door discussions of hospice, patient was unable to give direct answers.  We will have palliative care see patient today    Objective     Vitals:   Temp:  [97.2 °F (36.2 °C)-98.6 °F (37 °C)] 98.2 °F (36.8 °C)  Heart Rate:  [] 82  Resp:  [14-22] 18  BP: (118-157)/(62-75) 128/74  Flow (L/min):  [3] 3    Physical Exam  Tired thin frail ill-appearing appears weak/chronically ill  3 L nasal cannula, diminished breath sounds heard throughout, increased work of breathing  Elev rate reg rhythm, no le pitting edema, dry mucosa  Abd soft nt nd  Aox3    Result Review:  Vital signs, labs and recent relevant imaging reviewed.        acetaminophen    albuterol    aluminum-magnesium hydroxide-simethicone    senna-docusate sodium **AND** [DISCONTINUED] polyethylene glycol  **AND** bisacodyl **AND** bisacodyl    Diclofenac Sodium    hydrOXYzine    Lidocaine    melatonin    nicotine    nitroglycerin    ondansetron    Pharmacy to Dose Cefepime    polyethylene glycol    prochlorperazine    sodium chloride    sodium chloride    sodium chloride    arformoterol, 15 mcg, Nebulization, BID - RT  aspirin, 81 mg, Oral, Daily  atorvastatin, 10 mg, Oral, Nightly  budesonide, 0.5 mg, Nebulization, BID - RT  cefepime, 2,000 mg, Intravenous, Q8H  cetirizine, 5 mg, Oral, Daily  enoxaparin, 40 mg, Subcutaneous, Daily  guaiFENesin, 1,200 mg, Oral, Q12H  levalbuterol, 1.25 mg, Nebulization, 4x Daily - RT  metoprolol succinate XL, 50 mg, Oral, Q12H  pantoprazole, 40 mg, Oral, Q AM  roflumilast, 500 mcg, Oral, Daily  senna-docusate sodium, 2 tablet, Oral, BID  sodium chloride, 10 mL, Intravenous, Q12H  sodium chloride, 2 spray, Each Nare, 4x Daily - RT  tobramycin PF, 300 mg, Nebulization, Q12H - RT        XR Chest 1 View    Result Date: 10/30/2023     1. Small left pleural effusion with overlying consolidation either due to pneumonia or atelectasis.  Correlate clinically.  2. Advanced pulmonary emphysematous change.        WHITNEY ARCEO MD       Electronically Signed and Approved By: WHITNEY ARCEO MD on 10/30/2023 at 9:50             US Abdomen Limited    Result Date: 10/29/2023    1. Evidence for mild perinephric edema adjacent to the lower pole of the right kidney.  This finding is nonspecific.  There is no hydronephrosis.  Recommend correlation with urine analysis. 2. Otherwise unremarkable right upper quadrant ultrasound.       JOSEPH STROUD MD       ELECTRONICALLY SIGNED AND APPROVED BY: JOSEPH STROUD MD ON 10/29/2023 AT 9:20             CT Abdomen Pelvis With Contrast    Result Date: 10/28/2023    1. Stable low-density foci in the liver favored to represent small cysts 2. Small bowel containing right inguinal hernia measuring 1.7 cm transverse     Kiran Daugherty M.D.       Electronically Signed and  Approved By: Kiran Daugherty M.D. on 10/28/2023 at 10:38             CT Chest With Contrast Diagnostic    Result Date: 10/28/2023    1. No evidence of pulmonary embolism 2. Severe emphysematous changes 3. Moderate chronic consolidation in the left lower lobe suspected to represent atelectasis and or scarring.     Kiran Daugherty M.D.       Electronically Signed and Approved By: Kiran Daugherty M.D. on 10/28/2023 at 10:18             XR Chest 1 View    Result Date: 10/27/2023    Advanced emphysema with areas of parenchymal scarring, without convincing active process.       BRENDEN GARCIA MD       Electronically Signed and Approved By: BRENDEN GARCIA MD on 10/27/2023 at 17:01              Assessment / Plan   Assessment/Plan (clinically significant if listed here)  Acute hypoxic hypercapnic respiratory failure  COPD, 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%, with exacerbation, 3L NC baseline   Pseudomonas PNA in LLL, with hx of Pseudomonas PNA  FABIAN suspect secondary to sepsis and prerenal/low bp  Transaminitis, question ischemic hepatitis/congestive hepatopathy secondary to above  NSTEMI, suspect type II in setting of above  Diastolic CHF (EF 61% per 7/24/2023 echo)  Hypernatremia, suspect dehydration  Hx of mucous plugging and pseudomonas pna  MART on CPAP outpatient with malfunctioning unit  History of rectal cancer with prior surgical resection and chemotherapy reportedly cleared  Severe malnutrition, question copd related cachexia    Plan:  Patient remains admitted to hospital for further care and management  Pulmonologist consulted, appreciate assistance  Bronchoscopy completed on 10/31/2023, thick mucus plugging  Patient remains on cefepime at this time, Pseudomonas on respiratory culture  Continue with BiPAP as needed, hospital BiPAP has been helping patient  Working towards chest vest at home  Continues on scheduled and as needed breathing treatments  Has now completed a course of steroids, monitor off  Xopenex nebs given  prior history of SVT  Working towards ANDREAS parry at home  Plan of care to see patient today regarding goals of care.  On rounds this morning patient indicated he was tired, ready for this to be over  Continue current inpatient monitoring in the hospital, treatment and current level.  Does not need upgraded at this time.  However will need to monitor closely following bronchoscopy today, patient may need increasing BiPAP requirements.    Discussed with pulmonology, palliative care    DVT prophylaxis:  Medical DVT prophylaxis orders are present.    Level Of Support Discussed With: Patient  Code Status (Patient has no pulse and is not breathing): CPR (Attempt to Resuscitate)  Medical Interventions (Patient has pulse or is breathing): Full Support      CBC          10/30/2023    04:29 10/31/2023    04:55 11/1/2023    04:15   CBC   WBC 7.66  7.67  7.85    RBC 3.72  3.84  4.02    Hemoglobin 10.7  11.1  11.7    Hematocrit 36.8  38.9  40.3    MCV 98.9  101.3  100.2    MCH 28.8  28.9  29.1    MCHC 29.1  28.5  29.0    RDW 12.3  12.1  12.0    Platelets 232  231  255        CMP          10/30/2023    04:29 10/31/2023    04:55 11/1/2023    04:15   CMP   Glucose 101  90  86    BUN 30  24  21    Creatinine 0.72  0.64  0.66    EGFR 100.8  104.4  103.4    Sodium 146  144  143    Potassium 4.0  4.2  4.6    Chloride 100  100  97    Calcium 9.2  9.4  9.0    Total Protein 6.3  6.3  6.3    Albumin 3.3  3.2  3.3    Globulin 3.0  3.1  3.0    Total Bilirubin 0.2  0.2  0.2    Alkaline Phosphatase 91  93  81    AST (SGOT) 81  45  31    ALT (SGPT) 446  318  235    Albumin/Globulin Ratio 1.1  1.0  1.1    BUN/Creatinine Ratio 41.7  37.5  31.8    Anion Gap 4.0  1.5  2.6

## 2023-11-01 NOTE — PROGRESS NOTES
Pulmonary / Critical Care Progress Note      Patient Name: Pavel Dai  : 1955  MRN: [id number]  Primary Care Physician:  Yoel Geller MD  Date of admission: 10/27/2023    Subjective   Subjective   Follow-up for COPD exacerbation, hypercarbic respiratory failure    10/31 underwent bronchoscopy for mucous plugging, pneumonia panel positive for Pseudomonas, cultures NGTD, cytology pending.    No acute events overnight.  Unable to tolerate home NIPPV.  Wore hospital NIPPV.    This morning,  Underwent bronchoscopy yesterday  Currently on 3 L nasal cannula  Remains dyspneic with activity   No fever or chills  No chest pain or hemoptysis  Remains weak and fatigued    Review of Systems  General: Fatigue, otherwise denied complaints  HEENT: Denied complaints  Respiratory: Dyspnea, otherwise denied complaints  Cardiovascular: Denied complaints  GI: Denied complaints  : Denied complaints  MSK: Generalized weakness, otherwise denied complaints    Objective   Objective     Vitals:   Temp:  [97.2 °F (36.2 °C)-98.6 °F (37 °C)] 97.2 °F (36.2 °C)  Heart Rate:  [] 78  Resp:  [10-24] 18  BP: (118-157)/(62-95) 123/69  Flow (L/min):  [3-6] 3    Physical Exam   Vital Signs Reviewed   General:  Chronically ill-appearing male, cachectic, alert, NAD, sitting on side of bed  HEENT:  PERRL, EOMI.    Chest: Diminished to auscultation bilaterally, rhonchi at bases bilaterally, barrel chested, tympanic to percussion bilaterally, pursed lip breathing noted on 3 L nasal cannula  CV: RRR, no M/G/R, pulses 2+  Abd:  Soft, NT, ND, +BS  EXT:  no clubbing, no cyanosis, no edema, muscle wasting noted all 4 extremities  Neuro:  A&Ox3, CN grossly intact, no focal deficits.  Skin: No rashes or lesions noted      Result Review    Result Review:  I have personally reviewed the results from the time of this admission to 2023 07:02 EDT and agree with these findings:  [x]  Laboratory  []  Microbiology  [x]  Radiology  []   EKG/Telemetry   []  Cardiology/Vascular   [x]  Pathology  []  Old records  []  Other:  Most notable findings include:     7/24 echo 61 to 65% grade 1 diastolic dysfunction estimated RVSP 45 to 55 mmHg        Lab 11/01/23  0415 10/31/23  0455 10/30/23  0429 10/29/23  0500 10/28/23  1148 10/28/23  0600 10/27/23  1652   WBC 7.85 7.67 7.66 7.21  --  5.38 10.18   HEMOGLOBIN 11.7* 11.1* 10.7* 10.7*  --  11.0* 11.8*   HEMATOCRIT 40.3 38.9 36.8* 36.0*  --  38.2 40.6   PLATELETS 255 231 232 200  --  205 232   SODIUM 143 144 146* 144  --  143 144   SODIUM, ARTERIAL  --   --   --   --  143.0  --   --    POTASSIUM 4.6 4.2 4.0 3.9  --  4.2 4.5   CHLORIDE 97* 100 100 101  --  100 100   CO2 43.4* 42.5* 42.0* 41.1*  --  38.9* 37.6*   BUN 21 24* 30* 37*  --  49* 55*   CREATININE 0.66* 0.64* 0.72* 0.72*  --  1.04 1.55*   GLUCOSE 86 90 101* 111*  --  133* 99   GLUCOSE, ARTERIAL  --   --   --   --  126*  --   --    CALCIUM 9.0 9.4 9.2 9.1  --  9.0 9.1   PHOSPHORUS 3.2 3.3 2.5 2.1*  --  2.0*  --    TOTAL PROTEIN 6.3 6.3 6.3 6.3  --  6.6 7.1   ALBUMIN 3.3* 3.2* 3.3* 3.3*  --  3.3* 3.8   GLOBULIN 3.0 3.1 3.0 3.0  --  3.3 3.3       Assessment & Plan   Assessment / Plan     Active Hospital Problems:  Active Hospital Problems    Diagnosis     **Respiratory failure, acute and chronic     Mucus plug in respiratory tract      Impression:  COPD exacerbation  Pseudomonas pneumonia, recurrent  Acute on chronic hypercapnic and hypoxemic respiratory failure..  Wears 3 L of oxygen at baseline  Respiratory acidosis with CO2 narcosis  Elevated proBNP  NSTEMI likely type II  Transaminitis  Hyponatremia, clinically insignificant  Hypomagnesemia  History of COPD  History of CAD  History of heart failure  History of colon cancer     Plan:  -Currently on 3 L nasal cannula, continue to wean to maintain SPO2 88 to 92%   -Continue BiPAP nightly with naps on current settings.  May use home NIPPV.  -10/31 underwent bronchoscopy for mucous plugging, pneumonia  panel positive for Pseudomonas, cultures NGTD, cytology pending.  -Completed azithromycin x3 days  -Continue cefepime x14 days for Pseudomonas pneumonia, can de-escalate to Levaquin at time of discharge to complete therapy  -Continue ANDREAS nebs for Pseudomonas pneumonia.  We will try to set up ANDREAS nebs as outpatient cycling off/on a 30-day intervals.  -Continue Brovana, Pulmicort and as needed albuterol  -Continue bronchopulmonary hygiene  -Continue Daliresp  -Completed prednisone 40 mg x 5 days  -Encourage use of I-S.   -Continue aggressive bronchopulmonary hygiene with chest vest  -Consulted RT  to arrange chest vest.  Appreciate assistance.  -Trend electrolytes and renal panel.  Replace electrolytes necessary  -PT/OT on board.  Appreciate assistance  -Encourage mobilization.  Out of bed to chair  -Palliative care on board.  Appreciate assistance.       Mr Dai has history of daily productive cough for greater than six months and bronchiectasis noted on chest CT dated 7/24/2023.  Pt continues to have issues with airway clearance and has tried and failed  deep breathing and cough with IS, OPEP with aerobika, manual and percussor CPT .  Mr. Dai will need HFCWO with chest vest in the home to aid in mobilization of secretions.        DVT prophylaxis:  Medical DVT prophylaxis orders are present.    CODE STATUS:   Level Of Support Discussed With: Patient  Code Status (Patient has no pulse and is not breathing): CPR (Attempt to Resuscitate)  Medical Interventions (Patient has pulse or is breathing): Full Support      Labs, imaging, microbiology, notes and medications personally reviewed  Discussed with primary    I, Dr. Nish Schaefer, have spent more than 50% of the total time managing the patient in this encounter today.  This included personally reviewing all pertinent labs, imaging, microbiology and documentation. Also discussing the case with the patient and any available family, the admitting  physician and any available ancillary staff.    Electronically signed by JOSE Maloney, 11/01/23, 12:40 PM EDT.  Electronically signed by Nish Schaefer MD, 11/01/23, 1:57 PM EDT.               used

## 2023-11-01 NOTE — PLAN OF CARE
Goal Outcome Evaluation:      Patient is on Hospital BiPAP 14/6  on 30% O2. Patient attempted home unit and ended up desatting in the high 70's. Patient  stated he felt like the pressures were hugely different and felt like he couldn't breath on his home unit. Since switching to the hospital unit, patient has been satting well and has been able to rest. We will continue to monitor.

## 2023-11-01 NOTE — CONSULTS
"   11/01/23 1228   Coping/Psychosocial   Observed Emotional State agitated;frustrated   Verbalized Emotional State powerlessness   Trust Relationship/Rapport emotional support provided;questions encouraged;questions answered;empathic listening provided   Family/Support Persons son   Involvement in Care at bedside   Family/Support System Care caregiver stress acknowledged   Additional Documentation Spiritual Care (Group)   Spiritual Care   Use of Spiritual Resources spirituality for coping, indicated strong use of   Spiritual Care Source  initiative   Spiritual Care Follow-Up will follow closely   Response to Spiritual Care receptive of support;expressing self, indicated difficulty   Spiritual Care Interventions supportive conversation provided   Spiritual Care Visit Type initial   Spiritual Care Request coping/stress of illness support;decision-making support;spiritual/moral support   Receptivity to Spiritual Care visit welcomed   At time of visit pt is on 4MTU. He states that he is \"ready to go.\"   When asked where he is ready to go to, he states, [place].   Pt shares with me about his many years living with his illness and how he is ready to \"go on up there.\"      Pt has a palliative care consult.   "

## 2023-11-01 NOTE — PLAN OF CARE
Problem: Adult Inpatient Plan of Care  Goal: Plan of Care Review  Outcome: Ongoing, Progressing  Flowsheets (Taken 11/1/2023 0522)  Progress: improving  Plan of Care Reviewed With: patient  Outcome Evaluation: Patient alert and oriented x 4. Patient on 3 L nasal cannula. Patient wore bipap throughout the night. No complaints of pain. Consolidation of care utilized to promote sleep. Continuing with plan of care.   Goal Outcome Evaluation:  Plan of Care Reviewed With: patient        Progress: improving  Outcome Evaluation: Patient alert and oriented x 4. Patient on 3 L nasal cannula. Patient wore bipap throughout the night. No complaints of pain. Consolidation of care utilized to promote sleep. Continuing with plan of care.

## 2023-11-02 NOTE — NURSING NOTE
Mr Suhas had a EOS with hospice today with his son present. He is eligible for hospice services.   Will notify  for assistance in discharge plans  Bekah TILLMAN RN, BSN  Palliative Care

## 2023-11-02 NOTE — PLAN OF CARE
Goal Outcome Evaluation:         Pt accepted to palliative care. Pt understands severity of his condition. Pt currently on 3l NC humidified. A0x4, bed at lowest level, rails up x2, NAD.

## 2023-11-02 NOTE — PROGRESS NOTES
Pulmonary / Critical Care Progress Note      Patient Name: Pavel Dai  : 1955  MRN: [id number]  Primary Care Physician:  Yoel Geller MD  Date of admission: 10/27/2023    Subjective   Subjective   Follow-up for COPD exacerbation, hypercarbic respiratory failure    10/31 underwent bronchoscopy for mucous plugging, pneumonia panel positive for Pseudomonas, cultures with gram-negative bacilli, cytology pending.    No acute events overnight.  Wore home NIPPV.    This morning,  Lying in bed on 3 L nasal cannula  Remains dyspneic with minimal activity   Congested cough with brown sputum  No fever or chills  No chest pain or hemoptysis  Remains weak and fatigued  Meeting with hospice today    Review of Systems  General: Fatigue, otherwise denied complaints  HEENT: Denied complaints  Respiratory: Dyspnea, otherwise denied complaints  Cardiovascular: Denied complaints  GI: Denied complaints  : Denied complaints  MSK: Generalized weakness, otherwise denied complaints    Objective   Objective     Vitals:   Temp:  [97.5 °F (36.4 °C)-98.3 °F (36.8 °C)] 97.7 °F (36.5 °C)  Heart Rate:  [69-97] 86  Resp:  [17-21] 20  BP: (116-138)/(57-82) 133/71  Flow (L/min):  [3-4] 3    Physical Exam   Vital Signs Reviewed   General:  Chronically ill-appearing male, cachectic, alert, NAD, lying in bed  HEENT:  PERRL, EOMI.    Chest: Diminished to auscultation bilaterally, rhonchi at bases bilaterally, barrel chested, tympanic to percussion bilaterally, pursed lip breathing noted on 3 L nasal cannula, conversational dyspnea noted  CV: RRR, no M/G/R, pulses 2+  Abd:  Soft, NT, ND, +BS  EXT:  no clubbing, no cyanosis, no edema, muscle wasting noted all 4 extremities  Neuro:  A&Ox3, CN grossly intact, no focal deficits.  Skin: No rashes or lesions noted      Result Review    Result Review:  I have personally reviewed the results from the time of this admission to 2023 07:56 EDT and agree with these findings:  [x]   Laboratory  []  Microbiology  [x]  Radiology  []  EKG/Telemetry   []  Cardiology/Vascular   [x]  Pathology  []  Old records  []  Other:  Most notable findings include:     7/24 echo 61 to 65% grade 1 diastolic dysfunction estimated RVSP 45 to 55 mmHg        Lab 11/02/23  0451 11/01/23  0415 10/31/23  0455 10/30/23  0429 10/29/23  0500 10/28/23  1148 10/28/23  0600 10/27/23  1652   WBC 6.39 7.85 7.67 7.66 7.21  --  5.38 10.18   HEMOGLOBIN 11.2* 11.7* 11.1* 10.7* 10.7*  --  11.0* 11.8*   HEMATOCRIT 38.9 40.3 38.9 36.8* 36.0*  --  38.2 40.6   PLATELETS 233 255 231 232 200  --  205 232   SODIUM 145 143 144 146* 144  --  143 144   SODIUM, ARTERIAL  --   --   --   --   --  143.0  --   --    POTASSIUM 4.2 4.6 4.2 4.0 3.9  --  4.2 4.5   CHLORIDE 94* 97* 100 100 101  --  100 100   CO2 46.9* 43.4* 42.5* 42.0* 41.1*  --  38.9* 37.6*   BUN 25* 21 24* 30* 37*  --  49* 55*   CREATININE 0.67* 0.66* 0.64* 0.72* 0.72*  --  1.04 1.55*   GLUCOSE 84 86 90 101* 111*  --  133* 99   GLUCOSE, ARTERIAL  --   --   --   --   --  126*  --   --    CALCIUM 9.7 9.0 9.4 9.2 9.1  --  9.0 9.1   PHOSPHORUS 3.3 3.2 3.3 2.5 2.1*  --  2.0*  --    TOTAL PROTEIN 6.1 6.3 6.3 6.3 6.3  --  6.6 7.1   ALBUMIN 3.2* 3.3* 3.2* 3.3* 3.3*  --  3.3* 3.8   GLOBULIN 2.9 3.0 3.1 3.0 3.0  --  3.3 3.3       Assessment & Plan   Assessment / Plan     Active Hospital Problems:  Active Hospital Problems    Diagnosis     **Respiratory failure, acute and chronic     Mucus plug in respiratory tract      Impression:  COPD exacerbation  Pseudomonas pneumonia, recurrent  Acute on chronic hypercapnic and hypoxemic respiratory failure..  Wears 3 L of oxygen at baseline  Respiratory acidosis with CO2 narcosis  Elevated proBNP  NSTEMI likely type II  Transaminitis  Hyponatremia, clinically insignificant  Hypomagnesemia  History of COPD  History of CAD  History of heart failure  History of colon cancer     Plan:  -Currently on 3 L nasal cannula, continue to wean to maintain SPO2 88 to  92%   -Continue BiPAP nightly with naps on current settings.  May use home NIPPV.  -10/31 underwent bronchoscopy for mucous plugging, pneumonia panel positive for Pseudomonas, cultures with gram-negative bacilli, cytology pending.  -Completed azithromycin x3 days  -Continue cefepime x14 days for Pseudomonas pneumonia, can de-escalate to Levaquin at time of discharge to complete therapy  -Continue ANDREAS nebs for Pseudomonas pneumonia.  We will try to set up ANDREAS nebs as outpatient cycling off/on a 30-day intervals.  -Continue Brovana, Pulmicort and as needed albuterol  -Continue bronchopulmonary hygiene  -Continue Daliresp  -Completed prednisone 40 mg x 5 days  -Encourage use of I-.   -Continue aggressive bronchopulmonary hygiene with chest vest  -Consulted RT  to arrange chest vest.  Appreciate assistance.  -Trend electrolytes and renal panel.  Replace electrolytes necessary  -PT/OT on board.  Appreciate assistance  -Encourage mobilization.  Out of bed to chair  -Palliative care on board.  Appreciate assistance.  Meeting with hospice later today.        Mr Dai has history of daily productive cough for greater than six months and bronchiectasis noted on chest CT dated 7/24/2023.  Pt continues to have issues with airway clearance and has tried and failed  deep breathing and cough with IS, OPEP with aerobika, manual and percussor CPT .  Mr. Dai will need HFCWO with chest vest in the home to aid in mobilization of secretions    DVT prophylaxis:  Medical DVT prophylaxis orders are present.    CODE STATUS:   Medical Intervention Limits: NO intubation (DNI)  Code Status (Patient has no pulse and is not breathing): No CPR (Do Not Attempt to Resuscitate)  Medical Interventions (Patient has pulse or is breathing): Limited Support      Labs, imaging, microbiology, notes and medications personally reviewed  Discussed with primary    I, Dr. Nish Schaefer, have spent more than 50% of the total time managing the  patient in this encounter today.  This included personally reviewing all pertinent labs, imaging, microbiology and documentation. Also discussing the case with the patient and any available family, the admitting physician and any available ancillary staff.    Electronically signed by JOSE Maloney, 11/02/23, 2:00 PM EDT.  Electronically signed by Nish Schaefer MD, 11/02/23, 3:08 PM EDT.

## 2023-11-02 NOTE — PLAN OF CARE
Goal Outcome Evaluation:           Progress: no change  Outcome Evaluation: Pt wore bipap for the duration of the night. No issues, will continue to monitor

## 2023-11-02 NOTE — PROGRESS NOTES
Respiratory Therapist Broncho-Pulmonary Hygiene Progress Note      Patient Name:  Pavel Dai  YOB: 1955    Pavel Dai meets the qualification for Level 3 of the Bronco-Pulmonary Hygiene Protocol. This was based on my daily patient assessment and includes review of chest x-ray results, cough ability and quality, oxygenation, secretions or risk for secretion development and patient mobility.     Broncho-Pulmonary Hygiene Assessment:    Level of Movement: Actively changing positions-requires assistance  Disoriented/Follows Commands    Breath Sounds: Clear to slightly diminished    Cough: Strong, effective and/or frequent    Chest X-Ray: Presence of atelectasis and/or consolidation    Sputum Productions: Able to produce small to moderate amount, of moderately thick secretions    History and Physical: Chronic condition    SpO2 to Oxygen Need: greater than 92% on room air or  less than 3L nasal canula    Current SpO2 is: 94 on 3lpm NC    Based on this information, I have completed the following interventions: CPT (vest). Physician wants chest vest due to dense consolidation and history of mucus plugging. Explained to patient, and he is willing to try. Since bronchoscopy, patient has been able to cough up moderate amounts of thick brown secretions.       Electronically signed by Mayelin Salazar RRT, 11/02/23, 6:44 AM EDT.

## 2023-11-02 NOTE — PLAN OF CARE
Problem: Adult Inpatient Plan of Care  Goal: Plan of Care Review  Outcome: Ongoing, Progressing  Flowsheets  Taken 11/2/2023 0538 by Vanna Zhao RN  Progress: no change  Outcome Evaluation: Patient alert and oriented x 4. Patient wore bipap throughout night. No complaints of pain. Antibiotic therapy continued. Continuing with plan of care.  Taken 11/1/2023 0720 by Stephanie Chun, GRETA  Plan of Care Reviewed With: patient   Goal Outcome Evaluation:  Plan of Care Reviewed With: patient        Progress: no change  Outcome Evaluation: Patient alert and oriented x 4. Patient wore bipap throughout night. No complaints of pain. Antibiotic therapy continued. Continuing with plan of care.

## 2023-11-02 NOTE — THERAPY TREATMENT NOTE
Patient Name: Pavel Dai  : 1955    MRN: [id number]                              Today's Date: 2023       Admit Date: 10/27/2023    Visit Dx:     ICD-10-CM ICD-9-CM   1. COPD exacerbation  J44.1 491.21   2. Decreased activities of daily living (ADL)  Z78.9 V49.89   3. Difficulty walking  R26.2 719.7   4. Mucus plug in respiratory tract  T17.998A 934.9     Patient Active Problem List   Diagnosis    Rectal bleeding    Acute blood loss anemia    COPD (chronic obstructive pulmonary disease)    CAD (coronary artery disease)    Essential hypertension    History of ischemic stroke    Body mass index (BMI) 19.9 or less, adult    Encounter for immunization    Ex-smoker    Hyperlipidemia    Hypoxemia    Hypertensive heart disease without congestive heart failure    Oxygen dependent    Postnasal drip    Shortness of breath    Rectal cancer    Iron deficiency anemia due to chronic blood loss    Cancer related pain    Gastroesophageal reflux disease    COPD exacerbation    Severe malnutrition    Acute on chronic respiratory failure with hypoxia and hypercapnia    Hypercapnic respiratory failure    Respiratory failure, acute and chronic    Mucus plug in respiratory tract     Past Medical History:   Diagnosis Date    Anemia due to chemotherapy 2021    Cancer related pain 2022    CHF (congestive heart failure)     COPD (chronic obstructive pulmonary disease)     Coronary artery disease     Frequent urination     Hyperlipidemia     Hypertension     Iron deficiency anemia due to chronic blood loss 2021    Rectal cancer      Past Surgical History:   Procedure Laterality Date    BRONCHOSCOPY N/A 2023    Procedure: BRONCHOSCOPY WITH BAL AND WASHING;  Surgeon: Ted Petty MD;  Location: [facility] ENDOSCOPY;  Service: Pulmonary;  Laterality: N/A;  MUCUS PLUGGING    BRONCHOSCOPY N/A 10/31/2023    Procedure: BRONCHOSCOPY WITH BAL AND WASHINGS;  Surgeon: Nish Schaefer MD;  Location: [facility]  ENDOSCOPY;  Service: Pulmonary;  Laterality: N/A;  MUCOUS PLUGGING    COLONOSCOPY      HERNIA REPAIR      approximately 4 years ago     RECTAL SURGERY      SHOULDER SURGERY      joint loose, calcium particles removed from shoulder joint      General Information       Row Name 11/02/23 1008          OT Time and Intention    Document Type therapy note (daily note)  -AV     Mode of Treatment individual therapy;occupational therapy  -AV       Row Name 11/02/23 1008          General Information    Existing Precautions/Restrictions fall;oxygen therapy device and L/min  -AV     Barriers to Rehab none identified  Pleasant and cooperative  -AV       Row Name 11/02/23 1008          Cognition    Orientation Status (Cognition) --  Able to perform therapeutic exercises with minimal cues and demonstration.  -AV       Row Name 11/02/23 1008          Safety Issues, Functional Mobility    Impairments Affecting Function (Mobility) balance;endurance/activity tolerance  -AV               User Key  (r) = Recorded By, (t) = Taken By, (c) = Cosigned By      Initials Name Provider Type    AV Jesus Justin OT Occupational Therapist                     Mobility/ADL's    No documentation.                  Obj/Interventions       Row Name 11/02/23 1009          Shoulder (Therapeutic Exercise)    Shoulder (Therapeutic Exercise) strengthening exercise  -AV     Shoulder Strengthening (Therapeutic Exercise) bilateral;horizontal aBduction/aDduction;resistance band;yellow;15 repititions;2 sets  -AV       Row Name 11/02/23 1009          Elbow/Forearm (Therapeutic Exercise)    Elbow/Forearm (Therapeutic Exercise) strengthening exercise  -AV     Elbow/Forearm Strengthening (Therapeutic Exercise) bilateral;flexion;extension;resistance band;yellow;15 repititions  -AV       Row Name 11/02/23 1009          Motor Skills    Therapeutic Exercise shoulder;elbow/forearm  Performed in high Fowlers/ 3.5L O2.  Rest break required after each different exercise   -AV               User Key  (r) = Recorded By, (t) = Taken By, (c) = Cosigned By      Initials Name Provider Type    Jesus Martin OT Occupational Therapist                   Goals/Plan    No documentation.                  Clinical Impression       Row Name 11/02/23 1010          Pain Scale: FACES Pre/Post-Treatment    Pain: FACES Scale, Pretreatment 0-->no hurt  -AV     Posttreatment Pain Rating 0-->no hurt  -AV       Row Name 11/02/23 1010          Plan of Care Review    Progress no change  -AV     Outcome Evaluation Patient performed upper extremity Thera-Band exercises to improve endurance/activity tolerance needed to support ADLs.  3.5 L O2 with sats dropping 70% with activity-rebounding to 94% with rest breaks.  Continued OT indicated to remediate/compensate for deficits to maximize independence.  -AV       Row Name 11/02/23 1010          Therapy Plan Review/Discharge Plan (OT)    Anticipated Discharge Disposition (OT) sub acute care setting  -AV       Row Name 11/02/23 1010          Vital Signs    O2 Delivery Pre Treatment nasal cannula  -AV     O2 Delivery Intra Treatment nasal cannula  -AV     O2 Delivery Post Treatment nasal cannula  -AV       Row Name 11/02/23 1010          Positioning and Restraints    Pre-Treatment Position in bed  -AV     Post Treatment Position bed  -AV     In Bed call light within reach;encouraged to call for assist  -AV               User Key  (r) = Recorded By, (t) = Taken By, (c) = Cosigned By      Initials Name Provider Type    Jesus Martin OT Occupational Therapist                   Outcome Measures       Row Name 11/02/23 1011          How much help from another is currently needed...    Putting on and taking off regular lower body clothing? 2  -AV     Bathing (including washing, rinsing, and drying) 2  -AV     Toileting (which includes using toilet bed pan or urinal) 2  -AV     Putting on and taking off regular upper body clothing 2  -AV     Taking care of personal  grooming (such as brushing teeth) 2  -AV     Eating meals 4  -AV     AM-PAC 6 Clicks Score (OT) 14  -AV       Row Name 11/02/23 0740          How much help from another person do you currently need...    Turning from your back to your side while in flat bed without using bedrails? 4  -TQ     Moving from lying on back to sitting on the side of a flat bed without bedrails? 4  -TQ     Moving to and from a bed to a chair (including a wheelchair)? 4  -TQ     Standing up from a chair using your arms (e.g., wheelchair, bedside chair)? 4  -TQ     Climbing 3-5 steps with a railing? 3  -TQ     To walk in hospital room? 3  -TQ     AM-PAC 6 Clicks Score (PT) 22  -TQ     Highest level of mobility 7 --> Walked 25 feet or more  -TQ       Row Name 11/02/23 1011          Optimal Instrument    Bending/Stooping 2  -AV     Standing 2  -AV     Reaching 1  -AV               User Key  (r) = Recorded By, (t) = Taken By, (c) = Cosigned By      Initials Name Provider Type    Jesus Martin OT Occupational Therapist     Sky Ibarra RN Registered Nurse                    Occupational Therapy Education       Title: PT OT SLP Therapies (In Progress)       Topic: Occupational Therapy (In Progress)       Point: ADL training (Done)       Description:   Instruct learner(s) on proper safety adaptation and remediation techniques during self care or transfers.   Instruct in proper use of assistive devices.                  Learning Progress Summary             Patient Acceptance, E, VU by AV at 10/30/2023 1053                         Point: Home exercise program (Not Started)       Description:   Instruct learner(s) on appropriate technique for monitoring, assisting and/or progressing therapeutic exercises/activities.                  Learner Progress:  Not documented in this visit.              Point: Precautions (Done)       Description:   Instruct learner(s) on prescribed precautions during self-care and functional transfers.                   Learning Progress Summary             Patient Acceptance, E, VU by AV at 10/30/2023 1055                         Point: Body mechanics (Not Started)       Description:   Instruct learner(s) on proper positioning and spine alignment during self-care, functional mobility activities and/or exercises.                  Learner Progress:  Not documented in this visit.                              User Key       Initials Effective Dates Name Provider Type Discipline    AV 06/16/21 -  Jesus Justin OT Occupational Therapist OT                  OT Recommendation and Plan  Planned Therapy Interventions (OT): activity tolerance training, BADL retraining, functional balance retraining, IADL retraining, occupation/activity based interventions, patient/caregiver education/training, transfer/mobility retraining, ROM/therapeutic exercise  Therapy Frequency (OT): 5 times/wk  Plan of Care Review  Plan of Care Reviewed With: patient  Progress: no change  Outcome Evaluation: Patient performed upper extremity Thera-Band exercises to improve endurance/activity tolerance needed to support ADLs.  3.5 L O2 with sats dropping 70% with activity-rebounding to 94% with rest breaks.  Continued OT indicated to remediate/compensate for deficits to maximize independence.     Time Calculation:   Evaluation Complexity (OT)  Review Occupational Profile/Medical/Therapy History Complexity: expanded/moderate complexity  Assessment, Occupational Performance/Identification of Deficit Complexity: 1-3 performance deficits  Clinical Decision Making Complexity (OT): problem focused assessment/low complexity  Overall Complexity of Evaluation (OT): low complexity     Time Calculation- OT       Row Name 11/02/23 1012             Time Calculation- OT    OT Received On 11/02/23  -AV      OT Goal Re-Cert Due Date 11/08/23  -AV         Timed Charges    22333 - OT Therapeutic Exercise Minutes 10  -AV         Total Minutes    Timed Charges Total Minutes 10  -AV        Total Minutes 10  -AV                User Key  (r) = Recorded By, (t) = Taken By, (c) = Cosigned By      Initials Name Provider Type    Jesus Martin OT Occupational Therapist                  Therapy Charges for Today       Code Description Service Date Service Provider Modifiers Qty    [id number]  OT THER PROC EA 15 MIN 11/2/2023 Jesus Justin OT GO 1                 Jesus Justin OT  11/2/2023

## 2023-11-02 NOTE — PLAN OF CARE
Goal Outcome Evaluation:  Plan of Care Reviewed With: patient        Progress: no change  Outcome Evaluation: Patient continues to wear BIPAP throughout night, still on this morning. Unit will remain in room for PRN/nightly use.

## 2023-11-02 NOTE — CONSULTS
11/02/23 1457   Spiritual Care   Spiritual Care Source  initiative   Response to Spiritual Care thanks expressed   Spiritual Care Interventions supportive conversation provided   Spiritual Care Visit Type follow-up   Spiritual Care Request coping/stress of illness support;decision-making support;spiritual/moral support  (emma is listed as Jewish but states that he does not want to see a .)   Receptivity to Spiritual Care visit welcomed

## 2023-11-03 NOTE — PROGRESS NOTES
[facility]   Hospitalist Progress Note    Date of admission: 10/27/2023  Patient Name: Pavel Dai  1955  Date: 11/3/2023      Subjective     Chief Complaint   Patient presents with    Shortness of Breath    Cough     Hospital course:  Pavel Dai is a 68 y.o. with history of COPD medical history of COPD (fev1~12% on outpt testing) on 3 L home oxygen, [facility] on CPAP, CHF who presented with worsening shortness of breath found to have acute hypoxic hypercapnic respiratory failure requiring continuous BiPAP.  Also with FABIAN and significantly elevated transaminitis on admission. Patient had issues with his home CPAP machine not functioning correctly.  Patient also has a history of Pseudomonas pneumonia who was supposed to be on tobramycin nebulizations outpatient but has not been able to initiate these/they have not approved by insurance.  Pseudomonas positive here, started on cefepime.  Bronchoscopy completed on 10/31/2023.  Given patient's end-stage COPD he met with palliative care and ultimately hospice.  Patient is interested in discharging under the care of hospice.  Social work currently working on logistics    Interval Followup:   Patient met with hospice care, eligible and interested.  Still working on appropriate disposition.  For now we will start patient with sublingual morphine at a low dose to see if this helps with air hunger.    Objective     Vitals:   Temp:  [97.2 °F (36.2 °C)-98.2 °F (36.8 °C)] 98.2 °F (36.8 °C)  Heart Rate:  [] 94  Resp:  [18-24] 18  BP: (101-122)/(60-71) 112/67  Flow (L/min):  [3-3.5] 3.5    Physical Exam  Tired thin frail ill-appearing appears weak/chronically ill  4 L nasal cannula, diminished breath sounds heard throughout, increased work of breathing  Elev rate reg rhythm, no le pitting edema, dry mucosa  Abd soft nt nd  Aox3    Result Review:  Vital signs, labs and recent relevant imaging reviewed.        acetaminophen    albuterol     aluminum-magnesium hydroxide-simethicone    senna-docusate sodium **AND** [DISCONTINUED] polyethylene glycol **AND** bisacodyl **AND** bisacodyl    Diclofenac Sodium    hydrOXYzine    Lidocaine    melatonin    morphine sulfate (concentrate)    nicotine    nitroglycerin    ondansetron    Pharmacy to Dose Cefepime    polyethylene glycol    prochlorperazine    sodium chloride    sodium chloride    sodium chloride    arformoterol, 15 mcg, Nebulization, BID - RT  aspirin, 81 mg, Oral, Daily  atorvastatin, 10 mg, Oral, Nightly  budesonide, 0.5 mg, Nebulization, BID - RT  cefepime, 2,000 mg, Intravenous, Q8H  cetirizine, 5 mg, Oral, Daily  enoxaparin, 40 mg, Subcutaneous, Daily  guaiFENesin, 1,200 mg, Oral, Q12H  levalbuterol, 1.25 mg, Nebulization, 4x Daily - RT  metoprolol succinate XL, 50 mg, Oral, Q12H  pantoprazole, 40 mg, Oral, Q AM  roflumilast, 500 mcg, Oral, Daily  senna-docusate sodium, 2 tablet, Oral, BID  sodium chloride, 10 mL, Intravenous, Q12H  sodium chloride, 2 spray, Each Nare, 4x Daily - RT  tobramycin PF, 300 mg, Nebulization, Q12H - RT        XR Chest 1 View    Result Date: 10/30/2023     1. Small left pleural effusion with overlying consolidation either due to pneumonia or atelectasis.  Correlate clinically.  2. Advanced pulmonary emphysematous change.        WHITNEY ARCEO MD       Electronically Signed and Approved By: WHITNEY ARCEO MD on 10/30/2023 at 9:50             US Abdomen Limited    Result Date: 10/29/2023    1. Evidence for mild perinephric edema adjacent to the lower pole of the right kidney.  This finding is nonspecific.  There is no hydronephrosis.  Recommend correlation with urine analysis. 2. Otherwise unremarkable right upper quadrant ultrasound.       JOSEPH STROUD MD       ELECTRONICALLY SIGNED AND APPROVED BY: JOSEPH STROUD MD ON 10/29/2023 AT 9:20             CT Abdomen Pelvis With Contrast    Result Date: 10/28/2023    1. Stable low-density foci in the liver favored to represent  small cysts 2. Small bowel containing right inguinal hernia measuring 1.7 cm transverse     Kiran Daugherty M.D.       Electronically Signed and Approved By: Kiran Daugherty M.D. on 10/28/2023 at 10:38             CT Chest With Contrast Diagnostic    Result Date: 10/28/2023    1. No evidence of pulmonary embolism 2. Severe emphysematous changes 3. Moderate chronic consolidation in the left lower lobe suspected to represent atelectasis and or scarring.     Kiran Daugherty M.D.       Electronically Signed and Approved By: Kiran Daugherty M.D. on 10/28/2023 at 10:18             XR Chest 1 View    Result Date: 10/27/2023    Advanced emphysema with areas of parenchymal scarring, without convincing active process.       BRENDEN GARCIA MD       Electronically Signed and Approved By: BRENDEN GARCIA MD on 10/27/2023 at 17:01              Assessment / Plan   Assessment/Plan (clinically significant if listed here)  Acute hypoxic hypercapnic respiratory failure  COPD, 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%, with exacerbation, 3L NC baseline   Pseudomonas PNA in LLL, with hx of Pseudomonas PNA  FABIAN suspect secondary to sepsis and prerenal/low bp  Transaminitis, question ischemic hepatitis/congestive hepatopathy secondary to above  NSTEMI, suspect type II in setting of above  Diastolic CHF (EF 61% per 7/24/2023 echo)  Hypernatremia, suspect dehydration  Hx of mucous plugging and pseudomonas pna  MART on CPAP outpatient with malfunctioning unit  History of rectal cancer with prior surgical resection and chemotherapy reportedly cleared  Severe malnutrition, question copd related cachexia    Plan:  Patient remains admitted to hospital for further care and management  Pulmonologist consulted, appreciate assistance  Bronchoscopy completed on 10/31/2023, thick mucus plugging  Patient remains on cefepime at this time, Pseudomonas on respiratory culture  Continue with BiPAP as needed, hospital BiPAP has been helping patient  Patient met with the  palliative care on 11/1/2023, met with hospice care on 11/2/2023  Patient appropriate with hospice and interested in discharging with hospice.  However we are still working on appropriate disposition.  Social work is patient looking into discharge to SNF to later transition to comfort  Continues on scheduled and as needed breathing treatments  Has now completed a course of steroids, monitor off  Xopenex nebs given prior history of SVT    Discussed with pulmonology, palliative care    DVT prophylaxis:  Medical DVT prophylaxis orders are present.    Medical Intervention Limits: NO intubation (DNI)  Code Status (Patient has no pulse and is not breathing): No CPR (Do Not Attempt to Resuscitate)  Medical Interventions (Patient has pulse or is breathing): Limited Support      CBC          11/1/2023    04:15 11/2/2023    04:51 11/3/2023    05:30   CBC   WBC 7.85  6.39  9.66    RBC 4.02  3.90  4.06    Hemoglobin 11.7  11.2  11.9    Hematocrit 40.3  38.9  41.4    .2  99.7  102.0    MCH 29.1  28.7  29.3    MCHC 29.0  28.8  28.7    RDW 12.0  12.1  12.3    Platelets 255  233  253        CMP          11/1/2023    04:15 11/2/2023    04:51 11/3/2023    05:30   CMP   Glucose 86  84  89    BUN 21  25  30    Creatinine 0.66  0.67  0.60    EGFR 103.4  103.0  106.5    Sodium 143  145  141    Potassium 4.6  4.2  4.4    Chloride 97  94  94    Calcium 9.0  9.7  9.1    Total Protein 6.3  6.1  6.7    Albumin 3.3  3.2  3.5    Globulin 3.0  2.9  3.2    Total Bilirubin 0.2  0.2  0.3    Alkaline Phosphatase 81  71  74    AST (SGOT) 31  23  30    ALT (SGPT) 235  165  145    Albumin/Globulin Ratio 1.1  1.1  1.1    BUN/Creatinine Ratio 31.8  37.3  50.0    Anion Gap 2.6  4.1  1.2

## 2023-11-03 NOTE — PLAN OF CARE
Problem: Adult Inpatient Plan of Care  Goal: Plan of Care Review  Outcome: Ongoing, Progressing  Flowsheets (Taken 11/3/2023 1605)  Progress: improving  Plan of Care Reviewed With: patient  Outcome Evaluation: Patient alert and oriented throughout shift. VSS. No complaints of pain or discomfort noted. Antibiotic therapy and electrolyte replacement administered per MAR. Continuing with plan of care.   Goal Outcome Evaluation:  Plan of Care Reviewed With: patient        Progress: improving  Outcome Evaluation: Patient alert and oriented throughout shift. VSS. No complaints of pain or discomfort noted. Antibiotic therapy and electrolyte replacement administered per MAR. Continuing with plan of care.

## 2023-11-03 NOTE — PROGRESS NOTES
Pulmonary / Critical Care Progress Note      Patient Name: Pavel Dai  : 1955  MRN: [id number]  Primary Care Physician:  Yoel Geller MD  Date of admission: 10/27/2023    Subjective   Subjective   Follow-up for COPD exacerbation, hypercarbic respiratory failure    10/31 underwent bronchoscopy for mucous plugging, pneumonia panel positive for Pseudomonas, cultures positive for Pseudomonas, cytology negative for malignancy.    No acute events overnight.  Wore home NIPPV.    This morning,  Lying in bed on 3 L nasal cannula  Remains dyspneic with minimal activity  Congested cough with brown to tan sputum  No fever or chills  No chest pain or hemoptysis  Remains weak and fatigued  Pending rehab placement    Review of Systems  General: Fatigue, otherwise denied complaints  HEENT: Denied complaints  Respiratory: Dyspnea, cough otherwise denied complaints  Cardiovascular: Denied complaints  GI: Denied complaints  : Denied complaints  MSK: Generalized weakness, otherwise denied complaints    Objective   Objective     Vitals:   Temp:  [97.2 °F (36.2 °C)-98.2 °F (36.8 °C)] 97.2 °F (36.2 °C)  Heart Rate:  [] 81  Resp:  [18-24] 19  BP: (101-122)/(60-71) 110/65  Flow (L/min):  [3-3.5] 3.5    Physical Exam   Vital Signs Reviewed   General:  Chronically ill-appearing male, cachectic, alert, NAD, lying in bed on 3 L nasal cannula  HEENT:  PERRL, EOMI.    Chest: Diminished to auscultation bilaterally, rhonchi at bases bilaterally, barrel chested, tympanic to percussion bilaterally, pursed lip breathing noted, conversational dyspnea noted  CV: RRR, no M/G/R, pulses 2+  Abd:  Soft, NT, ND, +BS  EXT:  no clubbing, no cyanosis, no edema, muscle wasting noted all 4 extremities  Neuro:  A&Ox3, CN grossly intact, no focal deficits.  Skin: No rashes or lesions noted      Result Review    Result Review:  I have personally reviewed the results from the time of this admission to 11/3/2023 07:21 EDT and agree with these  findings:  [x]  Laboratory  [x]  Microbiology  [x]  Radiology  [x]  EKG/Telemetry   []  Cardiology/Vascular   [x]  Pathology  []  Old records  []  Other:  Most notable findings include:     7/24 echo 61 to 65% grade 1 diastolic dysfunction estimated RVSP 45 to 55 mmHg        Lab 11/03/23  0530 11/02/23  0451 11/01/23  0415 10/31/23  0455 10/30/23  0429 10/29/23  0500 10/28/23  1148 10/28/23  0600   WBC 9.66 6.39 7.85 7.67 7.66 7.21  --  5.38   HEMOGLOBIN 11.9* 11.2* 11.7* 11.1* 10.7* 10.7*  --  11.0*   HEMATOCRIT 41.4 38.9 40.3 38.9 36.8* 36.0*  --  38.2   PLATELETS 253 233 255 231 232 200  --  205   SODIUM 141 145 143 144 146* 144  --  143   SODIUM, ARTERIAL  --   --   --   --   --   --  143.0  --    POTASSIUM 4.4 4.2 4.6 4.2 4.0 3.9  --  4.2   CHLORIDE 94* 94* 97* 100 100 101  --  100   CO2 45.8* 46.9* 43.4* 42.5* 42.0* 41.1*  --  38.9*   BUN 30* 25* 21 24* 30* 37*  --  49*   CREATININE 0.60* 0.67* 0.66* 0.64* 0.72* 0.72*  --  1.04   GLUCOSE 89 84 86 90 101* 111*  --  133*   GLUCOSE, ARTERIAL  --   --   --   --   --   --  126*  --    CALCIUM 9.1 9.7 9.0 9.4 9.2 9.1  --  9.0   PHOSPHORUS 3.3 3.3 3.2 3.3 2.5 2.1*  --  2.0*   TOTAL PROTEIN 6.7 6.1 6.3 6.3 6.3 6.3  --  6.6   ALBUMIN 3.5 3.2* 3.3* 3.2* 3.3* 3.3*  --  3.3*   GLOBULIN 3.2 2.9 3.0 3.1 3.0 3.0  --  3.3       Assessment & Plan   Assessment / Plan     Active Hospital Problems:  Active Hospital Problems    Diagnosis     **Respiratory failure, acute and chronic     Mucus plug in respiratory tract      Impression:  COPD exacerbation  Pseudomonas pneumonia, recurrent  Acute on chronic hypercapnic and hypoxemic respiratory failure..  Wears 3 L of oxygen at baseline  Respiratory acidosis with CO2 narcosis  Elevated proBNP  NSTEMI likely type II  Transaminitis  Hyponatremia, clinically insignificant  Hypomagnesemia  History of COPD  History of CAD  History of heart failure  History of colon cancer     Plan:  --Currently on 3 L nasal cannula (3 L baseline), continue  to wean to maintain SPO2 88 to 92%   -Continue BiPAP nightly with naps on current settings.  May use home NIPPV.  -10/31 underwent bronchoscopy for mucous plugging, pneumonia panel positive for Pseudomonas, cultures positive for Pseudomonas, cytology negative for malignancy.  -Completed azithromycin x3 days  -Continue cefepime x14 days for Pseudomonas pneumonia, can de-escalate to Levaquin at time of discharge to complete therapy  -Continue ANDREAS nebs for Pseudomonas pneumonia.  We will try to set up ANDREAS nebs as outpatient cycling off/on a 30-day intervals.  -Continue Brovana, Pulmicort and as needed albuterol  -Continue bronchopulmonary hygiene  -Continue Daliresp  -Completed prednisone 40 mg x 5 days  -Encourage use of I-S  -Continue aggressive bronchopulmonary hygiene with chest vest  -Consulted RT  to arrange chest vest.  Appreciate assistance.  -Trend electrolytes and renal panel.  Replace electrolytes necessary  -PT/OT on board.  Appreciate assistance  -Encourage mobilization.  Out of bed to chair  -Palliative care on board.  Appreciate assistance.  -Okay to discharge to rehab from pulmonary standpoin     Mr Dai has history of daily productive cough for greater than six months and bronchiectasis noted on chest CT dated 7/24/2023.  Pt continues to have issues with airway clearance and has tried and failed  deep breathing and cough with IS, OPEP with aerobika, manual and percussor CPT .  Mr. Dai will need HFCWO with chest vest in the home to aid in mobilization of secretions    DVT prophylaxis:  Medical DVT prophylaxis orders are present.    CODE STATUS:   Medical Intervention Limits: NO intubation (DNI)  Code Status (Patient has no pulse and is not breathing): No CPR (Do Not Attempt to Resuscitate)  Medical Interventions (Patient has pulse or is breathing): Limited Support      Labs, imaging, microbiology, notes and medications personally reviewed  Discussed with primary    I, Dr. Nish JOE  Olive, have spent more than 50% of the total time managing the patient in this encounter today.  This included personally reviewing all pertinent labs, imaging, microbiology and documentation. Also discussing the case with the patient and any available family, the admitting physician and any available ancillary staff.    Electronically signed by JOSE Maloney, 11/03/23, 11:08 AM EDT.  Electronically signed by Nish Schaefer MD, 11/03/23, 12:13 PM EDT.

## 2023-11-03 NOTE — PLAN OF CARE
Goal Outcome Evaluation:      Patient has been on our Bipap 14/6 all night. He has tolerated well and no issues at this time.

## 2023-11-03 NOTE — THERAPY TREATMENT NOTE
Patient Name: Pavel Dai  : 1955    MRN: [id number]                              Today's Date: 11/3/2023       Admit Date: 10/27/2023    Visit Dx:     ICD-10-CM ICD-9-CM   1. COPD exacerbation  J44.1 491.21   2. Decreased activities of daily living (ADL)  Z78.9 V49.89   3. Difficulty walking  R26.2 719.7   4. Mucus plug in respiratory tract  T17.998A 934.9     Patient Active Problem List   Diagnosis    Rectal bleeding    Acute blood loss anemia    COPD (chronic obstructive pulmonary disease)    CAD (coronary artery disease)    Essential hypertension    History of ischemic stroke    Body mass index (BMI) 19.9 or less, adult    Encounter for immunization    Ex-smoker    Hyperlipidemia    Hypoxemia    Hypertensive heart disease without congestive heart failure    Oxygen dependent    Postnasal drip    Shortness of breath    Rectal cancer    Iron deficiency anemia due to chronic blood loss    Cancer related pain    Gastroesophageal reflux disease    COPD exacerbation    Severe malnutrition    Acute on chronic respiratory failure with hypoxia and hypercapnia    Hypercapnic respiratory failure    Respiratory failure, acute and chronic    Mucus plug in respiratory tract     Past Medical History:   Diagnosis Date    Anemia due to chemotherapy 2021    Cancer related pain 2022    CHF (congestive heart failure)     COPD (chronic obstructive pulmonary disease)     Coronary artery disease     Frequent urination     Hyperlipidemia     Hypertension     Iron deficiency anemia due to chronic blood loss 2021    Rectal cancer      Past Surgical History:   Procedure Laterality Date    BRONCHOSCOPY N/A 2023    Procedure: BRONCHOSCOPY WITH BAL AND WASHING;  Surgeon: Ted Petty MD;  Location: [facility] ENDOSCOPY;  Service: Pulmonary;  Laterality: N/A;  MUCUS PLUGGING    BRONCHOSCOPY N/A 10/31/2023    Procedure: BRONCHOSCOPY WITH BAL AND WASHINGS;  Surgeon: Nish Schaefer MD;  Location: [facility]  ENDOSCOPY;  Service: Pulmonary;  Laterality: N/A;  MUCOUS PLUGGING    COLONOSCOPY      HERNIA REPAIR      approximately 4 years ago     RECTAL SURGERY      SHOULDER SURGERY      joint loose, calcium particles removed from shoulder joint      General Information       Row Name 11/03/23 0922          OT Time and Intention    Document Type therapy note (daily note)  -AV     Mode of Treatment individual therapy;occupational therapy  -AV       Row Name 11/03/23 0922          General Information    Existing Precautions/Restrictions fall;oxygen therapy device and L/min  -AV       Row Name 11/03/23 0922          Cognition    Orientation Status (Cognition) --  able to perform theraband exercises with minimal cues/ demonstration  -AV       Row Name 11/03/23 0922          Safety Issues, Functional Mobility    Impairments Affecting Function (Mobility) balance;endurance/activity tolerance  -AV               User Key  (r) = Recorded By, (t) = Taken By, (c) = Cosigned By      Initials Name Provider Type    Jesus Martin OT Occupational Therapist                     Mobility/ADL's    No documentation.                  Obj/Interventions       Row Name 11/03/23 0923          Shoulder (Therapeutic Exercise)    Shoulder Strengthening (Therapeutic Exercise) bilateral;horizontal aBduction/aDduction;resistance band;yellow;15 repititions;2 sets  -AV       Row Name 11/03/23 0923          Elbow/Forearm (Therapeutic Exercise)    Elbow/Forearm Strengthening (Therapeutic Exercise) bilateral;flexion;extension;resistance band;yellow;15 repititions  -AV       Row Name 11/03/23 0923          Motor Skills    Therapeutic Exercise shoulder;elbow/forearm  performed in high-Larry's. required rest break after each different exercise. Bipap with O2 sats 89-93%. .  -AV               User Key  (r) = Recorded By, (t) = Taken By, (c) = Cosigned By      Initials Name Provider Type    Jesus Martin OT Occupational Therapist                    Goals/Plan    No documentation.                  Clinical Impression       Row Name 11/03/23 0925          Pain Scale: FACES Pre/Post-Treatment    Pain: FACES Scale, Pretreatment 0-->no hurt  -AV     Posttreatment Pain Rating 0-->no hurt  -AV       Row Name 11/03/23 0925          Plan of Care Review    Progress no change  -AV     Outcome Evaluation Patient performed upper extremity theraband exercises to improve endurance/ activity tolerance needed to support ADLs. Continued OT is indicated to remediate/compensate for deficits to maximize independence and safety with functional tasks.  -AV       Row Name 11/03/23 0925          Positioning and Restraints    Pre-Treatment Position in bed  -AV     Post Treatment Position bed  -AV     In Bed call light within reach;encouraged to call for assist  -AV               User Key  (r) = Recorded By, (t) = Taken By, (c) = Cosigned By      Initials Name Provider Type    Jesus Martin OT Occupational Therapist                   Outcome Measures       Row Name 11/03/23 0926          How much help from another is currently needed...    Putting on and taking off regular lower body clothing? 2  -AV     Bathing (including washing, rinsing, and drying) 2  -AV     Toileting (which includes using toilet bed pan or urinal) 2  -AV     Putting on and taking off regular upper body clothing 2  -AV     Taking care of personal grooming (such as brushing teeth) 2  -AV     Eating meals 4  -AV     AM-PAC 6 Clicks Score (OT) 14  -AV       Row Name 11/03/23 0900          How much help from another person do you currently need...    Turning from your back to your side while in flat bed without using bedrails? 4  -JM     Moving from lying on back to sitting on the side of a flat bed without bedrails? 4  -JM     Moving to and from a bed to a chair (including a wheelchair)? 4  -JM     Standing up from a chair using your arms (e.g., wheelchair, bedside chair)? 3  -JM     Climbing 3-5 steps with a  railing? 2  -JM     To walk in hospital room? 3  -JM     AM-PAC 6 Clicks Score (PT) 20  -     Highest level of mobility 6 --> Walked 10 steps or more  -       Row Name 11/03/23 0926          Optimal Instrument    Bending/Stooping 2  -AV     Standing 2  -AV     Reaching 1  -AV               User Key  (r) = Recorded By, (t) = Taken By, (c) = Cosigned By      Initials Name Provider Type     Jesus Justin OT Occupational Therapist    Laureano Li, RN Registered Nurse                    Occupational Therapy Education       Title: PT OT SLP Therapies (In Progress)       Topic: Occupational Therapy (In Progress)       Point: ADL training (Done)       Description:   Instruct learner(s) on proper safety adaptation and remediation techniques during self care or transfers.   Instruct in proper use of assistive devices.                  Learning Progress Summary             Patient Acceptance, E, VU by AV at 10/30/2023 1055                         Point: Home exercise program (Not Started)       Description:   Instruct learner(s) on appropriate technique for monitoring, assisting and/or progressing therapeutic exercises/activities.                  Learner Progress:  Not documented in this visit.              Point: Precautions (Done)       Description:   Instruct learner(s) on prescribed precautions during self-care and functional transfers.                  Learning Progress Summary             Patient Acceptance, E, VU by AV at 10/30/2023 1055                         Point: Body mechanics (Not Started)       Description:   Instruct learner(s) on proper positioning and spine alignment during self-care, functional mobility activities and/or exercises.                  Learner Progress:  Not documented in this visit.                              User Key       Initials Effective Dates Name Provider Type Discipline     06/16/21 -  Jesus Justin OT Occupational Therapist OT                  OT Recommendation  and Plan  Planned Therapy Interventions (OT): activity tolerance training, BADL retraining, functional balance retraining, IADL retraining, occupation/activity based interventions, patient/caregiver education/training, transfer/mobility retraining, ROM/therapeutic exercise  Therapy Frequency (OT): 5 times/wk  Plan of Care Review  Plan of Care Reviewed With: patient  Progress: no change  Outcome Evaluation: Patient performed upper extremity theraband exercises to improve endurance/ activity tolerance needed to support ADLs. Continued OT is indicated to remediate/compensate for deficits to maximize independence and safety with functional tasks.     Time Calculation:   Evaluation Complexity (OT)  Review Occupational Profile/Medical/Therapy History Complexity: expanded/moderate complexity  Assessment, Occupational Performance/Identification of Deficit Complexity: 1-3 performance deficits  Clinical Decision Making Complexity (OT): problem focused assessment/low complexity  Overall Complexity of Evaluation (OT): low complexity     Time Calculation- OT       Row Name 11/03/23 0927             Time Calculation- OT    OT Received On 11/03/23  -AV      OT Goal Re-Cert Due Date 11/08/23  -AV         Timed Charges    61428 - OT Therapeutic Exercise Minutes 10  -AV         Total Minutes    Timed Charges Total Minutes 10  -AV       Total Minutes 10  -AV                User Key  (r) = Recorded By, (t) = Taken By, (c) = Cosigned By      Initials Name Provider Type    AV Jesus Justin OT Occupational Therapist                  Therapy Charges for Today       Code Description Service Date Service Provider Modifiers Qty    64333221119 HC OT THER PROC EA 15 MIN 11/2/2023 Jesus Justin OT GO 1    32083033460 HC OT THER PROC EA 15 MIN 11/3/2023 Jesus Justin OT GO 1                 Jesus Justin OT  11/3/2023

## 2023-11-03 NOTE — PLAN OF CARE
Problem: Adult Inpatient Plan of Care  Goal: Plan of Care Review  Outcome: Ongoing, Progressing  Flowsheets (Taken 11/3/2023 9672)  Progress: improving  Plan of Care Reviewed With: patient  Outcome Evaluation: Patient alert and oriented x 4. Patient used Bipap throughout the night. Antibiotic therapy continued per orders. No complaints of pain throughout shift. Continuing with plan of care.   Goal Outcome Evaluation:  Plan of Care Reviewed With: patient        Progress: improving  Outcome Evaluation: Patient alert and oriented x 4. Patient used Bipap throughout the night. Antibiotic therapy continued per orders. No complaints of pain throughout shift. Continuing with plan of care.

## 2023-11-04 NOTE — PROGRESS NOTES
Pulmonary / Critical Care Progress Note      Patient Name: Pavel Dai  : 1955  MRN: [id number]  Primary Care Physician:  Yoel Geller MD  Date of admission: 10/27/2023    Subjective   Subjective   Follow-up for COPD exacerbation, hypercarbic respiratory failure    No acute events overnight.  Wore home NIPPV.    This morning,  Lying in bed on 3 L nasal cannula  Remains dyspneic with minimal activity  Congested cough with scant sputum  No fever or chills  No chest pain or hemoptysis  Remains weak and fatigued  Pending rehab placement    Review of Systems  General: Fatigue, otherwise denied complaints  HEENT: Denied complaints  Respiratory: Dyspnea, cough otherwise denied complaints  Cardiovascular: Denied complaints  GI: Denied complaints  : Denied complaints  MSK: Generalized weakness, otherwise denied complaints    Objective   Objective     Vitals:   Temp:  [97.7 °F (36.5 °C)-98.4 °F (36.9 °C)] 98.4 °F (36.9 °C)  Heart Rate:  [67-88] 78  Resp:  [14-21] 18  BP: ()/(58-75) 95/58  Flow (L/min):  [3-3.5] 3    Physical Exam   Frail male  Poor air exchange  Alert and oriented      Result Review    Result Review:  I have personally reviewed the results from the time of this admission to 2023 16:35 EDT and agree with these findings:  [x]  Laboratory  [x]  Microbiology  [x]  Radiology  [x]  EKG/Telemetry   []  Cardiology/Vascular   [x]  Pathology  []  Old records  []  Other:  Most notable findings include:     10/31 underwent bronchoscopy for mucous plugging, pneumonia panel positive for Pseudomonas, cultures positive for Pseudomonas, cytology negative for malignancy.     echo 61 to 65% grade 1 diastolic dysfunction estimated RVSP 45 to 55 mmHg        Lab 23  0546 23  0530 23  0451 23  0415 10/31/23  0455 10/30/23  0429 10/29/23  0500   WBC 8.40 9.66 6.39 7.85 7.67 7.66 7.21   HEMOGLOBIN 11.6* 11.9* 11.2* 11.7* 11.1* 10.7* 10.7*   HEMATOCRIT 40.3 41.4 38.9 40.3 38.9  36.8* 36.0*   PLATELETS 258 253 233 255 231 232 200   SODIUM 140 141 145 143 144 146* 144   POTASSIUM 4.8 4.4 4.2 4.6 4.2 4.0 3.9   CHLORIDE 94* 94* 94* 97* 100 100 101   CO2 44.8* 45.8* 46.9* 43.4* 42.5* 42.0* 41.1*   BUN 27* 30* 25* 21 24* 30* 37*   CREATININE 0.55* 0.60* 0.67* 0.66* 0.64* 0.72* 0.72*   GLUCOSE 99 89 84 86 90 101* 111*   CALCIUM 9.0 9.1 9.7 9.0 9.4 9.2 9.1   PHOSPHORUS 3.2 3.3 3.3 3.2 3.3 2.5 2.1*   TOTAL PROTEIN 6.6 6.7 6.1 6.3 6.3 6.3 6.3   ALBUMIN 3.5 3.5 3.2* 3.3* 3.2* 3.3* 3.3*   GLOBULIN 3.1 3.2 2.9 3.0 3.1 3.0 3.0     Assessment & Plan   Assessment / Plan     Active Hospital Problems:  Active Hospital Problems    Diagnosis     **Respiratory failure, acute and chronic     Mucus plug in respiratory tract      Impression:  COPD exacerbation  Pseudomonas pneumonia, recurrent  Acute on chronic hypercapnic and hypoxemic respiratory failure..  Wears 3 L of oxygen at baseline  Respiratory acidosis with CO2 narcosis  Elevated proBNP  NSTEMI likely type II  Transaminitis  Hyponatremia, clinically insignificant  Hypomagnesemia  History of COPD  History of CAD  History of heart failure  History of colon cancer     Plan:  Continue oxygen  Continue bronchopulmonary hygiene protocol  Completed Zithromax  Continue bronchopulmonary hygiene  Continue Daliresp  Continue prednisone  Continue cycling ANDREAS      DVT prophylaxis:  Medical DVT prophylaxis orders are present.    CODE STATUS:   Medical Intervention Limits: NO intubation (DNI)  Code Status (Patient has no pulse and is not breathing): No CPR (Do Not Attempt to Resuscitate)  Medical Interventions (Patient has pulse or is breathing): Limited Support      Labs, imaging, microbiology, notes and medications personally reviewed  Discussed with primary    Electronically signed by JOSE Maloney, 11/04/23, 4:35 PM EDT.      This visit was performed by BOTH a physician and an APC. I personally evaluated and examined the patient.  And spent more than 51%  of time caring for this patient I performed all aspects of MDM as documented. , I have reviewed and confirmed the accuracy of the patient's history as documented in this note. and I have reexamined the patient and the results are consistent with the previously documented exam. I have updated the documentation as necessary    Electronically signed by Enzo Del Castillo DO, 11/04/23, 4:48 PM EDT.

## 2023-11-04 NOTE — PROGRESS NOTES
[facility]   Hospitalist Progress Note  Date: 2023  Patient Name: Pavel Dai  : 1955  MRN: [id number]  Date of admission: 10/27/2023      Subjective   Subjective     Chief Complaint: Follow up for shortness of breath    Summary: 68 y.o. with history of COPD medical history of COPD (fev1~12% on outpt testing) on 3 L home oxygen, [facility] on CPAP, CHF who presented with worsening shortness of breath found to have acute hypoxic hypercapnic respiratory failure requiring continuous BiPAP.  Also with FABIAN and significantly elevated transaminitis on admission. Patient had issues with his home CPAP machine not functioning correctly.  Patient also has a history of Pseudomonas pneumonia who was supposed to be on tobramycin nebulizations outpatient but has not been able to initiate these/they have not approved by insurance.  Pseudomonas positive here, started on cefepime.  Bronchoscopy completed on 10/31/2023.  Given patient's end-stage COPD he met with palliative care and ultimately hospice.  Patient is interested in discharging under the care of hospice.  Social work currently working on logistics       Interval Followup:   TULIO. VSS  On 3L/min NC  Denies worsening dyspnea.  No sublingual morphine is helping.  Not coughing anything up.  No acute complaints    Review of Systems  Cardiovascular:  No Chest Pain, No Edema  Respiratory:  No Cough, +Dyspnea  Gastrointestinal:  No Nausea, No Vomiting      Objective   Objective     Vitals:   Temp:  [97.7 °F (36.5 °C)-98.2 °F (36.8 °C)] 98.2 °F (36.8 °C)  Heart Rate:  [72-95] 79  Resp:  [14-18] 18  BP: (105-119)/(56-75) 106/63  Flow (L/min):  [3.5] 3.5  Physical Exam    Constitutional: Thin, chronically ill-appearing, conversant, NAD   Respiratory: Diminished breath sounds with conversational dyspnea   Cardiovascular: RRR, no murmurs, no edema   Gastrointestinal: Positive bowel sounds, soft, nontender, nondistended   Neurologic: Alert, Cranial Nerves grossly  intact, speech clear   Skin: Extremities warm, no rashes    Result Review    Result Review:  I have personally reviewed the following over the last 24 hours (07:00 to 07:00) and agree with the following findings  [x]  Laboratory  CBC          11/2/2023    04:51 11/3/2023    05:30 11/4/2023    05:46   CBC   WBC 6.39  9.66  8.40    RBC 3.90  4.06  3.97    Hemoglobin 11.2  11.9  11.6    Hematocrit 38.9  41.4  40.3    MCV 99.7  102.0  101.5    MCH 28.7  29.3  29.2    MCHC 28.8  28.7  28.8    RDW 12.1  12.3  12.3    Platelets 233  253  258      BMP          11/2/2023    04:51 11/3/2023    05:30 11/4/2023    05:46   BMP   BUN 25  30  27    Creatinine 0.67  0.60  0.55    Sodium 145  141  140    Potassium 4.2  4.4  4.8    Chloride 94  94  94    CO2 46.9  45.8  44.8    Calcium 9.7  9.1  9.0      []  Microbiology  []  Radiology  [x]  EKG/Telemetry monitor personally reviewed: NSR  []  Cardiology/Vascular   []  Pathology  []  Old records  [x]  Other:    Intake/Output Summary (Last 24 hours) at 11/4/2023 1341  Last data filed at 11/4/2023 1200  Gross per 24 hour   Intake 200 ml   Output 950 ml   Net -750 ml         Assessment & Plan   Assessment / Plan     Assessment/Plan:  Acute hypoxic hypercapnic respiratory failure  COPD, 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%, with exacerbation, 3L NC baseline   Pseudomonas PNA in LLL, with hx of Pseudomonas PNA  FABIAN suspect secondary to sepsis and prerenal/low bp  Transaminitis, question ischemic hepatitis/congestive hepatopathy secondary to above  NSTEMI, suspect type II in setting of above  Diastolic CHF (EF 61% per 7/24/2023 echo)  Hypernatremia, suspect dehydration  Hx of mucous plugging and pseudomonas pna  [facility] on CPAP outpatient with malfunctioning unit  History of rectal cancer with prior surgical resection and chemotherapy reportedly cleared  Severe malnutrition, question copd related cachexia          Pulmonology following, appreciate assistance  Continue Brovana/Pulmicort nebs twice  daily, Xopenex nebs 4 times daily  Continue tobramycin nebs 300 mg every 12 hours  Continue Daliresp daily  Continue IV cefepime, day 7  Continue supplemental oxygen to maintain SpO2 88 to 92%   Continue BiPAP nightly with naps on current settings.   Continue Toprol XL 50 mg every 12 hours  Continue aspirin and atorvastatin  Continue liquid morphine 5 mg every 4 hours as needed for air hunger  CBC, BMP in AM    Social work is patient looking into discharge to SNF to later transition to comfort    Discussed plan with RN.    DVT prophylaxis: Lovenox 40 mg daily  Medical DVT prophylaxis orders are present.    CODE STATUS:   Medical Intervention Limits: NO intubation (DNI)  Code Status (Patient has no pulse and is not breathing): No CPR (Do Not Attempt to Resuscitate)  Medical Interventions (Patient has pulse or is breathing): Limited Support    Electronically signed by Benedicto Garcia DO, 11/04/23, 8:31 AM EDT.

## 2023-11-04 NOTE — PLAN OF CARE
Goal Outcome Evaluation:      Patient was placed on bipap at 0023. He has tolerated it well. Remains on 14/6 and 30% o2.

## 2023-11-04 NOTE — PLAN OF CARE
Goal Outcome Evaluation:           Progress: no change  Outcome Evaluation: No acute changes throughotu shift today, vss, patient alert and oriented. up ad meredith to bsc, currently on bipap. Will continue to monitor.

## 2023-11-04 NOTE — CONSULTS
"Nutrition Services    Patient Name: Pavel Dai  YOB: 1955  MRN: [id number]  Admission date: 10/27/2023      CLINICAL NUTRITION ASSESSMENT      Reason for Assessment  Follow-up protocol     H&P:    Past Medical History:   Diagnosis Date    Anemia due to chemotherapy 12/13/2021    Cancer related pain 01/03/2022    CHF (congestive heart failure)     COPD (chronic obstructive pulmonary disease)     Coronary artery disease     Frequent urination     Hyperlipidemia     Hypertension     Iron deficiency anemia due to chronic blood loss 12/13/2021    Rectal cancer         Current Problems:   Active Hospital Problems    Diagnosis     **Respiratory failure, acute and chronic     Mucus plug in respiratory tract         Nutrition/Diet History         Narrative     Patient admitted with acute respiratory failure. Pt meets criteria for severe malnutrition.     Nutrition follow up:    Pt s/p bronch, showing thick mucus plugging. Pt had meeting w/ hospice and is interested in d/c w/ them.     Pt's intake is variable, averaging between 50-75% for most meals. Pt is receiving ONS TID.     RD will continue to monitor for POC/GOC.        Anthropometrics        Current Height, Weight Height: 185.4 cm (73\")  Weight: 50.4 kg (111 lb 1.8 oz)   Current BMI Body mass index is 14.66 kg/m².       Weight Hx  Wt Readings from Last 30 Encounters:   10/27/23 2130 50.4 kg (111 lb 1.8 oz)   10/27/23 1614 52 kg (114 lb 10.2 oz)   09/08/23 0945 55.8 kg (123 lb)   08/11/23 0848 56.2 kg (123 lb 12.8 oz)   07/28/23 0520 55.4 kg (122 lb 2.2 oz)   07/26/23 0554 53.4 kg (117 lb 11.6 oz)   07/24/23 2041 52.8 kg (116 lb 6.5 oz)   07/24/23 1126 55.5 kg (122 lb 5.7 oz)   07/12/23 1040 57.1 kg (125 lb 14.1 oz)   04/25/23 1343 55.9 kg (123 lb 3.8 oz)   01/11/23 0954 55.6 kg (122 lb 9.2 oz)   10/27/22 1054 55.8 kg (123 lb 0.3 oz)   08/08/22 0237 56.6 kg (124 lb 12.5 oz)   08/08/22 0109 56.6 kg (124 lb 12.5 oz)   08/07/22 2319 56.2 kg (124 lb) "   05/26/22 1126 56.3 kg (124 lb 1.9 oz)   05/18/22 1305 56 kg (123 lb 7.3 oz)   04/27/22 1040 56 kg (123 lb 7.3 oz)   02/09/22 1126 58.1 kg (128 lb 1.4 oz)   01/31/22 1445 54.3 kg (119 lb 11.4 oz)   01/26/22 0842 55.4 kg (122 lb 2.2 oz)   01/05/22 1046 55.3 kg (121 lb 14.6 oz)   01/04/22 1043 55.2 kg (121 lb 11.1 oz)   01/03/22 1137 54 kg (119 lb 0.8 oz)   12/29/21 1051 55.1 kg (121 lb 7.6 oz)   12/28/21 1043 54.9 kg (121 lb 0.5 oz)   12/22/21 1033 57.1 kg (125 lb 14.1 oz)   12/21/21 1042 55.9 kg (123 lb 3.8 oz)   12/20/21 0928 55.3 kg (121 lb 14.6 oz)   12/16/21 1045 55.6 kg (122 lb 9.2 oz)   12/15/21 1048 55.6 kg (122 lb 9.2 oz)   12/13/21 0821 55 kg (121 lb 4.1 oz)   12/09/21 1046 56.9 kg (125 lb 7.1 oz)   12/07/21 1044 56.9 kg (125 lb 7.1 oz)   12/01/21 1031 57.5 kg (126 lb 12.2 oz)   11/30/21 1046 57 kg (125 lb 10.6 oz)            Wt Change Observation -9% x 3 months   -3 L since admit      Estimated/Assessed Needs       Energy Requirements 35-40 kcal/kg   EST Needs (kcal/day) 9456-3043 kcal        Protein Requirements 1.5 g/kg    EST Daily Needs (g/day) 76 g       Fluid Requirements 30-35 ml/kg     Estimated Needs (mL/day) 8502-2126 ml      Labs/Medications         Pertinent Labs Reviewed.   Results from last 7 days   Lab Units 11/04/23  0546 11/03/23  0530 11/02/23  0451   SODIUM mmol/L 140 141 145   POTASSIUM mmol/L 4.8 4.4 4.2   CHLORIDE mmol/L 94* 94* 94*   CO2 mmol/L 44.8* 45.8* 46.9*   BUN mg/dL 27* 30* 25*   CREATININE mg/dL 0.55* 0.60* 0.67*   CALCIUM mg/dL 9.0 9.1 9.7   BILIRUBIN mg/dL 0.2 0.3 0.2   ALK PHOS U/L 73 74 71   ALT (SGPT) U/L 118* 145* 165*   AST (SGOT) U/L 29 30 23   GLUCOSE mg/dL 99 89 84     Results from last 7 days   Lab Units 11/04/23  0546 11/03/23  0530 11/02/23  0451   MAGNESIUM mg/dL 2.3 1.8 1.9   PHOSPHORUS mg/dL 3.2 3.3 3.3   HEMOGLOBIN g/dL 11.6* 11.9* 11.2*   HEMATOCRIT % 40.3 41.4 38.9     COVID19   Date Value Ref Range Status   10/28/2023 Not Detected Not Detected - Ref.  Range Final     Lab Results   Component Value Date    HGBA1C 5.50 07/24/2023         Pertinent Medications Reviewed.     Current Nutrition Orders & Evaluation of Intake       Oral Nutrition     Current PO Diet Diet: Regular/House Diet; Texture: Soft to Chew (NDD 3); Soft to Chew: Chopped Meat; Fluid Consistency: Thin (IDDSI 0)   Supplement Orders Placed This Encounter      Dietary Nutrition Supplements Boost Plus (Ensure Plus); chocolate       Malnutrition Severity Assessment      Patient meets criteria for : Severe Malnutrition           Nutrition Diagnosis         Nutrition Dx Problem 1 Severe malnutrition related to increased nutrient needs due to catabolic disease as evidenced by unintended wt change. and body composition changes.       Nutrition Intervention         Boost Plus TID   +1080 kcal, 42 g pro per day      Medical Nutrition Therapy/Nutrition Education          Learner     Readiness Patient  Education not indicated at this time     Method     Response N/A  N/A     Monitor/Evaluation        Monitor Per protocol, PO intake, Supplement intake, Pertinent labs, Weight       Nutrition Discharge Plan         High kcal/High pro diet, ONS on discharge       Electronically signed by:  Beverly Beltran RD  11/04/23 12:11 EDT

## 2023-11-05 NOTE — PLAN OF CARE
Goal Outcome Evaluation:  Plan of Care Reviewed With: patient        Progress: no change  Outcome Evaluation: Patient is currently on Bipap 12/6, 30%oxygen. Patient is tolerating the Bipap well.

## 2023-11-05 NOTE — PLAN OF CARE
Goal Outcome Evaluation:  Plan of Care Reviewed With: patient  No c/o this shift, continues with bipap at HS and tolerates well. NC at 2-3LPM when not on bipap. Continues with ATB therapy. 1 assist to bedside commode.

## 2023-11-05 NOTE — PLAN OF CARE
Goal Outcome Evaluation:           Progress: no change  Outcome Evaluation: No acute changes throughout shift today, patient alert and oriented, vss, currently on bipap., IV abx infused, no complaints noted at this time.

## 2023-11-05 NOTE — PROGRESS NOTES
Pulmonary / Critical Care Progress Note      Patient Name: Pavel Dai  : 1955  MRN: [id number]  Primary Care Physician:  Yoel Geller MD  Date of admission: 10/27/2023    Subjective   Subjective   Follow-up for COPD exacerbation, hypercarbic respiratory failure    No acute events overnight.  Wore home NIPPV.    This morning,  Lying in bed on 3 L nasal cannula  Remains dyspneic with minimal activity  Cough with tan to white sputum  No fever or chills  No chest pain or hemoptysis  Remains weak and fatigued  Awaiting rehab placement    Review of Systems  General: Fatigue, otherwise denied complaints  HEENT: Denied complaints  Respiratory: Dyspnea, cough otherwise denied complaints  Cardiovascular: Denied complaints  GI: Denied complaints  : Denied complaints  MSK: Generalized weakness, otherwise denied complaints    Objective   Objective     Vitals:   Temp:  [97.3 °F (36.3 °C)-98.4 °F (36.9 °C)] 97.8 °F (36.6 °C)  Heart Rate:  [75-92] 87  Resp:  [17-20] 18  BP: ()/(58-67) 102/58  Flow (L/min):  [3] 3    Physical Exam   Vital Signs Reviewed  General: Thin, chronically ill male, Alert, NAD, lying in bed.    HEENT:  PERRL, EOMI.  OP, nares clear  Neck:  Supple, no JVD, no thyromegaly  Lymph: no cervical, supraclavicular lymphadenopathy noted bilaterally  Chest: Poor aeration, diminished to auscultation bilaterally, conversational dyspnea, increased work of breathing noted, barrel chested  CV: RRR, no MGR, pulses 2+, equal.  Abd:  Soft, NT, ND, + BS, no HSM  Ext:  no clubbing, no cyanosis, no edema  Neuro:  A&Ox3, CN grossly intact, no focal deficits.  Skin: No rashes or lesions noted      Result Review    Result Review:  I have personally reviewed the results from the time of this admission to 2023 13:28 EST and agree with these findings:  [x]  Laboratory  [x]  Microbiology  [x]  Radiology  [x]  EKG/Telemetry   []  Cardiology/Vascular   [x]  Pathology  []  Old records  []  Other:  Most  notable findings include:     10/31 underwent bronchoscopy for mucous plugging, pneumonia panel positive for Pseudomonas, cultures positive for Pseudomonas, cytology negative for malignancy.    7/24 echo 61 to 65% grade 1 diastolic dysfunction estimated RVSP 45 to 55 mmHg        Lab 11/05/23  0507 11/04/23  0546 11/03/23  0530 11/02/23  0451 11/01/23  0415 10/31/23  0455 10/30/23  0429   WBC 7.68 8.40 9.66 6.39 7.85 7.67 7.66   HEMOGLOBIN 11.4* 11.6* 11.9* 11.2* 11.7* 11.1* 10.7*   HEMATOCRIT 40.1 40.3 41.4 38.9 40.3 38.9 36.8*   PLATELETS 259 258 253 233 255 231 232   SODIUM 137 140 141 145 143 144 146*   POTASSIUM 4.8 4.8 4.4 4.2 4.6 4.2 4.0   CHLORIDE 94* 94* 94* 94* 97* 100 100   CO2 42.9* 44.8* 45.8* 46.9* 43.4* 42.5* 42.0*   BUN 30* 27* 30* 25* 21 24* 30*   CREATININE 0.58* 0.55* 0.60* 0.67* 0.66* 0.64* 0.72*   GLUCOSE 93 99 89 84 86 90 101*   CALCIUM 9.2 9.0 9.1 9.7 9.0 9.4 9.2   PHOSPHORUS 3.0 3.2 3.3 3.3 3.2 3.3 2.5   TOTAL PROTEIN 6.9 6.6 6.7 6.1 6.3 6.3 6.3   ALBUMIN 3.8 3.5 3.5 3.2* 3.3* 3.2* 3.3*   GLOBULIN 3.1 3.1 3.2 2.9 3.0 3.1 3.0     Assessment & Plan   Assessment / Plan     Active Hospital Problems:  Active Hospital Problems    Diagnosis     **Respiratory failure, acute and chronic     Mucus plug in respiratory tract      Impression:  COPD exacerbation  Pseudomonas pneumonia, recurrent  Acute on chronic hypercapnic and hypoxemic respiratory failure..  Wears 3 L of oxygen at baseline  Respiratory acidosis with CO2 narcosis  Elevated proBNP  NSTEMI likely type II  Transaminitis  Hyponatremia, clinically insignificant  Hypomagnesemia  History of COPD  History of CAD  History of heart failure  History of colon cancer     Plan:  -Continue O2 to maintain SPO2 greater than 90%.  3 L nasal cannula baseline.  -Continue NIPPV at night and as needed days with naps.  -Continue cefepime x14 days for Pseudomonas pneumonia, may transition to Levaquin at time of discharge  -Completed azithromycin  -Continue  30-day on/off cycle ANDREAS nebs  -Continue Brovana, Pulmicort and as needed albuterol  -Continue bronchopulmonary hygiene.  Encourage use of I-S and flutter valve.  -Continue aggressive bronchopulmonary hygiene with chest vest  -Continue Daliresp  -Encourage mobilization.  Out of bed to chair.  -Palliative care on board.  Appreciate assistance.  -Pending discharge to rehab.     DVT prophylaxis:  Medical DVT prophylaxis orders are present.    CODE STATUS:   Medical Intervention Limits: NO intubation (DNI)  Code Status (Patient has no pulse and is not breathing): No CPR (Do Not Attempt to Resuscitate)  Medical Interventions (Patient has pulse or is breathing): Limited Support    Labs, imaging, microbiology, notes and medications personally reviewed    Electronically signed by JOSE Maloney, 11/05/23, 1:28 PM EST.

## 2023-11-05 NOTE — PLAN OF CARE
Goal Outcome Evaluation:  Plan of Care Reviewed With: patient        Progress: no change  Outcome Evaluation: Pt is currently on bipap 14/6 30% FIO2 and is tolerating the bipap well. Pt wears 3L nasal cannula when not on the bipap. will continue to monitor throughout shift.

## 2023-11-05 NOTE — PROGRESS NOTES
[facility]   Hospitalist Progress Note  Date: 2023  Patient Name: Pavel Dai  : 1955  MRN: [id number]  Date of admission: 10/27/2023      Subjective   Subjective     Chief Complaint: Follow up for shortness of breath    Summary: 68 y.o. with history of COPD medical history of COPD (fev1~12% on outpt testing) on 3 L home oxygen, [facility] on CPAP, CHF who presented with worsening shortness of breath found to have acute hypoxic hypercapnic respiratory failure requiring continuous BiPAP.  Also with FABIAN and significantly elevated transaminitis on admission. Patient had issues with his home CPAP machine not functioning correctly.  Patient also has a history of Pseudomonas pneumonia who was supposed to be on tobramycin nebulizations outpatient but has not been able to initiate these/they have not approved by insurance.  Pseudomonas positive here, started on cefepime.  Bronchoscopy completed on 10/31/2023.  Given patient's end-stage COPD he met with palliative care and ultimately hospice.  Patient is interested in discharging under the care of hospice.  Social work currently working on logistics       Interval Followup:   Remains on 3 L of oxygen  No major changes clinically  No acute complaints this morning  Nuys uncontrolled anxiety or pain  Intermittent dyspnea unchanged    Review of Systems  Cardiovascular:  No Chest Pain, No Edema  Respiratory:  No Cough, +Dyspnea (unchanged)  Gastrointestinal:  No Nausea, No Vomiting      Objective   Objective     Vitals:   Temp:  [97.3 °F (36.3 °C)-98.4 °F (36.9 °C)] 97.8 °F (36.6 °C)  Heart Rate:  [75-92] 87  Resp:  [17-20] 18  BP: ()/(58-67) 102/58  Flow (L/min):  [3] 3  Physical Exam    Constitutional: Thin, chronically ill-appearing, conversant, NAD   Respiratory: Diminished breath sounds with conversational dyspnea   Cardiovascular: RRR, no murmurs, no edema   Gastrointestinal: Positive bowel sounds, soft, nontender, nondistended   Neurologic: Alert,  Cranial Nerves grossly intact, speech clear   Skin: Extremities warm, no rashes    Result Review    Result Review:  I have personally reviewed the following over the last 24 hours (07:00 to 07:00) and agree with the following findings  [x]  Laboratory  CBC          11/3/2023    05:30 11/4/2023    05:46 11/5/2023    05:07   CBC   WBC 9.66  8.40  7.68    RBC 4.06  3.97  3.99    Hemoglobin 11.9  11.6  11.4    Hematocrit 41.4  40.3  40.1    .0  101.5  100.5    MCH 29.3  29.2  28.6    MCHC 28.7  28.8  28.4    RDW 12.3  12.3  12.1    Platelets 253  258  259      BMP          11/3/2023    05:30 11/4/2023    05:46 11/5/2023    05:07   BMP   BUN 30  27  30    Creatinine 0.60  0.55  0.58    Sodium 141  140  137    Potassium 4.4  4.8  4.8    Chloride 94  94  94    CO2 45.8  44.8  42.9    Calcium 9.1  9.0  9.2      []  Microbiology  []  Radiology  [x]  EKG/Telemetry monitor personally reviewed: NSR  []  Cardiology/Vascular   []  Pathology  []  Old records  [x]  Other:    Intake/Output Summary (Last 24 hours) at 11/5/2023 1203  Last data filed at 11/5/2023 0900  Gross per 24 hour   Intake 240 ml   Output 2050 ml   Net -1810 ml         Assessment & Plan   Assessment / Plan     Assessment/Plan:  Acute hypoxic hypercapnic respiratory failure  COPD, 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%, with exacerbation, 3L NC baseline   Pseudomonas PNA in LLL, with hx of Pseudomonas PNA  FABIAN suspect secondary to sepsis and prerenal/low bp  Transaminitis, question ischemic hepatitis/congestive hepatopathy secondary to above  NSTEMI, suspect type II in setting of above  Diastolic CHF (EF 61% per 7/24/2023 echo)  Hypernatremia, suspect dehydration  Hx of mucous plugging and pseudomonas pna  [facility] on CPAP outpatient with malfunctioning unit  History of rectal cancer with prior surgical resection and chemotherapy reportedly cleared  Severe malnutrition, question copd related cachexia          Pulmonology following, appreciate assistance  Continue  Brovana/Pulmicort nebs twice daily, Xopenex nebs 4 times daily  Continue tobramycin nebs 300 mg every 12 hours  Continue Daliresp daily  Completed 7 days of cefepime  Continue supplemental oxygen to maintain SpO2 88 to 92%   Continue BiPAP nightly with naps on current settings.   Continue Toprol XL 50 mg every 12 hours  Continue aspirin and atorvastatin  Continue liquid morphine 5 mg every 4 hours as needed for air hunger  Lab holiday    Social work is patient looking into discharge to SNF to later transition to comfort    Discussed plan with RN.    DVT prophylaxis: Lovenox 40 mg daily  Medical DVT prophylaxis orders are present.    CODE STATUS:   Medical Intervention Limits: NO intubation (DNI)  Code Status (Patient has no pulse and is not breathing): No CPR (Do Not Attempt to Resuscitate)  Medical Interventions (Patient has pulse or is breathing): Limited Support    Electronically signed by Benedicto Garcia DO, 11/05/23, 12:05 PM EST.                Rollator/needs device and assist independent

## 2023-11-06 NOTE — PROGRESS NOTES
Pulmonary / Critical Care Progress Note      Patient Name: Pavel Dai  : 1955  MRN: [id number]  Primary Care Physician:  Yoel Geller MD  Date of admission: 10/27/2023    Subjective   Subjective   Follow-up for COPD exacerbation, hypercarbic respiratory failure    No acute events overnight.  Wore home NIPPV.    This morning,  Lying in bed on 4 L nasal cannula  Sublingual morphine helping with air hunger  Remains dyspneic with minimal activity  Occasional cough with tan to white sputum  Slept well overnight  Remains weak and fatigued  Awaiting rehab placement    Review of Systems  General: Fatigue, otherwise denied complaints  HEENT: Denied complaints  Respiratory: Dyspnea, cough otherwise denied complaints  Cardiovascular: Denied complaints  GI: Denied complaints  : Denied complaints  MSK: Generalized weakness, otherwise denied complaints    Objective   Objective     Vitals:   Temp:  [97.2 °F (36.2 °C)-98.6 °F (37 °C)] 98.1 °F (36.7 °C)  Heart Rate:  [76-94] 88  Resp:  [16-26] 26  BP: (100-144)/(56-77) 110/70  Flow (L/min):  [3] 3    Physical Exam   Vital Signs Reviewed   General:  Alert, NAD.  Chronically ill-appearing male, sitting up in bed  HEENT:  PERRL, EOMI.    Neck:  No JVD, no thyromegaly  Lymph: no axillary, cervical, supraclavicular lymphadenopathy noted bilaterally  Chest:  diminished to auscultation bilaterally, no work of breathing noted on 4 L nasal cannula, barrel chested   CV: RRR, no M/G/R, pulses 2+  Abd:  Soft, NT, ND, +BS  EXT:  no clubbing, no cyanosis, no edema  Neuro:  A&Ox3, CN grossly intact, no focal deficits.  Skin: No rashes or lesions noted      Result Review    Result Review:  I have personally reviewed the results from the time of this admission to 2023 12:08 EST and agree with these findings:  [x]  Laboratory  [x]  Microbiology  [x]  Radiology  [x]  EKG/Telemetry   []  Cardiology/Vascular   [x]  Pathology  []  Old records  []  Other:  Most notable findings  include:     10/31 underwent bronchoscopy for mucous plugging, pneumonia panel positive for Pseudomonas, cultures positive for Pseudomonas, cytology negative for malignancy.    7/24 echo 61 to 65% grade 1 diastolic dysfunction estimated RVSP 45 to 55 mmHg        Lab 11/06/23  0525 11/05/23  0507 11/04/23  0546 11/03/23  0530 11/02/23  0451 11/01/23  0415 10/31/23  0455   WBC 6.41 7.68 8.40 9.66 6.39 7.85 7.67   HEMOGLOBIN 12.2* 11.4* 11.6* 11.9* 11.2* 11.7* 11.1*   HEMATOCRIT 41.2 40.1 40.3 41.4 38.9 40.3 38.9   PLATELETS 258 259 258 253 233 255 231   SODIUM 136 137 140 141 145 143 144   POTASSIUM 5.0 4.8 4.8 4.4 4.2 4.6 4.2   CHLORIDE 92* 94* 94* 94* 94* 97* 100   CO2 40.5* 42.9* 44.8* 45.8* 46.9* 43.4* 42.5*   BUN 33* 30* 27* 30* 25* 21 24*   CREATININE 0.63* 0.58* 0.55* 0.60* 0.67* 0.66* 0.64*   GLUCOSE 98 93 99 89 84 86 90   CALCIUM 9.2 9.2 9.0 9.1 9.7 9.0 9.4   PHOSPHORUS 3.4 3.0 3.2 3.3 3.3 3.2 3.3   TOTAL PROTEIN 7.2 6.9 6.6 6.7 6.1 6.3 6.3   ALBUMIN 3.8 3.8 3.5 3.5 3.2* 3.3* 3.2*   GLOBULIN 3.4 3.1 3.1 3.2 2.9 3.0 3.1     Assessment & Plan   Assessment / Plan     Active Hospital Problems:  Active Hospital Problems    Diagnosis     **Respiratory failure, acute and chronic     Mucus plug in respiratory tract      Impression:  COPD exacerbation  Pseudomonas pneumonia, recurrent  Acute on chronic hypercapnic and hypoxemic respiratory failure..  Wears 3 L of oxygen at baseline  Respiratory acidosis with CO2 narcosis  Elevated proBNP  NSTEMI likely type II  Transaminitis  Hyponatremia, clinically insignificant  Hypomagnesemia  History of COPD  History of CAD  History of heart failure  History of colon cancer     Plan:  -Continue O2 to maintain SPO2 greater than 90%.  3 L nasal cannula baseline.  -Continue NIPPV at night and as needed days with naps.  -Continue cefepime x14 days for Pseudomonas pneumonia, may transition to Levaquin at time of discharge  -Completed azithromycin  -Continue 30-day on/off cycle ANDREAS  nebs  -Continue sublingual morphine as needed dyspnea/air hunger   -Continue Brovana, Pulmicort and as needed albuterol  -Continue bronchopulmonary hygiene.  Encourage use of I-S and flutter valve.  -Continue aggressive bronchopulmonary hygiene with chest vest  -Continue Daliresp  -Encourage mobilization.  Out of bed to chair.  -Palliative care on board.  Appreciate assistance.  -Plans to transition to comfort care at later date  -Okay to discharge to rehab from pulmonary standpoint      DVT prophylaxis:  Medical DVT prophylaxis orders are present.    CODE STATUS:   Medical Intervention Limits: NO intubation (DNI)  Code Status (Patient has no pulse and is not breathing): No CPR (Do Not Attempt to Resuscitate)  Medical Interventions (Patient has pulse or is breathing): Limited Support    Electronically signed by JOSE Maloney, 11/06/23, 12:08 PM EST.  This patient was seen by both a physician and a NP. ICarmen MD, spent >50% of time in accordance with split shared billing. This included personally reviewing all pertinent labs, imaging, microbiology and documentation. Also discussing the case with the patient and any available family, the admitting physician and any available ancillary staff.   Electronically signed by Carmen Santos MD, 11/06/23, 2:22 PM EST.

## 2023-11-06 NOTE — PROGRESS NOTES
[facility]   Hospitalist Progress Note  Date: 2023  Patient Name: Pavel Dai  : 1955  MRN: [id number]  Date of admission: 10/27/2023      Subjective   Subjective     Chief Complaint: Follow up for shortness of breath    Summary: 68 y.o. with history of COPD medical history of COPD (fev1~12% on outpt testing) on 3 L home oxygen, MART on CPAP, CHF who presented with worsening shortness of breath found to have acute hypoxic hypercapnic respiratory failure requiring continuous BiPAP.  Also with FABIAN and significantly elevated transaminitis on admission. Patient had issues with his home CPAP machine not functioning correctly.  Patient also has a history of Pseudomonas pneumonia who was supposed to be on tobramycin nebulizations outpatient but has not been able to initiate these/they have not approved by insurance.  Pseudomonas positive here, started on cefepime.  Bronchoscopy completed on 10/31/2023.  Given patient's end-stage COPD he met with palliative care and ultimately hospice.  Patient is interested in discharging under the care of hospice.     Interval Followup:   Original plan was to discharge to SNF, applied for Medicare after 30 days.  However patient would be on for to complete 30 days of rehab before transitioning to comfort care with hospice.  Therefore transitioning patient to hospice now, orders have been updated.  Discussing with palliative care we will admit patient to in-house hospice bed tomorrow morning following a meeting with hospice    Review of Systems  Cardiovascular:  No Chest Pain, No Edema  Respiratory:  No Cough, +Dyspnea (unchanged)  Gastrointestinal:  No Nausea, No Vomiting      Objective   Objective     Vitals:   Temp:  [97.2 °F (36.2 °C)-98.6 °F (37 °C)] 97.7 °F (36.5 °C)  Heart Rate:  [76-94] 88  Resp:  [16-26] 26  BP: (110-144)/(70-77) 117/71  Flow (L/min):  [3] 3  Physical Exam    Constitutional: Thin, chronically ill-appearing, conversant, NAD   Respiratory:  Diminished breath sounds with conversational dyspnea   Cardiovascular: RRR, no murmurs, no edema   Gastrointestinal: Positive bowel sounds, soft, nontender, nondistended   Neurologic: Alert, Cranial Nerves grossly intact, speech clear   Skin: Extremities warm, no rashes    Result Review    Result Review:  I have personally reviewed the following over the last 24 hours (07:00 to 07:00) and agree with the following findings  [x]  Laboratory  CBC          11/4/2023    05:46 11/5/2023    05:07 11/6/2023    05:25   CBC   WBC 8.40  7.68  6.41    RBC 3.97  3.99  4.11    Hemoglobin 11.6  11.4  12.2    Hematocrit 40.3  40.1  41.2    .5  100.5  100.2    MCH 29.2  28.6  29.7    MCHC 28.8  28.4  29.6    RDW 12.3  12.1  12.4    Platelets 258  259  258      BMP          11/4/2023    05:46 11/5/2023    05:07 11/6/2023    05:25   BMP   BUN 27  30  33    Creatinine 0.55  0.58  0.63    Sodium 140  137  136    Potassium 4.8  4.8  5.0    Chloride 94  94  92    CO2 44.8  42.9  40.5    Calcium 9.0  9.2  9.2      []  Microbiology  []  Radiology  [x]  EKG/Telemetry monitor personally reviewed: NSR  []  Cardiology/Vascular   []  Pathology  []  Old records  [x]  Other:    Intake/Output Summary (Last 24 hours) at 11/6/2023 1611  Last data filed at 11/6/2023 1349  Gross per 24 hour   Intake 800 ml   Output 950 ml   Net -150 ml         Assessment & Plan   Assessment / Plan     Assessment/Plan:  Acute hypoxic hypercapnic respiratory failure  COPD, 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%, with exacerbation, 3L NC baseline   Pseudomonas PNA in LLL, with hx of Pseudomonas PNA  FABIAN suspect secondary to sepsis and prerenal/low bp  Transaminitis, question ischemic hepatitis/congestive hepatopathy secondary to above  NSTEMI, suspect type II in setting of above  Diastolic CHF (EF 61% per 7/24/2023 echo)  Hypernatremia, suspect dehydration  Hx of mucous plugging and pseudomonas pna  MART on CPAP outpatient with malfunctioning unit  History of rectal cancer  with prior surgical resection and chemotherapy reportedly cleared  Severe malnutrition, question copd related cachexia          Patient has been transition to inpatient comfort care with hospice orders  Starting patient with IV medications in the treatment of symptoms, titrate as needed  Used morphine last night with significant improvement in symptoms  Discussed with palliative care, will transition to inpatient hospice bed in the morning hopefully following hospice meeting  CODE STATUS has been updated to DNR/DNI comfort measures only following bedside discussion with patient    Discussed plan with RN.  Palliative care    DVT prophylaxis:   Medical and mechanical DVT prophylaxis orders are present.    CODE STATUS:   Code Status (Patient has no pulse and is not breathing): No CPR (Do Not Attempt to Resuscitate)  Medical Interventions (Patient has pulse or is breathing): Comfort Measures

## 2023-11-06 NOTE — THERAPY TREATMENT NOTE
Acute Care - Physical Therapy Treatment Note   Shawna     Patient Name: Pavel Dai  : 1955  MRN: [id number]  Today's Date: 2023      Visit Dx:     ICD-10-CM ICD-9-CM   1. COPD exacerbation  J44.1 491.21   2. Decreased activities of daily living (ADL)  Z78.9 V49.89   3. Difficulty walking  R26.2 719.7   4. Mucus plug in respiratory tract  T17.998A 934.9     Patient Active Problem List   Diagnosis    Rectal bleeding    Acute blood loss anemia    COPD (chronic obstructive pulmonary disease)    CAD (coronary artery disease)    Essential hypertension    History of ischemic stroke    Body mass index (BMI) 19.9 or less, adult    Encounter for immunization    Ex-smoker    Hyperlipidemia    Hypoxemia    Hypertensive heart disease without congestive heart failure    Oxygen dependent    Postnasal drip    Shortness of breath    Rectal cancer    Iron deficiency anemia due to chronic blood loss    Cancer related pain    Gastroesophageal reflux disease    COPD exacerbation    Severe malnutrition    Acute on chronic respiratory failure with hypoxia and hypercapnia    Hypercapnic respiratory failure    Respiratory failure, acute and chronic    Mucus plug in respiratory tract     Past Medical History:   Diagnosis Date    Anemia due to chemotherapy 2021    Cancer related pain 2022    CHF (congestive heart failure)     COPD (chronic obstructive pulmonary disease)     Coronary artery disease     Frequent urination     Hyperlipidemia     Hypertension     Iron deficiency anemia due to chronic blood loss 2021    Rectal cancer      Past Surgical History:   Procedure Laterality Date    BRONCHOSCOPY N/A 2023    Procedure: BRONCHOSCOPY WITH BAL AND WASHING;  Surgeon: Ted Petty MD;  Location: [facility] ENDOSCOPY;  Service: Pulmonary;  Laterality: N/A;  MUCUS PLUGGING    BRONCHOSCOPY N/A 10/31/2023    Procedure: BRONCHOSCOPY WITH BAL AND WASHINGS;  Surgeon: Nish Schaefer MD;  Location: [facility]  ENDOSCOPY;  Service: Pulmonary;  Laterality: N/A;  MUCOUS PLUGGING    COLONOSCOPY      HERNIA REPAIR      approximately 4 years ago     RECTAL SURGERY      SHOULDER SURGERY      joint loose, calcium particles removed from shoulder joint     PT Assessment (last 12 hours)       PT Evaluation and Treatment       Row Name 11/06/23 1100          Physical Therapy Time and Intention    Subjective Information no complaints  -JABARI     Document Type therapy note (daily note)  -JABARI     Mode of Treatment individual therapy;physical therapy  -JABARI     Patient Effort good  -JABARI     Symptoms Noted During/After Treatment fatigue;significant change in vital signs  O2 dropped to 78 with standing and returned to 92 upon sitting. O2 was 96 upon PT exit with pt sitting up in bed.  -JABARI       Row Name 11/06/23 1100          Bed Mobility    Bed Mobility supine-sit;sit-supine  -JABARI     Supine-Sit Keyser (Bed Mobility) independent  -JABARI     Sit-Supine Keyser (Bed Mobility) independent  -JABARI     Assistive Device (Bed Mobility) bed rails;head of bed elevated  -JABARI       Row Name 11/06/23 1100          Transfers    Transfers sit-stand transfer;stand-sit transfer  -JABARI       Row Name 11/06/23 1100          Sit-Stand Transfer    Sit-Stand Keyser (Transfers) contact guard  -JABARI     Assistive Device (Sit-Stand Transfers) walker, front-wheeled  -JABARI       Row Name 11/06/23 1100          Stand-Sit Transfer    Stand-Sit Keyser (Transfers) minimum assist (75% patient effort)  -JABARI     Assistive Device (Stand-Sit Transfers) walker, front-wheeled  -JABARI       Row Name 11/06/23 1100          Gait/Stairs (Locomotion)    Gait/Stairs Locomotion gait/ambulation assistive device  -JABARI     Keyser Level (Gait) contact guard  -JABARI     Assistive Device (Gait) walker, front-wheeled  -JABARI     Patient was able to Ambulate yes  -JABARI     Distance in Feet (Gait) 12  -JABARI       Row Name 11/06/23 1100          Balance    Balance Assessment standing dynamic  balance  -JABARI     Dynamic Standing Balance contact guard  -JABARI     Position/Device Used, Standing Balance supported;walker, front-wheeled  -JABARI       Row Name 11/06/23 1100          Motor Skills    Motor Skills functional endurance  -JABARI     Therapeutic Exercise hip  -JABARI       Row Name 11/06/23 1100          Hip (Therapeutic Exercise)    Hip (Therapeutic Exercise) AROM (active range of motion)  -JABARI     Hip AROM (Therapeutic Exercise) bilateral;flexion;standing  Standing marches with rolling walker support, 2 x 10  -JABARI       Row Name 11/06/23 1100          Positioning and Restraints    Pre-Treatment Position in bed  -JABARI     Post Treatment Position bed  -JABARI     In Bed call light within reach;encouraged to call for assist;exit alarm on  -JABARI       Row Name 11/06/23 1100          Progress Summary (PT)    Progress Toward Functional Goals (PT) progress toward functional goals is good  -JABARI     Daily Progress Summary (PT) Pt was able to complete 3x sit-to-stand transfers with CGA and ambulate 12' with walker. He will continue to benefit from skilled PT intervention, continue current POC.  -JABARI               User Key  (r) = Recorded By, (t) = Taken By, (c) = Cosigned By      Initials Name Provider Type    Reno Mullins, PT Physical Therapist                      PT Recommendation and Plan     Progress Summary (PT)  Progress Toward Functional Goals (PT): progress toward functional goals is good  Daily Progress Summary (PT): Pt was able to complete 3x sit-to-stand transfers with CGA and ambulate 12' with walker. He will continue to benefit from skilled PT intervention, continue current POC.   Outcome Measures       Row Name 11/06/23 1100             How much help from another person do you currently need...    Turning from your back to your side while in flat bed without using bedrails? 4  -JABARI      Moving from lying on back to sitting on the side of a flat bed without bedrails? 4  -JABARI      Moving to and from a bed to a chair  (including a wheelchair)? 4  -JABARI      Standing up from a chair using your arms (e.g., wheelchair, bedside chair)? 3  -JABARI      Climbing 3-5 steps with a railing? 3  -JABARI      To walk in hospital room? 3  -JABARI      AM-PAC 6 Clicks Score (PT) 21  -JABARI      Highest level of mobility 6 --> Walked 10 steps or more  -JABARI                User Key  (r) = Recorded By, (t) = Taken By, (c) = Cosigned By      Initials Name Provider Type    Reno Mullins, PT Physical Therapist                     Time Calculation:    PT Charges       Row Name 11/06/23 1123             Time Calculation    PT Received On 11/06/23  -JABARI         Timed Charges    65735 - Gait Training Minutes  4  -JABARI      91704 - PT Therapeutic Activity Minutes 16  -JABARI         Total Minutes    Timed Charges Total Minutes 20  -JABARI       Total Minutes 20  -JABARI                User Key  (r) = Recorded By, (t) = Taken By, (c) = Cosigned By      Initials Name Provider Type    Reno Mullins, PT Physical Therapist                      PT G-Codes  Outcome Measure Options: AM-PAC 6 Clicks Daily Activity (OT), Optimal Instrument  AM-PAC 6 Clicks Score (PT): 21  AM-PAC 6 Clicks Score (OT): 14    Reno Smart, ADDISON  11/6/2023

## 2023-11-06 NOTE — PLAN OF CARE
Goal Outcome Evaluation:   Pt has worn bipap all night and tolerated well. Sat have remained >90

## 2023-11-06 NOTE — NURSING NOTE
Called to 4th floor. Pateint condition has changed. Resp rate has increased thus requiring increased 02. Work of breathing has increased as well. He complains of generalized pain 8 of 10. Dr Calixto has been notified. After speaking to the patient, Dr Calixto  has now written orders for comfort measures with a transition to GIP tomorrow.   Patients son Osorio was notified of the changes.  Hospice was notified of the need to transition.   Bekah TILLMAN RN, BSN  Palliative Care

## 2023-11-06 NOTE — PLAN OF CARE
Goal Outcome Evaluation:           Progress: no change  Outcome Evaluation: No acute changes throughout shift today, alert and oriented, vss, comfort measures initiated, transfer to 3NT. morphine 2mg given.

## 2023-11-06 NOTE — NURSING NOTE
Collaborated with RN. Noted Mr Dai will be discharged to [facility] today for rehab.

## 2023-11-06 NOTE — PLAN OF CARE
Goal Outcome Evaluation:   Patient is currently on Bipap 12/6, 30% oxygen. Patient is tolerating the Bipap well. We will continue to monitor.

## 2023-11-06 NOTE — PLAN OF CARE
Goal Outcome Evaluation:    Pt NAD, Aox4, bed at lowest setting, side rails up x2. Pt on bipap the whole night. Tolerating well. Will continue to monitor.

## 2023-11-07 PROBLEM — Z51.5 END OF LIFE CARE: Status: ACTIVE | Noted: 2023-01-01

## 2023-11-07 NOTE — PLAN OF CARE
Goal Outcome Evaluation:  Plan of Care Reviewed With: patient           Outcome Evaluation: Patient is off Bipap this morning, on 3L nasal cannula.

## 2023-11-07 NOTE — DISCHARGE SUMMARY
[facility]         HOSPITALIST  DISCHARGE SUMMARY    Patient Name: Pavel Dai  : 1955  MRN: [id number]    Date of Admission: 10/27/2023  Date of Discharge:  2023  Primary Care Physician: Yoel Geller MD    Consults       Date and Time Order Name Status Description    10/28/2023  8:38 AM Inpatient Pulmonology Consult Completed     10/27/2023  6:39 PM Inpatient Hospitalist Consult              Active and Resolved Hospital Problems:  Active Hospital Problems    Diagnosis POA    **Respiratory failure, acute and chronic [J96.20] Yes    Mucus plug in respiratory tract [T17.998A] Yes      Resolved Hospital Problems   No resolved problems to display.       Hospital Course     Hospital Course:  Pavel Dai is a 68 y.o. male  with history of COPD medical history of COPD (fev1~12% on outpt testing) on 3 L home oxygen, MART on CPAP, CHF who presented with worsening shortness of breath found to have acute hypoxic hypercapnic respiratory failure requiring continuous BiPAP.  Also with FABIAN and significantly elevated transaminitis on admission. Patient had issues with his home CPAP machine not functioning correctly.  Patient also has a history of Pseudomonas pneumonia who was supposed to be on tobramycin nebulizations outpatient but has not been able to initiate these/they have not approved by insurance.  Pseudomonas positive here, started on cefepime.  Bronchoscopy completed on 10/31/2023.  Given patient's end-stage COPD he met with palliative care and ultimately hospice.  Patient is interested in discharging under the care of hospice.     Patient discharged to inpatient hospice care      Day of Discharge     Vital Signs:  Temp:  [97.7 °F (36.5 °C)-98.7 °F (37.1 °C)] 97.7 °F (36.5 °C)  Heart Rate:  [84-94] 93  Resp:  [16-26] 20  BP: (117-149)/(62-71) 120/68  Flow (L/min):  [3] 3    Physical Exam:   Gen: NAD, Alert and Oriented  Cards: RRR, no murmur   Pulm: CTA b/l, no  wheezing  Abd: soft, nondistended  Extremities: no pitting edema      Discharge Details        Discharge Medications        ASK your doctor about these medications        Instructions Start Date   albuterol (2.5 MG/3ML) 0.083% nebulizer solution  Commonly known as: PROVENTIL   2.5 mg, Nebulization, Every 8 Hours PRN      aspirin 81 MG chewable tablet   81 mg, Oral, Daily      atorvastatin 10 MG tablet  Commonly known as: LIPITOR   10 mg, Oral, Nightly      Fluticasone-Salmeterol 100-50 MCG/ACT DISKUS  Commonly known as: ADVAIR/WIXELA   1 puff, Inhalation, 2 Times Daily - RT      guaiFENesin 600 MG 12 hr tablet  Commonly known as: MUCINEX   600 mg, Oral, 2 Times Daily PRN      ipratropium-albuterol  MCG/ACT inhaler  Commonly known as: COMBIVENT RESPIMAT   1 puff, Inhalation, 4 Times Daily - RT      loratadine 10 MG tablet  Commonly known as: CLARITIN   10 mg, Oral, Daily      metoprolol succinate XL 50 MG 24 hr tablet  Commonly known as: TOPROL-XL   50 mg, Oral, Every 12 Hours Scheduled      multivitamin with minerals tablet tablet   1 tablet, Oral, Daily      omeprazole 20 MG capsule  Commonly known as: priLOSEC   20 mg, Oral, Daily      roflumilast 500 MCG tablet tablet  Commonly known as: DALIRESP   500 mcg, Oral, Daily      tobramycin  MG/5ML nebulizer solution  Commonly known as: Rian   300 mg, Nebulization, 2 Times Daily - RT, Do nebulizer twice a day for 30 days and then stop for 30 days, continue cycle of 30 days on and 30 days off for 1 year.               No Known Allergies    Discharge Disposition:      Diet:  Hospital:  Diet Order   Procedures    Diet: Regular/House Diet; Texture: Regular Texture (IDDSI 7); Fluid Consistency: Thin (IDDSI 0)       Discharge Activity:       CODE STATUS:  Code Status and Medical Interventions:   Ordered at: 11/06/23 4743     Code Status (Patient has no pulse and is not breathing):    No CPR (Do Not Attempt to Resuscitate)     Medical Interventions (Patient has  pulse or is breathing):    Comfort Measures         Future Appointments   Date Time Provider Department Center   12/27/2023 10:30 AM Carmen Santos MD [facility]   1/16/2024 10:30 AM [facility] CT 1 [facility] BRN[facility]   1/18/2024  9:30 AM Enzo Regalado MD [facility]           Pertinent  and/or Most Recent Results         LAB RESULTS:      Lab 11/07/23  0539 11/06/23  0525 11/05/23  0507 11/04/23  0546 11/03/23  0530   WBC 8.17 6.41 7.68 8.40 9.66   HEMOGLOBIN 12.5* 12.2* 11.4* 11.6* 11.9*   HEMATOCRIT 41.7 41.2 40.1 40.3 41.4   PLATELETS 271 258 259 258 253   NEUTROS ABS 6.15 4.56 5.87 6.57 7.25*   IMMATURE GRANS (ABS) 0.07* 0.05 0.06* 0.05 0.06*   LYMPHS ABS 0.72 0.71 0.60* 0.54* 0.79   MONOS ABS 0.87 0.67 0.69 0.75 0.85   EOS ABS 0.31 0.37 0.43* 0.47* 0.68*   .5* 100.2* 100.5* 101.5* 102.0*         Lab 11/07/23  0539 11/06/23  0525 11/05/23  0507 11/04/23  0546 11/03/23  0530   SODIUM 136 136 137 140 141   POTASSIUM 4.6 5.0 4.8 4.8 4.4   CHLORIDE 91* 92* 94* 94* 94*   CO2 41.4* 40.5* 42.9* 44.8* 45.8*   ANION GAP 3.6* 3.5* 0.1* 1.2* 1.2*   BUN 32* 33* 30* 27* 30*   CREATININE 0.67* 0.63* 0.58* 0.55* 0.60*   EGFR 103.0 104.9 107.6 109.3 106.5   GLUCOSE 97 98 93 99 89   CALCIUM 9.3 9.2 9.2 9.0 9.1   MAGNESIUM 2.1 2.1 2.0 2.3 1.8   PHOSPHORUS 2.8 3.4 3.0 3.2 3.3         Lab 11/07/23  0539 11/06/23  0525 11/05/23  0507 11/04/23  0546 11/03/23  0530   TOTAL PROTEIN 7.4 7.2 6.9 6.6 6.7   ALBUMIN 3.9 3.8 3.8 3.5 3.5   GLOBULIN 3.5 3.4 3.1 3.1 3.2   ALT (SGPT) 96* 97* 106* 118* 145*   AST (SGOT) 41* 37 33 29 30   BILIRUBIN 0.3 0.3 0.2 0.2 0.3   ALK PHOS 77 75 79 73 74                     Brief Urine Lab Results  (Last result in the past 365 days)        Color   Clarity   Blood   Leuk Est   Nitrite   Protein   CREAT   Urine HCG        07/24/23 1339 Yellow   Clear   Trace   Negative   Negative   Negative                 Microbiology Results (last 10 days)       Procedure Component Value - Date/Time     Fungus Culture - Wash, Bronchus [[id number]] Collected: 10/31/23 0950    Lab Status: Preliminary result Specimen: Wash from Bronchus Updated: 11/07/23 0930     Fungus Culture No fungus isolated at 1 week    AFB Culture - Wash, Bronchus [[id number]] Collected: 10/31/23 0950    Lab Status: Preliminary result Specimen: Wash from Bronchus Updated: 11/07/23 0930     AFB Culture No AFB isolated at 1 week     AFB Stain No acid fast bacilli seen on direct smear      No acid fast bacilli seen on concentrated smear    Respiratory Culture - Wash, Bronchus [[id number]]  (Abnormal)  (Susceptibility) Collected: 10/31/23 0950    Lab Status: Final result Specimen: Wash from Bronchus Updated: 11/03/23 0940     Respiratory Culture Scant growth (1+) Pseudomonas aeruginosa      Rare Candida albicans     Gram Stain Few (2+) WBCs seen      No organisms seen    Susceptibility        Pseudomonas aeruginosa      PILAR      Cefepime Susceptible      Ceftazidime Susceptible      Ciprofloxacin Susceptible      Gentamicin Susceptible      Levofloxacin Susceptible      Piperacillin + Tazobactam Susceptible                           Fungus Culture - Lavage, Lung, Left Lower Lobe [[id number]] Collected: 10/31/23 0947    Lab Status: Preliminary result Specimen: Lavage from Lung, Left Lower Lobe Updated: 11/07/23 0930     Fungus Culture No fungus isolated at 1 week    AFB Culture - Lavage, Lung, Left Lower Lobe [[id number]] Collected: 10/31/23 0947    Lab Status: Preliminary result Specimen: Lavage from Lung, Left Lower Lobe Updated: 11/07/23 0930     AFB Culture No AFB isolated at 1 week     AFB Stain No acid fast bacilli seen on direct smear      No acid fast bacilli seen on concentrated smear    BAL Culture, Quantitative - Lavage, Lung, Left Lower Lobe [[id number]]  (Abnormal) Collected: 10/31/23 0947    Lab Status: Final result Specimen: Lavage from Lung, Left Lower Lobe Updated: 11/03/23 0941     BAL Culture <10,000 CFU/mL Pseudomonas aeruginosa       No Normal Respiratory Magi     Gram Stain No organisms seen      Rare (1+) WBCs seen    Pneumonia Panel - Lavage, Lung, Left Lower Lobe [[id number]]  (Abnormal) Collected: 10/31/23 0947    Lab Status: Final result Specimen: Lavage from Lung, Left Lower Lobe Updated: 10/31/23 1140     Escherichia coli PCR Not Detected     Acinetobacter calcoaceticus-baumannii complex PCR Not Detected     Enterobacter cloacae PCR Not Detected     Klebsiella oxytoca PCR Not Detected     Klebsiella pneumoniae group PCR Not Detected     Klebsiella aerogenes PCR Not Detected     Moraxella catarrhalis PCR Not Detected     Proteus species PCR Not Detected     Pseudomonas aeroginosa PCR Detected     Comment: 10^5 Bin copies/mL        Serratia marcescens PCR Not Detected     Staphylococcus aureus PCR Not Detected     Streptococcus pyogenes PCR Not Detected     Haemophilus influenzae PCR Not Detected     Streptococcus agalactiae PCR Not Detected     Streptococcus pneumoniae PCR Not Detected     Chlamydophila pneumoniae PCR Not Detected     Legionella pneumophilia PCR Not Detected     Mycoplasma pneumo by PCR Not Detected     ADENOVIRUS, PCR Not Detected     CTX-M Gene Not Detected     IMP Gene Not Detected     KPC Gene Not Detected     mecA/C and MREJ Gene N/A     NDM Gene Not Detected     OXA-48-like Gene N/A     VIM Gene Not Detected     Coronavirus Not Detected     Human Metapneumovirus Not Detected     Human Rhinovirus/Enterovirus Not Detected     Influenza A PCR Not Detected     Influenza B PCR Not Detected     RSV, PCR Not Detected     Parainfluenza virus PCR Not Detected    S. Pneumo Ag Urine or CSF - Urine, Urine, Clean Catch [[id number]]  (Normal) Collected: 10/28/23 1817    Lab Status: Final result Specimen: Urine, Clean Catch Updated: 10/28/23 1928     Strep Pneumo Ag Negative    Legionella Antigen, Urine - Urine, Urine, Clean Catch [[id number]]  (Normal) Collected: 10/28/23 1817    Lab Status: Final result Specimen: Urine, Clean  Catch Updated: 10/28/23 1928     LEGIONELLA ANTIGEN, URINE Negative    Respiratory Culture - Sputum, Cough [[id number]]  (Abnormal)  (Susceptibility) Collected: 10/28/23 1307    Lab Status: Final result Specimen: Sputum from Cough Updated: 10/30/23 1027     Respiratory Culture Moderate growth (3+) Pseudomonas aeruginosa      No Normal Respiratory Magi     Gram Stain Rare (1+) Epithelial cells per low power field      Moderate (3+) WBCs per low power field      Few (2+) Gram negative bacilli    Susceptibility        Pseudomonas aeruginosa      PILAR      Cefepime Susceptible      Ceftazidime Susceptible      Ciprofloxacin Susceptible      Gentamicin Susceptible      Levofloxacin Susceptible      Piperacillin + Tazobactam Susceptible                           Respiratory Panel PCR w/COVID-19(SARS-CoV-2) ZULEIKA/GREGORY/MAIKOL/PAD/COR/MAD/[facility]PES[facility] In-House, NP Swab in UTM/VTM, 3-4 HR TAT - Swab, Nasopharynx [[id number]]  (Normal) Collected: 10/28/23 1135    Lab Status: Final result Specimen: Swab from Nasopharynx Updated: 10/28/23 1332     ADENOVIRUS, PCR Not Detected     Coronavirus 229E Not Detected     Coronavirus HKU1 Not Detected     Coronavirus NL63 Not Detected     Coronavirus OC43 Not Detected     COVID19 Not Detected     Human Metapneumovirus Not Detected     Human Rhinovirus/Enterovirus Not Detected     Influenza A PCR Not Detected     Influenza B PCR Not Detected     Parainfluenza Virus 1 Not Detected     Parainfluenza Virus 2 Not Detected     Parainfluenza Virus 3 Not Detected     Parainfluenza Virus 4 Not Detected     RSV, PCR Not Detected     Bordetella pertussis pcr Not Detected     Bordetella parapertussis PCR Not Detected     Chlamydophila pneumoniae PCR Not Detected     Mycoplasma pneumo by PCR Not Detected    Narrative:      In the setting of a positive respiratory panel with a viral infection PLUS a negative procalcitonin without other underlying concern for bacterial infection, consider observing off antibiotics or  discontinuation of antibiotics and continue supportive care. If the respiratory panel is positive for atypical bacterial infection (Bordetella pertussis, Chlamydophila pneumoniae, or Mycoplasma pneumoniae), consider antibiotic de-escalation to target atypical bacterial infection.            No radiology results for the last 7 days               Labs Pending at Discharge:  Pending Labs       Order Current Status    AFB Culture - Lavage, Lung, Left Lower Lobe Preliminary result    AFB Culture - Wash, Bronchus Preliminary result    Fungus Culture - Lavage, Lung, Left Lower Lobe Preliminary result    Fungus Culture - Wash, Bronchus Preliminary result              Time spent on Discharge including face to face service:  >30 minutes    Electronically signed by Mignon Lyles DO, 11/07/23, 10:19 AM EST.

## 2023-11-07 NOTE — CONSULTS
11/07/23 1430   Spiritual Care   Spiritual Care Visit Type other (see comments)  (Comfort Measures Only)   Spiritual Care Source physician referral   Receptivity to Spiritual Care visit welcomed   Spiritual Care Request end-of-life issue(s) support   Spiritual Care Interventions supportive conversation provided   Response to Spiritual Care thanks expressed;receptive of support  (no needs expressed at this time; pt met with sera Berry earlier this admission and expressed being at peace)   Use of Spiritual Resources spirituality for coping, indicated strong use of   Spiritual Care Follow-Up will follow closely

## 2023-11-07 NOTE — PLAN OF CARE
Goal Outcome Evaluation:   Patient is on bipap 14/6  and 30% O2 at this time. Patient is tolerating it well and resting comfortably. We will continue to monitor

## 2023-11-07 NOTE — CONSULTS
Purpose of the visit was to evaluate for: transfer to comfort care bed/unit. Spoke with MD, RN, patient, family, and [facility] RN and [facility]   and discussed goals of care and [facility].      Assessment:  Patient is currently located on [facility], he has a medical history of rectal cancer, COPD, CAD, dyslipidemia, HTN and CHF with ejection fraction of 61-65%.  He was admitted for SOA.  Palliative care nurse was consulted to discuss goals of care.  A hospice referral was made.  Patient and family had planned to discharge 11/6 to [place] for inpatient rehab.      Patient had a significant decline later in the day on 11/6 and became more short of air and his oxygen demand increased.  RN, MD and patient and family met to discuss goals of care and they decided to transition to comfort measures only and for the patient to be reevaluated for a hospice inpatient bed today    patient is eligible for a hospice in patient bed per [facility] Medical Director.    Notified Dr. Lyles and she agreed to admit patient into hospice inpatient bed for symptom management of pain and shortness of air.  New orders were received.      Met with patient and his son at bedside.  Patient complained of feeling short of air and pain to his bottom.  He reports having hemorrhoids.  We discussed symptom management and new orders were requested.  Collaborated with primary RN and [facility] RN.  Patient was transferred to [facility] for hosp[facility] inpatient bed.  Palliative care will continue to follow to provide support and assistance with symptom management.      Patient is alert and oriented x 4, he has oxygen via nasal cannula at 3 liters.  He has morphine 2 mg IV q 1 hour prn and his last dose was today at 1142 and ativan 0.5 mg IV q 1 hour prn, and his last dose was today at 1146.      Recommendations/Plan: Inpatient [facility] bed.    Tasks Completed: Emotional Support.    Svetlana IRENE, RN, CHPN

## 2023-11-07 NOTE — PLAN OF CARE
Problem: Adult Inpatient Plan of Care  Goal: Plan of Care Review  Outcome: Met  Flowsheets (Taken 11/7/2023 1101)  Progress: no change  Plan of Care Reviewed With: patient  Outcome Evaluation: Patient being discharged and readmited to 4NT.  Goal: Patient-Specific Goal (Individualized)  Outcome: Met  Goal: Absence of Hospital-Acquired Illness or Injury  Outcome: Met  Intervention: Identify and Manage Fall Risk  Recent Flowsheet Documentation  Taken 11/7/2023 1053 by Maritza Naranjo RN  Safety Promotion/Fall Prevention:   nonskid shoes/slippers when out of bed   safety round/check completed  Taken 11/7/2023 0900 by Maritza Naranjo RN  Safety Promotion/Fall Prevention:   nonskid shoes/slippers when out of bed   safety round/check completed  Taken 11/7/2023 0715 by Maritza Naranjo RN  Safety Promotion/Fall Prevention:   assistive device/personal items within reach   clutter free environment maintained   fall prevention program maintained   lighting adjusted   nonskid shoes/slippers when out of bed   room organization consistent   safety round/check completed  Intervention: Prevent and Manage VTE (Venous Thromboembolism) Risk  Recent Flowsheet Documentation  Taken 11/7/2023 0715 by Maritza Naranjo RN  Range of Motion: active ROM (range of motion) encouraged  Intervention: Prevent Infection  Recent Flowsheet Documentation  Taken 11/7/2023 0715 by Maritza Naranjo RN  Infection Prevention:   cohorting utilized   environmental surveillance performed   equipment surfaces disinfected   hand hygiene promoted   personal protective equipment utilized   rest/sleep promoted   single patient room provided  Goal: Optimal Comfort and Wellbeing  Outcome: Met  Intervention: Provide Person-Centered Care  Recent Flowsheet Documentation  Taken 11/7/2023 0715 by Maritza Naranjo RN  Trust Relationship/Rapport:   care explained   choices provided   emotional support provided   empathic listening provided   questions answered    questions encouraged   reassurance provided   thoughts/feelings acknowledged  Goal: Readiness for Transition of Care  Outcome: Met     Problem: COPD (Chronic Obstructive Pulmonary Disease) Comorbidity  Goal: Maintenance of COPD Symptom Control  Outcome: Met  Intervention: Maintain COPD-Symptom Control  Recent Flowsheet Documentation  Taken 11/7/2023 0715 by Maritza Naranjo RN  Medication Review/Management:   medications reviewed   high-risk medications identified     Problem: Noninvasive Ventilation Acute  Goal: Effective Unassisted Ventilation and Oxygenation  Outcome: Met     Problem: Fall Injury Risk  Goal: Absence of Fall and Fall-Related Injury  Outcome: Met  Intervention: Identify and Manage Contributors  Recent Flowsheet Documentation  Taken 11/7/2023 0715 by Maritza Naranjo RN  Medication Review/Management:   medications reviewed   high-risk medications identified  Intervention: Promote Injury-Free Environment  Recent Flowsheet Documentation  Taken 11/7/2023 1053 by Maritza Naranjo RN  Safety Promotion/Fall Prevention:   nonskid shoes/slippers when out of bed   safety round/check completed  Taken 11/7/2023 0900 by Maritza Naranjo RN  Safety Promotion/Fall Prevention:   nonskid shoes/slippers when out of bed   safety round/check completed  Taken 11/7/2023 0715 by Maritza Naranjo RN  Safety Promotion/Fall Prevention:   assistive device/personal items within reach   clutter free environment maintained   fall prevention program maintained   lighting adjusted   nonskid shoes/slippers when out of bed   room organization consistent   safety round/check completed     Problem: Skin Injury Risk Increased  Goal: Skin Health and Integrity  Outcome: Met  Intervention: Optimize Skin Protection  Recent Flowsheet Documentation  Taken 11/7/2023 1053 by Maritza Naranjo RN  Head of Bed (HOB) Positioning: HOB elevated  Taken 11/7/2023 0900 by Maritza Naranjo RN  Head of Bed (HOB) Positioning: HOB elevated   Goal Outcome  Evaluation:  Plan of Care Reviewed With: patient        Progress: no change  Outcome Evaluation: Patient being discharged and readmited to 4NT.

## 2023-11-08 NOTE — H&P
[facility]  [facility]: [facility]   Patient Name: Pavel Dai  : 1955  MRN: [id number]  Primary Care Physician:  Yoel Geller MD  Date of admission: 2023    Subjective   Subjective     Chief Complaint: Hospice Care     HPI:    Pavel Dai is a 68 y.o. male with history of COPD medical history of COPD (fev1~12% on outpt testing) on 3 L home oxygen, MART on CPAP, CHF who presented with worsening shortness of breath found to have acute hypoxic hypercapnic respiratory failure requiring continuous BiPAP.  Also with FABIAN and significantly elevated transaminitis on admission. Patient had issues with his home CPAP machine not functioning correctly.  Patient also has a history of Pseudomonas pneumonia who was supposed to be on tobramycin nebulizations outpatient but has not been able to initiate these/they have not approved by insurance.  Pseudomonas positive here, started on cefepime.  Bronchoscopy completed on 10/31/2023.  Given patient's end-stage COPD he met with palliative care and ultimately hospice.    Patient transitioned to inpatient hospice care.     Personal History     Past Medical History:  Past Medical History:   Diagnosis Date    Anemia due to chemotherapy 2021    Cancer related pain 2022    CHF (congestive heart failure)     COPD (chronic obstructive pulmonary disease)     Coronary artery disease     Frequent urination     Hyperlipidemia     Hypertension     Iron deficiency anemia due to chronic blood loss 2021    Rectal cancer        Past Surgical History:  Past Surgical History:   Procedure Laterality Date    BRONCHOSCOPY N/A 2023    Procedure: BRONCHOSCOPY WITH BAL AND WASHING;  Surgeon: Ted Petty MD;  Location: [facility] ENDOSCOPY;  Service: Pulmonary;  Laterality: N/A;  MUCUS PLUGGING    BRONCHOSCOPY N/A 10/31/2023    Procedure: BRONCHOSCOPY WITH BAL AND WASHINGS;  Surgeon: Nish Schaefer MD;  Location: [facility]  ENDOSCOPY;  Service: Pulmonary;  Laterality: N/A;  MUCOUS PLUGGING    COLONOSCOPY      HERNIA REPAIR      approximately 4 years ago     RECTAL SURGERY      SHOULDER SURGERY      joint loose, calcium particles removed from shoulder joint        Family History:   Family History   Problem Relation Age of Onset    Heart attack Mother     Heart attack Sister     Heart attack Sister         Social History:   Social History     Socioeconomic History    Marital status:    Tobacco Use    Smoking status: Former     Types: Cigarettes     Start date: 11/3/1973     Quit date: 11/3/2014     Years since quittin.0    Smokeless tobacco: Never   Vaping Use    Vaping Use: Never used   Substance and Sexual Activity    Alcohol use: Not Currently    Drug use: Never    Sexual activity: Defer        Home Medications:  Fluticasone-Salmeterol, albuterol, aspirin, atorvastatin, guaiFENesin, ipratropium-albuterol, loratadine, metoprolol succinate XL, multivitamin with minerals, omeprazole, roflumilast, and tobramycin PF    Allergies:  No Known Allergies    Review of Systems  Denies HA, chest pain, SOB, abdominal pain, or dysuria    Objective   Objective     Vitals:   Temp:  [97.9 °F (36.6 °C)-98.6 °F (37 °C)] 97.9 °F (36.6 °C)  Heart Rate:  [] 51  Resp:  [16-18] 16  BP: (125-138)/(62-75) 125/75  Flow (L/min):  [3] 3    Physical Exam   Gen: NAD, Alert  Cards: RRR, no murmur   Pulm: CTA b/l, no wheezing  Abd: soft, nondistended  Extremities: no pitting edema    Result Review    Result Review:  I have personally reviewed the results from the time of this admission to 2023 08:46 EST and agree with these findings:  [x]  Laboratory  [x]  Microbiology  []  Radiology  []  EKG/Telemetry   []  Cardiology/Vascular   []  Pathology  []  Old records  []  Other:      Assessment & Plan   Assessment / Plan     Assessment:  Comfort measures  End-stage COPD  acute on chronic hypoxic hypercapnic respiratory failure  Pseudomonal  pneumonia      Plan:   Continue comfort measures  He is receiving as needed IV morphine.  He is able to swallow, will start him on Norco p.o. 5 mg as needed and sublingual morphine as needed.  Palliative care following  Bowel regiment  O2 stable      DVT prophylaxis:  Mechanical DVT prophylaxis orders are present.    CODE STATUS:    Code Status (Patient has no pulse and is not breathing): No CPR (Do Not Attempt to Resuscitate)  Medical Interventions (Patient has pulse or is breathing): Comfort Measures          Electronically signed by Mignon Lyles DO, 11/08/23, 8:46 AM EST.

## 2023-11-08 NOTE — NURSING NOTE
Patient continues to be located on 4nt, in a hospice inpatient bed for symptom management of pain and shortness of air.    His symptoms are being managed at this time with his current medications.  He has IV morphine 2mg q 1 hour prn and his last dose was today at 0412.  He has oxygen via nasal cannula at 3 liters and his respirations are slightly labored at rest and with conversation.  He has a hernandez to bedside drain with pink tinged urine noted in the tubing, his output is steadily decreasing.  His bowel movements are slowing down.  Requested a bowel regimen due to pain medications to prevent constipation.     He was complaining of a headache during the visit.  NAS Mcwilliams notified and will medicate.  Family at bedside and denied any needs at this time.      Palliative care will continue to provide support and assistance.    Svetlana IRENE, RN, CHPN

## 2023-11-08 NOTE — NURSING NOTE
"Griffin Freeman visited with patient today, she reported;     \"symptoms are adequately managed on the current medication regimen.  He denied any needs or concerns.  Will continue to see the patient daily.\"    Svetlana IRENE, RN, CHPN        "

## 2023-11-08 NOTE — PLAN OF CARE
Goal Outcome Evaluation:               Patient alert and oriented x4. Up to BSC x1 assist. Pt remains on 3L nasal cannula. Pt medicated per orders. Medicated for c/o pain this shift. No new issues/needs at this time.

## 2023-11-09 NOTE — PLAN OF CARE
Goal Outcome Evaluation:            Pt on comfort measures only this shift. Pt was alert and oriented until later in the night and is currently responding to voice only. Administered prn comfort meds as ordered. Pt is resting comfortably at this time. No new issues or new needs noted at this time.

## 2023-11-09 NOTE — DISCHARGE SUMMARY
[facility]         HOSPITALIST  DISCHARGE SUMMARY    Patient Name: Pavel Dai  : 1955  MRN: [id number]    Date of Admission: 2023  Date of Death:  2023  Time of Death: 1204 PM    Cause of Death: Acute hypoxic respiratory failure, Pseudomonas pneumonia, COPD      Consults       Date and Time Order Name Status Description    2023 11:32 AM Inpatient Hospitalist Consult      10/28/2023  8:38 AM Inpatient Pulmonology Consult Completed             Active and Resolved Hospital Problems:  Active Hospital Problems    Diagnosis POA    **End of life care [Z51.5] Not Applicable      Resolved Hospital Problems   No resolved problems to display.       Hospital Course     Hospital Course:  Pavel Dai is a 68 y.o. male with history of COPD (fev1~12% on outpt testing) on 3 L home oxygen, MART on CPAP, CHF who presented with worsening shortness of breath found to have acute hypoxic hypercapnic respiratory failure requiring continuous BiPAP.  Also with FABIAN and significantly elevated transaminitis on admission. Patient had issues with his home CPAP machine not functioning correctly.  Patient also has a history of Pseudomonas pneumonia who was supposed to be on tobramycin nebulizations outpatient but has not been able to initiate these/they have not approved by insurance.  Pseudomonas positive here, started on cefepime.  Bronchoscopy completed on 10/31/2023.  Given patient's end-stage COPD he met with palliative care and ultimately decided to transition to hospice.    Patient was transitioned to inpatient hospice care. He was receiving comfort medications with morphine and  ativan. He started declining overnight of 23. He was no longer alert or awake the morning of . His son was at bedside. Patient passed at 1205 PM on 23.         Day of Discharge     Vital Signs:  Temp:  [97.7 °F (36.5 °C)-98.2 °F (36.8 °C)] 98.2 °F (36.8 °C)  Heart Rate:  [] 139  Resp:   [20-30] 30  BP: (132-143)/(64-67) 132/64  Flow (L/min):  [3] 3    Physical Exam:   Gen: not alert or responsive  Cards: regular rhythm   Pulm: tachypnea  Extremities: no pitting edema      Discharge Details         CODE STATUS:  Code Status and Medical Interventions:   Ordered at: 11/08/23 0503     Code Status (Patient has no pulse and is not breathing):    No CPR (Do Not Attempt to Resuscitate)     Medical Interventions (Patient has pulse or is breathing):    Comfort Measures         Pertinent  and/or Most Recent Results                         Brief Urine Lab Results  (Last result in the past 365 days)        Color   Clarity   Blood   Leuk Est   Nitrite   Protein   CREAT   Urine HCG        07/24/23 1339 Yellow   Clear   Trace   Negative   Negative   Negative                 Microbiology Results (last 10 days)       Procedure Component Value - Date/Time    Fungus Culture - Wash, Bronchus [[id number]]  (Abnormal) Collected: 10/31/23 0950    Lab Status: Preliminary result Specimen: Wash from Bronchus Updated: 11/08/23 0856     Fungus Culture Light growth (2+) Candida albicans    AFB Culture - Wash, Bronchus [[id number]] Collected: 10/31/23 0950    Lab Status: Preliminary result Specimen: Wash from Bronchus Updated: 11/07/23 0930     AFB Culture No AFB isolated at 1 week     AFB Stain No acid fast bacilli seen on direct smear      No acid fast bacilli seen on concentrated smear    Respiratory Culture - Wash, Bronchus [[id number]]  (Abnormal)  (Susceptibility) Collected: 10/31/23 0950    Lab Status: Final result Specimen: Wash from Bronchus Updated: 11/03/23 0940     Respiratory Culture Scant growth (1+) Pseudomonas aeruginosa      Rare Candida albicans     Gram Stain Few (2+) WBCs seen      No organisms seen    Susceptibility        Pseudomonas aeruginosa      PILAR      Cefepime Susceptible      Ceftazidime Susceptible      Ciprofloxacin Susceptible      Gentamicin Susceptible      Levofloxacin Susceptible       Piperacillin + Tazobactam Susceptible                           Fungus Culture - Lavage, Lung, Left Lower Lobe [[id number]] Collected: 10/31/23 0947    Lab Status: Preliminary result Specimen: Lavage from Lung, Left Lower Lobe Updated: 11/07/23 0930     Fungus Culture No fungus isolated at 1 week    AFB Culture - Lavage, Lung, Left Lower Lobe [[id number]] Collected: 10/31/23 0947    Lab Status: Preliminary result Specimen: Lavage from Lung, Left Lower Lobe Updated: 11/07/23 0930     AFB Culture No AFB isolated at 1 week     AFB Stain No acid fast bacilli seen on direct smear      No acid fast bacilli seen on concentrated smear    BAL Culture, Quantitative - Lavage, Lung, Left Lower Lobe [[id number]]  (Abnormal) Collected: 10/31/23 0947    Lab Status: Final result Specimen: Lavage from Lung, Left Lower Lobe Updated: 11/03/23 0941     BAL Culture <10,000 CFU/mL Pseudomonas aeruginosa      No Normal Respiratory Magi     Gram Stain No organisms seen      Rare (1+) WBCs seen    Pneumonia Panel - Lavage, Lung, Left Lower Lobe [[id number]]  (Abnormal) Collected: 10/31/23 0947    Lab Status: Final result Specimen: Lavage from Lung, Left Lower Lobe Updated: 10/31/23 1140     Escherichia coli PCR Not Detected     Acinetobacter calcoaceticus-baumannii complex PCR Not Detected     Enterobacter cloacae PCR Not Detected     Klebsiella oxytoca PCR Not Detected     Klebsiella pneumoniae group PCR Not Detected     Klebsiella aerogenes PCR Not Detected     Moraxella catarrhalis PCR Not Detected     Proteus species PCR Not Detected     Pseudomonas aeroginosa PCR Detected     Comment: 10^5 Bin copies/mL        Serratia marcescens PCR Not Detected     Staphylococcus aureus PCR Not Detected     Streptococcus pyogenes PCR Not Detected     Haemophilus influenzae PCR Not Detected     Streptococcus agalactiae PCR Not Detected     Streptococcus pneumoniae PCR Not Detected     Chlamydophila pneumoniae PCR Not Detected     Legionella pneumophilia  PCR Not Detected     Mycoplasma pneumo by PCR Not Detected     ADENOVIRUS, PCR Not Detected     CTX-M Gene Not Detected     IMP Gene Not Detected     KPC Gene Not Detected     mecA/C and MREJ Gene N/A     NDM Gene Not Detected     OXA-48-like Gene N/A     VIM Gene Not Detected     Coronavirus Not Detected     Human Metapneumovirus Not Detected     Human Rhinovirus/Enterovirus Not Detected     Influenza A PCR Not Detected     Influenza B PCR Not Detected     RSV, PCR Not Detected     Parainfluenza virus PCR Not Detected            No radiology results for the last 7 days             Time spent on Discharge including face to face service:  >30 minutes    Electronically signed by Mignon Lyles DO, 11/09/23, 12:59 PM EST.

## 2023-11-09 NOTE — NURSING NOTE
Patient continues to be located on 4nt, in a hospice in patient bed for symptom management.  At 0913 am Hospice RN Janet reports as follows:   Patient's current PPS is 10%,  he is comfortable, Bedside RN Anupama has been medicating per MAR.  She was going to reach out to the son to discuss patient's condition and prognosis.      Met with patient and his family.  Patient has had a significant decline since yesterday.  Patient is unresponsive, his respirations are shallow and unlabored, he is on oxygen at 3 liters via nasal cannula and his oxygen saturation was 73% this am at 0730.  He is mottling and his feet and legs are cool to touch.      Family is satisfied with patient's symptom management and comfort.  Educated on his prognosis of hours to a day and what to expect as he declines.  They verbalized understanding.  They reported wanting [facility] in [place], [place] to provide services for the patient.  Offered to call the  and they stated they were going to call their local .  Encouraged everyone visit that wants to see him and they are awaiting a few more family to visit today.  Reviewed nonverbal signs of pain and when to notify RN.     No other needs noted at this time.  Collaborated with Primary Rn and will continue to provide support and assistance.      Svetlana IRENE, RN, CHPN

## 2023-11-14 LAB
FUNGUS WND CULT: NORMAL
MYCOBACTERIUM SPEC CULT: NORMAL
MYCOBACTERIUM SPEC CULT: NORMAL
NIGHT BLUE STAIN TISS: NORMAL

## 2023-11-21 LAB
MYCOBACTERIUM SPEC CULT: NORMAL
MYCOBACTERIUM SPEC CULT: NORMAL
NIGHT BLUE STAIN TISS: NORMAL

## 2023-11-22 LAB — FUNGUS WND CULT: ABNORMAL

## 2023-11-28 LAB
FUNGUS WND CULT: ABNORMAL
FUNGUS WND CULT: ABNORMAL
MYCOBACTERIUM SPEC CULT: NORMAL
MYCOBACTERIUM SPEC CULT: NORMAL
NIGHT BLUE STAIN TISS: NORMAL

## 2023-11-29 LAB — FUNGUS WND CULT: ABNORMAL

## 2023-12-05 LAB
MYCOBACTERIUM SPEC CULT: NORMAL
MYCOBACTERIUM SPEC CULT: NORMAL
NIGHT BLUE STAIN TISS: NORMAL

## 2023-12-12 LAB
MYCOBACTERIUM SPEC CULT: NORMAL
MYCOBACTERIUM SPEC CULT: NORMAL
NIGHT BLUE STAIN TISS: NORMAL

## 2025-03-13 NOTE — PLAN OF CARE
Goal Outcome Evaluation:                         The site was marked. Prepped: right groin. Prepped with: ChloraPrep. The site was clipped. The patient was draped.

## 2025-06-02 NOTE — PROGRESS NOTES
Chief Complaint  Rectal Cancer    Yoel Geller MD    Subjective          Pavel Dai presents to [facility] for follow-up of rectal cancer.  He is status post concurrent chemo RT.  Patient states that he has been doing well.  He has chronic COPD and is oxygen dependent.  His breathing limits his activities.  He reports normal bowel movements without blood, melena or pain with bowel movement.  He denies any masses or adenopathy.  No unusual aches or pains.  He is eating adequately.  He was seen recently by radiation oncology.  Dr. Lucio's records reviewed.    Oncology/Hematology History Overview Note   9/2021 (A) Rectal mass, transanal disk excision:     - Moderately differentiated invasive adenocarcinoma with focal superficial   invasion of  muscularis propria.  - Tumor present at 3 o'clock mucosal/peripheral margin    (B) Rectum, right margin, excision:    - Fragment of colon, negative for carcinoma    Surgery:  9/22/21 Transanal excision of the Rectal mass performed at [facility]. This demonstrated the high-grade dysplasia but also moderately differentiated adenocarcinoma with superficial invasion of the muscularis propria (T2), peripheral margin was positive.  Lymph vascular invasion identified.    Chemotherapy & Radiation:  11/22/21 neoadjuvant chemo RT with radiation x 28 fractions and capecitabine     Rectal cancer   11/3/2021 Initial Diagnosis    Rectal cancer (HCC)     11/3/2021 Cancer Staged    Staging form: Colon And Rectum, AJCC 8th Edition  - Clinical: Stage I (cT2, cN0, cM0) - Signed by Issac Walker MD on 11/3/2021     12/13/2021 - 12/13/2021 Chemotherapy    OP rectal Capecitabine 825 mg/m2 M-F With Concurrent Radiation      Malignant neoplasm of rectum (Resolved)   11/9/2021 Initial Diagnosis    Malignant neoplasm of rectum (HCC)     11/9/2021 - 11/29/2021 Radiation    RADIATION THERAPY Treatment Details (11/9/2021 -  Patient given walker and patient's home Testosterone medications. He is in room awaiting [facility] pickup.    11/29/2021)  Site: Rectum  Technique: IMRT  Goal: No goal specified  Planned Treatment Start Date: No planned start date specified         Review of Systems   Constitutional: Negative for appetite change, diaphoresis, fatigue, fever, unexpected weight gain and unexpected weight loss.   HENT: Negative for hearing loss, mouth sores, sore throat, swollen glands, trouble swallowing and voice change.    Eyes: Negative for blurred vision.   Respiratory: Negative for cough, shortness of breath and wheezing.    Cardiovascular: Negative for chest pain and palpitations.   Gastrointestinal: Negative for abdominal pain, blood in stool, constipation, diarrhea, nausea and vomiting.   Endocrine: Negative for cold intolerance and heat intolerance.   Genitourinary: Negative for difficulty urinating, dysuria, frequency, hematuria and urinary incontinence.   Musculoskeletal: Negative for arthralgias, back pain and myalgias.   Skin: Negative for rash, skin lesions and wound.   Neurological: Negative for dizziness, seizures, weakness, numbness and headache.   Hematological: Does not bruise/bleed easily.   Psychiatric/Behavioral: Negative for depressed mood. The patient is not nervous/anxious.      Current Outpatient Medications on File Prior to Visit   Medication Sig Dispense Refill   • albuterol (PROVENTIL) (2.5 MG/3ML) 0.083% nebulizer solution Take 2.5 mg by nebulization Every 8 (Eight) Hours As Needed for Wheezing or Shortness of Air.     • aspirin 81 MG chewable tablet Chew 1 tablet Daily.     • atorvastatin (LIPITOR) 10 MG tablet Take 1 tablet by mouth Every Night.     • Fluticasone-Salmeterol (ADVAIR/WIXELA) 100-50 MCG/ACT DISKUS Every 12 (Twelve) Hours.     • guaiFENesin (MUCINEX) 600 MG 12 hr tablet Take 1 tablet by mouth 2 (Two) Times a Day As Needed for Cough or Congestion.     • ipratropium-albuterol (COMBIVENT RESPIMAT)  MCG/ACT inhaler Inhale 1 puff 4 (Four) Times a Day.     • loratadine (CLARITIN) 10 MG tablet Take  1 tablet by mouth Daily.     • metoprolol tartrate (LOPRESSOR) 50 MG tablet Take 1 tablet by mouth 2 (Two) Times a Day.     • multivitamin with minerals tablet tablet Take 1 tablet by mouth Daily.     • omeprazole (priLOSEC) 20 MG capsule Take 1 capsule by mouth Daily.     • roflumilast (DALIRESP) 500 MCG tablet tablet Take 1 tablet by mouth Daily.       No current facility-administered medications on file prior to visit.       No Known Allergies  Past Medical History:   Diagnosis Date   • Anemia due to chemotherapy 2021   • Cancer related pain 1/3/2022   • CHF (congestive heart failure)    • COPD (chronic obstructive pulmonary disease)    • Coronary artery disease    • Frequent urination    • Hyperlipidemia    • Hypertension    • Iron deficiency anemia due to chronic blood loss 2021   • Rectal cancer      Past Surgical History:   Procedure Laterality Date   • COLONOSCOPY     • HERNIA REPAIR      approximately 4 years ago    • RECTAL SURGERY     • SHOULDER SURGERY      joint loose, calcium particles removed from shoulder joint     Social History     Socioeconomic History   • Marital status:    Tobacco Use   • Smoking status: Former     Types: Cigarettes     Start date: 11/3/1973     Quit date: 11/3/2014     Years since quittin.4   • Smokeless tobacco: Never   Vaping Use   • Vaping Use: Never used   Substance and Sexual Activity   • Alcohol use: Not Currently   • Drug use: Never   • Sexual activity: Defer     Family History   Problem Relation Age of Onset   • Heart attack Mother    • Heart attack Sister    • Heart attack Sister        Objective   Physical Exam  Vitals reviewed. Exam conducted with a chaperone present.   Constitutional:       General: He is not in acute distress.     Appearance: Normal appearance.   HENT:      Nose:      Comments: Nasal cannula in place    Cardiovascular:      Rate and Rhythm: Normal rate and regular rhythm.      Heart sounds: Normal heart sounds. No murmur  heard.    No gallop.   Pulmonary:      Effort: Pulmonary effort is normal.      Breath sounds: Normal breath sounds.   Abdominal:      General: Abdomen is flat. Bowel sounds are normal.      Palpations: Abdomen is soft.   Musculoskeletal:      Cervical back: Neck supple.      Right lower leg: No edema.      Left lower leg: No edema.   Lymphadenopathy:      Cervical: No cervical adenopathy.   Neurological:      Mental Status: He is alert and oriented to person, place, and time.   Psychiatric:         Mood and Affect: Mood normal.         Behavior: Behavior normal.         Vitals:    04/25/23 1343   BP: 127/71   Pulse: 88   Resp: 20   Temp: 98.8 °F (37.1 °C)   TempSrc: Temporal   SpO2: 90%  Comment: 3 l   Weight: 55.9 kg (123 lb 3.8 oz)   PainSc: 0-No pain     ECOG score: 2         PHQ-9 Total Score:                    Result Review :   The following data was reviewed by: Issac Walker MD on 04/25/2023:  Lab Results   Component Value Date    HGB 12.8 (L) 04/21/2023    HCT 40.8 04/21/2023    MCV 93.8 04/21/2023     04/21/2023    WBC 7.62 04/21/2023    NEUTROABS 6.21 04/21/2023    LYMPHSABS 0.55 (L) 04/21/2023    MONOSABS 0.52 04/21/2023    EOSABS 0.26 04/21/2023    BASOSABS 0.05 04/21/2023     Lab Results   Component Value Date    GLUCOSE 87 04/21/2023    BUN 15 04/21/2023    CREATININE 0.61 (L) 04/21/2023     04/21/2023    K 4.2 04/21/2023    CL 99 04/21/2023    CO2 33.0 (H) 04/21/2023    CALCIUM 9.3 04/21/2023    PROTEINTOT 7.7 04/21/2023    ALBUMIN 3.8 04/21/2023    BILITOT 0.3 04/21/2023    ALKPHOS 65 04/21/2023    AST 17 04/21/2023    ALT 13 04/21/2023     Lab Results   Component Value Date    MG 2.0 08/16/2022    PHOS 3.4 08/16/2022     Lab Results   Component Value Date    IRON 77 04/21/2023    LABIRON 24 04/21/2023    TRANSFERRIN 213 04/21/2023    TIBC 317 04/21/2023     Lab Results   Component Value Date    FERRITIN 207.90 04/21/2023     Lab Results   Component Value Date    CEA 2.44  01/11/2023             Assessment and Plan    Diagnoses and all orders for this visit:    1. Rectal cancer (Primary)  Assessment & Plan:  Patient is doing well.  I see no evidence of disease recurrence by his history, physical examination, lab work or recent CT scans which were reviewed.  I will see him back in 6 months for continued surveillance with lab work prior including CEA tumor marker.  He will follow-up with radiation oncology as scheduled.    Orders:  -     CBC & Differential; Future  -     Comprehensive Metabolic Panel; Future  -     CEA; Future    2. Iron deficiency anemia due to chronic blood loss  Assessment & Plan:  Lab work reviewed today demonstrating normal ferritin and iron profile.  Repeat next visit to ensure stability    Orders:  -     Ferritin; Future  -     Iron Profile; Future          Patient Follow Up: 6 months    Patient was given instructions and counseling regarding his condition or for health maintenance advice. Please see specific information pulled into the AVS if appropriate.     Issac Walker MD    4/25/2023

## (undated) DEVICE — SINGLE USE SUCTION VALVE MAJ-209: Brand: SINGLE USE SUCTION VALVE (STERILE)

## (undated) DEVICE — CONN JET HYDRA H20 AUXILIARY DISP

## (undated) DEVICE — SYR LUER SLPTP 50ML

## (undated) DEVICE — BLCK/BITE BLOX WO/DENTL/RIM W/STRAP 54F

## (undated) DEVICE — TRAP,MUCUS SPECIMEN,40CC: Brand: MEDLINE

## (undated) DEVICE — LINER SURG CANSTR SXN S/RIGD 1500CC

## (undated) DEVICE — Device

## (undated) DEVICE — CUP SPECI 4OZ LF STRL

## (undated) DEVICE — SINGLE USE BIOPSY VALVE MAJ-210: Brand: SINGLE USE BIOPSY VALVE (STERILE)

## (undated) DEVICE — DEV ATOMIZATION MUCOSAL/NASALTRACH

## (undated) DEVICE — SOLIDIFIER LIQLOC PLS 1500CC BT